# Patient Record
Sex: FEMALE | Race: WHITE | Employment: FULL TIME | URBAN - METROPOLITAN AREA
[De-identification: names, ages, dates, MRNs, and addresses within clinical notes are randomized per-mention and may not be internally consistent; named-entity substitution may affect disease eponyms.]

---

## 2017-05-25 ENCOUNTER — OFFICE VISIT (OUTPATIENT)
Dept: ORTHOPEDIC SURGERY | Age: 57
End: 2017-05-25

## 2017-05-25 VITALS
HEIGHT: 61 IN | WEIGHT: 168.2 LBS | HEART RATE: 61 BPM | SYSTOLIC BLOOD PRESSURE: 162 MMHG | DIASTOLIC BLOOD PRESSURE: 73 MMHG | TEMPERATURE: 97.8 F | BODY MASS INDEX: 31.75 KG/M2

## 2017-05-25 DIAGNOSIS — M70.61 TROCHANTERIC BURSITIS OF RIGHT HIP: ICD-10-CM

## 2017-05-25 DIAGNOSIS — M25.551 ACUTE RIGHT HIP PAIN: ICD-10-CM

## 2017-05-25 DIAGNOSIS — Z96.651 HISTORY OF TOTAL RIGHT KNEE REPLACEMENT: Primary | ICD-10-CM

## 2017-05-25 RX ORDER — BETAMETHASONE SODIUM PHOSPHATE AND BETAMETHASONE ACETATE 3; 3 MG/ML; MG/ML
6 INJECTION, SUSPENSION INTRA-ARTICULAR; INTRALESIONAL; INTRAMUSCULAR; SOFT TISSUE ONCE
Qty: 1 ML | Refills: 0
Start: 2017-05-25 | End: 2017-05-25

## 2017-05-25 RX ORDER — TRAMADOL HYDROCHLORIDE 50 MG/1
TABLET ORAL
Refills: 0 | COMMUNITY
Start: 2017-04-18 | End: 2017-12-23

## 2017-05-25 NOTE — MR AVS SNAPSHOT
Visit Information Date & Time Provider Department Dept. Phone Encounter #  
 5/25/2017  2:00 PM Ayala Monzon MD [facility] [phone] [id number] Upcoming Health Maintenance Date Due Hepatitis C Screening 1960 DTaP/Tdap/Td series (1 - Tdap) 2/23/1981 PAP AKA CERVICAL CYTOLOGY 2/23/1981 BREAST CANCER SCRN MAMMOGRAM 2/23/2010 FOBT Q 1 YEAR AGE 50-75 2/23/2010 INFLUENZA AGE 9 TO ADULT 8/1/2017 Allergies as of 5/25/2017  Review Complete On: 5/25/2017 By: Ayala Monzon MD  
 No Known Allergies Current Immunizations  Never Reviewed Name Date Pneumococcal Polysaccharide (PPSV-23) 11/12/2015 11:18 AM  
  
 Not reviewed this visit You Were Diagnosed With   
  
 Codes Comments History of total right knee replacement    -  Primary ICD-10-CM: U83.333 ICD-9-CM: V43.65 Acute right hip pain     ICD-10-CM: M25.551 ICD-9-CM: 719.45 Trochanteric bursitis of right hip     ICD-10-CM: M70.61 ICD-9-CM: 726.5 Vitals BP Pulse Temp Height(growth percentile) Weight(growth percentile) BMI  
 162/73 61 97.8 °F (36.6 °C) (Oral) 5' 1\" (1.549 m) 168 lb 3.2 oz (76.3 kg) 31.78 kg/m2 OB Status Smoking Status Hysterectomy Former Smoker BMI and BSA Data Body Mass Index Body Surface Area 31.78 kg/m 2 1.81 m 2 Preferred Pharmacy Pharmacy Name Phone [address], [address] [place] [address] [phone] Your Updated Medication List  
  
   
This list is accurate as of: 5/25/17  2:38 PM.  Always use your most recent med list.  
  
  
  
  
 acetaminophen 325 mg tablet Commonly known as:  TYLENOL Take 2 Tabs by mouth every four (4) hours as needed. ADVIL 100 mg tablet Generic drug:  ibuprofen Take 100 mg by mouth every six (6) hours as needed for Pain. ALBUTEROL IN Take  by inhalation. Emergency only multivitamin, tx-iron-ca-min 9 mg iron-400 mcg Tab tablet Commonly known as:  THERA-M w/ IRON Take 1 Tab by mouth daily. ondansetron 4 mg disintegrating tablet Commonly known as:  ZOFRAN ODT Take 1-2 Tabs by mouth every eight (8) hours as needed. oxyCODONE IR 5 mg immediate release tablet Commonly known as:  Ivan Longs Take 1-3 Tabs by mouth every four (4) hours as needed. Max Daily Amount: 90 mg.  
  
 traMADol 50 mg tablet Commonly known as:  ULTRAM  
TAKE 1 TABLET BY MOUTH TWO TIMES A DAY AS NEEDED  
  
 VITAMIN D3 1,000 unit Cap Generic drug:  cholecalciferol Take  by mouth daily. warfarin 2.5 mg tablet Commonly known as:  COUMADIN Take 1 Tab by mouth daily. GOAL INR IS 2.0-2.5. THE DOSE WILL BE ADJUSTED BASE ON THE LEVEL OF BLOOD THINNING. DURATION OF THERAPY IS 28 DAYS. We Performed the Following AMB POC X-RAY KNEE 3 VIEW [53360 CPT(R)] AMB POC X-RAY RADEX HIP UNI WITH PELVIS 2-3 VIEWS [43022 CPT(R)] Patient Instructions Patient's blood pressure was elevated at today's office visit. Patient instructed to contact  primary care physician for treatment. The doctor has ordered some laboratory studies for you. Please go to [facility] or [facility] to have your lab tests conducted. If you wish to go to another facility that is okay. Please have the lab forward a copy of the results to our office. Total Knee Replacement Rehabilitation What is knee replacement rehabilitation? Knee replacement rehabilitation (rehab) is a series of exercises you do after your surgery. This helps you get back your knee's range of motion and strength. You will work with your doctor and physical therapist to plan this exercise program. To get the best results, you need to do the exercises correctly and as often and as long as your doctor or physical therapist tells you. Before you start any exercises, talk with your doctor or physical therapist. It is important that you know exactly how to do the exercises. Stop and call your doctor if you are not sure you are doing the exercises correctly or if you have any pain. Ice your knee after exercising if it is sore. Exercises after surgery These exercises can be done right after your surgery. You can do them in your hospital bed. Talk to your doctor before you start any of these exercises. He or she may tell you to do them in a certain way. You may need to do them several times each day. When you first do them, they may hurt. Your knee may click or pop. But these exercises will help you recover and may help your pain go away faster. [employer] · Lie or sit on your bed. · Tighten the thigh muscle of your affected leg. · Hold for about 6 seconds, then rest up to 10 seconds. · Relax your thigh and knee. · Do 8 to 10 repetitions several times during the day. Straight leg raises · Lie or sit on your bed. · Tighten the thigh muscle of your affected leg, and keep the knee straight. · Lift your leg several inches, and hold it for 5 to 10 seconds. · Slowly lower your leg. · Rest a minute. Repeat 8 to 12 times with each knee. Do this exercise every day, up to 3 times a day. Ankle pumps · Lie or sit on your bed. · Tighten the thigh and calf muscles of your affected leg. This will make your foot move up and down. · Do this for 2 to 3 minutes, 2 to 3 times an hour. Quadriceps (thigh) strengthening exercise · While sitting in a chair, straighten your leg and hold for 6 seconds. Then lower your leg and rest for up to 10 seconds. · Repeat 8 to 12 times with each leg. Do every day, up to 3 times a day. · When this thigh-strengthening exercise becomes easy, you can add a light weight to your ankle. Bed knee bends · Lie or sit on your bed. · Bend your affected knee by sliding your foot toward you.  Stop when your knee no longer bends. · Hold this position for 15 to 30 seconds, and then slide your leg back down the bed. · Do this several times. Later exercises When you are able to walk on your own for a few steps or a short distance, you can try these exercises. Talk to your doctor before starting any of these exercises. He or she may tell you to do them in a certain way. Standing knee bends · Stand up straight, using a walker or crutches or hold onto something stable, such as a counter. · Lift the thigh of your affected leg so that your knee bends. · Lift as far as you can, and hold it for 5 to 10 seconds. · Lower your leg, and touch the floor with your heel first. 
· Do this 8 to 12 times or until your leg feels tired. Assisted knee bends · Lie on your back with your affected knee slightly bent. · Loop a towel over your knee, and slide it down so that is against your ankle. · Continue to bend your knee, pulling your ankle gently toward you with the towel. · Bend your knee as far as you can. · Hold for 15 to 30 seconds, and then relax your leg. · Do this 10 to 12 times or until your leg feels tired. Adding resistance About 4 to 6 weeks after surgery, you may be able to do the standing or assisted knee bends with light weights around your ankles. Begin with 1- to 2-pound weights, and slowly increase the weight as your knee and leg get stronger. Check with your doctor before you make any changes to your exercises. Follow-up care is a key part of your treatment and safety. Be sure to make and go to all appointments, and call your doctor if you are having problems. It's also a good idea to know your test results and keep a list of the medicines you take. Where can you learn more? Go to http://messi-charmaine.info/. Enter X232 in the search box to learn more about \"Total Knee Replacement Rehabilitation. \" Current as of: May 23, 2016 Content Version: 11.2 © 1070-1596 [employer]wise, Incorporated. Care instructions adapted under license by [employer] (which disclaims liability or warranty for this information). If you have questions about a medical condition or this instruction, always ask your healthcare professional. [address] any warranty or liability for your use of this information. Introducing \[facility]\"" & HEALTH SERVICES! Antonieta Rivasvermaulik introduces [employer] patient portal. Now you can access parts of your medical record, email your doctor's office, and request medication refills online. 1. In your internet browser, go to [email or web address] 2. Click on the First Time User? Click Here link in the Sign In box. You will see the New Member Sign Up page. 3. Enter your [employer] Access Code exactly as it appears below. You will not need to use this code after youve completed the sign-up process. If you do not sign up before the expiration date, you must request a new code. · [employer] Access Code: [email or web address] Expires: 8/23/2017  2:38 PM 
 
4. Enter the last four digits of your Social Security Number (xxxx) and Date of Birth (mm/dd/yyyy) as indicated and click Submit. You will be taken to the next sign-up page. 5. Create a [employer] ID. This will be your [employer] login ID and cannot be changed, so think of one that is secure and easy to remember. 6. Create a [employer] password. You can change your password at any time. 7. Enter your Password Reset Question and Answer. This can be used at a later time if you forget your password. 8. Enter your e-mail address. You will receive e-mail notification when new information is available in [address] 9. Click Sign Up. You can now view and download portions of your medical record. 10. Click the Download Summary menu link to download a portable copy of your medical information.  
 
If you have questions, please visit the Frequently Asked Questions section of the [employer]. Remember, [employer] is NOT to be used for urgent needs. For medical emergencies, dial 911. Now available from your iPhone and Android! Please provide this summary of care documentation to your next provider. Your primary care clinician is listed as Rashid Montiel. If you have any questions after today's visit, please call [phone].

## 2017-05-25 NOTE — PROGRESS NOTES
Patient: Roger Regalado                MRN: [id number]       SSN: [id number]  YOB: 1960        AGE: 62 y.o. SEX: female  Body mass index is 31.78 kg/(m^2). PCP: Amy Jacques MD  05/25/17    HISTORY: Ms. Lupe Singh is here today with complaints of right hip pain and right knee pain. Dr. Zoe Begum replaced both of her knees. She has a Sigma on the left side and a DePuy Attune on the right side. She is complaining of right knee and right hip pain. She has a little bit of start-up pain when she first ambulates for the right knee. No complications after surgery. She has otherwise been feeling well. She denies fevers, chills, night sweats or weight loss. The hip hurts to roll over on at it at night. She occasionally has some low back pain but not too much today. She does not have much groin pain either. All systems are reviewed and are updated on the EMR. PHYSICAL EXAMINATION:  She is a pleasant lady with a BMI of 32. She moves the head and neck adequately. There is no respiratory compromise or indrawing. The hips rotate well. The low back is only minimally tender. She is tender over the greater trochanter. With regards to the knees, both knees have excellent range of motion. There is perhaps a touch of mid flexion instability involving both knees but not severely so. She has some tenderness at the medial joint line and also some mild tenderness over the pes anserinus. The patella seems to be tracking quite nicely. Both feet are warm and well perfused. She does have some evidence of neuropathy distally. No strabismus, lymphadenopathy, scleral icterus or JVD. No cyanosis, peripheral edema, or clubbing. She remains a very pleasant lady. RADIOGRAPHS:  Review of her x-rays shows that her implants are very nicely aligned. I am questioning just a very faint lucent line involving the right tibia medial cement mantle along the stem. This may just be artifact.  Otherwise, the implants appear to be very well aligned. IMPRESSION:  My overall impression is fairly severe trochanteric bursitis. She has excellent range of motion involving both knees but with some right knee pain, mild to moderate. PLAN:  I explained that her knees appear to be very nicely placed. We will obtain a technetium bone scan looking specifically at the tibia. We will get some blood work as well for her. She will return to see us after these investigations. REVIEW OF SYSTEMS:      CON: negative for weight loss, fever  EYE: negative for double vision  ENT: negative for hoarseness  RS:   negative for Tb  GI:    negative for blood in stool  :  negative for blood in urine  Other systems reviewed and noted below. Past Medical History:   Diagnosis Date    Arthritis     COPD     Hepatitis C     treated     Hypercholesterolemia     Hypertension     no meds     Sleep apnea     no CPAP        Family History   Problem Relation Age of Onset    High Cholesterol Mother     Heart Disease Father     High Cholesterol Father     Diabetes Sister     High Cholesterol Sister        Current Outpatient Prescriptions   Medication Sig Dispense Refill    ibuprofen (ADVIL) 100 mg tablet Take 100 mg by mouth every six (6) hours as needed for Pain.  traMADol (ULTRAM) 50 mg tablet TAKE 1 TABLET BY MOUTH TWO TIMES A DAY AS NEEDED  0    multivitamin, tx-iron-ca-min (THERA-M W/ IRON) 9 mg iron-400 mcg tab tablet Take 1 Tab by mouth daily.  ALBUTEROL IN Take  by inhalation. Emergency only      Cholecalciferol, Vitamin D3, (VITAMIN D3) 1,000 unit cap Take  by mouth daily.  ondansetron (ZOFRAN ODT) 4 mg disintegrating tablet Take 1-2 Tabs by mouth every eight (8) hours as needed. 15 Tab 0    oxyCODONE IR (ROXICODONE) 5 mg immediate release tablet Take 1-3 Tabs by mouth every four (4) hours as needed. Max Daily Amount: 90 mg. 60 Tab 0    warfarin (COUMADIN) 2.5 mg tablet Take 1 Tab by mouth daily.  GOAL INR IS 2.0-2.5. THE DOSE WILL BE ADJUSTED BASE ON THE LEVEL OF BLOOD THINNING. DURATION OF THERAPY IS 28 DAYS. 30 Tab 1    acetaminophen (TYLENOL) 325 mg tablet Take 2 Tabs by mouth every four (4) hours as needed. 40 Tab 0       No Known Allergies    Past Surgical History:   Procedure Laterality Date    HC STPLER HEMMORROID PPH01      HX  SECTION  1984    HX CHOLECYSTECTOMY  2005    HX HYSTERECTOMY  2008    HX KNEE ARTHROSCOPY  2009    Bilateral    HX TONSILLECTOMY  1968    OSSEOUS SURGERY PER QUADRANT         Social History     Social History    Marital status: SINGLE     Spouse name: N/A    Number of children: N/A    Years of education: N/A     Occupational History    Not on file. Social History Main Topics    Smoking status: Former Smoker     Packs/day: 1.00     Years: 40.00     Types: Cigarettes     Quit date: 10/21/2015    Smokeless tobacco: Never Used    Alcohol use No    Drug use: No    Sexual activity: Not on file     Other Topics Concern    Not on file     Social History Narrative       Visit Vitals    /73    Pulse 61    Temp 97.8 °F (36.6 °C) (Oral)    Ht 5' 1\" (1.549 m)    Wt 168 lb 3.2 oz (76.3 kg)    BMI 31.78 kg/m2         PHYSICAL EXAMINATION:  GENERAL: Alert and oriented x3, in no acute distress, well-developed, well-nourished, afebrile. HEART: No JVD. EYES: No scleral icterus   NECK: No significant lymphadenopathy   LUNGS: No respiratory compromise or indrawing  ABDOMEN: Soft, non-tender, non-distended. Electronically signed by:  Carlos Martínez MD

## 2017-05-25 NOTE — PATIENT INSTRUCTIONS
Patient's blood pressure was elevated at today's office visit. Patient instructed to contact  primary care physician for treatment. The doctor has ordered some laboratory studies for you. Please go to [facility] or [facility] to have your lab tests conducted. If you wish to go to another facility that is okay. Please have the lab forward a copy of the results to our office. Total Knee Replacement Rehabilitation  What is knee replacement rehabilitation? Knee replacement rehabilitation (rehab) is a series of exercises you do after your surgery. This helps you get back your knee's range of motion and strength. You will work with your doctor and physical therapist to plan this exercise program. To get the best results, you need to do the exercises correctly and as often and as long as your doctor or physical therapist tells you. Before you start any exercises, talk with your doctor or physical therapist. It is important that you know exactly how to do the exercises. Stop and call your doctor if you are not sure you are doing the exercises correctly or if you have any pain. Ice your knee after exercising if it is sore. Exercises after surgery  These exercises can be done right after your surgery. You can do them in your hospital bed. Talk to your doctor before you start any of these exercises. He or she may tell you to do them in a certain way. You may need to do them several times each day. When you first do them, they may hurt. Your knee may click or pop. But these exercises will help you recover and may help your pain go away faster. Quad sets  · Lie or sit on your bed. · Tighten the thigh muscle of your affected leg. · Hold for about 6 seconds, then rest up to 10 seconds. · Relax your thigh and knee. · Do 8 to 10 repetitions several times during the day. Straight leg raises  · Lie or sit on your bed.   · Tighten the thigh muscle of your affected leg, and keep the knee straight. · Lift your leg several inches, and hold it for 5 to 10 seconds. · Slowly lower your leg. · Rest a minute. Repeat 8 to 12 times with each knee. Do this exercise every day, up to 3 times a day. Ankle pumps  · Lie or sit on your bed. · Tighten the thigh and calf muscles of your affected leg. This will make your foot move up and down. · Do this for 2 to 3 minutes, 2 to 3 times an hour. Quadriceps (thigh) strengthening exercise   · While sitting in a chair, straighten your leg and hold for 6 seconds. Then lower your leg and rest for up to 10 seconds. · Repeat 8 to 12 times with each leg. Do every day, up to 3 times a day. · When this thigh-strengthening exercise becomes easy, you can add a light weight to your ankle. Bed knee bends  · Lie or sit on your bed. · Bend your affected knee by sliding your foot toward you. Stop when your knee no longer bends. · Hold this position for 15 to 30 seconds, and then slide your leg back down the bed. · Do this several times. Later exercises  When you are able to walk on your own for a few steps or a short distance, you can try these exercises. Talk to your doctor before starting any of these exercises. He or she may tell you to do them in a certain way. Standing knee bends  · Stand up straight, using a walker or crutches or hold onto something stable, such as a counter. · Lift the thigh of your affected leg so that your knee bends. · Lift as far as you can, and hold it for 5 to 10 seconds. · Lower your leg, and touch the floor with your heel first.  · Do this 8 to 12 times or until your leg feels tired. Assisted knee bends  · Lie on your back with your affected knee slightly bent. · Loop a towel over your knee, and slide it down so that is against your ankle. · Continue to bend your knee, pulling your ankle gently toward you with the towel. · Bend your knee as far as you can. · Hold for 15 to 30 seconds, and then relax your leg.   · Do this 10 to 12 times or until your leg feels tired. Adding resistance  About 4 to 6 weeks after surgery, you may be able to do the standing or assisted knee bends with light weights around your ankles. Begin with 1- to 2-pound weights, and slowly increase the weight as your knee and leg get stronger. Check with your doctor before you make any changes to your exercises. Follow-up care is a key part of your treatment and safety. Be sure to make and go to all appointments, and call your doctor if you are having problems. It's also a good idea to know your test results and keep a list of the medicines you take. Where can you learn more? Go to http://messi-charmaine.info/. Enter P486 in the search box to learn more about \"Total Knee Replacement Rehabilitation. \"  Current as of: May 23, 2016  Content Version: 11.2  © 4452-8613 [employer]. Care instructions adapted under license by [employer] (which disclaims liability or warranty for this information). If you have questions about a medical condition or this instruction, always ask your healthcare professional. [address] any warranty or liability for your use of this information.

## 2017-07-24 ENCOUNTER — HOSPITAL ENCOUNTER (OUTPATIENT)
Dept: NUCLEAR MEDICINE | Age: 57
Discharge: HOME | End: 2017-07-24
Attending: ORTHOPAEDIC SURGERY
Payer: COMMERCIAL

## 2017-07-24 ENCOUNTER — TELEPHONE (OUTPATIENT)
Dept: ORTHOPEDIC SURGERY | Facility: CLINIC | Age: 57
End: 2017-07-24

## 2017-07-24 ENCOUNTER — HOSPITAL ENCOUNTER (OUTPATIENT)
Dept: GENERAL RADIOLOGY | Age: 57
Discharge: HOME | End: 2017-07-24
Attending: ORTHOPAEDIC SURGERY
Payer: COMMERCIAL

## 2017-07-24 DIAGNOSIS — Z96.651 HISTORY OF TOTAL RIGHT KNEE REPLACEMENT: ICD-10-CM

## 2017-07-24 DIAGNOSIS — Z96.651 HISTORY OF TOTAL RIGHT KNEE REPLACEMENT: Primary | ICD-10-CM

## 2017-07-24 PROCEDURE — 78315 BONE IMAGING 3 PHASE: CPT

## 2017-07-24 PROCEDURE — 73562 X-RAY EXAM OF KNEE 3: CPT

## 2017-07-24 NOTE — TELEPHONE ENCOUNTER
LIBERTY FROM [facility] CALLED FOR DR. Aldair Avila. LIBERTY SAID SHE NEEDS A NEW ORDER FOR THE PATIENT TO GET AN XRAY ON HER RIGHT KNEE. Megha Griffin. [phone]. FAX# [phone].

## 2017-07-25 ENCOUNTER — HOSPITAL ENCOUNTER (OUTPATIENT)
Dept: NUCLEAR MEDICINE | Age: 57
Discharge: HOME | End: 2017-07-25
Attending: ORTHOPAEDIC SURGERY
Payer: COMMERCIAL

## 2017-07-25 PROCEDURE — 78805 NM INFLAM PROC LTD: CPT

## 2017-07-26 ENCOUNTER — HOSPITAL ENCOUNTER (OUTPATIENT)
Dept: NUCLEAR MEDICINE | Age: 57
Discharge: HOME | End: 2017-07-26
Attending: ORTHOPAEDIC SURGERY
Payer: COMMERCIAL

## 2017-07-26 DIAGNOSIS — Z96.651 HISTORY OF TOTAL RIGHT KNEE REPLACEMENT: ICD-10-CM

## 2017-07-26 PROCEDURE — 78102 BONE MARROW IMAGING LTD: CPT

## 2017-08-08 ENCOUNTER — HOSPITAL ENCOUNTER (OUTPATIENT)
Dept: LAB | Age: 57
Discharge: HOME | End: 2017-08-08
Payer: COMMERCIAL

## 2017-08-08 DIAGNOSIS — Z96.651 HISTORY OF TOTAL RIGHT KNEE REPLACEMENT: ICD-10-CM

## 2017-08-08 LAB
BASOPHILS # BLD AUTO: 0 K/UL (ref 0–0.1)
BASOPHILS # BLD: 0 % (ref 0–2)
DIFFERENTIAL METHOD BLD: ABNORMAL
EOSINOPHIL # BLD: 0 K/UL (ref 0–0.4)
EOSINOPHIL NFR BLD: 0 % (ref 0–5)
ERYTHROCYTE [DISTWIDTH] IN BLOOD BY AUTOMATED COUNT: 13.2 % (ref 11.6–14.5)
ERYTHROCYTE [SEDIMENTATION RATE] IN BLOOD: 4 MM/HR (ref 0–30)
HCT VFR BLD AUTO: 45.5 % (ref 35–45)
HGB BLD-MCNC: 15.5 G/DL (ref 12–16)
LYMPHOCYTES # BLD AUTO: 15 % (ref 21–52)
LYMPHOCYTES # BLD: 1.1 K/UL (ref 0.9–3.6)
MCH RBC QN AUTO: 29.7 PG (ref 24–34)
MCHC RBC AUTO-ENTMCNC: 34.1 G/DL (ref 31–37)
MCV RBC AUTO: 87.2 FL (ref 74–97)
MONOCYTES # BLD: 0.2 K/UL (ref 0.05–1.2)
MONOCYTES NFR BLD AUTO: 2 % (ref 3–10)
NEUTS SEG # BLD: 6 K/UL (ref 1.8–8)
NEUTS SEG NFR BLD AUTO: 83 % (ref 40–73)
PLATELET # BLD AUTO: 229 K/UL (ref 135–420)
PMV BLD AUTO: 10.2 FL (ref 9.2–11.8)
RBC # BLD AUTO: 5.22 M/UL (ref 4.2–5.3)
URATE SERPL-MCNC: 4.4 MG/DL (ref 2.6–7.2)
WBC # BLD AUTO: 7.3 K/UL (ref 4.6–13.2)

## 2017-08-08 PROCEDURE — 85652 RBC SED RATE AUTOMATED: CPT | Performed by: ORTHOPAEDIC SURGERY

## 2017-08-08 PROCEDURE — 84550 ASSAY OF BLOOD/URIC ACID: CPT | Performed by: ORTHOPAEDIC SURGERY

## 2017-08-08 PROCEDURE — 36415 COLL VENOUS BLD VENIPUNCTURE: CPT | Performed by: ORTHOPAEDIC SURGERY

## 2017-08-08 PROCEDURE — 85025 COMPLETE CBC W/AUTO DIFF WBC: CPT | Performed by: ORTHOPAEDIC SURGERY

## 2017-08-08 PROCEDURE — 83520 IMMUNOASSAY QUANT NOS NONAB: CPT | Performed by: ORTHOPAEDIC SURGERY

## 2017-08-08 PROCEDURE — 86140 C-REACTIVE PROTEIN: CPT | Performed by: ORTHOPAEDIC SURGERY

## 2017-08-09 LAB — CRP SERPL-MCNC: 0.6 MG/DL (ref 0–0.3)

## 2017-08-10 ENCOUNTER — OFFICE VISIT (OUTPATIENT)
Dept: ORTHOPEDIC SURGERY | Age: 57
End: 2017-08-10

## 2017-08-10 VITALS
WEIGHT: 167.8 LBS | HEART RATE: 81 BPM | BODY MASS INDEX: 31.68 KG/M2 | SYSTOLIC BLOOD PRESSURE: 181 MMHG | TEMPERATURE: 96.1 F | RESPIRATION RATE: 15 BRPM | DIASTOLIC BLOOD PRESSURE: 97 MMHG | OXYGEN SATURATION: 93 % | HEIGHT: 61 IN

## 2017-08-10 DIAGNOSIS — M70.51 INFRAPATELLAR BURSITIS OF RIGHT KNEE: ICD-10-CM

## 2017-08-10 DIAGNOSIS — M70.51 PES ANSERINUS BURSITIS OF RIGHT KNEE: Primary | ICD-10-CM

## 2017-08-10 LAB — IL6 SERPL-MCNC: <0.7 PG/ML (ref 0–15.5)

## 2017-08-10 RX ORDER — DICLOFENAC SODIUM 10 MG/G
4 GEL TOPICAL 4 TIMES DAILY
Qty: 5 EACH | Refills: 0 | Status: SHIPPED | OUTPATIENT
Start: 2017-08-10 | End: 2021-11-26 | Stop reason: SDUPTHER

## 2017-08-10 RX ORDER — BETAMETHASONE SODIUM PHOSPHATE AND BETAMETHASONE ACETATE 3; 3 MG/ML; MG/ML
6 INJECTION, SUSPENSION INTRA-ARTICULAR; INTRALESIONAL; INTRAMUSCULAR; SOFT TISSUE ONCE
Qty: 1 ML | Refills: 0
Start: 2017-08-10 | End: 2017-08-18

## 2017-08-10 RX ORDER — MELOXICAM 15 MG/1
TABLET ORAL
Qty: 30 TAB | Refills: 2 | Status: SHIPPED | OUTPATIENT
Start: 2017-08-10 | End: 2020-09-15 | Stop reason: SDUPTHER

## 2017-08-10 RX ORDER — BETAMETHASONE SODIUM PHOSPHATE AND BETAMETHASONE ACETATE 3; 3 MG/ML; MG/ML
6 INJECTION, SUSPENSION INTRA-ARTICULAR; INTRALESIONAL; INTRAMUSCULAR; SOFT TISSUE ONCE
Qty: 1 VIAL | Refills: 0
Start: 2017-08-10 | End: 2017-08-18

## 2017-08-10 RX ORDER — HYDROCODONE BITARTRATE AND ACETAMINOPHEN 5; 325 MG/1; MG/1
1 TABLET ORAL
Qty: 21 TAB | Refills: 0 | Status: SHIPPED | OUTPATIENT
Start: 2017-08-10 | End: 2017-12-23

## 2017-08-10 RX ORDER — BETAMETHASONE SODIUM PHOSPHATE AND BETAMETHASONE ACETATE 3; 3 MG/ML; MG/ML
6 INJECTION, SUSPENSION INTRA-ARTICULAR; INTRALESIONAL; INTRAMUSCULAR; SOFT TISSUE ONCE
Qty: 1 VIAL | Refills: 0 | Status: CANCELLED
Start: 2017-08-10 | End: 2017-08-10

## 2017-08-10 RX ORDER — BETAMETHASONE SODIUM PHOSPHATE AND BETAMETHASONE ACETATE 3; 3 MG/ML; MG/ML
6 INJECTION, SUSPENSION INTRA-ARTICULAR; INTRALESIONAL; INTRAMUSCULAR; SOFT TISSUE ONCE
Qty: 1 ML | Refills: 0 | Status: CANCELLED
Start: 2017-08-10 | End: 2017-08-10

## 2017-08-10 NOTE — PROGRESS NOTES
[address] [facility], [address] [facility]  [phone]           Patient: Wander Schneider                MRN: [id number]       SSN: [id number]  YOB: 1960        AGE: 62 y.o. SEX: female  Body mass index is 31.71 kg/(m^2). PCP: None  08/10/17      This office note has been dictated. REVIEW OF SYSTEMS:  Constitutional: Negative for fever, chills, weight loss and malaise/fatigue. HENT: Negative. Eyes: Negative. Respiratory: Negative. Cardiovascular: Negative. Gastrointestinal: No bowel incontinence or constipation. Genitourinary: No bladder incontinence or saddle anesthesia. Skin: Negative. Neurological: Negative. Endo/Heme/Allergies: Negative. Psychiatric/Behavioral: Negative. Musculoskeletal: As per HPI above. Past Medical History:   Diagnosis Date    Arthritis     COPD     Hepatitis C     treated     Hypercholesterolemia     Hypertension     no meds     Sleep apnea     no CPAP          Current Outpatient Prescriptions:     ibuprofen (ADVIL) 100 mg tablet, Take 100 mg by mouth every six (6) hours as needed for Pain., Disp: , Rfl:     multivitamin, tx-iron-ca-min (THERA-M W/ IRON) 9 mg iron-400 mcg tab tablet, Take 1 Tab by mouth daily. , Disp: , Rfl:     acetaminophen (TYLENOL) 325 mg tablet, Take 2 Tabs by mouth every four (4) hours as needed. , Disp: 40 Tab, Rfl: 0    ALBUTEROL IN, Take  by inhalation. Emergency only, Disp: , Rfl:     Cholecalciferol, Vitamin D3, (VITAMIN D3) 1,000 unit cap, Take  by mouth daily. , Disp: , Rfl:     traMADol (ULTRAM) 50 mg tablet, TAKE 1 TABLET BY MOUTH TWO TIMES A DAY AS NEEDED, Disp: , Rfl: 0    ondansetron (ZOFRAN ODT) 4 mg disintegrating tablet, Take 1-2 Tabs by mouth every eight (8) hours as needed. , Disp: 15 Tab, Rfl: 0    oxyCODONE IR (ROXICODONE) 5 mg immediate release tablet, Take 1-3 Tabs by mouth every four (4) hours as needed.  Max Daily Amount: 90 mg., Disp: 60 Tab, Rfl: 0    warfarin (COUMADIN) 2.5 mg tablet, Take 1 Tab by mouth daily. GOAL INR IS 2.0-2.5. THE DOSE WILL BE ADJUSTED BASE ON THE LEVEL OF BLOOD THINNING. DURATION OF THERAPY IS 28 DAYS., Disp: 30 Tab, Rfl: 1    No Known Allergies    Social History     Social History    Marital status: SINGLE     Spouse name: N/A    Number of children: N/A    Years of education: N/A     Occupational History    Not on file. Social History Main Topics    Smoking status: Former Smoker     Packs/day: 1.00     Years: 40.00     Types: Cigarettes     Quit date: 10/21/2015    Smokeless tobacco: Never Used    Alcohol use No    Drug use: No    Sexual activity: Not on file     Other Topics Concern    Not on file     Social History Narrative       Past Surgical History:   Procedure Laterality Date    [facility] HEMMORROID PPH01      HX  SECTION      HX CHOLECYSTECTOMY      HX HYSTERECTOMY      HX KNEE ARTHROSCOPY  2009    Bilateral    HX TONSILLECTOMY  1968    OSSEOUS SURGERY PER QUADRANT             * Patient was identified by name and date of birth   * Agreement on procedure being performed was verified  * Risks and Benefits explained to the patient  * Procedure site verified and marked as necessary  * Patient was positioned for comfort  * Consent was signed and verified  4:23 PM    The patient was instructed on post injection care. The patient is seen today for reevaluation of mainly her right knee. She has had bilateral knee replacements done by Dr. Darryl Reynolds. Dr. Khurram Salmeron saw her and did an injection for her trochanteric bursitis, which helped her moderately. she does continue to have knee pain, however, when she is up and ambulating. She has soreness after being seated. She does have a little bit of feelings of instability of the knee at times. There is no locking or giving way. There are no injuries and no falls.   She is now about 18 months out of her knee surgery. She states that it did feel good early on. PHYSICAL EXAMINATION: In general, the patient is alert and oriented x 3 and is in no acute distress. The patient is well-developed and well-nourished with a normal affect. The patient is afebrile. HEENT:  Head is normocephalic and atraumatic. Pupils are equally round and reactive to light and accommodation. Extraocular eye movements are intact. Neck is supple. Trachea is midline. No JVD is present. Breathing is nonlabored. Examination of the lower extremities reveals pain-free range of motion of the hips. She dies gave discomfort with palpation of the trochanteric bursa on the right side. There is a negative straight leg raise, negative calf tenderness, and negative Zurdos sign. There are no signs of DVT present. Examination of the right knee reveals the skin is intact. The surgical wounds are healed nicely. There is no ecchymosis, no warmth, and no signs of infection or cellulitis present. She has good range of motion. The patella tracks nicely. There are no rubs or crepitus noted. There is a little bit of laxity to the knee ___mid-flexion. There is tenderness to palpation globally to the knee to the medial and lateral joint lines at the pes bursa, and she also has some discomfort to the tibialis anterior with resisted dorsiflexion. LABORATORY STUDIES:   Review of blood work shows CBC white count is 7.3. CRP is 0.6. IL-6 is less than 0.7. Sed rate is 4. BONE SCAN REVIEW:   Three-phase bone scan, as well as indium sulfur colloid shows no evidence of loosening and no evidence for active infection or inflammation. ASSESSMENT:      1. Status post right knee replacement done elsewhere. 2. Synovitis of the right knee. 3. Pes bursitis of the right knee. 4. Tibialis anterior tendinitis. PLAN:  At this point, we are going to move forward with cortisone injection for the knee.   Today, after informed and written consent, under aseptic conditions, with ultrasound-guided assistance, the right knee was prepped with Betadine and 6 mg of Celestone was injected to the knee proper and 3 mg to the pes bursa without complications. The patient tolerated the injection well. She was instructed on post injection care. We are going to start her on Mobic 15 mg once a day with foot, as well as Voltaren Gel to apply to the area qid. We will give her a prescription for Norco, one every eight hours #21. She is going to followup with us in one months time for evaluation.                    JR Parrish LOMBARDI, PA-C, ATC

## 2017-08-11 NOTE — TELEPHONE ENCOUNTER
[employer] needs PA submitted for Diclofenac Sodium. Use Key [id number]. Complete form on enter Key.  Please notify [facility] when determination from plan recd at [phone]

## 2017-08-15 NOTE — TELEPHONE ENCOUNTER
Received a fax from [employer] [facility] stating a prior auth request for Voltaren Gel has been approved effective 07/15/2017 through 08/14/2018.

## 2017-08-18 RX ORDER — BETAMETHASONE SODIUM PHOSPHATE AND BETAMETHASONE ACETATE 3; 3 MG/ML; MG/ML
6 INJECTION, SUSPENSION INTRA-ARTICULAR; INTRALESIONAL; INTRAMUSCULAR; SOFT TISSUE ONCE
Qty: 1 ML | Refills: 0
Start: 2017-08-18 | End: 2017-08-18

## 2017-10-04 ENCOUNTER — OFFICE VISIT (OUTPATIENT)
Dept: ORTHOPEDIC SURGERY | Facility: CLINIC | Age: 57
End: 2017-10-04

## 2017-10-04 VITALS
OXYGEN SATURATION: 96 % | DIASTOLIC BLOOD PRESSURE: 79 MMHG | BODY MASS INDEX: 32.25 KG/M2 | HEIGHT: 61 IN | HEART RATE: 76 BPM | TEMPERATURE: 97.5 F | WEIGHT: 170.8 LBS | SYSTOLIC BLOOD PRESSURE: 152 MMHG

## 2017-10-04 DIAGNOSIS — Z96.651 HISTORY OF TOTAL RIGHT KNEE REPLACEMENT: ICD-10-CM

## 2017-10-04 DIAGNOSIS — M12.269: ICD-10-CM

## 2017-10-04 DIAGNOSIS — M70.62 TROCHANTERIC BURSITIS OF LEFT HIP: Primary | ICD-10-CM

## 2017-10-04 DIAGNOSIS — M70.51 PES ANSERINUS BURSITIS OF RIGHT KNEE: ICD-10-CM

## 2017-10-04 RX ORDER — BETAMETHASONE SODIUM PHOSPHATE AND BETAMETHASONE ACETATE 3; 3 MG/ML; MG/ML
6 INJECTION, SUSPENSION INTRA-ARTICULAR; INTRALESIONAL; INTRAMUSCULAR; SOFT TISSUE ONCE
Qty: 1 ML | Refills: 0
Start: 2017-10-04 | End: 2017-10-04

## 2017-10-04 NOTE — PROGRESS NOTES
[address] [facility], [address] [facility]  [phone]           Patient: Alexandra Baer                MRN: [id number]       SSN: [id number]  YOB: 1960        AGE: 62 y.o. SEX: female  Body mass index is 32.27 kg/(m^2). PCP: None  10/04/17      This office note has been dictated. REVIEW OF SYSTEMS:  Constitutional: Negative for fever, chills, weight loss and malaise/fatigue. HENT: Negative. Eyes: Negative. Respiratory: Negative. Cardiovascular: Negative. Gastrointestinal: No bowel incontinence or constipation. Genitourinary: No bladder incontinence or saddle anesthesia. Skin: Negative. Neurological: Negative. Endo/Heme/Allergies: Negative. Psychiatric/Behavioral: Negative. Musculoskeletal: As per HPI above. Past Medical History:   Diagnosis Date    Arthritis     COPD     Hepatitis C     treated     Hypercholesterolemia     Hypertension     no meds     Sleep apnea     no CPAP          Current Outpatient Prescriptions:     meloxicam (MOBIC) 15 mg tablet, 1 tab by mouth daily with food, Disp: 30 Tab, Rfl: 2    HYDROcodone-acetaminophen (NORCO) 5-325 mg per tablet, Take 1 Tab by mouth every eight (8) hours as needed for Pain. Max Daily Amount: 3 Tabs., Disp: 21 Tab, Rfl: 0    diclofenac (VOLTAREN) 1 % gel, Apply 4 g to affected area four (4) times daily. Maximum 16 grams per joint per day. Dispense 5 100 gram tubes, Disp: 5 Each, Rfl: 0    ibuprofen (ADVIL) 100 mg tablet, Take 100 mg by mouth every six (6) hours as needed for Pain., Disp: , Rfl:     traMADol (ULTRAM) 50 mg tablet, TAKE 1 TABLET BY MOUTH TWO TIMES A DAY AS NEEDED, Disp: , Rfl: 0    ondansetron (ZOFRAN ODT) 4 mg disintegrating tablet, Take 1-2 Tabs by mouth every eight (8) hours as needed. , Disp: 15 Tab, Rfl: 0    oxyCODONE IR (ROXICODONE) 5 mg immediate release tablet, Take 1-3 Tabs by mouth every four (4) hours as needed.  Max Daily Amount: 90 mg., Disp: 60 Tab, Rfl: 0    warfarin (COUMADIN) 2.5 mg tablet, Take 1 Tab by mouth daily. GOAL INR IS 2.0-2.5. THE DOSE WILL BE ADJUSTED BASE ON THE LEVEL OF BLOOD THINNING. DURATION OF THERAPY IS 28 DAYS., Disp: 30 Tab, Rfl: 1    multivitamin, tx-iron-ca-min (THERA-M W/ IRON) 9 mg iron-400 mcg tab tablet, Take 1 Tab by mouth daily. , Disp: , Rfl:     acetaminophen (TYLENOL) 325 mg tablet, Take 2 Tabs by mouth every four (4) hours as needed. , Disp: 40 Tab, Rfl: 0    ALBUTEROL IN, Take  by inhalation. Emergency only, Disp: , Rfl:     Cholecalciferol, Vitamin D3, (VITAMIN D3) 1,000 unit cap, Take  by mouth daily. , Disp: , Rfl:     No Known Allergies    Social History     Social History    Marital status: SINGLE     Spouse name: N/A    Number of children: N/A    Years of education: N/A     Occupational History    Not on file. Social History Main Topics    Smoking status: Former Smoker     Packs/day: 1.00     Years: 40.00     Types: Cigarettes     Quit date: 10/21/2015    Smokeless tobacco: Never Used    Alcohol use No    Drug use: No    Sexual activity: Not on file     Other Topics Concern    Not on file     Social History Narrative       Past Surgical History:   Procedure Laterality Date    [facility] HEMMORROID PPH01      HX  SECTION  1984    HX CHOLECYSTECTOMY  2005    HX HYSTERECTOMY      HX KNEE ARTHROSCOPY  2009    Bilateral    HX TONSILLECTOMY  1968    OSSEOUS SURGERY PER QUADRANT             * Patient was identified by name and date of birth   * Agreement on procedure being performed was verified  * Risks and Benefits explained to the patient  * Procedure site verified and marked as necessary  * Patient was positioned for comfort  * Consent was signed and verified  9:39 AM    The patient was instructed on post injection care. We did see Ms. Armenta for reevaluation of her bilateral lower extremities.   The patient is complaining of a little laterally based left foot pain. She had an injection for trochanteric bursitis in the right hip in the past, which had worked well for her. She had discomfort, laterally based, to the left hip, which is worse when she is ambulating, afterwards lying on that side at night. The patient has had knee replacements done elsewhere approximately one year ago. We recently worked her up for infection and loosening. Fortunately, all have been negative. I did an injection for her synovitis and bursitis of the right knee at her last visit, which helped her considerably. She is currently using Voltaren Gel, which does help. She is on her feet quite a bit during the day and does have a little bit of achiness by the end of the day when she gets home at night. She has had no recent fevers, chills, systemic changes, and no injuries to report. PHYSICAL EXAMINATION:  In general, the patient is alert and oriented x 3 in no acute distress. The patient is well-developed, well-nourished, with a normal affect. The patient is afebrile. HEENT:  Head is normocephalic and atraumatic. Pupils are equally round and reactive to light and accommodation. Extraocular eye movements are intact. Neck is supple. Trachea is midline. No JVD is present. Breathing is nonlabored. Examination of the lower extremities reveals pain-free range of motion of the hips. She does have pain to palpation of the greater trochanteric bursa on the left side, none on the right. There is negative straight leg raise. There is negative calf tenderness. There is negative Zurdos. There is no evidence of DVT present. Examination of each of the knees reveals the skin is intact. There is no ecchymosis and no warmth. There are no signs for infection or cellulitis present. She has well-maintained range of motion, just some slight laxity in mid-flexion to the right knee, very acceptable, however. The patella tracks nicely.   There are no rubs or crepitus noted. There is no pain to palpation about the knees today. ASSESSMENT:      1. Left hip trochanteric bursitis. 2. Bilateral knee replacements. 3. Synovitis right knee, improved. 4. Pes bursitis right knee, improved. PLAN:  At this point, the patient will continue with Voltaren Gel to the knees four times a day as needed. We will move forward with a cortisone injection for the left hip today. Under aseptic conditions, and after informed and written consent, and a time out performed, left hip was prepped with Betadine and 6 mg of Celestone was injected without complications. The patient tolerated the injection well. The patient was instructed on post injection care. We will see her back in the office in about three months time for evaluation, at which point we can repeat the injection for the knees if necessary. She will call with any questions or concerns that shall arise.                     JR Parrish LOMBARDI, ABDI, ATC

## 2017-12-23 ENCOUNTER — APPOINTMENT (OUTPATIENT)
Dept: GENERAL RADIOLOGY | Age: 57
End: 2017-12-23
Attending: PHYSICIAN ASSISTANT
Payer: COMMERCIAL

## 2017-12-23 ENCOUNTER — HOSPITAL ENCOUNTER (EMERGENCY)
Age: 57
Discharge: HOME | End: 2017-12-23
Attending: EMERGENCY MEDICINE
Payer: COMMERCIAL

## 2017-12-23 VITALS
WEIGHT: 166 LBS | HEART RATE: 71 BPM | TEMPERATURE: 97.7 F | RESPIRATION RATE: 20 BRPM | BODY MASS INDEX: 32.59 KG/M2 | DIASTOLIC BLOOD PRESSURE: 95 MMHG | SYSTOLIC BLOOD PRESSURE: 186 MMHG | OXYGEN SATURATION: 97 % | HEIGHT: 60 IN

## 2017-12-23 DIAGNOSIS — I10 ESSENTIAL HYPERTENSION: ICD-10-CM

## 2017-12-23 DIAGNOSIS — S80.01XA CONTUSION OF RIGHT KNEE, INITIAL ENCOUNTER: ICD-10-CM

## 2017-12-23 DIAGNOSIS — S80.02XA CONTUSION OF LEFT KNEE, INITIAL ENCOUNTER: ICD-10-CM

## 2017-12-23 DIAGNOSIS — S90.31XA CONTUSION OF RIGHT FOOT, INITIAL ENCOUNTER: Primary | ICD-10-CM

## 2017-12-23 PROCEDURE — 73564 X-RAY EXAM KNEE 4 OR MORE: CPT

## 2017-12-23 PROCEDURE — 99283 EMERGENCY DEPT VISIT LOW MDM: CPT

## 2017-12-23 PROCEDURE — 73590 X-RAY EXAM OF LOWER LEG: CPT

## 2017-12-23 PROCEDURE — 74011250637 HC RX REV CODE- 250/637: Performed by: PHYSICIAN ASSISTANT

## 2017-12-23 PROCEDURE — 73630 X-RAY EXAM OF FOOT: CPT

## 2017-12-23 RX ORDER — HYDROCODONE BITARTRATE AND ACETAMINOPHEN 5; 325 MG/1; MG/1
1 TABLET ORAL
Status: COMPLETED | OUTPATIENT
Start: 2017-12-23 | End: 2017-12-23

## 2017-12-23 RX ORDER — HYDROCODONE BITARTRATE AND ACETAMINOPHEN 5; 325 MG/1; MG/1
1 TABLET ORAL
Qty: 6 TAB | Refills: 0 | OUTPATIENT
Start: 2017-12-23 | End: 2019-10-13

## 2017-12-23 RX ADMIN — HYDROCODONE BITARTRATE AND ACETAMINOPHEN 1 TABLET: 5; 325 TABLET ORAL at 14:53

## 2017-12-23 NOTE — ED NOTES
I have reviewed discharge instructions with the patient. The patient verbalized understanding. Current Discharge Medication List      START taking these medications    Details   HYDROcodone-acetaminophen (NORCO) 5-325 mg per tablet Take 1 Tab by mouth every six (6) hours as needed for Pain. Max Daily Amount: 4 Tabs.   Qty: 6 Tab, Refills: 0    Associated Diagnoses: Contusion of right knee, initial encounter         Patient armband removed and shredded

## 2017-12-23 NOTE — ED PROVIDER NOTES
EMERGENCY DEPARTMENT HISTORY AND PHYSICAL EXAM    2:31 PM      Date: 12/23/2017  Patient Name: Brock Patterson    History of Presenting Illness     Chief Complaint   Patient presents with    Ankle Injury    Knee Injury    Fall         History Provided By: Patient    Chief Complaint: b/l knee pain, R ankle pain  Duration:  Hours  Timing:  Acute  Location: b/l knee  Quality: Stabbing  Severity: Moderate  Modifying Factors: Worse with movement  Associated Symptoms: denies any other associated signs or symptoms      Additional History (Context): Brock Patterson is a 62 y.o. female with COPD, arthritis, HTN and past surgical hx of TKR who presents with c/o R ankle, b/l knee pain after a trip and fall 30 minutes PTA. Pt notes she twisted her ankle and fell forward off one step and landed on both of her knees. Denies head injury, LOC. Denies dizziness, chest pain, dyspnea, weakness, numbness/tingling. Took Motrin PTA    PCP: None    Current Outpatient Prescriptions   Medication Sig Dispense Refill    HYDROcodone-acetaminophen (NORCO) 5-325 mg per tablet Take 1 Tab by mouth every six (6) hours as needed for Pain. Max Daily Amount: 4 Tabs. 6 Tab 0    meloxicam (MOBIC) 15 mg tablet 1 tab by mouth daily with food 30 Tab 2    diclofenac (VOLTAREN) 1 % gel Apply 4 g to affected area four (4) times daily. Maximum 16 grams per joint per day. Dispense 5 100 gram tubes 5 Each 0    ibuprofen (ADVIL) 100 mg tablet Take 100 mg by mouth every six (6) hours as needed for Pain.  multivitamin, tx-iron-ca-min (THERA-M W/ IRON) 9 mg iron-400 mcg tab tablet Take 1 Tab by mouth daily.  ALBUTEROL IN Take  by inhalation. Emergency only      Cholecalciferol, Vitamin D3, (VITAMIN D3) 1,000 unit cap Take  by mouth daily.  ondansetron (ZOFRAN ODT) 4 mg disintegrating tablet Take 1-2 Tabs by mouth every eight (8) hours as needed.  15 Tab 0       Past History     Past Medical History:  Past Medical History:   Diagnosis Date  Arthritis     COPD     Hepatitis C     treated     Hypercholesterolemia     Hypertension     no meds     Sleep apnea     no CPAP        Past Surgical History:  Past Surgical History:   Procedure Laterality Date    [facility] HEMMORROID PPH01      HX  SECTION  1984    HX CHOLECYSTECTOMY  2005    HX HYSTERECTOMY  2008    HX KNEE ARTHROSCOPY   2010    Bilateral    HX TONSILLECTOMY  1968    OSSEOUS SURGERY PER QUADRANT         Family History:  Family History   Problem Relation Age of Onset    High Cholesterol Mother     Heart Disease Father     High Cholesterol Father     Diabetes Sister     High Cholesterol Sister        Social History:  Social History   Substance Use Topics    Smoking status: Current Some Day Smoker     Packs/day: 1.00     Years: 40.00     Types: Cigarettes     Last attempt to quit: 10/21/2015    Smokeless tobacco: Never Used    Alcohol use No       Allergies:  No Known Allergies      Review of Systems       Review of Systems   Constitutional: Negative for chills and fever. Respiratory: Negative for shortness of breath. Cardiovascular: Negative for chest pain. Gastrointestinal: Negative for abdominal pain, nausea and vomiting. Musculoskeletal: Positive for gait problem and joint swelling. Negative for neck pain and neck stiffness. Skin: Negative for color change, pallor, rash and wound. Neurological: Negative for weakness. All other systems reviewed and are negative. Physical Exam     Visit Vitals    BP (!) 186/95 (BP 1 Location: Left arm)    Pulse 71    Temp 97.7 °F (36.5 °C)    Resp 20    Ht 5' (1.524 m)    Wt 75.3 kg (166 lb)    SpO2 97%    BMI 32.42 kg/m2         Physical Exam   Constitutional: She appears well-developed and well-nourished. No distress. HENT:   Head: Normocephalic and atraumatic. Neck: Normal range of motion. Neck supple. Cardiovascular: Normal rate, regular rhythm, normal heart sounds and intact distal pulses. Exam reveals no gallop and no friction rub. No murmur heard. Pulmonary/Chest: Effort normal and breath sounds normal. No respiratory distress. She has no wheezes. She has no rales. Musculoskeletal:        Right hip: Normal.        Left hip: Normal.        Right knee: She exhibits decreased range of motion (flexion) and bony tenderness (patella, moderate). She exhibits no swelling, no effusion, no ecchymosis, no deformity, no laceration, no erythema, normal alignment and normal patellar mobility. Tenderness found. Left knee: She exhibits bony tenderness (mild patella tenderness). She exhibits normal range of motion, no swelling, no effusion, no ecchymosis, no deformity, no erythema, normal alignment and normal patellar mobility. Right ankle: Normal.        Right foot: There is normal capillary refill, no crepitus and no laceration. Feet:    Neurological: She is alert. Skin: Skin is warm. No rash noted. She is not diaphoretic. Nursing note and vitals reviewed. Diagnostic Study Results     Labs -  No results found for this or any previous visit (from the past 12 hour(s)). Radiologic Studies -   XR KNEE LT MIN 4 V    (Results Pending)   XR KNEE RT MIN 4 V    (Results Pending)   XR TIB/FIB RT    (Results Pending)   XR FOOT RT MIN 3 V    (Results Pending)     RADIOLOGY FINDINGS  R, L knee, R tib/ fib X-ray shows no fracture or dislocation, hardware in place. R foot xray with possible avulsion fracture lateral aspect of R foot, no dislocation, joint spaces intact  Pending review by Radiologist  Recorded by Gagan Reid PA-C      Medical Decision Making   I am the first provider for this patient. I reviewed the vital signs, available nursing notes, past medical history, past surgical history, family history and social history. Vital Signs-Reviewed the patient's vital signs. Pulse Oximetry Analysis -  97 on room air     Records Reviewed:  Old Medical Records (Time of Review: 2:31 PM)    ED Course: Progress Notes, Reevaluation, and Consults:  4:30 PM: Reviewed preliminary x-ray read with patient. Called for radiologist read for R foot due to possible sesamoid bone vs fx lateral aspect of R foot. 4:50PM: Pt does not want to wait for official read. Will place posterior short leg splint on foot and place on crutches due to possible fracture of foot. Will follow-up with her orthopedic physician next week. Provider Notes (Medical Decision Making): Extremity contusion d/c: The patient has symptoms and findings c/w soft tissue strain w/o specific S/S of fracture, major ligament or tendon rupture, compartment syndrome, or neurovascular compromise. They are stable for d/c with outpatient follow-up. Diagnosis     Clinical Impression:   1. Contusion of right foot, initial encounter    2. Contusion of right knee, initial encounter    3. Contusion of left knee, initial encounter    4. Essential hypertension        Disposition: home    Follow-up Information     Follow up With Details Comments Contact Info    [facility] EMERGENCY DEPT  If symptoms worsen [address] [phone] [id number] Schedule an appointment as soon as possible for a visit  [address] [facility]  [phone]           Patient's Medications   Start Taking    HYDROCODONE-ACETAMINOPHEN (NORCO) 5-325 MG PER TABLET    Take 1 Tab by mouth every six (6) hours as needed for Pain. Max Daily Amount: 4 Tabs. Continue Taking    ALBUTEROL IN    Take  by inhalation. Emergency only    CHOLECALCIFEROL, VITAMIN D3, (VITAMIN D3) 1,000 UNIT CAP    Take  by mouth daily. DICLOFENAC (VOLTAREN) 1 % GEL    Apply 4 g to affected area four (4) times daily. Maximum 16 grams per joint per day. Dispense 5 100 gram tubes    IBUPROFEN (ADVIL) 100 MG TABLET    Take 100 mg by mouth every six (6) hours as needed for Pain. MELOXICAM (MOBIC) 15 MG TABLET    1 tab by mouth daily with food    MULTIVITAMIN, TX-IRON-CA-MIN (THERA-M W/ IRON) 9 MG IRON-400 MCG TAB TABLET    Take 1 Tab by mouth daily. ONDANSETRON (ZOFRAN ODT) 4 MG DISINTEGRATING TABLET    Take 1-2 Tabs by mouth every eight (8) hours as needed. These Medications have changed    No medications on file   Stop Taking    ACETAMINOPHEN (TYLENOL) 325 MG TABLET    Take 2 Tabs by mouth every four (4) hours as needed. HYDROCODONE-ACETAMINOPHEN (NORCO) 5-325 MG PER TABLET    Take 1 Tab by mouth every eight (8) hours as needed for Pain. Max Daily Amount: 3 Tabs. OXYCODONE IR (ROXICODONE) 5 MG IMMEDIATE RELEASE TABLET    Take 1-3 Tabs by mouth every four (4) hours as needed. Max Daily Amount: 90 mg. TRAMADOL (ULTRAM) 50 MG TABLET    TAKE 1 TABLET BY MOUTH TWO TIMES A DAY AS NEEDED    WARFARIN (COUMADIN) 2.5 MG TABLET    Take 1 Tab by mouth daily. GOAL INR IS 2.0-2.5. THE DOSE WILL BE ADJUSTED BASE ON THE LEVEL OF BLOOD THINNING. DURATION OF THERAPY IS 28 DAYS.

## 2017-12-23 NOTE — DISCHARGE INSTRUCTIONS

## 2017-12-23 NOTE — Clinical Note
Take medication as prescribed. Follow-up with the orthopedic physician in 2 days for reassessment. Bring the results from this visit with your for their review. Return to the ED for any new, worsening, or persistent symptoms.

## 2017-12-27 ENCOUNTER — OFFICE VISIT (OUTPATIENT)
Dept: ORTHOPEDIC SURGERY | Facility: CLINIC | Age: 57
End: 2017-12-27

## 2017-12-27 VITALS
SYSTOLIC BLOOD PRESSURE: 204 MMHG | RESPIRATION RATE: 20 BRPM | HEIGHT: 60 IN | TEMPERATURE: 96.3 F | DIASTOLIC BLOOD PRESSURE: 93 MMHG | OXYGEN SATURATION: 97 % | HEART RATE: 76 BPM

## 2017-12-27 DIAGNOSIS — M25.571 ACUTE RIGHT ANKLE PAIN: Primary | ICD-10-CM

## 2017-12-27 DIAGNOSIS — S93.401A SPRAIN OF RIGHT ANKLE, UNSPECIFIED LIGAMENT, INITIAL ENCOUNTER: ICD-10-CM

## 2017-12-27 RX ORDER — HYDROCODONE BITARTRATE AND ACETAMINOPHEN 7.5; 325 MG/1; MG/1
1 TABLET ORAL
Qty: 21 TAB | Refills: 0 | Status: SHIPPED | OUTPATIENT
Start: 2017-12-27 | End: 2018-01-10 | Stop reason: SDUPTHER

## 2017-12-27 NOTE — PROGRESS NOTES
Patient: Shukri Brewer                MRN: [id number]       SSN: [id number]  YOB: 1960        AGE: 62 y.o. SEX: female  There is no height or weight on file to calculate BMI. PCP: None  12/27/17    HISTORY: Antonio Rodríguez missed a step a few days ago and injured her right foot and ankle. She had x-rays of the tib/fib, x-rays of the foot, no x-ray of the ankle and knee, which were negative. She is complaining of right lateral ankle pain and right lateral foot pain. It is moderate, significant, and she denies calf pain or shortness of breath. She denies hip pain. She denies knee pain. She has some noted swelling in that area. PHYSICAL EXAMINATION:  On examination today, her blood pressure is 200, and I recommend that she does see her family doctor. She is in no acute distress. She is very pleasant. She is alert and oriented. Affect is normal.  She has no respiratory compromise or indrawing. The hips rotate nicely. Both feet are warm and well perfused. The calf is nontender. She has some swelling and some mild bruising over the lateral aspect of the fibula, as well as the foot as well. There is some tenderness over the base of the fifth and also over the lateral malleolus, distal fibula, and a little bit of the ATFL as well. The CFL is mildly tender as well. There is decent ankle motion, although a little stiff and tender. There is good pulse. Sensation is normal, and the calf is soft. RADIOGRAPHS:  X-rays reviewed, including three views of the ankle, I do not see any obvious fracture. IMPRESSION:  My overall impression is very severe sprain. I am also very concerned about her fifth metatarsal.  She is quite tender over the tip, although I do not see anything obvious. PLAN:  We are going to put her in a boot. We will see her back in about 7-10 days.   We are going to repeat the foot and ankle looking at the fifth metatarsal, and also repeat the ankle x-ray at that point as well. If she has persistent, severe pain involving the base of the fifth past the three-week noreen, we will consider an MRI evaluation as well at that point. REVIEW OF SYSTEMS:      CON: negative for weight loss, fever  EYE: negative for double vision  ENT: negative for hoarseness  RS:   negative for Tb  GI:    negative for blood in stool  :  negative for blood in urine  Other systems reviewed and noted below. Past Medical History:   Diagnosis Date    Arthritis     COPD     Hepatitis C     treated     Hypercholesterolemia     Hypertension     no meds     Sleep apnea     no CPAP        Family History   Problem Relation Age of Onset    High Cholesterol Mother     Heart Disease Father     High Cholesterol Father     Diabetes Sister     High Cholesterol Sister        Current Outpatient Prescriptions   Medication Sig Dispense Refill    HYDROcodone-acetaminophen (NORCO) 7.5-325 mg per tablet Take 1 Tab by mouth every eight (8) hours as needed for Pain. Max Daily Amount: 3 Tabs. 21 Tab 0    meloxicam (MOBIC) 15 mg tablet 1 tab by mouth daily with food 30 Tab 2    diclofenac (VOLTAREN) 1 % gel Apply 4 g to affected area four (4) times daily. Maximum 16 grams per joint per day. Dispense 5 100 gram tubes 5 Each 0    ibuprofen (ADVIL) 100 mg tablet Take 100 mg by mouth every six (6) hours as needed for Pain.  multivitamin, tx-iron-ca-min (THERA-M W/ IRON) 9 mg iron-400 mcg tab tablet Take 1 Tab by mouth daily.  ALBUTEROL IN Take  by inhalation. Emergency only      Cholecalciferol, Vitamin D3, (VITAMIN D3) 1,000 unit cap Take  by mouth daily.  HYDROcodone-acetaminophen (NORCO) 5-325 mg per tablet Take 1 Tab by mouth every six (6) hours as needed for Pain. Max Daily Amount: 4 Tabs. 6 Tab 0    ondansetron (ZOFRAN ODT) 4 mg disintegrating tablet Take 1-2 Tabs by mouth every eight (8) hours as needed.  15 Tab 0       No Known Allergies    Past Surgical History:   Procedure Laterality Date     STPLER HEMMORROID PPH01      HX  SECTION  1984    HX CHOLECYSTECTOMY  2005    HX HYSTERECTOMY  2008    HX KNEE ARTHROSCOPY  2009 2010    Bilateral    HX TONSILLECTOMY  1968    OSSEOUS SURGERY PER QUADRANT         Social History     Social History    Marital status: SINGLE     Spouse name: N/A    Number of children: N/A    Years of education: N/A     Occupational History    Not on file. Social History Main Topics    Smoking status: Current Some Day Smoker     Packs/day: 1.00     Years: 40.00     Types: Cigarettes     Last attempt to quit: 10/21/2015    Smokeless tobacco: Never Used    Alcohol use No    Drug use: No    Sexual activity: Not on file     Other Topics Concern    Not on file     Social History Narrative       Visit Vitals    BP (!) 204/93    Pulse 76    Temp 96.3 °F (35.7 °C) (Oral)    Resp 20    Ht 5' (1.524 m)    SpO2 97%         PHYSICAL EXAMINATION:  GENERAL: Alert and oriented x3, in no acute distress, well-developed, well-nourished, afebrile. HEART: No JVD. EYES: No scleral icterus   NECK: No significant lymphadenopathy   LUNGS: No respiratory compromise or indrawing  ABDOMEN: Soft, non-tender, non-distended. Electronically signed by:  Lesly Canchola MD

## 2018-01-10 ENCOUNTER — OFFICE VISIT (OUTPATIENT)
Dept: ORTHOPEDIC SURGERY | Facility: CLINIC | Age: 58
End: 2018-01-10

## 2018-01-10 VITALS
OXYGEN SATURATION: 95 % | HEART RATE: 74 BPM | TEMPERATURE: 97.2 F | HEIGHT: 60 IN | DIASTOLIC BLOOD PRESSURE: 82 MMHG | RESPIRATION RATE: 18 BRPM | SYSTOLIC BLOOD PRESSURE: 172 MMHG | BODY MASS INDEX: 34.08 KG/M2 | WEIGHT: 173.6 LBS

## 2018-01-10 DIAGNOSIS — S93.401A SPRAIN OF RIGHT ANKLE, UNSPECIFIED LIGAMENT, INITIAL ENCOUNTER: ICD-10-CM

## 2018-01-10 DIAGNOSIS — S92.351D FRACTURE OF BASE OF FIFTH METATARSAL BONE OF RIGHT FOOT WITH ROUTINE HEALING, SUBSEQUENT ENCOUNTER: ICD-10-CM

## 2018-01-10 DIAGNOSIS — M25.571 PAIN, JOINT, ANKLE AND FOOT, RIGHT: Primary | ICD-10-CM

## 2018-01-10 RX ORDER — HYDROCODONE BITARTRATE AND ACETAMINOPHEN 7.5; 325 MG/1; MG/1
1 TABLET ORAL
Qty: 21 TAB | Refills: 0 | OUTPATIENT
Start: 2018-01-10 | End: 2019-10-13

## 2018-01-10 NOTE — PROGRESS NOTES
Patient: Luna Cabrera                MRN: [id number]       SSN: [id number]  YOB: 1960        AGE: 62 y.o. SEX: female  Body mass index is 33.9 kg/(m^2). PCP: None  01/10/18    HISTORY: Ms. Maribel Olvera injured her foot a few weeks ago, and she remains tender at the base of the fifth metatarsal, a little less on the ankle. We put her in a low fracture walker. She has been very happy with it. It has been gradually improving. She is taking occasional [place] with this. She denies fever or chills. She is otherwise feeling well. She denies calf pain, shortness of breath, or chest pain. PHYSICAL EXAMINATION:  On examination today, she has just slight swelling about the ankle. The fibula is not particularly tender or the medial malleolus. The CFL and ATFL are just slightly tender for her. She is mostly tender at the base of the fifth metatarsal.  There is good pulse present. The calf is nontender. Zurdo's sign is negative. There is no evidence for infection or DVT. RADIOGRAPHS:  Review of her x-rays, we repeated all the views, I think she has a non-displaced base of the fifth metatarsal fracture. The fracture is quite proximal with a cortical irregularity, non-displaced. PLAN:  We will keep her in the boot, and I will see her back in 10 days time for re-x-ray of the foot and ankle. She will return if there are any problems or if she is not doing well. A prescription for Norco 7.5 mg was written as well. REVIEW OF SYSTEMS:      CON: negative for weight loss, fever  EYE: negative for double vision  ENT: negative for hoarseness  RS:   negative for Tb  GI:    negative for blood in stool  :  negative for blood in urine  Other systems reviewed and noted below.           Past Medical History:   Diagnosis Date    Arthritis     COPD     Hepatitis C     treated     Hypercholesterolemia     Hypertension     no meds     Sleep apnea     no CPAP        Family History   Problem Relation Age of Onset    High Cholesterol Mother     Heart Disease Father     High Cholesterol Father     Diabetes Sister     High Cholesterol Sister        Current Outpatient Prescriptions   Medication Sig Dispense Refill    HYDROcodone-acetaminophen (NORCO) 7.5-325 mg per tablet Take 1 Tab by mouth every eight (8) hours as needed for Pain. Max Daily Amount: 3 Tabs. 21 Tab 0    meloxicam (MOBIC) 15 mg tablet 1 tab by mouth daily with food 30 Tab 2    diclofenac (VOLTAREN) 1 % gel Apply 4 g to affected area four (4) times daily. Maximum 16 grams per joint per day. Dispense 5 100 gram tubes 5 Each 0    ibuprofen (ADVIL) 100 mg tablet Take 100 mg by mouth every six (6) hours as needed for Pain.  multivitamin, tx-iron-ca-min (THERA-M W/ IRON) 9 mg iron-400 mcg tab tablet Take 1 Tab by mouth daily.  ALBUTEROL IN Take  by inhalation. Emergency only      Cholecalciferol, Vitamin D3, (VITAMIN D3) 1,000 unit cap Take  by mouth daily.  HYDROcodone-acetaminophen (NORCO) 5-325 mg per tablet Take 1 Tab by mouth every six (6) hours as needed for Pain. Max Daily Amount: 4 Tabs. 6 Tab 0    ondansetron (ZOFRAN ODT) 4 mg disintegrating tablet Take 1-2 Tabs by mouth every eight (8) hours as needed. 15 Tab 0       No Known Allergies    Past Surgical History:   Procedure Laterality Date    [facility] HEMMORROID PPH01      HX  SECTION  1984    HX CHOLECYSTECTOMY  2005    HX HYSTERECTOMY  2008    HX KNEE ARTHROSCOPY  2009    Bilateral    HX TONSILLECTOMY  1968    OSSEOUS SURGERY PER QUADRANT         Social History     Social History    Marital status: SINGLE     Spouse name: N/A    Number of children: N/A    Years of education: N/A     Occupational History    Not on file.      Social History Main Topics    Smoking status: Current Some Day Smoker     Packs/day: 1.00     Years: 40.00     Types: Cigarettes     Last attempt to quit: 10/21/2015    Smokeless tobacco: Never Used    Alcohol use No    Drug use: No    Sexual activity: Not on file     Other Topics Concern    Not on file     Social History Narrative       Visit Vitals    /82    Pulse 74    Temp 97.2 °F (36.2 °C) (Oral)    Resp 18    Ht 5' (1.524 m)    Wt 173 lb 9.6 oz (78.7 kg)    SpO2 95%    BMI 33.9 kg/m2         PHYSICAL EXAMINATION:  GENERAL: Alert and oriented x3, in no acute distress, well-developed, well-nourished, afebrile. HEART: No JVD. EYES: No scleral icterus   NECK: No significant lymphadenopathy   LUNGS: No respiratory compromise or indrawing  ABDOMEN: Soft, non-tender, non-distended. Electronically signed by:  Bharat Loving MD

## 2018-01-17 ENCOUNTER — OFFICE VISIT (OUTPATIENT)
Dept: ORTHOPEDIC SURGERY | Facility: CLINIC | Age: 58
End: 2018-01-17

## 2018-01-17 VITALS
DIASTOLIC BLOOD PRESSURE: 99 MMHG | TEMPERATURE: 97.4 F | RESPIRATION RATE: 18 BRPM | SYSTOLIC BLOOD PRESSURE: 176 MMHG | BODY MASS INDEX: 33.38 KG/M2 | WEIGHT: 170 LBS | OXYGEN SATURATION: 94 % | HEART RATE: 75 BPM | HEIGHT: 60 IN

## 2018-01-17 DIAGNOSIS — M25.571 PAIN IN JOINT INVOLVING RIGHT ANKLE AND FOOT: Primary | ICD-10-CM

## 2018-01-17 NOTE — PROGRESS NOTES
Patient: Luna Cabrera                MRN: [id number]       SSN: [id number]  YOB: 1960        AGE: 62 y.o. SEX: female  Body mass index is 33.2 kg/(m^2). PCP: None  01/17/18  HISTORY: Ms. Maribel Olvera returns in followup with regards to her base of the fifth metatarsal fracture. She is feeling better. PHYSICAL EXAMINATION:  I did examine her today. There is still a little bit of tenderness, surprisingly, over the distal fibula, and the swelling is coming down. She has good motion in the foot. The calf is nontender. Zurdo's sign is negative. She is also tender at the base of the fifth metatarsal.  She is more tender at the base of the fifth metatarsal than the fibula. She is ambulating adequately with her fracture walker. RADIOGRAPHS:  X-rays today, three views of the ankle, I do not see a fracture in the ankle. The syndesmosis is intact. With regards to the base of the fifth metatarsal, this remains undisplaced. PROCEDURE:  Under aseptic conditions and after informed, written consent with a time out, the **was injected with 1 cc of the [place] preparation, i.e. 6 mg, which was well tolerated. PLAN:  She is a smoker, and she has been cautioned about slow healing with smokers. I am very pleased, actually, with her clinical course. We will see her back in two weeks time. I would like to repeat the ankle and foot x-ray at that time. REVIEW OF SYSTEMS:      CON: negative for weight loss, fever  EYE: negative for double vision  ENT: negative for hoarseness  RS:   negative for Tb  GI:    negative for blood in stool  :  negative for blood in urine  Other systems reviewed and noted below.           Past Medical History:   Diagnosis Date    Arthritis     COPD     Hepatitis C     treated     Hypercholesterolemia     Hypertension     no meds     Sleep apnea     no CPAP        Family History   Problem Relation Age of Onset    High Cholesterol Mother     Heart Disease Father     High Cholesterol Father     Diabetes Sister     High Cholesterol Sister        Current Outpatient Prescriptions   Medication Sig Dispense Refill    HYDROcodone-acetaminophen (NORCO) 5-325 mg per tablet Take 1 Tab by mouth every six (6) hours as needed for Pain. Max Daily Amount: 4 Tabs. 6 Tab 0    meloxicam (MOBIC) 15 mg tablet 1 tab by mouth daily with food 30 Tab 2    diclofenac (VOLTAREN) 1 % gel Apply 4 g to affected area four (4) times daily. Maximum 16 grams per joint per day. Dispense 5 100 gram tubes 5 Each 0    ibuprofen (ADVIL) 100 mg tablet Take 100 mg by mouth every six (6) hours as needed for Pain.  multivitamin, tx-iron-ca-min (THERA-M W/ IRON) 9 mg iron-400 mcg tab tablet Take 1 Tab by mouth daily.  ALBUTEROL IN Take  by inhalation. Emergency only      Cholecalciferol, Vitamin D3, (VITAMIN D3) 1,000 unit cap Take  by mouth daily.  HYDROcodone-acetaminophen (NORCO) 7.5-325 mg per tablet Take 1 Tab by mouth every eight (8) hours as needed for Pain. Max Daily Amount: 3 Tabs. 21 Tab 0    ondansetron (ZOFRAN ODT) 4 mg disintegrating tablet Take 1-2 Tabs by mouth every eight (8) hours as needed. 15 Tab 0       No Known Allergies    Past Surgical History:   Procedure Laterality Date    [facility] STPLER HEMMORROID PPH01      HX  SECTION  1984    HX CHOLECYSTECTOMY  2005    HX HYSTERECTOMY      HX KNEE ARTHROSCOPY  2009    Bilateral    HX TONSILLECTOMY  1968    OSSEOUS SURGERY PER QUADRANT         Social History     Social History    Marital status: SINGLE     Spouse name: N/A    Number of children: N/A    Years of education: N/A     Occupational History    Not on file.      Social History Main Topics    Smoking status: Current Some Day Smoker     Packs/day: 1.00     Years: 40.00     Types: Cigarettes     Last attempt to quit: 10/21/2015    Smokeless tobacco: Never Used    Alcohol use No    Drug use: No    Sexual activity: Not on file     Other Topics Concern    Not on file     Social History Narrative       Visit Vitals    BP (!) 176/99    Pulse 75    Temp 97.4 °F (36.3 °C) (Oral)    Resp 18    Ht 5' (1.524 m)    Wt 170 lb (77.1 kg)    SpO2 94%    BMI 33.2 kg/m2         PHYSICAL EXAMINATION:  GENERAL: Alert and oriented x3, in no acute distress, well-developed, well-nourished, afebrile. HEART: No JVD. EYES: No scleral icterus   NECK: No significant lymphadenopathy   LUNGS: No respiratory compromise or indrawing  ABDOMEN: Soft, non-tender, non-distended. Electronically signed by:  Jack Calderón MD

## 2018-02-01 ENCOUNTER — OFFICE VISIT (OUTPATIENT)
Dept: ORTHOPEDIC SURGERY | Facility: CLINIC | Age: 58
End: 2018-02-01

## 2018-02-01 VITALS
OXYGEN SATURATION: 96 % | RESPIRATION RATE: 18 BRPM | WEIGHT: 170.6 LBS | HEIGHT: 60 IN | TEMPERATURE: 97.5 F | BODY MASS INDEX: 33.49 KG/M2 | HEART RATE: 68 BPM | DIASTOLIC BLOOD PRESSURE: 78 MMHG | SYSTOLIC BLOOD PRESSURE: 181 MMHG

## 2018-02-01 DIAGNOSIS — M25.571 ACUTE RIGHT ANKLE PAIN: ICD-10-CM

## 2018-02-01 DIAGNOSIS — M79.671 RIGHT FOOT PAIN: Primary | ICD-10-CM

## 2018-02-01 NOTE — PROGRESS NOTES
Patient: Herminio Andino                MRN: [id number]       SSN: [id number]  YOB: 1960        AGE: 62 y.o. SEX: female  Body mass index is 33.32 kg/(m^2). PCP: None  02/01/18  HISTORY: Ms. Shaneka Anderson is here today in follow up with regards to right foot and ankle pain. She had a known base of the fifth metatarsal fracture, which has gone on to heal nicely, and she is still complaining of posterolateral ankle discomfort, and we have had her in a boot. She twisted her leg the other night while she was sleeping and made things worse. She denies fever or chills. She is otherwise feeling well. She denies calf pain, shortness of breath, or chest pain. She denies knee pain or proximal tibial pain. PHYSICAL EXAMINATION:  On examination today, the knee and proximal tibia are noncontributory. The malleoli are nontender. The fifth metatarsal is nontender, including the base, but she is tender in two main places today, the peroneal tendons are quite irritated, although there is no evidence for infection, and the sinus tarsi is also tender for her as well. She has about a 1+ drawer test as well. The ankle dorsiflexors and plantarflexors are intact. Zurdo's sign is negative. The calf is nontender. RADIOGRAPHS:  Review of her x-rays, three views of the ankle and three views of the foot, show the fracture is healed. She has had a previous injury, and she has known calcified areas proximal to this. Nothing acute there. IMPRESSION:  My overall impression is fifth metatarsal healed now with peroneal dysfunction, tendon dysfunction. PLAN:  We will obtain an MRI of the foot and ankle for her.                REVIEW OF SYSTEMS:      CON: negative for weight loss, fever  EYE: negative for double vision  ENT: negative for hoarseness  RS:   negative for Tb  GI:    negative for blood in stool  :  negative for blood in urine  Other systems reviewed and noted below. Past Medical History:   Diagnosis Date    Arthritis     COPD     Hepatitis C     treated     Hypercholesterolemia     Hypertension     no meds     Sleep apnea     no CPAP        Family History   Problem Relation Age of Onset    High Cholesterol Mother     Heart Disease Father     High Cholesterol Father     Diabetes Sister     High Cholesterol Sister        Current Outpatient Prescriptions   Medication Sig Dispense Refill    meloxicam (MOBIC) 15 mg tablet 1 tab by mouth daily with food 30 Tab 2    diclofenac (VOLTAREN) 1 % gel Apply 4 g to affected area four (4) times daily. Maximum 16 grams per joint per day. Dispense 5 100 gram tubes 5 Each 0    ibuprofen (ADVIL) 100 mg tablet Take 100 mg by mouth every six (6) hours as needed for Pain.  multivitamin, tx-iron-ca-min (THERA-M W/ IRON) 9 mg iron-400 mcg tab tablet Take 1 Tab by mouth daily.  ALBUTEROL IN Take  by inhalation. Emergency only      Cholecalciferol, Vitamin D3, (VITAMIN D3) 1,000 unit cap Take  by mouth daily.  HYDROcodone-acetaminophen (NORCO) 7.5-325 mg per tablet Take 1 Tab by mouth every eight (8) hours as needed for Pain. Max Daily Amount: 3 Tabs. 21 Tab 0    HYDROcodone-acetaminophen (NORCO) 5-325 mg per tablet Take 1 Tab by mouth every six (6) hours as needed for Pain. Max Daily Amount: 4 Tabs. 6 Tab 0    ondansetron (ZOFRAN ODT) 4 mg disintegrating tablet Take 1-2 Tabs by mouth every eight (8) hours as needed.  15 Tab 0       No Known Allergies    Past Surgical History:   Procedure Laterality Date    [facility] HEMMORROID PPH01      HX  SECTION  1984    HX CHOLECYSTECTOMY  2005    HX HYSTERECTOMY      HX KNEE ARTHROSCOPY   2010    Bilateral    HX TONSILLECTOMY  1968    OSSEOUS SURGERY PER QUADRANT         Social History     Social History    Marital status: SINGLE     Spouse name: N/A    Number of children: N/A    Years of education: N/A     Occupational History    Not on file.     Social History Main Topics    Smoking status: Current Some Day Smoker     Packs/day: 1.00     Years: 40.00     Types: Cigarettes     Last attempt to quit: 10/21/2015    Smokeless tobacco: Never Used    Alcohol use No    Drug use: No    Sexual activity: Not on file     Other Topics Concern    Not on file     Social History Narrative       Visit Vitals    /78    Pulse 68    Temp 97.5 °F (36.4 °C) (Oral)    Resp 18    Ht 5' (1.524 m)    Wt 170 lb 9.6 oz (77.4 kg)    SpO2 96%    BMI 33.32 kg/m2         PHYSICAL EXAMINATION:  GENERAL: Alert and oriented x3, in no acute distress, well-developed, well-nourished, afebrile. HEART: No JVD. EYES: No scleral icterus   NECK: No significant lymphadenopathy   LUNGS: No respiratory compromise or indrawing  ABDOMEN: Soft, non-tender, non-distended. Electronically signed by:  Guevara Dowell MD

## 2018-08-03 ENCOUNTER — OFFICE VISIT (OUTPATIENT)
Dept: ORTHOPEDIC SURGERY | Age: 58
End: 2018-08-03

## 2018-08-03 VITALS
TEMPERATURE: 96.6 F | WEIGHT: 176 LBS | OXYGEN SATURATION: 93 % | DIASTOLIC BLOOD PRESSURE: 71 MMHG | SYSTOLIC BLOOD PRESSURE: 164 MMHG | HEART RATE: 74 BPM | BODY MASS INDEX: 34.55 KG/M2 | HEIGHT: 60 IN

## 2018-08-03 DIAGNOSIS — M70.51 PES ANSERINUS BURSITIS OF RIGHT KNEE: Primary | ICD-10-CM

## 2018-08-03 DIAGNOSIS — Z96.653 STATUS POST TOTAL BILATERAL KNEE REPLACEMENT: ICD-10-CM

## 2018-08-03 RX ORDER — BETAMETHASONE SODIUM PHOSPHATE AND BETAMETHASONE ACETATE 3; 3 MG/ML; MG/ML
6 INJECTION, SUSPENSION INTRA-ARTICULAR; INTRALESIONAL; INTRAMUSCULAR; SOFT TISSUE ONCE
Qty: 1 VIAL | Refills: 0
Start: 2018-08-03 | End: 2018-08-03

## 2018-08-03 RX ORDER — HYDROCODONE BITARTRATE AND ACETAMINOPHEN 7.5; 325 MG/1; MG/1
1 TABLET ORAL
Qty: 21 TAB | Refills: 0 | OUTPATIENT
Start: 2018-08-03 | End: 2019-10-13

## 2018-08-03 NOTE — PROGRESS NOTES
[address] [facility], [address] [facility]  [phone]           Patient: Montez Pereira                MRN: [id number]       SSN: [id number]  YOB: 1960        AGE: 62 y.o. SEX: female  Body mass index is 34.37 kg/(m^2). PCP: None  08/03/18      This office note has been dictated. REVIEW OF SYSTEMS:  Constitutional: Negative for fever, chills, weight loss and malaise/fatigue. HENT: Negative. Eyes: Negative. Respiratory: Negative. Cardiovascular: Negative. Gastrointestinal: No bowel incontinence or constipation. Genitourinary: No bladder incontinence or saddle anesthesia. Skin: Negative. Neurological: Negative. Endo/Heme/Allergies: Negative. Psychiatric/Behavioral: Negative. Musculoskeletal: As per HPI above. Past Medical History:   Diagnosis Date    Arthritis     COPD     Hepatitis C     treated     Hypercholesterolemia     Hypertension     no meds     Sleep apnea     no CPAP          Current Outpatient Prescriptions:     betamethasone (CELESTONE SOLUSPAN) 6 mg/mL injection, 1 mL by IntraBURSal route once for 1 dose. Right pes ansuranus injection, Disp: 1 Vial, Rfl: 0    meloxicam (MOBIC) 15 mg tablet, 1 tab by mouth daily with food, Disp: 30 Tab, Rfl: 2    diclofenac (VOLTAREN) 1 % gel, Apply 4 g to affected area four (4) times daily. Maximum 16 grams per joint per day. Dispense 5 100 gram tubes, Disp: 5 Each, Rfl: 0    ibuprofen (ADVIL) 100 mg tablet, Take 100 mg by mouth every six (6) hours as needed for Pain., Disp: , Rfl:     multivitamin, tx-iron-ca-min (THERA-M W/ IRON) 9 mg iron-400 mcg tab tablet, Take 1 Tab by mouth daily. , Disp: , Rfl:     ALBUTEROL IN, Take  by inhalation. Emergency only, Disp: , Rfl:     Cholecalciferol, Vitamin D3, (VITAMIN D3) 1,000 unit cap, Take  by mouth daily. , Disp: , Rfl:     HYDROcodone-acetaminophen (NORCO) 7.5-325 mg per tablet, Take 1 Tab by mouth every eight (8) hours as needed for Pain. Max Daily Amount: 3 Tabs., Disp: 21 Tab, Rfl: 0    HYDROcodone-acetaminophen (NORCO) 5-325 mg per tablet, Take 1 Tab by mouth every six (6) hours as needed for Pain. Max Daily Amount: 4 Tabs., Disp: 6 Tab, Rfl: 0    ondansetron (ZOFRAN ODT) 4 mg disintegrating tablet, Take 1-2 Tabs by mouth every eight (8) hours as needed. , Disp: 15 Tab, Rfl: 0    No Known Allergies    Social History     Social History    Marital status: SINGLE     Spouse name: N/A    Number of children: N/A    Years of education: N/A     Occupational History    Not on file. Social History Main Topics    Smoking status: Current Some Day Smoker     Packs/day: 1.00     Years: 40.00     Types: Cigarettes     Last attempt to quit: 10/21/2015    Smokeless tobacco: Never Used    Alcohol use No    Drug use: No    Sexual activity: Not on file     Other Topics Concern    Not on file     Social History Narrative       Past Surgical History:   Procedure Laterality Date    [facility] HEMMORROID PPH01      HX  SECTION  1984    HX CHOLECYSTECTOMY  2005    HX HYSTERECTOMY      HX KNEE ARTHROSCOPY  2009    Bilateral    HX KNEE REPLACEMENT      HX TONSILLECTOMY  1968    MS OSSEOUS SURG 4/> CNTIG TEETH QUAD             * Patient was identified by name and date of birth   * Agreement on procedure being performed was verified  * Risks and Benefits explained to the patient  * Procedure site verified and marked as necessary  * Patient was positioned for comfort  * Consent was signed and verified  4:01 PM    The patient was instructed on post injection care. SUBJECTIVE: The patient is seen today for reevaluation of her hips and knees. The patient does have a little laterally-based discomfort of her hips. More so, she has troubles with her knees. She has done knee replacements done elsewhere. She has been worked up for infection, which fortunately was negative.   She has some pes bursitis, especially on the right side, which is bothering her knee yet again today. She has had no recent fevers, chills, systemic changes, or injuries to report, and no chest pain or shortness of breath. PHYSICAL EXAMINATION:  In general, the patient is alert and oriented x 3 in no acute distress. The patient is well-developed, well-nourished, with a normal affect. The patient is afebrile. HEENT:  Head is normocephalic and atraumatic. Pupils are equally round and reactive to light and accommodation. Extraocular eye movements are intact. Neck is supple. Trachea is midline. No JVD is present. Breathing is nonlabored. Examination of the lower extremities reveals pain-free range of motion of the hips. She does have a little discomfort to palpation of the greater trochanteric bursae on the right side and negative on the left. Each of the knees reveals the skin is intact. There is no ecchymosis, no warmth, and no signs for infection or cellulitis present. She has full range of motion, good stability, and the patella tracks nicely without rubs or crepitus noted. There is tenderness to palpation to the pes bursa on the right side and minimal on the left. ASSESSMENT:      1. Trochanteric bursitis right hip. 2. Bilateral knee replacements with mild synovitis. 3. Right knee pes bursitis. PLAN:  At this point, we are going to move forward with a repeat cortisone injection for the right knee today. Under aseptic conditions, after informed and written consent, and appropriate time out performed, with ultrasound-guided assistance, the right knee was prepped with Betadine and 3 mg of Celestone was injected to the pes bursa without complications. The patient tolerated the injection well. The patient was instructed on post injection care. We will see her back in the office in about three months time for evaluation. She will call with any questions or concerns that shall arise. JR Senior MPAS, PASACHI, ATC

## 2018-11-01 ENCOUNTER — OFFICE VISIT (OUTPATIENT)
Dept: ORTHOPEDIC SURGERY | Facility: CLINIC | Age: 58
End: 2018-11-01

## 2018-11-01 VITALS
OXYGEN SATURATION: 95 % | BODY MASS INDEX: 35.18 KG/M2 | WEIGHT: 179.2 LBS | RESPIRATION RATE: 18 BRPM | DIASTOLIC BLOOD PRESSURE: 70 MMHG | TEMPERATURE: 97 F | HEART RATE: 69 BPM | HEIGHT: 60 IN | SYSTOLIC BLOOD PRESSURE: 177 MMHG

## 2018-11-01 DIAGNOSIS — M70.51 PES ANSERINUS BURSITIS OF RIGHT KNEE: ICD-10-CM

## 2018-11-01 DIAGNOSIS — M25.512 LEFT SHOULDER PAIN, UNSPECIFIED CHRONICITY: Primary | ICD-10-CM

## 2018-11-01 PROBLEM — E66.01 SEVERE OBESITY (HCC): Status: ACTIVE | Noted: 2018-11-01

## 2018-11-01 RX ORDER — BETAMETHASONE SODIUM PHOSPHATE AND BETAMETHASONE ACETATE 3; 3 MG/ML; MG/ML
6 INJECTION, SUSPENSION INTRA-ARTICULAR; INTRALESIONAL; INTRAMUSCULAR; SOFT TISSUE ONCE
Qty: 1 VIAL | Refills: 0
Start: 2018-11-01 | End: 2018-11-01

## 2018-11-01 RX ORDER — HYDROCODONE BITARTRATE AND ACETAMINOPHEN 7.5; 325 MG/1; MG/1
1 TABLET ORAL
Qty: 28 TAB | Refills: 0 | OUTPATIENT
Start: 2018-11-01 | End: 2019-10-13

## 2018-11-01 RX ORDER — BETAMETHASONE SODIUM PHOSPHATE AND BETAMETHASONE ACETATE 3; 3 MG/ML; MG/ML
6 INJECTION, SUSPENSION INTRA-ARTICULAR; INTRALESIONAL; INTRAMUSCULAR; SOFT TISSUE ONCE
Qty: 1 ML | Refills: 0
Start: 2018-11-01 | End: 2018-11-01

## 2018-11-01 NOTE — PROGRESS NOTES
[address] [place] [address] Drive, [address] [facility] [address] 
[phone] Patient: Marilyn Og                MRN: [id number]       SSN: [id number] YOB: 1960        AGE: 62 y.o. SEX: female Body mass index is 35 kg/m². PCP: None 11/01/18 This office note has been dictated. REVIEW OF SYSTEMS: 
Constitutional: Negative for fever, chills, weight loss and malaise/fatigue. HENT: Negative. Eyes: Negative. Respiratory: Negative. Cardiovascular: Negative. Gastrointestinal: No bowel incontinence or constipation. Genitourinary: No bladder incontinence or saddle anesthesia. Skin: Negative. Neurological: Negative. Endo/Heme/Allergies: Negative. Psychiatric/Behavioral: Negative. Musculoskeletal: As per HPI above. Past Medical History:  
Diagnosis Date  Arthritis  COPD  Hepatitis C   
 treated  Hypercholesterolemia  Hypertension   
 no meds  Sleep apnea   
 no CPAP Current Outpatient Medications:  
  meloxicam (MOBIC) 15 mg tablet, 1 tab by mouth daily with food, Disp: 30 Tab, Rfl: 2 
  diclofenac (VOLTAREN) 1 % gel, Apply 4 g to affected area four (4) times daily. Maximum 16 grams per joint per day. Dispense 5 100 gram tubes, Disp: 5 Each, Rfl: 0 
  ibuprofen (ADVIL) 100 mg tablet, Take 100 mg by mouth every six (6) hours as needed for Pain., Disp: , Rfl:  
  multivitamin, tx-iron-ca-min (THERA-M W/ IRON) 9 mg iron-400 mcg tab tablet, Take 1 Tab by mouth daily. , Disp: , Rfl:  
  ALBUTEROL IN, Take  by inhalation. Emergency only, Disp: , Rfl:  
  Cholecalciferol, Vitamin D3, (VITAMIN D3) 1,000 unit cap, Take  by mouth daily. , Disp: , Rfl:  
  HYDROcodone-acetaminophen (NORCO) 7.5-325 mg per tablet, Take 1 Tab by mouth every eight (8) hours as needed for Pain.  Max Daily Amount: 3 Tabs., Disp: 21 Tab, Rfl: 0 
   HYDROcodone-acetaminophen (NORCO) 7.5-325 mg per tablet, Take 1 Tab by mouth every eight (8) hours as needed for Pain. Max Daily Amount: 3 Tabs., Disp: 21 Tab, Rfl: 0 
  HYDROcodone-acetaminophen (NORCO) 5-325 mg per tablet, Take 1 Tab by mouth every six (6) hours as needed for Pain. Max Daily Amount: 4 Tabs., Disp: 6 Tab, Rfl: 0 
  ondansetron (ZOFRAN ODT) 4 mg disintegrating tablet, Take 1-2 Tabs by mouth every eight (8) hours as needed. , Disp: 15 Tab, Rfl: 0 No Known Allergies Social History Socioeconomic History  Marital status: SINGLE Spouse name: Not on file  Number of children: Not on file  Years of education: Not on file  Highest education level: Not on file Social Needs  Financial resource strain: Not on file  Food insecurity - worry: Not on file  Food insecurity - inability: Not on file  Transportation needs - medical: Not on file  Transportation needs - non-medical: Not on file Occupational History  Not on file Tobacco Use  Smoking status: Current Some Day Smoker Packs/day: 1.00 Years: 40.00 Pack years: 40.00 Types: Cigarettes Last attempt to quit: 10/21/2015 Years since quitting: 3.0  Smokeless tobacco: Never Used Substance and Sexual Activity  Alcohol use: No  
 Drug use: No  
 Sexual activity: Not on file Other Topics Concern  Not on file Social History Narrative  Not on file Past Surgical History:  
Procedure Laterality Date [address]  HX CHOLECYSTECTOMY  2005  HX HYSTERECTOMY  2008  HX KNEE ARTHROSCOPY  2009 2010 Bilateral  
 HX KNEE REPLACEMENT    
 HX TONSILLECTOMY  1968  NM OSSEOUS SURG 4/> CNTIG TEETH QUAD * Patient was identified by name and date of birth * Agreement on procedure being performed was verified * Risks and Benefits explained to the patient * Procedure site verified and marked as necessary * Patient was positioned for comfort * Consent was signed and verified 9:15 AM 
 
The patient was instructed on post injection care. SUBJECTIVE:  The patient is seen today for reevaluation of her right knee. she is status post right knee replacement surgery. She did very well with it. She had a little bursitis upon her last visit and had a bursal injection about three months ago, which gave her some relief. It has acted up a little bit more recently. She has had no fevers or chills or injuries to report. She has also been having some left shoulder pain for the past 10 days or so without injuries. She denies any fevers or chills. She has no warmth to the shoulders. She has trouble with overhead activities and has some discomfort at night into her deltoid, and other movements, i.e. putting on her clothes and her bra, cause discomfort. She has had no radiating pain or numbness or tingling in the upper extremity to report. PHYSICAL EXAMINATION:  In general, the patient is alert and oriented x 3 in no acute distress. The patient is well-developed, well-nourished, with a normal affect. The patient is afebrile. HEENT:  Head is normocephalic and atraumatic. Pupils are equally round and reactive to light and accommodation. Extraocular eye movements are intact. Neck is supple. Trachea is midline. No JVD is present. Breathing is nonlabored. Examination of the neck reveals good range of motion of the cervical spine without reproduction of symptoms. The left shoulder reveals the skin is intact. There is no ecchymosis, no warmth, and no signs of infection or cellulitis present. Range of motion is about 100° of forward flexion, 90° abduction. There is decreased strength with external rotation. There is a positive Howard, positive Speeds, negative [place]s.   There is some discomfort to palpation to the subacromial space, as well as the bicipital tendon. Sensory and motor are intact to the upper extremities. Examination of the lower extremities reveals pain-free range of motion of the hips. There is no pain to palpation of the greater trochanteric bursae. There is negative straight leg raise. There is negative calf tenderness. There is negative Zur[place]s. There is no evidence of DVT present. The right knee reveals the skin is intact. The surgical wounds are healed nicely. There is no ecchymosis and no warmth. She has full range of motion and very good stability. The patella tracks nicely without rubs or crepitus noted. There is some tenderness to palpation to the pes bursa of the right knee. RADIOGRAPHS:  Radiographs in the office today, including three views of the left shoulder, show no acute bony abnormalities and a type 2 acromion. ASSESSMENT:   
 
1. Left shoulder subacromial bursitis. 2. Left shoulder bicipital tendinitis. 3. Status post right knee replacement. 4. Pes bursitis right knee. PLAN:  At this point, we discussed treatment options. We are going to move forward with a cortisone injection for both the left shoulder and right knee today. Under aseptic conditions, after informed and written consent, and appropriate time out performed, both the left shoulder and right knee was prepped with Betadine and 6 mg of Celestone was injected to the shoulder and 3 mg of Celestone was injected to the right knee pes bursa without complications. The patient tolerated the injection well. The patient was instructed on post injection care. We will see her back in the office in about a months time for evaluation and to  the efficacy of the injections. We may consider an MRI of the shoulder at that point depending on the results.     
 
 
 
 
 
 
 
 
JR Parrish LOMBARDI, ABDI, ATC

## 2018-11-29 ENCOUNTER — OFFICE VISIT (OUTPATIENT)
Dept: ORTHOPEDIC SURGERY | Facility: CLINIC | Age: 58
End: 2018-11-29

## 2018-11-29 VITALS
HEIGHT: 60 IN | RESPIRATION RATE: 18 BRPM | OXYGEN SATURATION: 96 % | WEIGHT: 176.4 LBS | TEMPERATURE: 96.1 F | DIASTOLIC BLOOD PRESSURE: 90 MMHG | SYSTOLIC BLOOD PRESSURE: 181 MMHG | BODY MASS INDEX: 34.63 KG/M2 | HEART RATE: 74 BPM

## 2018-11-29 DIAGNOSIS — M25.512 LEFT SHOULDER PAIN, UNSPECIFIED CHRONICITY: Primary | ICD-10-CM

## 2018-11-29 RX ORDER — HYDROCODONE BITARTRATE AND ACETAMINOPHEN 7.5; 325 MG/1; MG/1
1 TABLET ORAL
Qty: 28 TAB | Refills: 0 | OUTPATIENT
Start: 2018-11-29 | End: 2019-10-13

## 2018-11-29 NOTE — PROGRESS NOTES
[address] [place] [address], [address] [facility] [address] [phone] Patient: Kevin Lewis                MRN: [id number]       SSN: [id number] YOB: 1960        AGE: 62 y.o. SEX: female Body mass index is 34.45 kg/m². PCP: None 11/29/18 This office note has been dictated. REVIEW OF SYSTEMS: 
Constitutional: Negative for fever, chills, weight loss and malaise/fatigue. HENT: Negative. Eyes: Negative. Respiratory: Negative. Cardiovascular: Negative. Gastrointestinal: No bowel incontinence or constipation. Genitourinary: No bladder incontinence or saddle anesthesia. Skin: Negative. Neurological: Negative. Endo/Heme/Allergies: Negative. Psychiatric/Behavioral: Negative. Musculoskeletal: As per HPI above. Past Medical History:  
Diagnosis Date  Arthritis  COPD  Hepatitis C   
 treated  Hypercholesterolemia  Hypertension   
 no meds  Sleep apnea   
 no CPAP Current Outpatient Medications:  
  HYDROcodone-acetaminophen (NORCO) 7.5-325 mg per tablet, Take 1 Tab by mouth every six (6) hours as needed for Pain. Max Daily Amount: 4 Tabs., Disp: 28 Tab, Rfl: 0 
  HYDROcodone-acetaminophen (NORCO) 5-325 mg per tablet, Take 1 Tab by mouth every six (6) hours as needed for Pain. Max Daily Amount: 4 Tabs., Disp: 6 Tab, Rfl: 0 
  diclofenac (VOLTAREN) 1 % gel, Apply 4 g to affected area four (4) times daily. Maximum 16 grams per joint per day. Dispense 5 100 gram tubes, Disp: 5 Each, Rfl: 0 
  ibuprofen (ADVIL) 100 mg tablet, Take 100 mg by mouth every six (6) hours as needed for Pain., Disp: , Rfl:  
  multivitamin, tx-iron-ca-min (THERA-M W/ IRON) 9 mg iron-400 mcg tab tablet, Take 1 Tab by mouth daily. , Disp: , Rfl:  
  ALBUTEROL IN, Take  by inhalation.  Emergency only, Disp: , Rfl:  
  Cholecalciferol, Vitamin D3, (VITAMIN D3) 1,000 unit cap, Take  by mouth daily. , Disp: , Rfl:  
  HYDROcodone-acetaminophen (NORCO) 7.5-325 mg per tablet, Take 1 Tab by mouth every six (6) hours as needed for Pain. Max Daily Amount: 4 Tabs., Disp: 28 Tab, Rfl: 0 
  HYDROcodone-acetaminophen (NORCO) 7.5-325 mg per tablet, Take 1 Tab by mouth every eight (8) hours as needed for Pain. Max Daily Amount: 3 Tabs., Disp: 21 Tab, Rfl: 0 
  HYDROcodone-acetaminophen (NORCO) 7.5-325 mg per tablet, Take 1 Tab by mouth every eight (8) hours as needed for Pain. Max Daily Amount: 3 Tabs., Disp: 21 Tab, Rfl: 0 
  meloxicam (MOBIC) 15 mg tablet, 1 tab by mouth daily with food, Disp: 30 Tab, Rfl: 2 
  ondansetron (ZOFRAN ODT) 4 mg disintegrating tablet, Take 1-2 Tabs by mouth every eight (8) hours as needed. , Disp: 15 Tab, Rfl: 0 No Known Allergies Social History Socioeconomic History  Marital status: SINGLE Spouse name: Not on file  Number of children: Not on file  Years of education: Not on file  Highest education level: Not on file Social Needs  Financial resource strain: Not on file  Food insecurity - worry: Not on file  Food insecurity - inability: Not on file  Transportation needs - medical: Not on file  Transportation needs - non-medical: Not on file Occupational History  Not on file Tobacco Use  Smoking status: Current Some Day Smoker Packs/day: 1.00 Years: 40.00 Pack years: 40.00 Types: Cigarettes Last attempt to quit: 10/21/2015 Years since quitting: 3.1  Smokeless tobacco: Never Used Substance and Sexual Activity  Alcohol use: No  
 Drug use: No  
 Sexual activity: Not on file Other Topics Concern  Not on file Social History Narrative  Not on file Past Surgical History:  
Procedure Laterality Date [address]  HX CHOLECYSTECTOMY  2005  HX HYSTERECTOMY  2008  HX KNEE ARTHROSCOPY  2009 2010  Bilateral  
 [address] [facility] OSSEOUS SURG 4/> CNTIG TEETH QUAD SUBJECTIVE:   The patient is seen today with reference to her left shoulder. The patient had an injection back on November 5, 2018, which did not help her much. She does continue to have discomfort in the shoulder worse with overhead activities and discomfort at night. It radiates to her deltoid. She denies any numbness or tingling in the upper extremity. She denies any chest pain or shortness of breath. She has had no fevers or chills. PHYSICAL EXAMINATION:  In general, the patient is alert and oriented x 3 in no acute distress. The patient is well-developed, well-nourished, with a normal affect. The patient is afebrile. HEENT:  Head is normocephalic and atraumatic. Pupils are equally round and reactive to light and accommodation. Extraocular eye movements are intact. Neck is supple. Trachea is midline. No JVD is present. Breathing is nonlabored. Examination of the neck reveals good range of motion of the cervical spine without reproduction of symptoms. The left shoulder reveals the skin is intact. There is no ecchymosis, no warmth, and no signs of infection or cellulitis present. Range of motion is about 90° of forward flexion and 95° of abduction. There is a positive Howard, positive Neers, and negative drop-arm, Speed's, and [place]'s. There is decreased strength with external rotation. Radial pulse is 2+. Capillary refill is within normal limits. ASSESSMENT:  Left shoulder subacromial bursitis, impingement syndrome, and rotator cuff pathology. PLAN:  At this point, she failed conservative treatment. We are going to move forward with an MRI of the left shoulder for further evaluation to rule out rotator cuff pathology, which likely she does have. She was given a refill of her Norco, one-week supply.   We will see her back after the MRI for further evaluation. She will call with any questions or concerns that shall arise.   
 
 
 
 
 
 
 
 
JR Parrish LOMBARDI, ABDI, ATC 
 
 
 
 
 
 
 
 
 no

## 2018-12-11 ENCOUNTER — HOSPITAL ENCOUNTER (OUTPATIENT)
Dept: MRI IMAGING | Age: 58
Discharge: HOME | End: 2018-12-11
Attending: PHYSICIAN ASSISTANT
Payer: COMMERCIAL

## 2018-12-11 DIAGNOSIS — M25.512 LEFT SHOULDER PAIN, UNSPECIFIED CHRONICITY: ICD-10-CM

## 2018-12-11 PROCEDURE — 73221 MRI JOINT UPR EXTREM W/O DYE: CPT

## 2018-12-18 DIAGNOSIS — M25.512 LEFT SHOULDER PAIN, UNSPECIFIED CHRONICITY: ICD-10-CM

## 2018-12-18 RX ORDER — HYDROCODONE BITARTRATE AND ACETAMINOPHEN 7.5; 325 MG/1; MG/1
1 TABLET ORAL
Qty: 28 TAB | Refills: 0 | OUTPATIENT
Start: 2018-12-18

## 2018-12-18 NOTE — TELEPHONE ENCOUNTER
Last Visit: 11/29/2018 with BERTIN Britton  Next Appointment: 12/28/2018 with BERTIN Britton  Previous Refill Encounter(s): 11/29/2018 per BERTIN Britton #[id number]    Requested Prescriptions     Pending Prescriptions Disp Refills    HYDROcodone-acetaminophen (NORCO) 7.5-325 mg per tablet 28 Tab 0     Sig: Take 1 Tab by mouth every six (6) hours as needed for Pain. Max Daily Amount: 4 Tabs.

## 2018-12-28 ENCOUNTER — OFFICE VISIT (OUTPATIENT)
Dept: ORTHOPEDIC SURGERY | Age: 58
End: 2018-12-28

## 2018-12-28 VITALS
HEART RATE: 73 BPM | SYSTOLIC BLOOD PRESSURE: 185 MMHG | WEIGHT: 175.4 LBS | BODY MASS INDEX: 34.44 KG/M2 | OXYGEN SATURATION: 98 % | DIASTOLIC BLOOD PRESSURE: 100 MMHG | HEIGHT: 60 IN | RESPIRATION RATE: 18 BRPM | TEMPERATURE: 97.6 F

## 2018-12-28 DIAGNOSIS — M25.512 LEFT SHOULDER PAIN, UNSPECIFIED CHRONICITY: ICD-10-CM

## 2018-12-28 DIAGNOSIS — M70.61 TROCHANTERIC BURSITIS OF RIGHT HIP: ICD-10-CM

## 2018-12-28 DIAGNOSIS — M25.551 RIGHT HIP PAIN: Primary | ICD-10-CM

## 2018-12-28 RX ORDER — BETAMETHASONE SODIUM PHOSPHATE AND BETAMETHASONE ACETATE 3; 3 MG/ML; MG/ML
6 INJECTION, SUSPENSION INTRA-ARTICULAR; INTRALESIONAL; INTRAMUSCULAR; SOFT TISSUE ONCE
Qty: 1 ML | Refills: 0
Start: 2018-12-28 | End: 2018-12-28

## 2018-12-28 RX ORDER — MELOXICAM 15 MG/1
15 TABLET ORAL DAILY
Qty: 30 TAB | Refills: 1 | Status: ON HOLD | OUTPATIENT
Start: 2018-12-28 | End: 2021-12-19

## 2018-12-28 RX ORDER — HYDROCODONE BITARTRATE AND ACETAMINOPHEN 7.5; 325 MG/1; MG/1
1 TABLET ORAL
Qty: 28 TAB | Refills: 0 | Status: SHIPPED | OUTPATIENT
Start: 2018-12-28 | End: 2020-09-15

## 2018-12-28 NOTE — PROGRESS NOTES
[address] [facility], [address] [place] [id number] 
[phone] Patient: Anupam Bullard                MRN: [id number]       SSN: [id number] YOB: 1960        AGE: 62 y.o. SEX: female Body mass index is 34.26 kg/m². PCP: Refusal, Pcp Disclosure 12/28/18 This office note has been dictated. REVIEW OF SYSTEMS: 
Constitutional: Negative for fever, chills, weight loss and malaise/fatigue. HENT: Negative. Eyes: Negative. Respiratory: Negative. Cardiovascular: Negative. Gastrointestinal: No bowel incontinence or constipation. Genitourinary: No bladder incontinence or saddle anesthesia. Skin: Negative. Neurological: Negative. Endo/Heme/Allergies: Negative. Psychiatric/Behavioral: Negative. Musculoskeletal: As per HPI above. Past Medical History:  
Diagnosis Date  Arthritis  COPD  Hepatitis C   
 treated  Hypercholesterolemia  Hypertension   
 no meds  Sleep apnea   
 no CPAP Current Outpatient Medications:  
  HYDROcodone-acetaminophen (NORCO) 5-325 mg per tablet, Take 1 Tab by mouth every six (6) hours as needed for Pain. Max Daily Amount: 4 Tabs., Disp: 6 Tab, Rfl: 0 
  meloxicam (MOBIC) 15 mg tablet, 1 tab by mouth daily with food, Disp: 30 Tab, Rfl: 2 
  diclofenac (VOLTAREN) 1 % gel, Apply 4 g to affected area four (4) times daily. Maximum 16 grams per joint per day. Dispense 5 100 gram tubes, Disp: 5 Each, Rfl: 0 
  ibuprofen (ADVIL) 100 mg tablet, Take 100 mg by mouth every six (6) hours as needed for Pain., Disp: , Rfl:  
  multivitamin, tx-iron-ca-min (THERA-M W/ IRON) 9 mg iron-400 mcg tab tablet, Take 1 Tab by mouth daily. , Disp: , Rfl:  
  ALBUTEROL IN, Take  by inhalation. Emergency only, Disp: , Rfl:  
  Cholecalciferol, Vitamin D3, (VITAMIN D3) 1,000 unit cap, Take  by mouth daily. , Disp: , Rfl:  
   HYDROcodone-acetaminophen (NORCO) 7.5-325 mg per tablet, Take 1 Tab by mouth every six (6) hours as needed for Pain. Max Daily Amount: 4 Tabs., Disp: 28 Tab, Rfl: 0 
  HYDROcodone-acetaminophen (NORCO) 7.5-325 mg per tablet, Take 1 Tab by mouth every six (6) hours as needed for Pain. Max Daily Amount: 4 Tabs., Disp: 28 Tab, Rfl: 0 
  HYDROcodone-acetaminophen (NORCO) 7.5-325 mg per tablet, Take 1 Tab by mouth every eight (8) hours as needed for Pain. Max Daily Amount: 3 Tabs., Disp: 21 Tab, Rfl: 0 
  HYDROcodone-acetaminophen (NORCO) 7.5-325 mg per tablet, Take 1 Tab by mouth every eight (8) hours as needed for Pain. Max Daily Amount: 3 Tabs., Disp: 21 Tab, Rfl: 0 
  ondansetron (ZOFRAN ODT) 4 mg disintegrating tablet, Take 1-2 Tabs by mouth every eight (8) hours as needed. , Disp: 15 Tab, Rfl: 0 No Known Allergies Social History Socioeconomic History  Marital status: SINGLE Spouse name: Not on file  Number of children: Not on file  Years of education: Not on file  Highest education level: Not on file Social Needs  Financial resource strain: Not on file  Food insecurity - worry: Not on file  Food insecurity - inability: Not on file  Transportation needs - medical: Not on file  Transportation needs - non-medical: Not on file Occupational History  Not on file Tobacco Use  Smoking status: Current Some Day Smoker Packs/day: 1.00 Years: 40.00 Pack years: 40.00 Types: Cigarettes Last attempt to quit: 10/21/2015 Years since quitting: 3.1  Smokeless tobacco: Never Used Substance and Sexual Activity  Alcohol use: No  
 Drug use: No  
 Sexual activity: Not on file Other Topics Concern  Not on file Social History Narrative  Not on file Past Surgical History:  
Procedure Laterality Date [address]  HX CHOLECYSTECTOMY  2005  HX HYSTERECTOMY  2008  HX KNEE ARTHROSCOPY  2009 2010 Bilateral  
 HX KNEE REPLACEMENT    
 HX TONSILLECTOMY  1968  LA OSSEOUS SURG 4/> CNTIG TEETH QUAD * Patient was identified by name and date of birth * Agreement on procedure being performed was verified * Risks and Benefits explained to the patient * Procedure site verified and marked as necessary * Patient was positioned for comfort * Consent was signed and verified 9:30 AM 
 
The patient was instructed on post injection care. SUBJECTIVE: The patient is seen today for reevaluation of her left shoulder, as well as complaints of right hip pain. With regards to the left shoulder, she had a cortisone injection back in early November, which did not help her much. I sent her for an MRI for further evaluation. She presents today for review. She states that she still has shoulder discomfort, especially with overhead activities and at night. She denies any weakness. She has no numbness or tingling in the upper extremity. With regards to the hip, she is having laterally-based discomfort. She does have a history of trochanteric bursitis. She is requesting repeat injection of the hip today. He denies any radiating pain or numbness down the lower extremities. She has had no change in her bowel or bladder habits and no fevers or chills to report. PHYSICAL EXAMINATION:  In general, the patient is alert and oriented x 3 in no acute distress. The patient is well-developed, well-nourished, with a normal affect. The patient is afebrile. HEENT:  Head is normocephalic and atraumatic. Pupils are equally round and reactive to light and accommodation. Extraocular eye movements are intact. Neck is supple. Trachea is midline. No JVD is present. Breathing is nonlabored. Examination of the lower extremities reveals pain-free range of motion of the hips.   She does have discomfort to palpation of the greater trochanteric bursae on the right side, minimal on the left. There is no calf tenderness. There is a negative Zurdo's sign. There are no signs for DVT present. The cervical spine has full range of motion without reproduction of symptoms. There is a negative Spurlings. The left shoulder reveals the skin is intact. There is no ecchymosis, no warmth, and no signs of infection or cellulitis present. There is a positive Howard and a negative drop-arm, Speed's, and [place]'s. There is a positive Neers. range of motion is about 95° of forward flexion and 90° of abduction. There is 30° of external rotation. RADIOGRAPHS:  Review of the MRI of the left shoulder shows rotator cuff tendinosis with no full-thickness tears noted. There is subacromial bursitis. AC joint hypertrophy and inflammation is noted. ASSESSMENT:   
 
1. Left shoulder rotator cuff tendinitis, subacromial bursitis. 2. Right hip trochanteric bursitis. PLAN:  At this point, we discussed treatment options. With regards to the shoulder, she will continue on Mobic. She was given a refill of this today. We will get her to physical therapy for a rotator cuff program.  With regards to the right hip, we are going to move forward with a cortisone injection for the right hip today. After informed and written consent, and appropriate time out performed, under sterile conditions, with ultrasound-guided assistance, the right hip was prepped with Betadine and 6 mg of Celestone was injected without complications. The patient tolerated the injection well. The patient was instructed on post injection care. She was given a one-week supply refill of her Norco.  We will plan on seeing her back in the office in about six weeks time for evaluation after physical therapy.     
 
 
 
 
 
JR Parrish LOMBARDI, PASACHI, ATC

## 2019-01-11 ENCOUNTER — HOSPITAL ENCOUNTER (OUTPATIENT)
Dept: PHYSICAL THERAPY | Age: 59
Discharge: HOME | End: 2019-01-11
Payer: COMMERCIAL

## 2019-01-11 PROCEDURE — 97110 THERAPEUTIC EXERCISES: CPT

## 2019-01-11 PROCEDURE — 97162 PT EVAL MOD COMPLEX 30 MIN: CPT
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
## 2019-01-11 NOTE — PROGRESS NOTES
[facility] [address] [facility] 
[phone] fax Plan of Care/ Statement of Necessity for Physical Therapy Services Patient name: Elliott Delarosa Start of Care: 2019 Referral source: Ila Moya MD : 1960 Medical Diagnosis: Left shoulder pain [M25.512] Payor: [facility] / Plan: [facility] PPO / Product Type: PPO /  Onset Date:2018 Treatment Diagnosis: Left shoulder pain Prior Hospitalization: see medical history Provider#: [id number] Medications: Verified on Patient summary List  
 Comorbidities: High blood pressure Prior Level of Function: I & pain free with all activities The Plan of Care and following information is based on the information from the initial evaluation. Assessment/ key information: Pt is a 61 y/o female presenting to clinic today due to left shoulder pain & decreased ROM. Pain began 2018 with no identifiable mechanism of injury. Reported pain at eval was 6/10 & has lessened following initial onset. .18 MRI of left shoulder shows rotator cuff tendinosis with no full-thickness tear, mild subacromial bursitis, joint effusion & synovitis, small intra-articular loose bodies possible, and AC joint hypertrophy/inflammation with type II acromion. Special tests & pt reported s/s rule out radicular symptoms. Hypomobile left scapula preventing normal scapular humeral rhythm. Sensation in tact bilaterally. Pt presents with decreased A&PROM & decreased strength on left side. Skilled physical therapy is medically necessary for pt to return to PLOF pain free by facilitating proper biomechanics of glenohumeral & scapular thoracic joints and strengthening surrounding musculature as appropriate.   
 
Evaluation Complexity History MEDIUM  Complexity : 1-2 comorbidities / personal factors will impact the outcome/ POC ; Examination HIGH Complexity : 4+ Standardized tests and measures addressing body structure, function, activity limitation and / or participation in recreation  ;Presentation MEDIUM Complexity : Evolving with changing characteristics  ; Clinical Decision Making MEDIUM Complexity : FOTO score of 26-74 Overall Complexity Rating: MEDIUM Problem List: pain affecting function, decrease ROM, decrease strength, decrease ADL/ functional abilitiies, decrease activity tolerance and decrease flexibility/ joint mobility Treatment Plan may include any combination of the following: Therapeutic exercise, Therapeutic activities, Neuromuscular re-education, Physical agent/modality, Manual therapy, Patient education, Self Care training and Functional mobility training Patient / Family readiness to learn indicated by: asking questions and interest 
Persons(s) to be included in education: patient (P) and family support person (FSP);list Issac Castañeda (sister). Barriers to Learning/Limitations: yes; Physical (currently uncontrolled high blood pressure) Patient Goal (s): \"reduction in pain when using it Patient Self Reported Health Status: good Rehabilitation Potential: excellent Short Term Goals: To be accomplished in 2 weeks: 1. Pt will be independent with mild complexity HEP in order to progress toward long term goals. Long Term Goals: To be accomplished in 6 weeks: 1. Pt will be independent with moderate complexity HEP in order to maintain gains made in physical therapy. 2. Pt will increase active abduction to >/= 120' in order to progress toward PLOF. 3. Pt will report pain does not exceed 3/10 in order to improve quality of life. 4. Pt will increase active ER to >/=70' to increase ability to complete ADLs without need for modification. 5. Pt will increase FOTO score by >/= 22 points in order to show functional improvement & return to prior level of function. 6. Pt will be able to carry a medium weight bag (5-10lb) with minimal difficulty in order to increase independence with carrying groceries. Frequency / Duration: Patient to be seen 2 times per week for 6 weeks. Patient/ Caregiver education and instruction: Diagnosis, prognosis, exercises and other proper biomechanic of glenohumeral & scapular thoracic joint; sources of pain. [x]  Plan of care has been reviewed with PTA Certification Period: 01.11.19 - 03.11.19 Alvino Turk, [facility] [place] 1/11/2019 10:33 AM 
 
________________________________________________________________________ I certify that the above Therapy Services are being furnished while the patient is under my care. I agree with the treatment plan and certify that this therapy is necessary. [de-identified] Signature:____________Date:_________TIME:________ 
 
[employer], Date and Time must be completed for valid certification ** Please sign and return to [place]  [address] [facility] 
[phone] fax

## 2019-01-15 ENCOUNTER — HOSPITAL ENCOUNTER (OUTPATIENT)
Dept: PHYSICAL THERAPY | Age: 59
Discharge: HOME | End: 2019-01-15
Payer: COMMERCIAL

## 2019-01-15 PROCEDURE — 97110 THERAPEUTIC EXERCISES: CPT

## 2019-01-15 NOTE — PROGRESS NOTES
PT DAILY TREATMENT NOTE 10-18 Patient Name: Ruddy Martinez Date:1/15/2019 : 1960 [x]  Patient  Verified Payor: BLUE CROSS / Plan: [facility] PPO / Product Type: PPO / In time:800  Out time:844 Total Treatment Time (min): 44 Visit #: 2 of 12 Medicare/BCBS Only Total Timed Codes (min):  29 1:1 Treatment Time:  23 Treatment Area: Left shoulder pain [M25.512] SUBJECTIVE Pain Level (0-10 scale): 5/10 Any medication changes, allergies to medications, adverse drug reactions, diagnosis change, or new procedure performed?: [x] No    [] Yes (see summary sheet for update) Subjective functional status/changes:   [] No changes reported Pt stated that she is doing ok today, but has some sh pain OBJECTIVE Modality rationale: decrease pain and increase tissue extensibility to improve the patients ability to increase ease with ADLs Min Type Additional Details  
 [] Estim:  []Unatt       []IFC  []Premod []Other:  []w/ice   []w/heat Position: Location:  
 [] Estim: []Att    []TENS instruct  []NMES []Other:  []w/US   []w/ice   []w/heat Position: Location:  
 []  Traction: [] Cervical       []Lumbar 
                     [] Prone          []Supine []Intermittent   []Continuous Lbs: 
[] before manual 
[] after manual  
 []  Ultrasound: []Continuous   [] Pulsed []1MHz   []3MHz W/cm2: 
Location:  
 []  Iontophoresis with dexamethasone Location: [] Take home patch  
[] In clinic  
15 []  Ice     [x]  heat 
[]  Ice massage 
[]  Laser  
[]  Anodyne Position: seated Location:left sh  
 []  Laser with stim 
[]  Other:  Position: Location:  
 []  Vasopneumatic Device Pressure:       [] lo [] med [] hi  
Temperature: [] lo [] med [] hi  
[x] Skin assessment post-treatment:  [x]intact []redness- no adverse reaction 
  []redness  adverse reaction: 28 min Therapeutic Exercise:  [x] See flow sheet :  
Rationale: increase ROM and increase strength to improve the patients ability to increase ease with ADls With 
 [x] TE 
 [] TA 
 [] neuro 
 [] other: Patient Education: [x] Review HEP [] Progressed/Changed HEP based on:  
[] positioning   [] body mechanics   [] transfers   [] heat/ice application   
[] other:   
 
Other Objective/Functional Measures:  
Had no complaint of increased pain during session Used good form with exercises with minimal cuing Pain Level (0-10 scale) post treatment: 3/10 ASSESSMENT/Changes in Function:  
Initiated therex today per flow sheet. Pt put forth good effort with all exercises. Pt reported compliance with HEP Patient will continue to benefit from skilled PT services to address functional mobility deficits, address ROM deficits and address strength deficits to attain remaining goals. [x]  See Plan of Care 
[]  See progress note/recertification 
[]  See Discharge Summary Progress towards goals / Updated goals: 
Short Term Goals: To be accomplished in 2 weeks: 1. Pt will be independent with mild complexity HEP in order to progress toward long term goals. Goal met. 1/15/19 Long Term Goals: To be accomplished in 6 weeks: 1. Pt will be independent with moderate complexity HEP in order to maintain gains made in physical therapy. 2. Pt will increase active abduction to >/= 120' in order to progress toward PLOF. 3. Pt will report pain does not exceed 3/10 in order to improve quality of life. 4. Pt will increase active ER to >/=70' to increase ability to complete ADLs without need for modification. 5. Pt will increase FOTO score by >/= 22 points in order to show functional improvement & return to prior level of function. 6. Pt will be able to carry a medium weight bag (5-10lb) with minimal difficulty in order to increase independence with carrying groceries.   
 
PLAN 
 []  Upgrade activities as tolerated     [x]  Continue plan of care 
[]  Update interventions per flow sheet      
[]  Discharge due to:_ 
[]  Other:_ Osmel Grover, CELSO 1/15/2019  8:04 AM 
 
Future Appointments Date Time Provider Kary Velázquez 1/18/2019  8:00 AM Jess Jessica, PTA [facility]  
1/22/2019  8:30 AM Jess Jessica, PTA [facility]  
1/25/2019  8:00 AM Humphrey Llanos, PT [id number]  
1/30/2019  8:00 AM Jess Jessica, PTA [facility]  
2/1/2019  8:00 AM Humphrey Llanos, PT [id number]  
2/5/2019  8:00 AM Jess Jessica, PTA [facility]  
2/8/2019  8:30 AM Jess Jessica, PTA [facility]  
2/12/2019  8:00 AM Jess Jessica, PTA [facility]  
2/15/2019  8:00 AM Humphrey Llanos, PT [facility]  
2/19/2019  8:00 AM Jess Jessica, PTA [facility]  
2/22/2019  8:00 AM Jess Jessica, PTA [facility]

## 2019-01-18 ENCOUNTER — HOSPITAL ENCOUNTER (OUTPATIENT)
Dept: PHYSICAL THERAPY | Age: 59
Discharge: HOME | End: 2019-01-18
Payer: COMMERCIAL

## 2019-01-18 PROCEDURE — 97530 THERAPEUTIC ACTIVITIES: CPT

## 2019-01-18 NOTE — PROGRESS NOTES
PT DAILY TREATMENT NOTE 10-18 Patient Name: Jarred Reyes Date:2019 : 1960 [x]  Patient  Verified Payor: BLUE CROSS / Plan: [facility] PPO / Product Type: PPO / In time:757  Out time:810 Total Treatment Time (min): 13 Visit #: - of 12 Medicare/BCBS Only Total Timed Codes (min):  13 1:1 Treatment Time:  13 Treatment Area: Left shoulder pain [M25.512] SUBJECTIVE Pain Level (0-10 scale): 510 Any medication changes, allergies to medications, adverse drug reactions, diagnosis change, or new procedure performed?: [x] No    [] Yes (see summary sheet for update) Subjective functional status/changes:   [] No changes reported Pt was not seen today 2* increased BP. Pt was given her BP readings from eval and instructed to call MD and see if he wants her to go to the ER or wait until her appt next week. OBJECTIVE 13 min Therapeutic Activity:  [x]  See flow sheet :  
Rationale: increase ROM and BP check and education about risk of high BP With 
 [] TE 
 [] TA 
 [] neuro 
 [] other: Patient Education: [x] Review HEP [] Progressed/Changed HEP based on:  
[] positioning   [] body mechanics   [] transfers   [] heat/ice application   
[] other:   
 
Other Objective/Functional Measures: BP was 178/107 at 800 am 
Was 183/103 at 810 am  
 
Pain Level (0-10 scale) post treatment: 5/10 ASSESSMENT/Changes in Function: Pt was sent home due to high BP Patient will continue to benefit from skilled PT services to modify and progress therapeutic interventions, address functional mobility deficits, address ROM deficits and address strength deficits to attain remaining goals. [x]  See Plan of Care 
[]  See progress note/recertification 
[]  See Discharge Summary Progress towards goals / Updated goals: 
Short Term Goals: To be accomplished in 2 weeks: 1.  Pt will be independent with mild complexity HEP in order to progress toward long term goals.  
Goal met. 1/15/19 Long Term Goals: To be accomplished in 6 weeks: 1. Pt will be independent with moderate complexity HEP in order to maintain gains made in physical therapy. 2. Pt will increase active abduction to >/= 120' in order to progress toward PLOF. 3. Pt will report pain does not exceed 3/10 in order to improve quality of life. 4. Pt will increase active ER to >/=70' to increase ability to complete ADLs without need for modification.  
5. Pt will increase FOTO score by >/= 22 points in order to show functional improvement & return to prior level of function. 6. Pt will be able to carry a medium weight bag (5-10lb) with minimal difficulty in order to increase independence with carrying groceries.  PLAN 
[]  Upgrade activities as tolerated     [x]  Continue plan of care 
[]  Update interventions per flow sheet      
[]  Discharge due to:_ 
[]  Other:_ Jessee Mera PTA 1/18/2019  7:57 AM 
 
Future Appointments Date Time Provider Kary Velázquez 1/18/2019  8:00 AM Verner Distad, PTA [facility]  
1/22/2019  8:30 AM Verner Distad, PTA [facility]  
1/25/2019  8:00 AM Verner Distad, PTA [facility]  
1/30/2019  8:00 AM Verner Distad, PTA [facility]  
2/1/2019  8:00 AM Aydee Hinton, PT [facility]  
2/5/2019  8:00 AM Verner Distad, PTA [facility]  
2/8/2019  8:30 AM Verner Distad, PTA [facility]  
2/12/2019  8:00 AM Vernerjason Camilo, PTA [facility]  
2/15/2019  8:00 AM Aydee Hinton, PT [facility]  
2/19/2019  8:00 AM Verner Distad, PTA [facility]  
2/22/2019  8:00 AM Verner Distad, PTA [facility]

## 2019-01-19 ENCOUNTER — HOSPITAL ENCOUNTER (OUTPATIENT)
Dept: LAB | Age: 59
Discharge: HOME | End: 2019-01-19
Payer: COMMERCIAL

## 2019-01-19 LAB
ALBUMIN SERPL-MCNC: 3.8 G/DL (ref 3.4–5)
ALBUMIN/GLOB SERPL: 1.2 {RATIO} (ref 0.8–1.7)
ALP SERPL-CCNC: 59 U/L (ref 45–117)
ALT SERPL-CCNC: 16 U/L (ref 13–56)
ANION GAP SERPL CALC-SCNC: 4 MMOL/L (ref 3–18)
AST SERPL-CCNC: 13 U/L (ref 15–37)
BASOPHILS # BLD: 0 K/UL (ref 0–0.1)
BASOPHILS NFR BLD: 1 % (ref 0–2)
BILIRUB SERPL-MCNC: 0.6 MG/DL (ref 0.2–1)
BUN SERPL-MCNC: 8 MG/DL (ref 7–18)
BUN/CREAT SERPL: 12 (ref 12–20)
CALCIUM SERPL-MCNC: 8.7 MG/DL (ref 8.5–10.1)
CHLORIDE SERPL-SCNC: 107 MMOL/L (ref 100–108)
CHOLEST SERPL-MCNC: 264 MG/DL
CO2 SERPL-SCNC: 30 MMOL/L (ref 21–32)
CREAT SERPL-MCNC: 0.68 MG/DL (ref 0.6–1.3)
DIFFERENTIAL METHOD BLD: ABNORMAL
EOSINOPHIL # BLD: 0.1 K/UL (ref 0–0.4)
EOSINOPHIL NFR BLD: 2 % (ref 0–5)
ERYTHROCYTE [DISTWIDTH] IN BLOOD BY AUTOMATED COUNT: 13.1 % (ref 11.6–14.5)
GLOBULIN SER CALC-MCNC: 3.2 G/DL (ref 2–4)
GLUCOSE SERPL-MCNC: 78 MG/DL (ref 74–99)
HCT VFR BLD AUTO: 47.7 % (ref 35–45)
HDLC SERPL-MCNC: 69 MG/DL (ref 40–60)
HDLC SERPL: 3.8 {RATIO} (ref 0–5)
HGB BLD-MCNC: 16 G/DL (ref 12–16)
LDLC SERPL CALC-MCNC: 181.4 MG/DL (ref 0–100)
LIPID PROFILE,FLP: ABNORMAL
LYMPHOCYTES # BLD: 2.2 K/UL (ref 0.9–3.6)
LYMPHOCYTES NFR BLD: 34 % (ref 21–52)
MCH RBC QN AUTO: 29.4 PG (ref 24–34)
MCHC RBC AUTO-ENTMCNC: 33.5 G/DL (ref 31–37)
MCV RBC AUTO: 87.5 FL (ref 74–97)
MONOCYTES # BLD: 0.5 K/UL (ref 0.05–1.2)
MONOCYTES NFR BLD: 8 % (ref 3–10)
NEUTS SEG # BLD: 3.7 K/UL (ref 1.8–8)
NEUTS SEG NFR BLD: 55 % (ref 40–73)
PLATELET # BLD AUTO: 187 K/UL (ref 135–420)
PMV BLD AUTO: 10.3 FL (ref 9.2–11.8)
POTASSIUM SERPL-SCNC: 4.2 MMOL/L (ref 3.5–5.5)
PROT SERPL-MCNC: 7 G/DL (ref 6.4–8.2)
RBC # BLD AUTO: 5.45 M/UL (ref 4.2–5.3)
SODIUM SERPL-SCNC: 141 MMOL/L (ref 136–145)
TRIGL SERPL-MCNC: 68 MG/DL (ref ?–150)
VLDLC SERPL CALC-MCNC: 13.6 MG/DL
WBC # BLD AUTO: 6.7 K/UL (ref 4.6–13.2)

## 2019-01-19 PROCEDURE — 80053 COMPREHEN METABOLIC PANEL: CPT

## 2019-01-19 PROCEDURE — 36415 COLL VENOUS BLD VENIPUNCTURE: CPT

## 2019-01-19 PROCEDURE — 85025 COMPLETE CBC W/AUTO DIFF WBC: CPT

## 2019-01-19 PROCEDURE — 80061 LIPID PANEL: CPT

## 2019-01-22 ENCOUNTER — HOSPITAL ENCOUNTER (OUTPATIENT)
Dept: PHYSICAL THERAPY | Age: 59
Discharge: HOME | End: 2019-01-22
Payer: COMMERCIAL

## 2019-01-22 PROCEDURE — 97530 THERAPEUTIC ACTIVITIES: CPT

## 2019-01-22 NOTE — PROGRESS NOTES
PT DAILY TREATMENT NOTE 10-18 Patient Name: Oscar Dodd Date:2019 : 1960 [x]  Patient  Verified Payor: BLUE CROSS / Plan: [facility] PPO / Product Type: PPO / In time:800  Out time:813 Total Treatment Time (min): 13 Visit #: - of 12 Medicare/BCBS Only Total Timed Codes (min):  13 1:1 Treatment Time:  13 Treatment Area: Left shoulder pain [M25.512] SUBJECTIVE Pain Level (0-10 scale): 7/10 Any medication changes, allergies to medications, adverse drug reactions, diagnosis change, or new procedure performed?: [x] No    [] Yes (see summary sheet for update) Subjective functional status/changes:   [] No changes reported Pt stated that her MD put her on BP medication on friday OBJECTIVE 13 min Therapeutic Activity:  [x]  See flow sheet :  
Rationale: BP check and education on taking medication timely and getting a BP cuff for home use With 
 [] TE 
 [] TA 
 [] neuro 
 [] other: Patient Education: [x] Review HEP [] Progressed/Changed HEP based on:  
[] positioning   [] body mechanics   [] transfers   [] heat/ice application   
[] other:   
 
Other Objective/Functional Measures:  
Pt cont with increased BP 
185/96 and 181/99 Pt has begun taking BP medication, MD wants to check her kidneys before increasing dosage Pt was instructed to invest in a BP cuff for home use for self monitoring BP Pain Level (0-10 scale) post treatment: 7/10 ASSESSMENT/Changes in Function:  
 
 
Patient will continue to benefit from skilled PT services to modify and progress therapeutic interventions, address functional mobility deficits, address ROM deficits and address strength deficits to attain remaining goals. [x]  See Plan of Care 
[]  See progress note/recertification 
[]  See Discharge Summary Progress towards goals / Updated goals: 
Short Term Goals: To be accomplished in 2 weeks: 1. Pt will be independent with mild complexity HEP in order to progress toward long term goals.  
Goal met. 1/15/19 Long Term Goals: To be accomplished in 6 weeks: 1. Pt will be independent with moderate complexity HEP in order to maintain gains made in physical therapy. 2. Pt will increase active abduction to >/= 120' in order to progress toward PLOF. 3. Pt will report pain does not exceed 3/10 in order to improve quality of life. 4. Pt will increase active ER to >/=70' to increase ability to complete ADLs without need for modification.  
5. Pt will increase FOTO score by >/= 22 points in order to show functional improvement & return to prior level of function. 6. Pt will be able to carry a medium weight bag (5-10lb) with minimal difficulty in order to increase independence with carrying groceries.  PLAN 
[]  Upgrade activities as tolerated     [x]  Continue plan of care 
[]  Update interventions per flow sheet      
[]  Discharge due to:_ 
[]  Other:_ Fabian España PTA 1/22/2019  8:13 AM 
 
Future Appointments Date Time Provider Kary Velázquez 1/25/2019  8:00 AM Angelina Standing, PTA [facility]  
1/30/2019  8:00 AM Angelina Standing, PTA [facility]  
2/1/2019  8:00 AM Sachin Sailors, PT [facility]  
2/5/2019  8:00 AM Angelina Standing, PTA [facility]  
2/8/2019  8:30 AM Angelina Standing, PTA [facility]  
2/12/2019  8:00 AM Angelina Standing, PTA [facility]  
2/15/2019  8:00 AM Sachin Sailors, PT [facility]  
2/19/2019  8:00 AM [facility] Standing[facility]  
2/22/2019  8:00 AM Angelina Standing, PTA [facility]

## 2019-01-30 ENCOUNTER — HOSPITAL ENCOUNTER (OUTPATIENT)
Dept: PHYSICAL THERAPY | Age: 59
Discharge: HOME | End: 2019-01-30
Payer: COMMERCIAL

## 2019-01-30 PROCEDURE — 97110 THERAPEUTIC EXERCISES: CPT

## 2019-01-30 NOTE — PROGRESS NOTES
PT DAILY TREATMENT NOTE 10-18 Patient Name: Elliott Delarosa Date:2019 : 1960 [x]  Patient  Verified Payor: BLUE CROSS / Plan: [facility] PPO / Product Type: PPO / In time:757  Out time:838 Total Treatment Time (min): 41 Visit #: 3 of 12 Medicare/BCBS Only Total Timed Codes (min):  26 1:1 Treatment Time:  26  
 
 
Treatment Area: Left shoulder pain [M25.512] SUBJECTIVE Pain Level (0-10 scale): 7/10 Any medication changes, allergies to medications, adverse drug reactions, diagnosis change, or new procedure performed?: [x] No    [] Yes (see summary sheet for update) Subjective functional status/changes:   [] No changes reported Pt stated that she cont to take her BP medication. Stated that she cont to have pain OBJECTIVE Modality rationale: decrease pain and increase tissue extensibility to improve the patients ability to increase ease with ADLs Min Type Additional Details  
 [] Estim:  []Unatt       []IFC  []Premod []Other:  []w/ice   []w/heat Position: Location:  
 [] Estim: []Att    []TENS instruct  []NMES []Other:  []w/US   []w/ice   []w/heat Position: Location:  
 []  Traction: [] Cervical       []Lumbar 
                     [] Prone          []Supine []Intermittent   []Continuous Lbs: 
[] before manual 
[] after manual  
 []  Ultrasound: []Continuous   [] Pulsed []1MHz   []3MHz W/cm2: 
Location:  
 []  Iontophoresis with dexamethasone Location: [] Take home patch  
[] In clinic  
10 []  Ice     [x]  heat 
[]  Ice massage 
[]  Laser  
[]  Anodyne Position: seated Location:left sh  
 []  Laser with stim 
[]  Other:  Position: Location:  
 []  Vasopneumatic Device Pressure:       [] lo [] med [] hi  
Temperature: [] lo [] med [] hi  
[x] Skin assessment post-treatment:  [x]intact []redness- no adverse reaction 
  []redness  adverse reaction: 26 min Therapeutic Exercise:  [x] See flow sheet :  
Rationale: increase ROM and increase strength to improve the patients ability to increase ease with ADLs With 
 [x] TE 
 [] TA 
 [] neuro 
 [] other: Patient Education: [x] Review HEP [] Progressed/Changed HEP based on:  
[] positioning   [] body mechanics   [] transfers   [] heat/ice application   
[] other:   
 
Other Objective/Functional Measures:  
Had difficulty with wall ladder today Cont to need cueing with isometrics Pain Level (0-10 scale) post treatment: 4/10 ASSESSMENT/Changes in Function:  
Pt is making slow progress toward goals as she has missed the 2 previous appts due to high BP. Pt cont with significant pain in the left sh. Had decreased active range of motion for all motions. Cont to have difficulty with ADls Patient will continue to benefit from skilled PT services to modify and progress therapeutic interventions, address functional mobility deficits, address ROM deficits and address strength deficits to attain remaining goals. [x]  See Plan of Care 
[]  See progress note/recertification 
[]  See Discharge Summary Progress towards goals / Updated goals: 
Short Term Goals: To be accomplished in 2 weeks: 1. Pt will be independent with mild complexity HEP in order to progress toward long term goals.  
Goal met. 1/15/19 Long Term Goals: To be accomplished in 6 weeks: 1. Pt will be independent with moderate complexity HEP in order to maintain gains made in physical therapy. 2. Pt will increase active abduction to >/= 120' in order to progress toward PLOF. 3. Pt will report pain does not exceed 3/10 in order to improve quality of life. 4. Pt will increase active ER to >/=70' to increase ability to complete ADLs without need for modification.  
5. Pt will increase FOTO score by >/= 22 points in order to show functional improvement & return to prior level of function. 6. Pt will be able to carry a medium weight bag (5-10lb) with minimal difficulty in order to increase independence with carrying groceries.  PLAN 
[]  Upgrade activities as tolerated     [x]  Continue plan of care 
[]  Update interventions per flow sheet      
[]  Discharge due to:_ 
[]  Other:_ Alirio Maya PTA 1/30/2019  8:02 AM 
 
Future Appointments Date Time Provider Kary Velázquez 2/1/2019  8:00 AM Kvng Figueroa, PT [facility]  
2/5/2019  8:00 AM Marilyn Diaz, PTA [facility]  
2/8/2019  8:30 AM Marilyn Diaz, PTA [facility]  
2/12/2019  8:00 AM Marilyn Diaz, PTA [facility]  
2/15/2019  8:00 AM Kvng Figueroa, PT [facility]  
2/19/2019  8:00 AM Marilyn Diaz, PTA [facility]  
2/22/2019  8:00 AM Marilyn Diaz, PTA [facility]

## 2019-02-01 ENCOUNTER — HOSPITAL ENCOUNTER (OUTPATIENT)
Dept: PHYSICAL THERAPY | Age: 59
Discharge: HOME | End: 2019-02-01
Payer: COMMERCIAL

## 2019-02-01 PROCEDURE — 97110 THERAPEUTIC EXERCISES: CPT

## 2019-02-01 NOTE — PROGRESS NOTES
PT DAILY TREATMENT NOTE 10-18 Patient Name: Joan Wilson Date:2019 : 1960 [x]  Patient  Verified Payor: BLUE CROSS / Plan: [facility] PPO / Product Type: PPO / In time: 8:00  Out time: 8:46 Total Treatment Time (min): 46 Visit #: 4 of 12 Medicare/BCBS Only Total Timed Codes (min):  31 1:1 Treatment Time:  31  
 
 
Treatment Area: Left shoulder pain [M25.512] SUBJECTIVE Pain Level (0-10 scale):  8/10 Any medication changes, allergies to medications, adverse drug reactions, diagnosis change, or new procedure performed?: [x] No    [] Yes (see summary sheet for update) Subjective functional status/changes:   [] No changes reported Pt. Reports she moved the wrong way at work and now has a lot of shoulder pain OBJECTIVE Modality rationale: decrease pain and increase tissue extensibility to improve the patients ability to increase ease of ADLs Type Additional Details  
[] Estim:  []Unatt       []IFC  []Premod []Other:  []w/ice   []w/heat Position: Location:  
[] Estim: []Att    []TENS instruct  []NMES []Other:  []w/US   []w/ice   []w/heat Position: Location:  
[]  Traction: [] Cervical       []Lumbar 
                     [] Prone          []Supine []Intermittent   []Continuous Lbs: 
[] before manual 
[] after manual  
[]  Ultrasound: []Continuous   [] Pulsed []1MHz   []3MHz W/cm2: 
Location:  
[]  Iontophoresis with dexamethasone Location: [] Take home patch  
[] In clinic  
[]  Ice     [x]  Heat 15 min 
[]  Ice massage 
[]  Laser  
[]  Anodyne Position: Location:  
[]  Laser with stim 
[]  Other:  Position: Location:  
[]  Vasopneumatic Device Pressure:       [] lo [] med [] hi  
Temperature: [] lo [] med [] hi  
[x] Skin assessment post-treatment:  [x]intact []redness- no adverse reaction 
  []redness  adverse reaction: 26 min Therapeutic Exercise:  [x] See flow sheet :  
Rationale: increase ROM and increase strength to improve the patients ability to increase ease of ADLs 5 min Manual Therapy:  Trigger point release to infraspinatus Rationale: decrease pain, increase ROM and increase tissue extensibility to increase ease of ADLs With 
 [x] TE 
 [] TA 
 [] neuro 
 [] other: Patient Education: [x] Review HEP [] Progressed/Changed HEP based on:  
[] positioning   [] body mechanics   [] transfers   [] heat/ice application   
[] other:   
 
Other Objective/Functional Measures:  
Left shoulder abduction AROM: 65 degrees BP: 177/94 Pt. Has improved mobility following manual  
Pt. Was educated on self massage at home Pain Level (0-10 scale) post treatment:  3/10 ASSESSMENT/Changes in Function:  Pt. Is making limited progress towards goals. She continues to have significant pain and decreased left shoulder mobility. She has significant tightness and trigger points in posterior shoulder that is also affecting her mobility Patient will continue to benefit from skilled PT services to modify and progress therapeutic interventions, address functional mobility deficits, address ROM deficits, address strength deficits, analyze and address soft tissue restrictions, analyze and cue movement patterns, analyze and modify body mechanics/ergonomics and assess and modify postural abnormalities to attain remaining goals. Progress towards goals / Updated goals: 
Short Term Goals: To be accomplished in 2 weeks: 1. Pt will be independent with mild complexity HEP in order to progress toward long term goals.  
Goal met. 1/15/19 Long Term Goals: To be accomplished in 6 weeks: 1. Pt will be independent with moderate complexity HEP in order to maintain gains made in physical therapy. 2. Pt will increase active abduction to >/= 120' in order to progress toward PLOF. Not met: 65 degrees (2/1/19) 3. Pt will report pain does not exceed 3/10 in order to improve quality of life. Not met: 8/10 (19) 4. Pt will increase active ER to >/=70' to increase ability to complete ADLs without need for modification.  
5. Pt will increase FOTO score by >/= 22 points in order to show functional improvement & return to prior level of function. 6. Pt will be able to carry a medium weight bag (5-10lb) with minimal difficulty in order to increase independence with carrying groceries.  PLAN 
[]  Upgrade activities as tolerated     [x]  Continue plan of care 
[]  Update interventions per flow sheet      
[]  Discharge due to:_ 
[]  Other:_ Sera Good, PT 2019  7:54 AM 
 
Future Appointments Date Time Provider Kary Velázquez 2019  8:00 AM Kimberlyn Do, PT [facility]  
2019  8:00 AM Halley Valenzuela, PTA [facility]  
2019  8:30 AM Halley Field, PTA [facility]  
2019  8:00 AM Halley Field, [facility]  
2/15/2019  8:00 AM Kimberlyn Do, PT [facility]  
2019  8:00 AM Halley Vlaenzuela, PTA [facility]  
2019  8:00 AM Halleycary Valenzuela, PTA [facility]

## 2019-02-08 ENCOUNTER — HOSPITAL ENCOUNTER (OUTPATIENT)
Dept: PHYSICAL THERAPY | Age: 59
Discharge: HOME | End: 2019-02-08
Payer: COMMERCIAL

## 2019-02-08 PROCEDURE — 97140 MANUAL THERAPY 1/> REGIONS: CPT

## 2019-02-08 PROCEDURE — 97110 THERAPEUTIC EXERCISES: CPT

## 2019-02-08 NOTE — PROGRESS NOTES
PT DAILY TREATMENT NOTE 10-18 Patient Name: Charline Coppola Date:2019 : 1960 [x]  Patient  Verified Payor: BLUE CROSS / Plan: [facility] PPO / Product Type: PPO / In time:830  Out time:910 Total Treatment Time (min): 40 Visit #: 5 of 12 Medicare/BCBS Only Total Timed Codes (min):  30 1:1 Treatment Time:  30 Treatment Area: Left shoulder pain [M25.512] SUBJECTIVE Pain Level (0-10 scale): 4/10 Any medication changes, allergies to medications, adverse drug reactions, diagnosis change, or new procedure performed?: [x] No    [] Yes (see summary sheet for update) Subjective functional status/changes:   [] No changes reported Pt stated that she is doing much better today OBJECTIVE Modality rationale: decrease pain and increase tissue extensibility to improve the patients ability to increase ease with ADLs Min Type Additional Details  
 [] Estim:  []Unatt       []IFC  []Premod []Other:  []w/ice   []w/heat Position: Location:  
 [] Estim: []Att    []TENS instruct  []NMES []Other:  []w/US   []w/ice   []w/heat Position: Location:  
 []  Traction: [] Cervical       []Lumbar 
                     [] Prone          []Supine []Intermittent   []Continuous Lbs: 
[] before manual 
[] after manual  
 []  Ultrasound: []Continuous   [] Pulsed []1MHz   []3MHz W/cm2: 
Location:  
 []  Iontophoresis with dexamethasone Location: [] Take home patch  
[] In clinic  
10 []  Ice     [x]  heat 
[]  Ice massage 
[]  Laser  
[]  Anodyne Position:seated Location:left sh  
 []  Laser with stim 
[]  Other:  Position: Location:  
 []  Vasopneumatic Device Pressure:       [] lo [] med [] hi  
Temperature: [] lo [] med [] hi  
[x] Skin assessment post-treatment:  [x]intact []redness- no adverse reaction 
  []redness  adverse reaction: 22 min Therapeutic Exercise:  [x] See flow sheet :  
Rationale: increase ROM and increase strength to improve the patients ability to increase ease with ADls 8 min Manual Therapy:  DTM to left infraspinatus Rationale: decrease pain, increase ROM and increase tissue extensibility to increase ease with ADLs With 
 [x] TE 
 [] TA 
 [] neuro 
 [] other: Patient Education: [x] Review HEP [] Progressed/Changed HEP based on:  
[] positioning   [] body mechanics   [] transfers   [] heat/ice application   
[] other:   
 
Other Objective/Functional Measures:  
Had no difficulty with exercises Isometrics cont to be challenging Had some tenderness in left infraspinatus, but no discernible trigger points Had increased pain with SA punches Pain Level (0-10 scale) post treatment: 2/10 ASSESSMENT/Changes in Function:  
Pt is slowly progressing toward goals. Pt had reduction in pain today. Cont with decreased strength and range of motion in the left sh. Cont to report difficulty with ADLs, but is improving Patient will continue to benefit from skilled PT services to modify and progress therapeutic interventions, address functional mobility deficits, address ROM deficits and address strength deficits to attain remaining goals. [x]  See Plan of Care 
[]  See progress note/recertification 
[]  See Discharge Summary Progress towards goals / Updated goals: 
Short Term Goals: To be accomplished in 2 weeks: 1. Pt will be independent with mild complexity HEP in order to progress toward long term goals.  
Goal met. 1/15/19 
  
Long Term Goals: To be accomplished in 6 weeks: 1. Pt will be independent with moderate complexity HEP in order to maintain gains made in physical therapy. 2. Pt will increase active abduction to >/= 120' in order to progress toward PLOF. Not met: 65 degrees (2/1/19) 3. Pt will report pain does not exceed 3/10 in order to improve quality of life. Progressing. Pt reported 4/10 pain today. 2/8/19 4. Pt will increase active ER to >/=70' to increase ability to complete ADLs without need for modification.  
5. Pt will increase FOTO score by >/= 22 points in order to show functional improvement & return to prior level of function. 6. Pt will be able to carry a medium weight bag (5-10lb) with minimal difficulty in order to increase independence with carrying groceries.  PLAN 
[]  Upgrade activities as tolerated     [x]  Continue plan of care 
[]  Update interventions per flow sheet      
[]  Discharge due to:_ 
[]  Other:_ Uma Serrano PTA 2/8/2019  8:29 AM 
 
Future Appointments Date Time Provider Kary Velázquez 2/8/2019  8:30 AM Ingrid Vogt PTA [facility]  
2/12/2019  8:00 AM Ingrid Vogt PTA [facility]  
2/15/2019  8:00 AM Helyn Bosworth, PTA [facility]  
2/19/2019  8:00 AM Ingrid Vogt PTA [facility]  
2/22/2019  8:00 AM Ingrid Vogt, PTA [facility]

## 2019-02-12 ENCOUNTER — HOSPITAL ENCOUNTER (OUTPATIENT)
Dept: PHYSICAL THERAPY | Age: 59
Discharge: HOME | End: 2019-02-12
Payer: COMMERCIAL

## 2019-02-12 PROCEDURE — 97110 THERAPEUTIC EXERCISES: CPT

## 2019-02-12 NOTE — PROGRESS NOTES
PT DAILY TREATMENT NOTE 10-18 Patient Name: Aubrey Gallegos Date:2019 : 1960 [x]  Patient  Verified Payor: BLUE CROSS / Plan: [facility] PPO / Product Type: PPO / In time:756  Out time:835 Total Treatment Time (min): 39 Visit #: 6 of 12 Medicare/BCBS Only Total Timed Codes (min):  29 1:1 Treatment Time:  34 Treatment Area: Left shoulder pain [M25.512] SUBJECTIVE Pain Level (0-10 scale): 5/10 Any medication changes, allergies to medications, adverse drug reactions, diagnosis change, or new procedure performed?: [x] No    [] Yes (see summary sheet for update) Subjective functional status/changes:   [] No changes reported Pt stated that she is doing pretty good today OBJECTIVE Modality rationale: decrease pain and increase tissue extensibility to improve the patients ability to increase ease with ADLs Min Type Additional Details  
 [] Estim:  []Unatt       []IFC  []Premod []Other:  []w/ice   []w/heat Position: Location:  
 [] Estim: []Att    []TENS instruct  []NMES []Other:  []w/US   []w/ice   []w/heat Position: Location:  
 []  Traction: [] Cervical       []Lumbar 
                     [] Prone          []Supine []Intermittent   []Continuous Lbs: 
[] before manual 
[] after manual  
 []  Ultrasound: []Continuous   [] Pulsed []1MHz   []3MHz W/cm2: 
Location:  
 []  Iontophoresis with dexamethasone Location: [] Take home patch  
[] In clinic  
10 []  Ice     [x]  heat 
[]  Ice massage 
[]  Laser  
[]  Anodyne Position:seated Location:left sh  
 []  Laser with stim 
[]  Other:  Position: Location:  
 []  Vasopneumatic Device Pressure:       [] lo [] med [] hi  
Temperature: [] lo [] med [] hi  
[x] Skin assessment post-treatment:  [x]intact []redness- no adverse reaction 
  []redness  adverse reaction: 29 min Therapeutic Exercise:  [x] See flow sheet :  
Rationale: increase ROM and increase strength to improve the patients ability to increase ease with ADls With 
 [x] TE 
 [] TA 
 [] neuro 
 [] other: Patient Education: [x] Review HEP [] Progressed/Changed HEP based on:  
[] positioning   [] body mechanics   [] transfers   [] heat/ice application   
[] other:   
 
Other Objective/Functional Measures:  
Pt declined to have manual therapy due to copay Pain Level (0-10 scale) post treatment: 0/10 ASSESSMENT/Changes in Function:  
See progress note Patient will continue to benefit from skilled PT services to modify and progress therapeutic interventions, address functional mobility deficits, address ROM deficits and address strength deficits to attain remaining goals. []  See Plan of Care [x]  See progress note/recertification 
[]  See Discharge Summary Progress towards goals / Updated goals: 
Short Term Goals: To be accomplished in 2 weeks: 1. Pt will be independent with mild complexity HEP in order to progress toward long term goals.  
Goal met. 1/15/19 
  
Long Term Goals: To be accomplished in 6 weeks: 1. Pt will be independent with moderate complexity HEP in order to maintain gains made in physical therapy. Goal met. 2/12/19 2. Pt will increase active abduction to >/= 120' in order to progress toward PLOF.  
Not met: 65 degrees (2/1/19) 3. Pt will report pain does not exceed 3/10 in order to improve quality of life.  
Progressing. Pt reported 4/10 pain today. 2/8/19 4. Pt will increase active ER to >/=70' to increase ability to complete ADLs without need for modification.  
Progressing. Left ER is 50*. 2/12/19 5. Pt will increase FOTO score by >/= 22 points in order to show functional improvement & return to prior level of function. Progressing. Increased from 44 to 55. 2/12/19 6.  Pt will be able to carry a medium weight bag (5-10lb) with minimal difficulty in order to increase independence with carrying groceries.  
Not met. Pt is unable to carry more than a loaf of bread with the left arm. 2/12/19 PLAN 
[]  Upgrade activities as tolerated     [x]  Continue plan of care 
[]  Update interventions per flow sheet      
[]  Discharge due to:_ 
[]  Other:_ Deana Ambrose, CELSO 2/12/2019  7:59 AM 
 
Future Appointments Date Time Provider Kary Velázquez 2/12/2019  8:00 AM Jose Carlos Louder, PTA [facility]  
2/15/2019  8:00 AM Irl Guera [facility]  
2/19/2019  8:00 AM Jose Carlos Louder, PTA [facility]  
2/22/2019  8:00 AM Jose Carlos Louder, PTA [facility]

## 2019-02-13 NOTE — PROGRESS NOTES
[facility] [address] [facility] 
[phone] fax Physical Therapy Progress Note Patient name: Claudia Alfaro Start of Care:  19 Referral source: Wallace Dupont MD : 1960 Medical/Treatment Diagnosis: Left shoulder pain [M25.512] Payor: [facility] / Plan: [facility] PPO / Product Type: PPO /  Onset Date: 2018 Prior Hospitalization: see medical history Provider#: [id number] Medications: Verified on Patient Summary List   
Comorbidities:  HTN Prior Level of Function: Ind and pain free with all activities Visits from Start of Care: 7    Missed Visits: 2 Established Goals:         Excellent           Good         Limited           None 
[x] Increased ROM   []  []  [x]  [] 
[x] Increased Strength  []  []  [x]  [] 
[x] Increased Mobility  []  []  [x]  []  
[x] Decreased Pain   []  []  [x]  [] 
[] Decreased Swelling  []  []  []  [] 
 
Key Functional Changes:  Pt. Is progressing slowly towards goals. She is having less pain overall but continues to demonstrate decreased shoulder mobility. Left shoulder abduction AROM was 65 degrees and ER was limited to 50 degrees. She also continue to have difficulty carrying anything heavier than a loaf of bread with left UE. FOTO score did improve from 44 to 55 points. Skilled PT is medically necessary in order to improve left shoulder mobility and strength for increased ease of ADLs and improved quality of life. Updated Goals: to be achieved in 6 weeks: 1. Patient will improve FOTO score by 22 points in order to demonstrate a significant improvement in function. 2. Patient will improve left shoulder abduction MMT to 120 degrees in order to increase ease of getting dressed. 3. Patient will improve left shoulder IR/ER strength to 4/5 in order to increase ease of reaching into cabinets.   
4. Patient will report pain at 2/10 or less during daily activities in order to improve quality of life. ASSESSMENT/RECOMMENDATIONS: 
[x]Continue therapy per initial plan/protocol at a frequency of  2 x per week for 6 weeks []Continue therapy with the following recommended changes:_____________________      _____________________________________________________________________ []Discontinue therapy progressing towards or have reached established goals []Discontinue therapy due to lack of appreciable progress towards goals []Discontinue therapy due to lack of attendance or compliance []Await Physician's recommendations/decisions regarding therapy []Other:________________________________________________________________ Thank you for this referral.  
Kenneth Aguilar, PT 2/13/2019 9:28 AM 
 
NOTE TO PHYSICIAN:  PLEASE COMPLETE THE ORDERS BELOW AND  
FAX TO [facility]: [phone] If you are unable to process this request in 24 hours please contact our office: [phone] ? I have read the above report and request that my patient continue as recommended. ? I have read the above report and request that my patient continue therapy with the following changes/special instructions:____________________________________ ? I have read the above report and request that my patient be discharged from therapy. Physicians signature: ______________________________Date: ______Time:______

## 2019-02-15 ENCOUNTER — HOSPITAL ENCOUNTER (OUTPATIENT)
Dept: PHYSICAL THERAPY | Age: 59
Discharge: HOME | End: 2019-02-15
Payer: COMMERCIAL

## 2019-02-22 ENCOUNTER — HOSPITAL ENCOUNTER (OUTPATIENT)
Dept: PHYSICAL THERAPY | Age: 59
Discharge: HOME | End: 2019-02-22
Payer: COMMERCIAL

## 2019-02-22 PROCEDURE — 97110 THERAPEUTIC EXERCISES: CPT

## 2019-02-22 NOTE — PROGRESS NOTES
PT DAILY TREATMENT NOTE 10-18 Patient Name: Alicia Mirza Date:2019 : 1960 [x]  Patient  Verified Payor: BLUE CROSS / Plan: [facility] PPO / Product Type: PPO / In time:8:00  Out time:8:50 Total Treatment Time (min): 50 Visit #: 8 of 12 Medicare timed visits: 50 Medicare 1:1 time= 40 Treatment Area: Left shoulder pain [M25.512] SUBJECTIVE Pain Level (0-10 scale): 3/10 Any medication changes, allergies to medications, adverse drug reactions, diagnosis change, or new procedure performed?: [x] No    [] Yes (see summary sheet for update) Subjective functional status/changes:   [] No changes reported I am in a little pain, but not much. I  Do not take pain medication. OBJECTIVE Modality rationale: decrease edema, decrease inflammation, decrease pain, increase tissue extensibility and increase muscle contraction/control to improve the patients ability to pick items up over head and in her cabinets. Type Additional Details  
[] Estim:  []Unatt       []IFC  []Premod []Other:  []w/ice   []w/heat Position: Location:  
[] Estim: []Att    []TENS instruct  []NMES []Other:  []w/US   []w/ice   []w/heat Position: Location:  
[]  Traction: [] Cervical       []Lumbar 
                     [] Prone          []Supine []Intermittent   []Continuous Lbs: 
[] before manual 
[] after manual  
[]  Ultrasound: []Continuous   [] Pulsed []1MHz   []3MHz W/cm2: 
Location:  
[]  Iontophoresis with dexamethasone Location: [] Take home patch  
[] In clinic  
[]  Ice     [x]  heat 
[]  Ice massage 
[]  Laser  
[]  Anodyne Position:seated Location:  
[]  Laser with stim 
[]  Other:  Position: Location:  
[]  Vasopneumatic Device Pressure:       [] lo [] med [] hi  
Temperature: [] lo [] med [] hi  
[x] Skin assessment post-treatment:  [x]intact [x]redness- no adverse reaction []redness  adverse reaction:  
 
 
 
40 min Therapeutic Exercise:  [x] See flow sheet :  
Rationale: increase ROM and increase strength to improve the patients ability to return to normal activities. With 
 [] TE 
 [] TA 
 [] neuro 
 [] other: Patient Education: [x] Review HEP [] Progressed/Changed HEP based on:  
[] positioning   [] body mechanics   [] transfers   [] heat/ice application   
[x] other: HEP and compliance Other Objective/Functional Measures:  Pt with decreased AROM of left shoulder. She reports increased stress at home, work and with her mentally ill daughter. Her BP has been monitored by her MD, and it continues to be high at times. Pt was educated regarding the need to monitor BP to avoid complications from high BP. Pain Level (0-10 scale) post treatment: 5/10 ASSESSMENT/Changes in Function: Pt with limited progress toward goals, she reports not being able to lift overhead to reach into her closets, cabinets and has slight difficulty washing her hair. Recommend f/u with MD regarding limitations. Patient will continue to benefit from skilled PT services to address functional mobility deficits, address ROM deficits, address strength deficits, analyze and address soft tissue restrictions and analyze and cue movement patterns to attain remaining goals. [x]  See Plan of Care 
[]  See progress note/recertification 
[]  See Discharge Summary Updated Goals: to be achieved in 6 weeks: 1. Patient will improve FOTO score by 22 points in order to demonstrate a significant improvement in function. 2. Patient will improve left shoulder abduction MMT to 120 degrees in order to increase ease of getting dressed. 3. Patient will improve left shoulder IR/ER strength to 4/5 in order to increase ease of reaching into cabinets.   
4. Patient will report pain at 2/10 or less during daily activities in order to improve quality of life.  
  
 
 ogress towards goals / Updated goals: PLAN [x]  Upgrade activities as tolerated     [x]  Continue plan of care 
[]  Update interventions per flow sheet      
[]  Discharge due to:_ 
[]  Other:_ Ailin Gold 2/22/2019  7:58 AM 
 
Future Appointments Date Time Provider Kary Velázquez 2/22/2019  8:00 AM Oscar Sellers [facility]

## 2019-03-27 NOTE — PROGRESS NOTES
[facility] [address] [facility] 
[phone] fax Physical Therapy Discharge Summary Patient name: Nain Painting Start of Care:  19 Referral source: Mariluz Sanchez MD : 1960 Medical/Treatment Diagnosis: Left shoulder pain [M25.512] Payor: [facility] / Plan: [facility] PPO / Product Type: PPO /  Onset Date: 2018 Prior Hospitalization: see medical history Provider#: [id number] Medications: Verified on Patient Summary List   
Comorbidities:  HTN Prior Level of Function: Ind and pain free with all activities Visits from Start of Care: 10    Missed Visits: 2 Reporting Period : 19 to 19 Summary of Care: 
Goal: Patient will improve FOTO score by 22 points in order to demonstrate a significant improvement in function. Status at last note/certification: 55 Status at discharge: not met Goal: Patient will improve left shoulder abduction AROM to 120 degrees in order to increase ease of getting dressed. Status at last note/certification: 65 degrees Status at discharge: not met Goal: Patient will improve left shoulder IR/ER strength to 4/5 in order to increase ease of reaching into cabinets. Status at last note/certification: 4-/5 Status at discharge: not met Goal: Patient will report pain at 2/10 or less during daily activities in order to improve quality of life. Status at last note/certification:  Status at discharge: not met Pt. Was seen for 1 visit after last progress note and then did not return to PT. Per last progress note: \"Pt. Is progressing slowly towards goals. She is having less pain overall but continues to demonstrate decreased shoulder mobility. Left shoulder abduction AROM was 65 degrees and ER was limited to 50 degrees. She also continue to have difficulty carrying anything heavier than a loaf of bread with left UE.  FOTO score did improve from 44 to 55 points. \" she was educated on a HEP at evaluation. ASSESSMENT/RECOMMENDATIONS: 
[x]Discontinue therapy: []Patient has reached or is progressing toward set goals [x]Patient is non-compliant or has abdicated 
    []Due to lack of appreciable progress towards set goals Kenneth Aguilar, PT 3/27/2019 3:27 PM

## 2019-04-24 ENCOUNTER — OFFICE VISIT (OUTPATIENT)
Dept: ORTHOPEDIC SURGERY | Facility: CLINIC | Age: 59
End: 2019-04-24

## 2019-04-24 VITALS
BODY MASS INDEX: 33.92 KG/M2 | DIASTOLIC BLOOD PRESSURE: 84 MMHG | HEIGHT: 60 IN | HEART RATE: 64 BPM | RESPIRATION RATE: 18 BRPM | OXYGEN SATURATION: 94 % | TEMPERATURE: 98.3 F | SYSTOLIC BLOOD PRESSURE: 185 MMHG | WEIGHT: 172.8 LBS

## 2019-04-24 DIAGNOSIS — M70.61 TROCHANTERIC BURSITIS OF RIGHT HIP: ICD-10-CM

## 2019-04-24 DIAGNOSIS — M25.512 LEFT SHOULDER PAIN, UNSPECIFIED CHRONICITY: Primary | ICD-10-CM

## 2019-04-24 DIAGNOSIS — M79.671 RIGHT FOOT PAIN: ICD-10-CM

## 2019-04-24 RX ORDER — HYDROCODONE BITARTRATE AND ACETAMINOPHEN 7.5; 325 MG/1; MG/1
1 TABLET ORAL
Qty: 28 TAB | Refills: 0 | Status: SHIPPED | OUTPATIENT
Start: 2019-04-24 | End: 2019-05-08

## 2019-04-24 RX ORDER — BETAMETHASONE SODIUM PHOSPHATE AND BETAMETHASONE ACETATE 3; 3 MG/ML; MG/ML
6 INJECTION, SUSPENSION INTRA-ARTICULAR; INTRALESIONAL; INTRAMUSCULAR; SOFT TISSUE ONCE
Qty: 1 ML | Refills: 0
Start: 2019-04-24 | End: 2019-04-24

## 2019-04-24 RX ORDER — DICLOFENAC SODIUM 10 MG/G
4 GEL TOPICAL 4 TIMES DAILY
Qty: 100 G | Refills: 0 | Status: SHIPPED | OUTPATIENT
Start: 2019-04-24 | End: 2020-09-15 | Stop reason: SDUPTHER

## 2019-04-24 RX ORDER — ATORVASTATIN CALCIUM 10 MG/1
TABLET, FILM COATED ORAL
Refills: 0 | COMMUNITY
Start: 2019-02-01 | End: 2021-12-29 | Stop reason: SDUPTHER

## 2019-04-24 RX ORDER — LISINOPRIL 10 MG/1
TABLET ORAL
Refills: 0 | COMMUNITY
Start: 2019-01-18 | End: 2021-12-29 | Stop reason: SDUPTHER

## 2019-04-24 NOTE — PROGRESS NOTES
[address] [place] [address], [address] Aga Bateman 
[phone] Patient: Stan Houston                MRN: [id number]       SSN: [id number] YOB: 1960        AGE: 61 y.o. SEX: female Body mass index is 33.75 kg/m². PCP: Refusal, Pcp Disclosure 04/24/19 This office note has been dictated. REVIEW OF SYSTEMS: 
Constitutional: Negative for fever, chills, weight loss and malaise/fatigue. HENT: Negative. Eyes: Negative. Respiratory: Negative. Cardiovascular: Negative. Gastrointestinal: No bowel incontinence or constipation. Genitourinary: No bladder incontinence or saddle anesthesia. Skin: Negative. Neurological: Negative. Endo/Heme/Allergies: Negative. Psychiatric/Behavioral: Negative. Musculoskeletal: As per HPI above. Past Medical History:  
Diagnosis Date  Arthritis  COPD  Hepatitis C   
 treated  Hypercholesterolemia  Hypertension   
 no meds  Sleep apnea   
 no CPAP Current Outpatient Medications:  
  atorvastatin (LIPITOR) 10 mg tablet, take 1 tablet by mouth at bedtime, Disp: , Rfl: 0 
  lisinopril (PRINIVIL, ZESTRIL) 10 mg tablet, take 1 tablet by mouth once daily, Disp: , Rfl: 0 
  betamethasone (CELESTONE SOLUSPAN) 6 mg/mL injection, 1 mL by Intra artICUlar route once for 1 dose., Disp: 1 mL, Rfl: 0 
  meloxicam (MOBIC) 15 mg tablet, 1 tab by mouth daily with food, Disp: 30 Tab, Rfl: 2 
  diclofenac (VOLTAREN) 1 % gel, Apply 4 g to affected area four (4) times daily. Maximum 16 grams per joint per day. Dispense 5 100 gram tubes, Disp: 5 Each, Rfl: 0 
  ibuprofen (ADVIL) 100 mg tablet, Take 100 mg by mouth every six (6) hours as needed for Pain., Disp: , Rfl:  
  multivitamin, tx-iron-ca-min (THERA-M W/ IRON) 9 mg iron-400 mcg tab tablet, Take 1 Tab by mouth daily. , Disp: , Rfl:  
  ALBUTEROL IN, Take  by inhalation.  Emergency only, Disp: , Rfl:  
   Cholecalciferol, Vitamin D3, (VITAMIN D3) 1,000 unit cap, Take  by mouth daily. , Disp: , Rfl:  
  meloxicam (MOBIC) 15 mg tablet, Take 1 Tab by mouth daily. , Disp: 30 Tab, Rfl: 1 
  HYDROcodone-acetaminophen (NORCO) 7.5-325 mg per tablet, Take 1 Tab by mouth every six (6) hours as needed for Pain. Max Daily Amount: 4 Tabs., Disp: 28 Tab, Rfl: 0 
  HYDROcodone-acetaminophen (NORCO) 7.5-325 mg per tablet, Take 1 Tab by mouth every eight (8) hours as needed for Pain. Max Daily Amount: 3 Tabs., Disp: 21 Tab, Rfl: 0 
  HYDROcodone-acetaminophen (NORCO) 7.5-325 mg per tablet, Take 1 Tab by mouth every eight (8) hours as needed for Pain. Max Daily Amount: 3 Tabs., Disp: 21 Tab, Rfl: 0 
  HYDROcodone-acetaminophen (NORCO) 5-325 mg per tablet, Take 1 Tab by mouth every six (6) hours as needed for Pain. Max Daily Amount: 4 Tabs., Disp: 6 Tab, Rfl: 0 
  ondansetron (ZOFRAN ODT) 4 mg disintegrating tablet, Take 1-2 Tabs by mouth every eight (8) hours as needed. , Disp: 15 Tab, Rfl: 0 No Known Allergies Social History Socioeconomic History  Marital status: SINGLE Spouse name: Not on file  Number of children: Not on file  Years of education: Not on file  Highest education level: Not on file Occupational History  Not on file Social Needs  Financial resource strain: Not on file  Food insecurity:  
  Worry: Not on file Inability: Not on file  Transportation needs:  
  Medical: Not on file Non-medical: Not on file Tobacco Use  Smoking status: Current Some Day Smoker Packs/day: 1.00 Years: 40.00 Pack years: 40.00 Types: Cigarettes Last attempt to quit: 10/21/2015 Years since quitting: 3.5  Smokeless tobacco: Never Used Substance and Sexual Activity  Alcohol use: No  
 Drug use: No  
 Sexual activity: Not on file Lifestyle  Physical activity:  
  Days per week: Not on file Minutes per session: Not on file  Stress: Not on file Relationships  Social connections:  
  Talks on phone: Not on file Gets together: Not on file Attends Rastafari service: Not on file Active member of club or organization: Not on file Attends meetings of clubs or organizations: Not on file Relationship status: Not on file  Intimate partner violence:  
  Fear of current or ex partner: Not on file Emotionally abused: Not on file Physically abused: Not on file Forced sexual activity: Not on file Other Topics Concern  Not on file Social History Narrative  Not on file Past Surgical History:  
Procedure Laterality Date [address]  HX CHOLECYSTECTOMY  2005  HX HYSTERECTOMY  2008  HX KNEE ARTHROSCOPY  2009 2010 Bilateral  
 HX KNEE REPLACEMENT    
 HX TONSILLECTOMY  1968  VT OSSEOUS SURG 4/> CNTIG TEETH QUAD * Patient was identified by name and date of birth * Agreement on procedure being performed was verified * Risks and Benefits explained to the patient * Procedure site verified and marked as necessary * Patient was positioned for comfort * Consent was signed and verified 11:42 AM 
 
The patient was instructed on post injection care. SUBJECTIVE:  The patient is seen today for reevaluation of her right hip, as well as her right foot. She does have a history of trochanteric bursitis of the right hip. She is requesting repeat injection of the hip today.   She is having laterally-based discomfort when she rolls over on the right side at night. She denies any radiating pain or numbness down the lower extremities. She has had no fevers, chills, systemic changes, or injuries to report. She does report having some right foot pain over the past couple of weeks without injury. She reports pain on the outside part of her foot. She has no numbness or tingling. PHYSICAL EXAMINATION:  In general, the patient is alert and oriented x 3 in no acute distress. The patient is well-developed, well-nourished, with a normal affect. The patient is afebrile. HEENT:  Head is normocephalic and atraumatic. Pupils are equally round and reactive to light and accommodation. Extraocular eye movements are intact. Neck is supple. Trachea is midline. No JVD is present. Breathing is nonlabored. Examination of the lower extremities reveals pain-free range of motion of the hips. She does have discomfort to palpation of the greater trochanteric bursae on the right side, none on the left. There is negative straight leg raise. There is negative calf tenderness. There is negative Zurdo's. There is no evidence of DVT present. The right foot reveals the skin is intact. There is no ecchymosis, no warmth, and no signs of infection or cellulitis present. She does have discomfort to palpation to the peroneal tendon at its insertion to the base of the fifth, as well as to the plantar fascia. She is ligamentously stable. There is a negative anterior drawer and negative talar tilt. There is negative eversion stress. Distal pulse is 2+ to dorsalis pedis and posterior tibial.   
  
RADIOGRAPHS:  Radiographs in the office today, including three views of the right foot, show no acute bony abnormalities. ASSESSMENT:   
 
1. Right hip trochanteric bursitis. 2. Right lower extremity peroneal tendinitis. PLAN:  At this point, we are going to move forward with a cortisone injection for the right hip today.  After informed and written consent, and appropriate time out performed, under sterile conditions, with ultrasound-guided assistance, the right hip was prepped with Betadine and 40 mg of Kenalog was injected to the trochanteric bursa without complications. The patient tolerated the injection well. The patient was instructed on post injection care. We are going to move forward with getting her some Voltaren Gel for the right foot. She was instructed on use, as well as precautions. We will plan on seeing her back in the office in about three months' time for evaluation. If the foot is not improved, we will get her to see Dr. Cas Garcia. She may need immobilization in the boot.    
 
 
 
 
 
 
 
 
JR Parrish LOMBARDI, PAAyeshaC, ATC

## 2019-06-05 ENCOUNTER — OFFICE VISIT (OUTPATIENT)
Dept: ORTHOPEDIC SURGERY | Facility: CLINIC | Age: 59
End: 2019-06-05

## 2019-06-05 VITALS
BODY MASS INDEX: 33.22 KG/M2 | TEMPERATURE: 97.5 F | SYSTOLIC BLOOD PRESSURE: 182 MMHG | RESPIRATION RATE: 16 BRPM | OXYGEN SATURATION: 91 % | DIASTOLIC BLOOD PRESSURE: 100 MMHG | HEIGHT: 60 IN | WEIGHT: 169.2 LBS | HEART RATE: 70 BPM

## 2019-06-05 DIAGNOSIS — M70.52 PES ANSERINUS BURSITIS OF LEFT KNEE: ICD-10-CM

## 2019-06-05 DIAGNOSIS — M25.512 LEFT SHOULDER PAIN, UNSPECIFIED CHRONICITY: ICD-10-CM

## 2019-06-05 DIAGNOSIS — Z96.652 STATUS POST TOTAL LEFT KNEE REPLACEMENT: ICD-10-CM

## 2019-06-05 DIAGNOSIS — M79.671 RIGHT FOOT PAIN: Primary | ICD-10-CM

## 2019-06-05 RX ORDER — VARENICLINE TARTRATE 1 MG/1
1 TABLET, FILM COATED ORAL
COMMUNITY
End: 2020-09-15

## 2019-06-05 RX ORDER — BETAMETHASONE SODIUM PHOSPHATE AND BETAMETHASONE ACETATE 3; 3 MG/ML; MG/ML
6 INJECTION, SUSPENSION INTRA-ARTICULAR; INTRALESIONAL; INTRAMUSCULAR; SOFT TISSUE ONCE
Qty: 1 ML | Refills: 0
Start: 2019-06-05 | End: 2019-06-05

## 2019-06-05 RX ORDER — HYDROCODONE BITARTRATE AND ACETAMINOPHEN 7.5; 325 MG/1; MG/1
1 TABLET ORAL
Qty: 21 TAB | Refills: 0 | Status: SHIPPED | OUTPATIENT
Start: 2019-06-05 | End: 2019-06-19

## 2019-06-05 NOTE — PROGRESS NOTES
[address] [facility], [address] [facility]  [phone]           Patient: Lyudmila Davis                MRN: [id number]       SSN: [id number]  YOB: 1960        AGE: 61 y.o. SEX: female  Body mass index is 33.04 kg/m². PCP: Refusal, Pcp Disclosure  06/05/19      This office note has been dictated. REVIEW OF SYSTEMS:  Constitutional: Negative for fever, chills, weight loss and malaise/fatigue. HENT: Negative. Eyes: Negative. Respiratory: Negative. Cardiovascular: Negative. Gastrointestinal: No bowel incontinence or constipation. Genitourinary: No bladder incontinence or saddle anesthesia. Skin: Negative. Neurological: Negative. Endo/Heme/Allergies: Negative. Psychiatric/Behavioral: Negative. Musculoskeletal: As per HPI above. Past Medical History:   Diagnosis Date    Arthritis     COPD     Hepatitis C     treated     Hypercholesterolemia     Hypertension     no meds     Sleep apnea     no CPAP          Current Outpatient Medications:     varenicline (CHANTIX) 1 mg tablet, Take 1 mg by mouth two (2) times daily (after meals). , Disp: , Rfl:     atorvastatin (LIPITOR) 10 mg tablet, take 1 tablet by mouth at bedtime, Disp: , Rfl: 0    lisinopril (PRINIVIL, ZESTRIL) 10 mg tablet, take 1 tablet by mouth once daily, Disp: , Rfl: 0    diclofenac (VOLTAREN) 1 % gel, Apply 4 g to affected area four (4) times daily. , Disp: 100 g, Rfl: 0    meloxicam (MOBIC) 15 mg tablet, Take 1 Tab by mouth daily. , Disp: 30 Tab, Rfl: 1    HYDROcodone-acetaminophen (NORCO) 7.5-325 mg per tablet, Take 1 Tab by mouth every six (6) hours as needed for Pain. Max Daily Amount: 4 Tabs., Disp: 28 Tab, Rfl: 0    HYDROcodone-acetaminophen (NORCO) 7.5-325 mg per tablet, Take 1 Tab by mouth every six (6) hours as needed for Pain.  Max Daily Amount: 4 Tabs., Disp: 28 Tab, Rfl: 0    HYDROcodone-acetaminophen (NORCO) 7.5-325 mg per tablet, Take 1 Tab by mouth every six (6) hours as needed for Pain. Max Daily Amount: 4 Tabs., Disp: 28 Tab, Rfl: 0    HYDROcodone-acetaminophen (NORCO) 7.5-325 mg per tablet, Take 1 Tab by mouth every eight (8) hours as needed for Pain. Max Daily Amount: 3 Tabs., Disp: 21 Tab, Rfl: 0    HYDROcodone-acetaminophen (NORCO) 7.5-325 mg per tablet, Take 1 Tab by mouth every eight (8) hours as needed for Pain. Max Daily Amount: 3 Tabs., Disp: 21 Tab, Rfl: 0    HYDROcodone-acetaminophen (NORCO) 5-325 mg per tablet, Take 1 Tab by mouth every six (6) hours as needed for Pain. Max Daily Amount: 4 Tabs., Disp: 6 Tab, Rfl: 0    meloxicam (MOBIC) 15 mg tablet, 1 tab by mouth daily with food, Disp: 30 Tab, Rfl: 2    diclofenac (VOLTAREN) 1 % gel, Apply 4 g to affected area four (4) times daily. Maximum 16 grams per joint per day. Dispense 5 100 gram tubes, Disp: 5 Each, Rfl: 0    ibuprofen (ADVIL) 100 mg tablet, Take 100 mg by mouth every six (6) hours as needed for Pain., Disp: , Rfl:     ondansetron (ZOFRAN ODT) 4 mg disintegrating tablet, Take 1-2 Tabs by mouth every eight (8) hours as needed. , Disp: 15 Tab, Rfl: 0    multivitamin, tx-iron-ca-min (THERA-M W/ IRON) 9 mg iron-400 mcg tab tablet, Take 1 Tab by mouth daily. , Disp: , Rfl:     ALBUTEROL IN, Take  by inhalation. Emergency only, Disp: , Rfl:     Cholecalciferol, Vitamin D3, (VITAMIN D3) 1,000 unit cap, Take  by mouth daily. , Disp: , Rfl:     No Known Allergies    Social History     Socioeconomic History    Marital status: SINGLE     Spouse name: Not on file    Number of children: Not on file    Years of education: Not on file    Highest education level: Not on file   Occupational History    Not on file   Social Needs    Financial resource strain: Not on file    Food insecurity:     Worry: Not on file     Inability: Not on file    Transportation needs:     Medical: Not on file     Non-medical: Not on file   Tobacco Use    Smoking status: Current Some Day Smoker     Packs/day: 1.00     Years: 40.00     Pack years: 40.00     Types: Cigarettes     Last attempt to quit: 10/21/2015     Years since quitting: 3.6    Smokeless tobacco: Never Used   Substance and Sexual Activity    Alcohol use: No    Drug use: No    Sexual activity: Not on file   Lifestyle    Physical activity:     Days per week: Not on file     Minutes per session: Not on file    Stress: Not on file   Relationships    Social connections:     Talks on phone: Not on file     Gets together: Not on file     Attends [employer] service: Not on file     Active member of club or organization: Not on file     Attends meetings of clubs or organizations: Not on file     Relationship status: Not on file    Intimate partner violence:     Fear of current or ex partner: Not on file     Emotionally abused: Not on file     Physically abused: Not on file     Forced sexual activity: Not on file   Other Topics Concern    Not on file   Social History Narrative    Not on file       Past Surgical History:   Procedure Laterality Date    [facility] [facility]      HX  SECTION      HX CHOLECYSTECTOMY      HX HYSTERECTOMY      HX KNEE ARTHROSCOPY  2009    Bilateral    HX KNEE REPLACEMENT      HX TONSILLECTOMY  1968    [place] OSSEOUS SURG 4/> CNTIG TEETH QUAD             * Patient was identified by name and date of birth   * Agreement on procedure being performed was verified  * Risks and Benefits explained to the patient  * Procedure site verified and marked as necessary  * Patient was positioned for comfort  * Consent was signed and verified  8:56 AM    The patient was instructed on post injection care. SUBJECTIVE:  The patient is seen today for reevaluation of multiple complaints. The patient complains of her left shoulder, which does have known bursitis. She has had an injection in the past, which gave her moderate relief. She is still doing her exercises.   She has had twinges of discomfort, discomfort at night radiating to her deltoid. No numbness or tingling in the upper extremity. She also has had a left knee replacement. She did well with it. She has been worked up for infection in the past.  Fortunately it has been negative. Does have some pes bursitis. Requesting an injection for the bursitis today. No feelings of instability. No fevers or chills. No knee swelling. She is also complaining of some right foot pain, which has been ongoing. She has had peroneal tendinitis in the past. Has been using some anti-inflammatories, which have not helped. She has discomfort with ambulation. No numbness and tingling to the foot. PHYSICAL EXAMINATION:  In general, the patient is alert and oriented x3 and is in no acute distress. The patient is well-developed and well-nourished. The patient is afebrile. HEENT:  Head is normocephalic and atraumatic. Extraocular eye movements are intact. No JVD is present. Breathing is nonlabored. Examination of the lower extremities reveals pain-free range of motion of the neck. No reproduction of symptoms. Sensory and motor intact in the lower extremities, symmetric bilaterally. Left shoulder reveals skin intact. No redness. No warmth. There are no signs for infection or cellulitis present. Range of motion about 40 degrees external rotation. Decreased strength on external rotation. Positive Howard. Negative  drop arm, and Clayton's'. About 90 degrees of forward flexion, 90 degrees of abduction. Examination of the lower extremities reveals pain-free motion of the hips. No pain on palpation of the trochanteric bursa. Straight leg raise negative. Negative calf tenderness. No signs of DVT present. The left knee reveals skin is intact. No redness. No warmth. There are no signs for infection or cellulitis present. She has full range of motion with very good stability.   Patella tracks nicely without rubs or crepitance. She does have  tenderness to palpation to the pes bursa of the left knee. The right foot reveals skin intact. No redness. No warmth. There are no signs for infection or cellulitis present. She does have discomfort to palpation to the peroneal tendon, laterally as well as the base of the 5th. There is no erythema, no ecchymosis noted. Range of motion of the foot and ankle within normal limits. ASSESSMENT:  1. Left shoulder  subacromial bursitis, impingement syndrome, rotator cuff pathology. 2.  Left knee replacement. 3.  Left knee pes bursitis. 4.  Right foot peroneal tendonitis. PROCEDURE:  At this point, we are going to move forward with a cortisone injection to the left knee today. Today, after informed consent, under aseptic conditions, as well as ultrasound guidance,  the left knee was prepped with Betadine and 6 mg of Celestone was injected to the pes without complications. The patient tolerated the injection well. She was instructed in post-injection care. PLAN:  We are going to perform an MRI of the left shoulder to rule out rotator cuff pathology, as well as the right foot to rule out any stress fractures. She was placed in a short boot to the right lower extremity. She will call if any questions or concern arises.                     JR Prarish LOMBARDI, ABDI, ATC

## 2019-06-28 ENCOUNTER — TELEPHONE (OUTPATIENT)
Dept: ORTHOPEDIC SURGERY | Facility: CLINIC | Age: 59
End: 2019-06-28

## 2019-06-28 NOTE — TELEPHONE ENCOUNTER
I spoke with the patient and explained the situation and apologized profusely for the confusion. I told her I was going to have someone from the scheduling at [facility] contact her and try to get her MRI done before she goes out of town July 5. She seemed to feel ome better about the situation. I also e-mailed Arnie Free with scheduling and asked her to contact the patient regarding this situation. Patient is requesting pain medication until she can get the MRI done.

## 2019-06-28 NOTE — TELEPHONE ENCOUNTER
Message received from Ebony in central scheduling. She is asking if we would complete a peer to peer review for the MRI of the Right Shoulder today. Please call [phone], ID# [id number], If approved, please obtain the authorization number and effective dates. Thank you.

## 2019-06-28 NOTE — TELEPHONE ENCOUNTER
Patient called back stating that L wanted her to call back after her MRI was scheduled but did not remember the reason why. She did state that her foot MRI is scheduled for 7/1 and they are still waiting on the shoulder MRI authorization.  Please advise patient at [phone]

## 2019-06-28 NOTE — TELEPHONE ENCOUNTER
Robina with [employer] of [facility] called asking what the status of the Peer to Peer is and if it is just for the shoulder MRI or for both the shoulder and foot MRI. She did state that the case closes today and that both MRI's are on the same case but not sure if the foot was approved or not. Please advise Haydee García at [phone].

## 2019-06-28 NOTE — TELEPHONE ENCOUNTER
Can you guys help me get this cleared up. Need to find out why she was denied and how all this miscommunication happened please.

## 2019-06-28 NOTE — TELEPHONE ENCOUNTER
Spoke with Sarika Isaac and she thinks because the shoulder foot are bound together it is not showing that either one is authorized. She is going to try to separate the orders so the foot can be done while the shoulder is pending authorization and peer to peer from [employer]. She is going to contact the patient to let her know what is going on.

## 2019-06-28 NOTE — TELEPHONE ENCOUNTER
Patient called stating that she was originally scheduled for today at [facility] for both MRI's but received a call yesterday saying to not show up because the MRI's were not approved. Then she received another call saying to come in they have an authorization for her foot only. When she arrived they told her they never had her on the schedule and neither MRI was approved. She was told that someone from [facility] spoke to us about the MRI denials but there is no documentation on it. She is very hurt by this and wants this fixed as soon as possible.  Please advise patient at [phone]

## 2019-06-28 NOTE — TELEPHONE ENCOUNTER
BERTIN Powers will need to do the peer to peer, I will not have time today. I can give her an rx for mobic that is the strongest medication I can do for this.

## 2019-07-01 ENCOUNTER — HOSPITAL ENCOUNTER (OUTPATIENT)
Age: 59
Discharge: HOME | End: 2019-07-01
Attending: PHYSICIAN ASSISTANT
Payer: COMMERCIAL

## 2019-07-01 ENCOUNTER — TELEPHONE (OUTPATIENT)
Dept: ORTHOPEDIC SURGERY | Age: 59
End: 2019-07-01

## 2019-07-01 DIAGNOSIS — M79.671 RIGHT FOOT PAIN: ICD-10-CM

## 2019-07-01 PROCEDURE — 73718 MRI LOWER EXTREMITY W/O DYE: CPT

## 2019-07-01 NOTE — TELEPHONE ENCOUNTER
Patient called advised Rt foot MRI completed 7/1. Pt is wanting update with Shoulder MRI. She is going out of town on 7/5 and will not return until 7/20. Patient is also requesting refill on [employer] 7.5-325 or alternative. Until she is able to be seen.

## 2019-07-02 NOTE — TELEPHONE ENCOUNTER
[address] notifying patient that she will need to be seen for her shoulder, and that we are unable to refill any pain medication at this time.

## 2019-07-02 NOTE — TELEPHONE ENCOUNTER
Pt will need to come in for f/u visit to obtain MRI of the shoulder. No refill of pain meds at this time.

## 2019-08-07 ENCOUNTER — OFFICE VISIT (OUTPATIENT)
Dept: ORTHOPEDIC SURGERY | Facility: CLINIC | Age: 59
End: 2019-08-07

## 2019-08-07 VITALS
WEIGHT: 170.6 LBS | OXYGEN SATURATION: 94 % | RESPIRATION RATE: 18 BRPM | BODY MASS INDEX: 33.49 KG/M2 | SYSTOLIC BLOOD PRESSURE: 170 MMHG | HEART RATE: 73 BPM | DIASTOLIC BLOOD PRESSURE: 79 MMHG | TEMPERATURE: 97.6 F | HEIGHT: 60 IN

## 2019-08-07 DIAGNOSIS — M70.52 PES ANSERINUS BURSITIS OF LEFT KNEE: ICD-10-CM

## 2019-08-07 DIAGNOSIS — M25.512 LEFT SHOULDER PAIN, UNSPECIFIED CHRONICITY: ICD-10-CM

## 2019-08-07 DIAGNOSIS — M79.671 RIGHT FOOT PAIN: Primary | ICD-10-CM

## 2019-08-07 DIAGNOSIS — Z96.652 STATUS POST TOTAL LEFT KNEE REPLACEMENT: ICD-10-CM

## 2019-08-07 RX ORDER — HYDROCODONE BITARTRATE AND ACETAMINOPHEN 7.5; 325 MG/1; MG/1
1 TABLET ORAL
Qty: 28 TAB | Refills: 0 | Status: SHIPPED | OUTPATIENT
Start: 2019-08-07 | End: 2019-08-14

## 2019-08-07 NOTE — PROGRESS NOTES
[address] [facility], [address] [facility]  [phone]           Patient: Nadiya Bob                MRN: [id number]       SSN: [id number]  YOB: 1960        AGE: 61 y.o. SEX: female  Body mass index is 33.32 kg/m². PCP: Refusal, Pcp Disclosure  08/07/19      This office note has been dictated. REVIEW OF SYSTEMS:  Constitutional: Negative for fever, chills, weight loss and malaise/fatigue. HENT: Negative. Eyes: Negative. Respiratory: Negative. Cardiovascular: Negative. Gastrointestinal: No bowel incontinence or constipation. Genitourinary: No bladder incontinence or saddle anesthesia. Skin: Negative. Neurological: Negative. Endo/Heme/Allergies: Negative. Psychiatric/Behavioral: Negative. Musculoskeletal: As per HPI above. Past Medical History:   Diagnosis Date    Arthritis     COPD     Hepatitis C     treated     Hypercholesterolemia     Hypertension     no meds     Sleep apnea     no CPAP          Current Outpatient Medications:     HYDROcodone-acetaminophen (NORCO) 7.5-325 mg per tablet, Take 1 Tab by mouth every six (6) hours as needed for Pain for up to 7 days. Max Daily Amount: 4 Tabs., Disp: 28 Tab, Rfl: 0    varenicline (CHANTIX) 1 mg tablet, Take 1 mg by mouth two (2) times daily (after meals). , Disp: , Rfl:     atorvastatin (LIPITOR) 10 mg tablet, take 1 tablet by mouth at bedtime, Disp: , Rfl: 0    lisinopril (PRINIVIL, ZESTRIL) 10 mg tablet, take 1 tablet by mouth once daily, Disp: , Rfl: 0    meloxicam (MOBIC) 15 mg tablet, 1 tab by mouth daily with food, Disp: 30 Tab, Rfl: 2    diclofenac (VOLTAREN) 1 % gel, Apply 4 g to affected area four (4) times daily. Maximum 16 grams per joint per day.  Dispense 5 100 gram tubes, Disp: 5 Each, Rfl: 0    ibuprofen (ADVIL) 100 mg tablet, Take 100 mg by mouth every six (6) hours as needed for Pain., Disp: , Rfl:     multivitamin, tx-iron-ca-min (THERA-M W/ IRON) 9 mg iron-400 mcg tab tablet, Take 1 Tab by mouth daily. , Disp: , Rfl:     ALBUTEROL IN, Take  by inhalation. Emergency only, Disp: , Rfl:     Cholecalciferol, Vitamin D3, (VITAMIN D3) 1,000 unit cap, Take  by mouth daily. , Disp: , Rfl:     diclofenac (VOLTAREN) 1 % gel, Apply 4 g to affected area four (4) times daily. , Disp: 100 g, Rfl: 0    meloxicam (MOBIC) 15 mg tablet, Take 1 Tab by mouth daily. , Disp: 30 Tab, Rfl: 1    HYDROcodone-acetaminophen (NORCO) 7.5-325 mg per tablet, Take 1 Tab by mouth every six (6) hours as needed for Pain. Max Daily Amount: 4 Tabs., Disp: 28 Tab, Rfl: 0    HYDROcodone-acetaminophen (NORCO) 7.5-325 mg per tablet, Take 1 Tab by mouth every six (6) hours as needed for Pain. Max Daily Amount: 4 Tabs., Disp: 28 Tab, Rfl: 0    HYDROcodone-acetaminophen (NORCO) 7.5-325 mg per tablet, Take 1 Tab by mouth every six (6) hours as needed for Pain. Max Daily Amount: 4 Tabs., Disp: 28 Tab, Rfl: 0    HYDROcodone-acetaminophen (NORCO) 7.5-325 mg per tablet, Take 1 Tab by mouth every eight (8) hours as needed for Pain. Max Daily Amount: 3 Tabs., Disp: 21 Tab, Rfl: 0    HYDROcodone-acetaminophen (NORCO) 7.5-325 mg per tablet, Take 1 Tab by mouth every eight (8) hours as needed for Pain. Max Daily Amount: 3 Tabs., Disp: 21 Tab, Rfl: 0    HYDROcodone-acetaminophen (NORCO) 5-325 mg per tablet, Take 1 Tab by mouth every six (6) hours as needed for Pain. Max Daily Amount: 4 Tabs., Disp: 6 Tab, Rfl: 0    ondansetron (ZOFRAN ODT) 4 mg disintegrating tablet, Take 1-2 Tabs by mouth every eight (8) hours as needed. , Disp: 15 Tab, Rfl: 0    No Known Allergies    Social History     Socioeconomic History    Marital status: SINGLE     Spouse name: Not on file    Number of children: Not on file    Years of education: Not on file    Highest education level: Not on file   Occupational History    Not on file   Social Needs    Financial resource strain: Not on file   [place] Food insecurity:     Worry: Not on file     Inability: Not on file    Transportation needs:     Medical: Not on file     Non-medical: Not on file   Tobacco Use    Smoking status: Current Some Day Smoker     Packs/day: 1.00     Years: 40.00     Pack years: 40.00     Types: Cigarettes     Last attempt to quit: 10/21/2015     Years since quitting: 3.7    Smokeless tobacco: Never Used   Substance and Sexual Activity    Alcohol use: No    Drug use: No    Sexual activity: Not on file   Lifestyle    Physical activity:     Days per week: Not on file     Minutes per session: Not on file    Stress: Not on file   Relationships    Social connections:     Talks on phone: Not on file     Gets together: Not on file     Attends Yazidi service: Not on file     Active member of club or organization: Not on file     Attends meetings of clubs or organizations: Not on file     Relationship status: Not on file    Intimate partner violence:     Fear of current or ex partner: Not on file     Emotionally abused: Not on file     Physically abused: Not on file     Forced sexual activity: Not on file   Other Topics Concern    Not on file   Social History Narrative    Not on file       Past Surgical History:   Procedure Laterality Date    [facility] [facility]      HX  SECTION  1984    HX CHOLECYSTECTOMY  2005    HX HYSTERECTOMY  2008    HX KNEE ARTHROSCOPY   2010    Bilateral    HX KNEE REPLACEMENT      HX TONSILLECTOMY  1968    [place] OSSEOUS SURG 4/> CNTIG TEETH QUAD             SUBJECTIVE:  The patient is seen today for reevaluation of her left shoulder. The patient has worsening discomfort of her left shoulder. I tried to get an MRI scheduled for her. Unfortunately, this was denied. The patient had a cortisone injection, which failed. She has had physical therapy, which failed. She has tried over-the-counter anti-inflammatories. She uses Voltaren gel.   She does take an occasional pain pill, which has had asked for a refill for. She has continued discomfort with overhead activities with some popping and snapping noted at times causing discomfort. There is no radiating pain or numbness down the upper extremity. With regards to the foot, she is improved. There is really just a little ache at times, but she is able to do her normal daily activities. She is status post bilateral knee replacements. She has done well with them. She does have a little pes bursitis. She had an injection about two months ago, which is starting to act up a little bit again. PHYSICAL EXAMINATION:  In general, the patient is alert and oriented x 3 in no acute distress. The patient is well-developed, well-nourished, with a normal affect. The patient is afebrile. HEENT:  Head is normocephalic and atraumatic. Pupils are equally round and reactive to light and accommodation. Extraocular eye movements are intact. Neck is supple. Trachea is midline. No JVD is present. Breathing is nonlabored. Examination of the left shoulder reveals the skin is intact. There is no ecchymosis, no warmth, and no signs of infection or cellulitis present. She has range of motion of about 100ø of forward flexion, 100ø abduction. There is decreased strength with external rotation and positive Howard', positive Neer's, and negative drop-arm, Speed's, and Golden's. Radial pulse is 2+, and capillary refill is within normal limits. Examination of the lower extremities reveals pain-free range of motion of the hips. There is no pain to palpation of the greater trochanteric bursae. There is negative straight leg raise. There is negative calf tenderness. There is negative Zurdo's. There is no evidence of DVT present. Each knee reveals the skin is intact. The surgical wounds are healed nicely. There is no ecchymosis, no warmth, and no signs of infection or cellulitis present.   She does have a little discomfort to palpation to the pes bursa of the right knee. Examination of the right foot reveals the skin is intact. There is no ecchymosis, no warmth, and no signs of infection or cellulitis present. There is no tenderness to palpation about the foot and ankle. She is ligamentously stable. Distal pulse is 2+ to dorsalis pedis and posterior tibial.       RADIOGRAPHS:  Review of the MRI of the right foot shows no evidence for stress fracture. There is nonspecific edema to the lateral and dorsal soft tissues noted. ASSESSMENT:      1. Worsening left shoulder pain. 2. Right foot peroneal tendinitis, improved. 3. Status post bilateral knee replacements. 4. Right knee pes bursitis. PLAN:  At this point, the patient has failed conservative treatment with regards to the left shoulder. I would like to get an MRI. I will put in for approval for this again. She will continue with Voltaren gel for the knee and activities as tolerated for the foot. She will see us back after the MRI for further evaluation.                   JR Parrish LOMBARDI, ABDI, ATC

## 2019-10-07 ENCOUNTER — HOSPITAL ENCOUNTER (OUTPATIENT)
Dept: MRI IMAGING | Age: 59
Discharge: HOME | End: 2019-10-07
Attending: PHYSICIAN ASSISTANT
Payer: COMMERCIAL

## 2019-10-07 DIAGNOSIS — M25.512 LEFT SHOULDER PAIN, UNSPECIFIED CHRONICITY: ICD-10-CM

## 2019-10-07 PROCEDURE — 73221 MRI JOINT UPR EXTREM W/O DYE: CPT

## 2019-10-13 ENCOUNTER — APPOINTMENT (OUTPATIENT)
Dept: GENERAL RADIOLOGY | Age: 59
End: 2019-10-13
Attending: PHYSICIAN ASSISTANT
Payer: COMMERCIAL

## 2019-10-13 ENCOUNTER — HOSPITAL ENCOUNTER (EMERGENCY)
Age: 59
Discharge: HOME | End: 2019-10-13
Attending: EMERGENCY MEDICINE
Payer: COMMERCIAL

## 2019-10-13 VITALS
DIASTOLIC BLOOD PRESSURE: 69 MMHG | RESPIRATION RATE: 23 BRPM | HEIGHT: 61 IN | SYSTOLIC BLOOD PRESSURE: 139 MMHG | TEMPERATURE: 98.6 F | WEIGHT: 162 LBS | HEART RATE: 101 BPM | OXYGEN SATURATION: 91 % | BODY MASS INDEX: 30.58 KG/M2

## 2019-10-13 DIAGNOSIS — J41.8 MIXED SIMPLE AND MUCOPURULENT CHRONIC BRONCHITIS (HCC): Primary | ICD-10-CM

## 2019-10-13 LAB
ALBUMIN SERPL-MCNC: 3.7 G/DL (ref 3.4–5)
ALBUMIN/GLOB SERPL: 0.9 {RATIO} (ref 0.8–1.7)
ALP SERPL-CCNC: 69 U/L (ref 45–117)
ALT SERPL-CCNC: 18 U/L (ref 13–56)
ANION GAP SERPL CALC-SCNC: 2 MMOL/L (ref 3–18)
ARTERIAL PATENCY WRIST A: YES
AST SERPL-CCNC: 15 U/L (ref 10–38)
ATRIAL RATE: 62 BPM
BASE EXCESS BLD CALC-SCNC: 0 MMOL/L
BASOPHILS # BLD: 0.1 K/UL (ref 0–0.1)
BASOPHILS NFR BLD: 1 % (ref 0–2)
BDY SITE: ABNORMAL
BILIRUB SERPL-MCNC: 0.4 MG/DL (ref 0.2–1)
BUN SERPL-MCNC: 7 MG/DL (ref 7–18)
BUN/CREAT SERPL: 9 (ref 12–20)
CALCIUM SERPL-MCNC: 8.3 MG/DL (ref 8.5–10.1)
CALCULATED P AXIS, ECG09: 17 DEGREES
CALCULATED R AXIS, ECG10: 57 DEGREES
CALCULATED T AXIS, ECG11: 20 DEGREES
CHLORIDE SERPL-SCNC: 107 MMOL/L (ref 100–111)
CK MB CFR SERPL CALC: NORMAL % (ref 0–4)
CK MB SERPL-MCNC: <1 NG/ML (ref 5–25)
CK SERPL-CCNC: 76 U/L (ref 26–192)
CO2 SERPL-SCNC: 31 MMOL/L (ref 21–32)
CREAT SERPL-MCNC: 0.75 MG/DL (ref 0.6–1.3)
DIAGNOSIS, 93000: NORMAL
DIFFERENTIAL METHOD BLD: ABNORMAL
EOSINOPHIL # BLD: 0.3 K/UL (ref 0–0.4)
EOSINOPHIL NFR BLD: 5 % (ref 0–5)
ERYTHROCYTE [DISTWIDTH] IN BLOOD BY AUTOMATED COUNT: 13.3 % (ref 11.6–14.5)
FLUAV AG NPH QL IA: NEGATIVE
FLUBV AG NOSE QL IA: NEGATIVE
GAS FLOW.O2 O2 DELIVERY SYS: ABNORMAL L/MIN
GAS FLOW.O2 SETTING OXYMISER: 2 L/M
GLOBULIN SER CALC-MCNC: 3.9 G/DL (ref 2–4)
GLUCOSE SERPL-MCNC: 92 MG/DL (ref 74–99)
HCO3 BLD-SCNC: 25.7 MMOL/L (ref 22–26)
HCT VFR BLD AUTO: 46.5 % (ref 35–45)
HGB BLD-MCNC: 15.6 G/DL (ref 12–16)
LYMPHOCYTES # BLD: 2 K/UL (ref 0.9–3.6)
LYMPHOCYTES NFR BLD: 38 % (ref 21–52)
MCH RBC QN AUTO: 29.6 PG (ref 24–34)
MCHC RBC AUTO-ENTMCNC: 33.5 G/DL (ref 31–37)
MCV RBC AUTO: 88.2 FL (ref 74–97)
MONOCYTES # BLD: 0.6 K/UL (ref 0.05–1.2)
MONOCYTES NFR BLD: 12 % (ref 3–10)
NEUTS SEG # BLD: 2.2 K/UL (ref 1.8–8)
NEUTS SEG NFR BLD: 44 % (ref 40–73)
O2/TOTAL GAS SETTING VFR VENT: 28 %
P-R INTERVAL, ECG05: 104 MS
PCO2 BLD: 46.3 MMHG (ref 35–45)
PH BLD: 7.35 [PH] (ref 7.35–7.45)
PLATELET # BLD AUTO: 192 K/UL (ref 135–420)
PMV BLD AUTO: 9.9 FL (ref 9.2–11.8)
PO2 BLD: 60 MMHG (ref 80–100)
POTASSIUM SERPL-SCNC: 4.1 MMOL/L (ref 3.5–5.5)
PROT SERPL-MCNC: 7.6 G/DL (ref 6.4–8.2)
Q-T INTERVAL, ECG07: 448 MS
QRS DURATION, ECG06: 80 MS
QTC CALCULATION (BEZET), ECG08: 454 MS
RBC # BLD AUTO: 5.27 M/UL (ref 4.2–5.3)
SAO2 % BLD: 89 % (ref 92–97)
SERVICE CMNT-IMP: ABNORMAL
SODIUM SERPL-SCNC: 140 MMOL/L (ref 136–145)
SPECIMEN TYPE: ABNORMAL
TOTAL RESP. RATE, ITRR: 20
TROPONIN I SERPL-MCNC: <0.02 NG/ML (ref 0–0.04)
VENTRICULAR RATE, ECG03: 62 BPM
WBC # BLD AUTO: 5.1 K/UL (ref 4.6–13.2)

## 2019-10-13 PROCEDURE — 94640 AIRWAY INHALATION TREATMENT: CPT

## 2019-10-13 PROCEDURE — 94760 N-INVAS EAR/PLS OXIMETRY 1: CPT

## 2019-10-13 PROCEDURE — 93005 ELECTROCARDIOGRAM TRACING: CPT

## 2019-10-13 PROCEDURE — 96365 THER/PROPH/DIAG IV INF INIT: CPT

## 2019-10-13 PROCEDURE — 82803 BLOOD GASES ANY COMBINATION: CPT

## 2019-10-13 PROCEDURE — 99285 EMERGENCY DEPT VISIT HI MDM: CPT

## 2019-10-13 PROCEDURE — 85025 COMPLETE CBC W/AUTO DIFF WBC: CPT

## 2019-10-13 PROCEDURE — 71046 X-RAY EXAM CHEST 2 VIEWS: CPT

## 2019-10-13 PROCEDURE — 87804 INFLUENZA ASSAY W/OPTIC: CPT

## 2019-10-13 PROCEDURE — 74011000250 HC RX REV CODE- 250: Performed by: PHYSICIAN ASSISTANT

## 2019-10-13 PROCEDURE — 74011000258 HC RX REV CODE- 258: Performed by: PHYSICIAN ASSISTANT

## 2019-10-13 PROCEDURE — 36600 WITHDRAWAL OF ARTERIAL BLOOD: CPT

## 2019-10-13 PROCEDURE — 96375 TX/PRO/DX INJ NEW DRUG ADDON: CPT

## 2019-10-13 PROCEDURE — 80053 COMPREHEN METABOLIC PANEL: CPT

## 2019-10-13 PROCEDURE — 74011250636 HC RX REV CODE- 250/636: Performed by: PHYSICIAN ASSISTANT

## 2019-10-13 PROCEDURE — 82550 ASSAY OF CK (CPK): CPT

## 2019-10-13 RX ORDER — ALBUTEROL SULFATE 90 UG/1
2 AEROSOL, METERED RESPIRATORY (INHALATION)
Qty: 1 INHALER | Refills: 0 | Status: SHIPPED | OUTPATIENT
Start: 2019-10-13

## 2019-10-13 RX ORDER — ALBUTEROL SULFATE 2.5 MG/.5ML
5 SOLUTION RESPIRATORY (INHALATION)
Status: DISCONTINUED | OUTPATIENT
Start: 2019-10-13 | End: 2019-10-13

## 2019-10-13 RX ORDER — DOXYCYCLINE 100 MG/1
100 CAPSULE ORAL 2 TIMES DAILY
Qty: 20 CAP | Refills: 0 | Status: SHIPPED | OUTPATIENT
Start: 2019-10-13 | End: 2019-10-23

## 2019-10-13 RX ORDER — DOXYCYCLINE 100 MG/1
100 CAPSULE ORAL
Status: DISCONTINUED | OUTPATIENT
Start: 2019-10-13 | End: 2019-10-13

## 2019-10-13 RX ORDER — ALBUTEROL SULFATE 0.83 MG/ML
10 SOLUTION RESPIRATORY (INHALATION)
Status: COMPLETED | OUTPATIENT
Start: 2019-10-13 | End: 2019-10-13

## 2019-10-13 RX ORDER — IPRATROPIUM BROMIDE AND ALBUTEROL SULFATE 2.5; .5 MG/3ML; MG/3ML
3 SOLUTION RESPIRATORY (INHALATION)
Status: COMPLETED | OUTPATIENT
Start: 2019-10-13 | End: 2019-10-13

## 2019-10-13 RX ORDER — PREDNISONE 20 MG/1
60 TABLET ORAL DAILY
Qty: 15 TAB | Refills: 0 | Status: SHIPPED | OUTPATIENT
Start: 2019-10-13 | End: 2019-10-18

## 2019-10-13 RX ORDER — ALBUTEROL SULFATE 2.5 MG/.5ML
5 SOLUTION RESPIRATORY (INHALATION)
Status: COMPLETED | OUTPATIENT
Start: 2019-10-13 | End: 2019-10-13

## 2019-10-13 RX ADMIN — IPRATROPIUM BROMIDE AND ALBUTEROL SULFATE 3 ML: .5; 3 SOLUTION RESPIRATORY (INHALATION) at 10:31

## 2019-10-13 RX ADMIN — METHYLPREDNISOLONE SODIUM SUCCINATE 125 MG: 125 INJECTION, POWDER, FOR SOLUTION INTRAMUSCULAR; INTRAVENOUS at 11:13

## 2019-10-13 RX ADMIN — DOXYCYCLINE 100 MG: 100 INJECTION, POWDER, LYOPHILIZED, FOR SOLUTION INTRAVENOUS at 13:01

## 2019-10-13 RX ADMIN — ALBUTEROL SULFATE 10 MG: 2.5 SOLUTION RESPIRATORY (INHALATION) at 13:01

## 2019-10-13 RX ADMIN — ALBUTEROL SULFATE 5 MG: 2.5 SOLUTION RESPIRATORY (INHALATION) at 11:22

## 2019-10-13 NOTE — DISCHARGE INSTRUCTIONS
Take medication as prescribed. Follow-up with your primary care physician within 2 days for reassessment. Bring the results from this visit with you for their review. Return to the ED immediately for any new, worsening, or persistent symptoms, including fever, chest pain, shortness of breath, or any other medical concerns.

## 2019-10-13 NOTE — ED NOTES
Pt ambulated to nurses station and back to Rm #15, 02 sats remain at 90% on RA. Provider made aware.

## 2019-10-13 NOTE — ED NOTES
1:11 PM :Pt care assumed from Brayan Aparicio, ED provider. Pt complaint(s), current treatment plan, progression and available diagnostic results have been discussed thoroughly. Rounding occurred: yes  Intended Disposition: TBD   Pending diagnostic reports and/or labs (please list): flu test, reassessment for disposition     1:57 PM: Pt reassessed. Feeling much better. Lungs CTAB. No respiratory distress. Pulse oxygenation 91-93%. Ambulated patient with nurse Tempestt. Pulse ox 91%. Pt notes her oxygenation is usually 90-93%. Notes \"I have to go home to take care of my grandson. \"  Reviewed results with patient and discussed need for close outpatient follow-up this week for reassessment. Discussed strict return precautions for any new, worsening, or persistent symptoms, including fever, chest pain, shortness of breath, or any other medical concerns. Pt in agreement with plan.

## 2019-10-13 NOTE — ED PROVIDER NOTES
EMERGENCY DEPARTMENT HISTORY AND PHYSICAL EXAM    Date: 10/13/2019  Patient Name: Juan Guo    History of Presenting Illness     Chief Complaint   Patient presents with    Cough             History Provided By: Patient    Chief Complaint:   Chief Complaint   Patient presents with    Cough     Duration: 4 Days  Timing:  Progressive  Location: lungs  Quality: Tightness  Severity: Mild  Modifying Factors: cold  Associated Symptoms: cough, congestion, wheezing      Additional History (Context): Juan Guo is a 61 y.o. female with COPD who presents with 4 days of cough and congestion. She originally started out with a cold with congestion and sore throat and some chills but no reported fever. Today she comes in with a progressive cough and wheezing and feeling shortness of breath when she is ambulating around. She does smoke and has a history of Spee COPD but she does not take a daily inhalers. Said the last time she inhaler was proximally a year ago. Sputum production is clear to light yellow. PCP: Refusal, Pcp Disclosure    Current Facility-Administered Medications   Medication Dose Route Frequency Provider Last Rate Last Dose    doxycycline (VIBRAMYCIN) 100 mg in 0.9% sodium chloride (MBP/ADV) 100 mL MBP  100 mg IntraVENous Q12H Ilia Hale PA-C 100 mL/hr at 10/13/19 1301 100 mg at 10/13/19 1301     Current Outpatient Medications   Medication Sig Dispense Refill    albuterol (PROVENTIL HFA, VENTOLIN HFA, PROAIR HFA) 90 mcg/actuation inhaler Take 2 Puffs by inhalation every four (4) hours as needed for Wheezing. 1 Inhaler 0    ipratropium-albuterol (COMBIVENT RESPIMAT)  mcg/actuation inhaler Take 1 Puff by inhalation every six (6) hours. 1 Inhaler 0    predniSONE (DELTASONE) 20 mg tablet Take 60 mg by mouth daily for 5 days. With Breakfast 15 Tab 0    doxycycline (MONODOX) 100 mg capsule Take 1 Cap by mouth two (2) times a day for 10 days.  20 Cap 0    dextromethorphan-guaiFENesin (ROBITUSSIN-DM)  mg/5 mL syrup Take 10 mL by mouth every six (6) hours as needed for Cough. SIG:  As prescribed during the daytime. 240 mL 0    varenicline (CHANTIX) 1 mg tablet Take 1 mg by mouth two (2) times daily (after meals).  atorvastatin (LIPITOR) 10 mg tablet take 1 tablet by mouth at bedtime  0    lisinopril (PRINIVIL, ZESTRIL) 10 mg tablet take 1 tablet by mouth once daily  0    diclofenac (VOLTAREN) 1 % gel Apply 4 g to affected area four (4) times daily. 100 g 0    meloxicam (MOBIC) 15 mg tablet Take 1 Tab by mouth daily. 30 Tab 1    HYDROcodone-acetaminophen (NORCO) 7.5-325 mg per tablet Take 1 Tab by mouth every six (6) hours as needed for Pain. Max Daily Amount: 4 Tabs. 28 Tab 0    meloxicam (MOBIC) 15 mg tablet 1 tab by mouth daily with food 30 Tab 2    diclofenac (VOLTAREN) 1 % gel Apply 4 g to affected area four (4) times daily. Maximum 16 grams per joint per day. Dispense 5 100 gram tubes 5 Each 0    ibuprofen (ADVIL) 100 mg tablet Take 100 mg by mouth every six (6) hours as needed for Pain.  ondansetron (ZOFRAN ODT) 4 mg disintegrating tablet Take 1-2 Tabs by mouth every eight (8) hours as needed. 15 Tab 0    multivitamin, tx-iron-ca-min (THERA-M W/ IRON) 9 mg iron-400 mcg tab tablet Take 1 Tab by mouth daily.  ALBUTEROL IN Take  by inhalation. Emergency only      Cholecalciferol, Vitamin D3, (VITAMIN D3) 1,000 unit cap Take  by mouth daily.          Past History     Past Medical History:  Past Medical History:   Diagnosis Date    Arthritis     COPD     Hepatitis C     treated     Hypercholesterolemia     Hypertension     no meds     Sleep apnea     no CPAP        Past Surgical History:  Past Surgical History:   Procedure Laterality Date    [facility] HEMMORROID [id number]      HX  SECTION  1984    HX CHOLECYSTECTOMY  2005    HX HYSTERECTOMY      HX KNEE ARTHROSCOPY  2009    Bilateral    HX KNEE REPLACEMENT      HX TONSILLECTOMY  1968    AR OSSEOUS SURG 4/> CNTIG TEETH QUAD         Family History:  Family History   Problem Relation Age of Onset    High Cholesterol Mother     Stroke Mother     Heart Disease Father     High Cholesterol Father     Diabetes Sister     High Cholesterol Sister        Social History:  Social History     Tobacco Use    Smoking status: Current Some Day Smoker     Packs/day: 1.00     Years: 40.00     Pack years: 40.00     Types: Cigarettes     Last attempt to quit: 10/21/2015     Years since quitting: 3.9    Smokeless tobacco: Never Used   Substance Use Topics    Alcohol use: No    Drug use: No       Allergies:  No Known Allergies      Review of Systems   Review of Systems   Constitutional: Negative. HENT: Positive for congestion. Negative for ear pain. Respiratory: Positive for cough, shortness of breath and wheezing. Cardiovascular: Negative. Gastrointestinal: Negative. Musculoskeletal: Negative. Skin: Negative. Neurological: Negative. All Other Systems Negative  Physical Exam     Vitals:    10/13/19 1145 10/13/19 1200 10/13/19 1215 10/13/19 1245   BP: 154/73 154/69 154/66 152/63   Pulse: 78 80 74 72   Resp: 21 22 18 21   Temp:       SpO2: 93% (!) 89% (!) 89% 94%   Weight:       Height:         Physical Exam   Constitutional: She appears well-developed and well-nourished. HENT:   Head: Normocephalic and atraumatic. Right Ear: Hearing, external ear and ear canal normal. No tenderness. Tympanic membrane is not erythematous and not bulging. Tympanic membrane mobility is normal. No hemotympanum. Left Ear: Hearing, tympanic membrane, external ear and ear canal normal.   Ears:    Nose: Nose normal.   Mouth/Throat: Oropharynx is clear and moist.   Eyes: Pupils are equal, round, and reactive to light. Conjunctivae and EOM are normal.   Neck: Normal range of motion. Neck supple.    Cardiovascular: Normal rate, regular rhythm, normal heart sounds and intact distal pulses. Pulmonary/Chest: She has wheezes. She has rales. Abdominal: Soft. Bowel sounds are normal.   Musculoskeletal: Normal range of motion. Neurological: She is alert. No cranial nerve deficit. Coordination normal.   Skin: Skin is warm and dry. Psychiatric: She has a normal mood and affect. Her behavior is normal.   Nursing note and vitals reviewed. Diagnostic Study Results     Labs -     Recent Results (from the past 12 hour(s))   CBC WITH AUTOMATED DIFF    Collection Time: 10/13/19 10:20 AM   Result Value Ref Range    WBC 5.1 4.6 - 13.2 K/uL    RBC 5.27 4.20 - 5.30 M/uL    HGB 15.6 12.0 - 16.0 g/dL    HCT 46.5 (H) 35.0 - 45.0 %    MCV 88.2 74.0 - 97.0 FL    MCH 29.6 24.0 - 34.0 PG    MCHC 33.5 31.0 - 37.0 g/dL    RDW 13.3 11.6 - 14.5 %    PLATELET 727 088 - 090 K/uL    MPV 9.9 9.2 - 11.8 FL    NEUTROPHILS 44 40 - 73 %    LYMPHOCYTES 38 21 - 52 %    MONOCYTES 12 (H) 3 - 10 %    EOSINOPHILS 5 0 - 5 %    BASOPHILS 1 0 - 2 %    ABS. NEUTROPHILS 2.2 1.8 - 8.0 K/UL    ABS. LYMPHOCYTES 2.0 0.9 - 3.6 K/UL    ABS. MONOCYTES 0.6 0.05 - 1.2 K/UL    ABS. EOSINOPHILS 0.3 0.0 - 0.4 K/UL    ABS.  BASOPHILS 0.1 0.0 - 0.1 K/UL    DF AUTOMATED     EKG, 12 LEAD, INITIAL    Collection Time: 10/13/19 10:24 AM   Result Value Ref Range    Ventricular Rate 62 BPM    Atrial Rate 62 BPM    P-R Interval 104 ms    QRS Duration 80 ms    Q-T Interval 448 ms    QTC Calculation (Bezet) 454 ms    Calculated P Axis 17 degrees    Calculated R Axis 57 degrees    Calculated T Axis 20 degrees    Diagnosis       Sinus rhythm with short SD  ST & T wave abnormality, consider anterior ischemia  Abnormal ECG  When compared with ECG of 30-OCT-2015 10:30,  ST now depressed in Inferior leads  Nonspecific T wave abnormality now evident in Inferior leads  Confirmed by Jamir Melo ([id number]) on 10/13/2019 11:11:22 AM     CARDIAC PANEL,(CK, CKMB & TROPONIN)    Collection Time: 10/13/19 10:38 AM   Result Value Ref Range    CK 76 26 - 192 U/L CK - MB <1.0 <3.6 ng/ml    CK-MB Index  0.0 - 4.0 %     CALCULATION NOT PERFORMED WHEN RESULT IS BELOW LINEAR LIMIT    Troponin-I, QT <0.02 0.0 - 1.125 NG/ML   METABOLIC PANEL, COMPREHENSIVE    Collection Time: 10/13/19 10:38 AM   Result Value Ref Range    Sodium 140 136 - 145 mmol/L    Potassium 4.1 3.5 - 5.5 mmol/L    Chloride 107 100 - 111 mmol/L    CO2 31 21 - 32 mmol/L    Anion gap 2 (L) 3.0 - 18 mmol/L    Glucose 92 74 - 99 mg/dL    BUN 7 7.0 - 18 MG/DL    Creatinine 0.75 0.6 - 1.3 MG/DL    BUN/Creatinine ratio 9 (L) 12 - 20      GFR est AA >60 >60 ml/min/1.73m2    GFR est non-AA >60 >60 ml/min/1.73m2    Calcium 8.3 (L) 8.5 - 10.1 MG/DL    Bilirubin, total 0.4 0.2 - 1.0 MG/DL    ALT (SGPT) 18 13 - 56 U/L    AST (SGOT) 15 10 - 38 U/L    Alk. phosphatase 69 45 - 117 U/L    Protein, total 7.6 6.4 - 8.2 g/dL    Albumin 3.7 3.4 - 5.0 g/dL    Globulin 3.9 2.0 - 4.0 g/dL    A-G Ratio 0.9 0.8 - 1.7     POC G3    Collection Time: 10/13/19 12:34 PM   Result Value Ref Range    Device: NASAL CANNULA      Flow rate (POC) 2 L/M    FIO2 (POC) 28 %    pH (POC) 7.353 7.35 - 7.45      pCO2 (POC) 46.3 (H) 35.0 - 45.0 MMHG    pO2 (POC) 60 (L) 80 - 100 MMHG    HCO3 (POC) 25.7 22 - 26 MMOL/L    sO2 (POC) 89 (L) 92 - 97 %    Base excess (POC) 0 mmol/L    Allens test (POC) YES      Total resp. rate 20      Site RIGHT RADIAL      Specimen type (POC) ARTERIAL      Performed by Berniece Schirmer        Radiologic Studies -   XR CHEST PA LAT   Final Result   IMPRESSION:      Findings compatible with history of chronic smoking and COPD with bronchial wall   thickening suspect for bronchitis. No evidence of focal pulmonary infiltrate. CT Results  (Last 48 hours)    None        CXR Results  (Last 48 hours)               10/13/19 1055  XR CHEST PA LAT Final result    Impression:  IMPRESSION:       Findings compatible with history of chronic smoking and COPD with bronchial wall   thickening suspect for bronchitis.  No evidence of focal pulmonary infiltrate. Narrative:  CHEST, PA AND LATERAL       CPT CODE: 93123       INDICATION: Shortness of breath. Productive cough, chest congestion, wheezing. COPD. Chronic smoker. COMPARISON: 11/11/2015 AP and lateral, 10/30/2015 PA and lateral.       TECHNIQUE: PA and lateral chest radiographs are reviewed. FINDINGS:       Bronchial wall thickening best seen in the lower lobes suspect for bronchitis. No evidence of  focal pulmonary infiltrate, pulmonary edema or pleural effusion. No evidence of pneumothorax. There is relative attenuation of the   bronchovascular markings in the upper lungs, which could reflect pulmonary   emphysematous changes. The cardiac silhouette is within normal limits in size. Hilar and mediastinal   contours are without significant interval change. Aortic atherosclerotic   calcification noted best seen at the level of the aortic arch. No acute osseous abnormalities identified. Surgical clips again project in the   right upper quadrant medially. EKG leads/wires overlie the patient. Medical Decision Making   I am the first provider for this patient. I reviewed the vital signs, available nursing notes, past medical history, past surgical history, family history and social history. Vital Signs-Reviewed the patient's vital signs. Pulse Oximetry Analysis - 88-89% on RA      EKG: Interpreted by the EP. Time Interpreted: 10:24   Rate: 62   Rhythm:NSR   Interpretation:ST and T wave abnormality   Comparison:    Records Reviewed: Nursing Notes and Old Medical Records    Provider Notes (Medical Decision Making):   7247 I reevaluated patient after her initial nebulizer. Her wheezing has improved but she is still having wheezing. I will order a second albuterol nebulizer. In addition she is just received the Solu-Medrol. 12:13 advised the patient after her second nebulizer.   Her wheezing had improved but she was still desaturating to 88% on room air. The nurse is going to add 2 L nasal cannula. We are going to try a second 5 mill albuterol nebulizer. And give her a dose of doxycycline. If she does not improve we did talk about the fact that she may need to be admitted. The patient at this time does not want to be admitted since she has children at home that need her.    1:03 PM.  The patient is getting her second nebulized treatment. Flu test is been ordered as well as IV doxycycline. I am leaving for the day and Demetrio Suero is taking over. We introduced her to the patient and she is aware that the patient would like to be discharged if possible. We told the patient if she was able to wean off her oxygen after the doxycycline and nebulizer treatment that she might be able to be discharged home. MED RECONCILIATION:  Current Facility-Administered Medications   Medication Dose Route Frequency    doxycycline (VIBRAMYCIN) 100 mg in 0.9% sodium chloride (MBP/ADV) 100 mL MBP  100 mg IntraVENous Q12H     Current Outpatient Medications   Medication Sig    albuterol (PROVENTIL HFA, VENTOLIN HFA, PROAIR HFA) 90 mcg/actuation inhaler Take 2 Puffs by inhalation every four (4) hours as needed for Wheezing.  ipratropium-albuterol (COMBIVENT RESPIMAT)  mcg/actuation inhaler Take 1 Puff by inhalation every six (6) hours.  predniSONE (DELTASONE) 20 mg tablet Take 60 mg by mouth daily for 5 days. With Breakfast    doxycycline (MONODOX) 100 mg capsule Take 1 Cap by mouth two (2) times a day for 10 days.  dextromethorphan-guaiFENesin (ROBITUSSIN-DM)  mg/5 mL syrup Take 10 mL by mouth every six (6) hours as needed for Cough. SIG:  As prescribed during the daytime.  varenicline (CHANTIX) 1 mg tablet Take 1 mg by mouth two (2) times daily (after meals).     atorvastatin (LIPITOR) 10 mg tablet take 1 tablet by mouth at bedtime    lisinopril (PRINIVIL, ZESTRIL) 10 mg tablet take 1 tablet by mouth once daily    diclofenac (VOLTAREN) 1 % gel Apply 4 g to affected area four (4) times daily.  meloxicam (MOBIC) 15 mg tablet Take 1 Tab by mouth daily.  HYDROcodone-acetaminophen (NORCO) 7.5-325 mg per tablet Take 1 Tab by mouth every six (6) hours as needed for Pain. Max Daily Amount: 4 Tabs.  meloxicam (MOBIC) 15 mg tablet 1 tab by mouth daily with food    diclofenac (VOLTAREN) 1 % gel Apply 4 g to affected area four (4) times daily. Maximum 16 grams per joint per day. Dispense 5 100 gram tubes    ibuprofen (ADVIL) 100 mg tablet Take 100 mg by mouth every six (6) hours as needed for Pain.  ondansetron (ZOFRAN ODT) 4 mg disintegrating tablet Take 1-2 Tabs by mouth every eight (8) hours as needed.  multivitamin, tx-iron-ca-min (THERA-M W/ IRON) 9 mg iron-400 mcg tab tablet Take 1 Tab by mouth daily.  ALBUTEROL IN Take  by inhalation. Emergency only    Cholecalciferol, Vitamin D3, (VITAMIN D3) 1,000 unit cap Take  by mouth daily. Disposition:  Home    DISCHARGE NOTE:   Pt has been reexamined. Patient has no new complaints, changes, or physical findings. Care plan outlined and precautions discussed. Results of labs and CXR were reviewed with the patient. All medications were reviewed with the patient; will d/c home with medications as listed above. All of pt's questions and concerns were addressed. Patient was instructed and agrees to follow up with PCM, as well as to return to the ED upon further deterioration. Patient is ready to go home.     Follow-up Information     Follow up With Specialties Details Why Contact Info    [facility] EMERGENCY DEPT Emergency Medicine  If symptoms worsen [address] [facility] [id number]  [facility] [address] [address] [facility] [address] [phone]    [facility] EMERGENCY DEPT Emergency Medicine  If symptoms worsen [address] [address]  [place] [address] [address] [facility] [address]          Current Discharge Medication List      START taking these medications    Details   albuterol (PROVENTIL HFA, VENTOLIN HFA, PROAIR HFA) 90 mcg/actuation inhaler Take 2 Puffs by inhalation every four (4) hours as needed for Wheezing. Qty: 1 Inhaler, Refills: 0      ipratropium-albuterol (COMBIVENT RESPIMAT)  mcg/actuation inhaler Take 1 Puff by inhalation every six (6) hours. Qty: 1 Inhaler, Refills: 0      predniSONE (DELTASONE) 20 mg tablet Take 60 mg by mouth daily for 5 days. With Breakfast  Qty: 15 Tab, Refills: 0      doxycycline (MONODOX) 100 mg capsule Take 1 Cap by mouth two (2) times a day for 10 days. Qty: 20 Cap, Refills: 0      dextromethorphan-guaiFENesin (ROBITUSSIN-DM)  mg/5 mL syrup Take 10 mL by mouth every six (6) hours as needed for Cough. SIG:  As prescribed during the daytime. Qty: 240 mL, Refills: 0         CONTINUE these medications which have NOT CHANGED    Details   varenicline (CHANTIX) 1 mg tablet Take 1 mg by mouth two (2) times daily (after meals). atorvastatin (LIPITOR) 10 mg tablet take 1 tablet by mouth at bedtime  Refills: 0      lisinopril (PRINIVIL, ZESTRIL) 10 mg tablet take 1 tablet by mouth once daily  Refills: 0      !! diclofenac (VOLTAREN) 1 % gel Apply 4 g to affected area four (4) times daily. Qty: 100 g, Refills: 0    Associated Diagnoses: Left shoulder pain, unspecified chronicity; Trochanteric bursitis of right hip; Right foot pain      !! meloxicam (MOBIC) 15 mg tablet Take 1 Tab by mouth daily. Qty: 30 Tab, Refills: 1    Associated Diagnoses: Right hip pain; Left shoulder pain, unspecified chronicity; Trochanteric bursitis of right hip      HYDROcodone-acetaminophen (NORCO) 7.5-325 mg per tablet Take 1 Tab by mouth every six (6) hours as needed for Pain. Max Daily Amount: 4 Tabs.   Qty: 28 Tab, Refills: 0    Associated Diagnoses: Right hip pain; Left shoulder pain, unspecified chronicity; Trochanteric bursitis of right hip      !! meloxicam (MOBIC) 15 mg tablet 1 tab by mouth daily with food  Qty: 30 Tab, Refills: 2    Associated Diagnoses: Pes anserinus bursitis of right knee; Infrapatellar bursitis of right knee      !! diclofenac (VOLTAREN) 1 % gel Apply 4 g to affected area four (4) times daily. Maximum 16 grams per joint per day. Dispense 5 100 gram tubes  Qty: 5 Each, Refills: 0    Associated Diagnoses: Pes anserinus bursitis of right knee; Infrapatellar bursitis of right knee      ibuprofen (ADVIL) 100 mg tablet Take 100 mg by mouth every six (6) hours as needed for Pain. ondansetron (ZOFRAN ODT) 4 mg disintegrating tablet Take 1-2 Tabs by mouth every eight (8) hours as needed. Qty: 15 Tab, Refills: 0      multivitamin, tx-iron-ca-min (THERA-M W/ IRON) 9 mg iron-400 mcg tab tablet Take 1 Tab by mouth daily. ALBUTEROL IN Take  by inhalation. Emergency only      Cholecalciferol, Vitamin D3, (VITAMIN D3) 1,000 unit cap Take  by mouth daily. !! - Potential duplicate medications found. Please discuss with provider. STOP taking these medications       HYDROcodone-acetaminophen (NORCO) 5-325 mg per tablet Comments:   Reason for Stopping:                   Diagnosis     Clinical Impression:   1.  Mixed simple and mucopurulent chronic bronchitis ([facility] [address]

## 2019-10-13 NOTE — ED TRIAGE NOTES
The patient has COPD. She presents for evaluation of a productive cough, chest congestion, and wheezing.

## 2020-09-15 ENCOUNTER — APPOINTMENT (OUTPATIENT)
Dept: GENERAL RADIOLOGY | Age: 60
End: 2020-09-15
Attending: EMERGENCY MEDICINE
Payer: COMMERCIAL

## 2020-09-15 ENCOUNTER — HOSPITAL ENCOUNTER (EMERGENCY)
Age: 60
Discharge: HOME | End: 2020-09-15
Attending: EMERGENCY MEDICINE
Payer: COMMERCIAL

## 2020-09-15 VITALS
DIASTOLIC BLOOD PRESSURE: 76 MMHG | HEIGHT: 60 IN | HEART RATE: 59 BPM | RESPIRATION RATE: 18 BRPM | OXYGEN SATURATION: 96 % | SYSTOLIC BLOOD PRESSURE: 171 MMHG | BODY MASS INDEX: 31.41 KG/M2 | WEIGHT: 160 LBS | TEMPERATURE: 97.9 F

## 2020-09-15 DIAGNOSIS — Z20.822 SUSPECTED COVID-19 VIRUS INFECTION: Primary | ICD-10-CM

## 2020-09-15 DIAGNOSIS — H72.91 PERFORATED EAR DRUM, RIGHT: ICD-10-CM

## 2020-09-15 PROCEDURE — 87635 SARS-COV-2 COVID-19 AMP PRB: CPT

## 2020-09-15 PROCEDURE — 75810000150 HC RMVL IMPACTED CERUMEN 1 / 2

## 2020-09-15 PROCEDURE — 99283 EMERGENCY DEPT VISIT LOW MDM: CPT

## 2020-09-15 PROCEDURE — 71045 X-RAY EXAM CHEST 1 VIEW: CPT

## 2020-09-15 RX ORDER — ALPRAZOLAM 0.5 MG/1
0.5 TABLET ORAL AS NEEDED
COMMUNITY

## 2020-09-15 RX ORDER — SERTRALINE HYDROCHLORIDE 50 MG/1
125 TABLET, FILM COATED ORAL DAILY
COMMUNITY

## 2020-09-15 RX ORDER — ACETIC ACID 20.65 MG/ML
5 SOLUTION AURICULAR (OTIC) 3 TIMES DAILY
Qty: 10 ML | Refills: 0 | Status: SHIPPED | OUTPATIENT
Start: 2020-09-15 | End: 2020-09-25

## 2020-09-15 RX ORDER — TRAZODONE HYDROCHLORIDE 100 MG/1
100 TABLET ORAL
COMMUNITY

## 2020-09-15 NOTE — ED NOTES
Current Discharge Medication List      START taking these medications    Details   acetic acid (VOSOL) 2 % otic solution 5 Drops by Otic route three (3) times daily for 10 days. Qty: 10 mL, Refills: 0           Patient armband removed and shredded. Prescription sent to patient pharmacy and reviewed.

## 2020-09-15 NOTE — ED PROVIDER NOTES
EMERGENCY DEPARTMENT HISTORY AND PHYSICAL EXAM    9:39 AM  Date: 9/15/2020  Patient Name: Selina Shahid    History of Presenting Illness     Chief Complaint   Patient presents with    Sinus Infection    Nasal Congestion    Sore Throat    Watery Eyes        History Provided By: Patient    HPI: Selina Shahid is a 61 y.o. female with history of COPD and hypertension. Patient is presenting with sinus congestion, sore throat and right ear pain since yesterday. She used over-the-counter Zyrtec and Flonase without much relief. Denies fever chills. No history of GI symptoms or urinary symptoms. 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 Reporting dry cough but denies chest pain or shortness of breath. Location:  Severity:  Timing/course:    Onset/Duration:     PCP: None    Past History     Past Medical History:  Past Medical History:   Diagnosis Date    Arthritis     COPD     Hepatitis C     treated     Hypercholesterolemia     Hypertension     no meds     Sleep apnea     no CPAP        Past Surgical History:  Past Surgical History:   Procedure Laterality Date    HC STPLER HEMMORROID PPH01      HX  SECTION  1984    HX CHOLECYSTECTOMY  2005    HX HYSTERECTOMY      HX KNEE ARTHROSCOPY  2009    Bilateral    HX KNEE REPLACEMENT      HX TONSILLECTOMY  1968    [place] OSSEOUS SURG 4/> CNTIG TEETH QUAD         Family History:  Family History   Problem Relation Age of Onset    High Cholesterol Mother     Stroke Mother     Heart Disease Father     High Cholesterol Father     Diabetes Sister     High Cholesterol Sister        Social History:  Social History     Tobacco Use    Smoking status: Current Some Day Smoker     Packs/day: 1.00     Years: 40.00     Pack years: 40.00     Types: Cigarettes     Last attempt to quit: 10/21/2015     Years since quittin.9    Smokeless tobacco: Never Used   Substance Use Topics    Alcohol use: No    Drug use: No       Allergies:  No Known Allergies    Review of Systems   Review of Systems   HENT: Positive for congestion, ear pain, postnasal drip, sinus pressure and sore throat. Respiratory: Positive for cough. All other systems reviewed and are negative. Physical Exam     Patient Vitals for the past 12 hrs:   Temp Pulse Resp BP SpO2   09/15/20 0837 97.9 °F (36.6 °C) (!) 59 18 (!) 171/76 96 %       Physical Exam  Vitals signs and nursing note reviewed. Constitutional:       General: She is not in acute distress. Appearance: She is well-developed. HENT:      Head: Normocephalic and atraumatic. Right Ear: There is impacted cerumen. Tympanic membrane is perforated. Left Ear: Tympanic membrane and ear canal normal. There is impacted cerumen. Neck:      Musculoskeletal: Normal range of motion and neck supple. Cardiovascular:      Rate and Rhythm: Normal rate. Pulmonary:      Effort: Pulmonary effort is normal. No respiratory distress. Skin:     General: Skin is warm and dry. Neurological:      General: No focal deficit present. Mental Status: She is alert and oriented to person, place, and time. Diagnostic Study Results     Labs -  No results found for this or any previous visit (from the past 12 hour(s)). Radiologic Studies -   No results found. Medical Decision Making     ED Course: Progress Notes, Reevaluation, and Consults:    9:39 AM Initial assessment performed. The patients presenting problems have been discussed, and they/their family are in agreement with the care plan formulated and outlined with them. I have encouraged them to ask questions as they arise throughout their visit. Provider Notes (Medical Decision Making): 63-year-old female history of hypertension and COPD presenting with ear pain and sinus pressure as well as dry cough.   Could possibly be related to her allergies versus an infection and mainly COVID-19 since she works at a place with somebody tested positive for COVID-19. Patient has a perforated right TM and both ear canals are full of wax. Will remove the wax with a curette, no irrigation since she has a perforated TM. She is well-appearing on exam otherwise and not in respiratory distress. Will get a screening chest x-ray given her COPD history to assess for pneumonia and discharge with symptomatic treatment. Also will need a COVID-19 test.    Procedures:     Critical Care Time:     Vital Signs-Reviewed the patient's vital signs. Reviewed pt's pulse ox reading. EKG: Interpreted by the EP. Time Interpreted:    Rate:    Rhythm:    Interpretation:   Comparison:     Records Reviewed: Nursing Notes (Time of Review: 9:39 AM)  -I am the first provider for this patient.  -I reviewed the vital signs, available nursing notes, past medical history, past surgical history, family history and social history. Current Outpatient Medications   Medication Sig Dispense Refill    ALPRAZolam (XANAX) 0.5 mg tablet Take 0.5 mg by mouth as needed for Anxiety.  traZODone (DESYREL) 100 mg tablet Take 100 mg by mouth nightly.  sertraline (Zoloft) 50 mg tablet Take 50 mg by mouth daily.  albuterol (PROVENTIL HFA, VENTOLIN HFA, PROAIR HFA) 90 mcg/actuation inhaler Take 2 Puffs by inhalation every four (4) hours as needed for Wheezing. 1 Inhaler 0    atorvastatin (LIPITOR) 10 mg tablet take 1 tablet by mouth at bedtime  0    lisinopril (PRINIVIL, ZESTRIL) 10 mg tablet take 1 tablet by mouth once daily  0    ibuprofen (ADVIL) 100 mg tablet Take 100 mg by mouth every six (6) hours as needed for Pain.  Cholecalciferol, Vitamin D3, (VITAMIN D3) 1,000 unit cap Take  by mouth daily.  meloxicam (MOBIC) 15 mg tablet Take 1 Tab by mouth daily. 30 Tab 1    diclofenac (VOLTAREN) 1 % gel Apply 4 g to affected area four (4) times daily.  Maximum 16 grams per joint per day. Dispense 5 100 gram tubes 5 Each 0        Clinical Impression     Clinical Impression: No diagnosis found. Disposition: DC      This note was dictated utilizing voice recognition software which may lead to typographical errors. I apologize in advance if the situation occurs. If questions arise please do not hesitate to contact me or call our department.     Carina Bean MD  9:39 AM

## 2020-09-15 NOTE — ED TRIAGE NOTES
Pt presents with headache, nasal pain/pressure, sore throat, nasal congestion and watery eyes since yesterday. Pt denies n/v/d or fever.

## 2020-09-15 NOTE — LETTER
NOTIFICATION RETURN TO WORK / SCHOOL 
 
9/15/2020 9:49 AM 
 
Ms. Sacha Guallpa 63 [facility] [id number] To Whom It May Concern: 
 
Sacha Guallpa is currently under the care of [address] [facility] EMERGENCY DEPT. She will return to work/school on: 9/28 or if test is negative If there are questions or concerns please have the patient contact our office. Sincerely, Stacia Gómez MD

## 2020-09-15 NOTE — DISCHARGE INSTRUCTIONS
Patient Education        Keeping Ears Dry: Care Instructions  Your Care Instructions  Your doctor wants you to keep water from getting into your ears. You may need to do this because of a ruptured eardrum, an ear infection, or other ear problems. Follow-up care is a key part of your treatment and safety. Be sure to make and go to all appointments, and call your doctor if you are having problems. It's also a good idea to know your test results and keep a list of the medicines you take. How can you care for yourself at home? · Take baths until your doctor says you can take showers again. Avoid getting water in the ear until after the problem clears up. Ask your doctor if you should use earplugs to keep water out of your ears. · Do not swim until your doctor says you can. · If you get water in your ears, turn your head to each side and pull the earlobe in different directions. This will help the water run out. If your ears are still wet, use a hair dryer set on the lowest heat. Hold the dryer several inches from your ear. · Use your medicines exactly as prescribed. Call your doctor if you think you are having a problem with your medicine. Do not put drops in your ears unless your doctor prescribes them. When should you call for help? Watch closely for changes in your health, and be sure to contact your doctor if you have any problems. Where can you learn more? Go to http://messi-charmaine.info/  Enter Z972 in the search box to learn more about \"Keeping Ears Dry: Care Instructions. \"  Current as of: April 15, 2020               Content Version: 12.6  © 0291-9990 Healthwise, Incorporated. Care instructions adapted under license by [employer] (which disclaims liability or warranty for this information).  If you have questions about a medical condition or this instruction, always ask your healthcare professional. [address] any warranty or liability for your use of this information. Patient Education        10 Things to Do When You Have COVID-19    Stay home. Don't go to school, work, or public areas. And don't use public transportation, ride-shares, or taxis unless you have no choice. Leave your home only if you need to get medical care. But call the doctor's office first so they know you're coming. And wear a cloth face cover. Ask before leaving isolation. Talk with your doctor or other health professional about when it will be safe for you to leave isolation. Wear a cloth face cover when you are around other people. It can help stop the spread of the virus when you cough or sneeze. Limit contact with people in your home. If possible, stay in a separate bedroom and use a separate bathroom. Avoid contact with pets and other animals. If possible, have a friend or family member care for them while you're sick. Cover your mouth and nose with a tissue when you cough or sneeze. Then throw the tissue in the trash right away. Wash your hands often, especially after you cough or sneeze. Use soap and water, and scrub for at least 20 seconds. If soap and water aren't available, use an alcohol-based hand . Don't share personal household items. These include bedding, towels, cups and glasses, and eating utensils. Clean and disinfect your home every day. Use household  or disinfectant wipes or sprays. Take special care to clean things that you grab with your hands. These include doorknobs, remote controls, phones, and handles on your refrigerator and microwave. And don't forget countertops, tabletops, bathrooms, and computer keyboards. Take acetaminophen (Tylenol) to relieve fever and body aches. Read and follow all instructions on the label. Current as of: July 10, 2020               Content Version: 12.6  © 8860-0177 [employer].    Care instructions adapted under license by [employer] (which disclaims liability or warranty for this information). If you have questions about a medical condition or this instruction, always ask your healthcare professional. [address] any warranty or liability for your use of this information.

## 2020-09-16 ENCOUNTER — PATIENT OUTREACH (OUTPATIENT)
Dept: CASE MANAGEMENT | Age: 60
End: 2020-09-16

## 2020-09-16 LAB — SARS-COV-2, COV2NT: NOT DETECTED

## 2020-09-16 NOTE — ACP (ADVANCE CARE PLANNING)
Advance Care Planning:   Does patient have an Advance Directive: health care decision makeres listed

## 2020-09-16 NOTE — PROGRESS NOTES
Patient contacted regarding [id number] suspect. Discussed COVID-19 related testing which was pending at this time. Test results were pending. Patient informed of results, if available? pending     Care Transition Nurse/ Ambulatory Care Manager/ LPN Care Coordinator contacted the patient by telephone to perform post discharge assessment. Verified name and  with patient as identifiers. Provided introduction to self, and explanation of the CTN/ACM/LPN role, and reason for call due to risk factors for infection and/or exposure to COVID-19. Symptoms reviewed with patient who verbalized the following symptoms: no new symptoms and no worsening symptoms. Due to no new or worsening symptoms encounter was not routed to provider for escalation. Discussed follow-up appointments. If no appointment was previously scheduled, appointment scheduling offered: [facility] follow up appointment(s): No future appointments. Non-[facility] follow up appointment(s): pcp as needed      Advance Care Planning:   Does patient have an Advance Directive: health care decision makeres listed    Patient has following risk factors of: htn. CTN/ACM/LPN reviewed discharge instructions, medical action plan and red flags such as increased shortness of breath, increasing fever and signs of decompensation with patient who verbalized understanding. Discussed exposure protocols and quarantine with CDC Guidelines What to do if you are sick with coronavirus disease .  Patient was given an opportunity for questions and concerns. The patient agrees to contact the [facility] exposure line [phone], [facility] [address]  ([phone]) and PCP office for questions related to their healthcare. CTN/ACM/LPN provided contact information for future needs. Reviewed and educated patient on any new and changed medications related to discharge diagnosis.     Patient/family/caregiver given information for [employer] and agrees to enroll no  Patient's preferred e-mail:  n/a  Patient's preferred phone number: n/a  Based on Loop alert triggers, patient will be contacted by nurse care manager for worsening symptoms. Plan for follow-up call in 1-2 days based on severity of symptoms and risk factors.

## 2020-09-17 ENCOUNTER — PATIENT OUTREACH (OUTPATIENT)
Dept: CASE MANAGEMENT | Age: 60
End: 2020-09-17

## 2020-09-17 NOTE — PROGRESS NOTES
Patient contacted regarding COVID-19 risk and screening. Discussed COVID-19 related testing which was available at this time. Test results were negative. Patient informed of results, if available? yes     Care Transition Nurse/ Ambulatory Care Manager/ LPN Care Coordinator contacted the patient by telephone to perform follow-up assessment. Verified name and  with patient as identifiers. Patient has following risk factors of: htn. Symptoms reviewed with patient who verbalized the following symptoms: no new symptoms and no worsening symptoms. Due to no new or worsening symptoms encounter was not routed to provider for escalation. Education provided regarding infection prevention, and signs and symptoms of COVID-19 and when to seek medical attention with patient who verbalized understanding. Discussed exposure protocols and quarantine from [address] you at higher risk for severe illness  and given an opportunity for questions and concerns. The patient agrees to contact PCP office for questions related to their healthcare. CTN/ACM/LPN provided contact information for future reference. From CDC: Are you at higher risk for severe illness?  Wash your hands often.  Avoid close contact (6 feet, which is about two arm lengths) with people who are sick.  Put distance between yourself and other people if COVID-19 is spreading in your community.  Clean and disinfect frequently touched surfaces.  Avoid all cruise travel and non-essential air travel.  Call your healthcare professional if you have concerns about COVID-19 and your underlying condition or if you are sick. For more information on steps you can take to protect yourself, see CDC's How to [place] for follow-up call in 7-14 days based on severity of symptoms and risk factors.

## 2020-09-30 ENCOUNTER — PATIENT OUTREACH (OUTPATIENT)
Dept: CASE MANAGEMENT | Age: 60
End: 2020-09-30

## 2020-09-30 NOTE — PROGRESS NOTES
Date/Time:  9/30/2020 12:12 PM  Attempted to reach patient by telephone. Left HIPPA compliant message requesting a return call. This episode is resolved.

## 2021-06-09 ENCOUNTER — OFFICE VISIT (OUTPATIENT)
Dept: ORTHOPEDIC SURGERY | Age: 61
End: 2021-06-09
Payer: COMMERCIAL

## 2021-06-09 VITALS
WEIGHT: 170 LBS | OXYGEN SATURATION: 98 % | HEART RATE: 67 BPM | BODY MASS INDEX: 33.38 KG/M2 | TEMPERATURE: 97.8 F | HEIGHT: 60 IN

## 2021-06-09 DIAGNOSIS — M70.52 PES ANSERINUS BURSITIS OF LEFT KNEE: ICD-10-CM

## 2021-06-09 DIAGNOSIS — M75.51 BURSITIS OF RIGHT SHOULDER: ICD-10-CM

## 2021-06-09 DIAGNOSIS — M25.511 RIGHT SHOULDER PAIN, UNSPECIFIED CHRONICITY: ICD-10-CM

## 2021-06-09 DIAGNOSIS — M70.51 PES ANSERINUS BURSITIS OF RIGHT KNEE: ICD-10-CM

## 2021-06-09 DIAGNOSIS — M70.61 TROCHANTERIC BURSITIS OF RIGHT HIP: ICD-10-CM

## 2021-06-09 DIAGNOSIS — Z96.652 STATUS POST TOTAL LEFT KNEE REPLACEMENT: Primary | ICD-10-CM

## 2021-06-09 DIAGNOSIS — Z96.651 STATUS POST RIGHT KNEE REPLACEMENT: ICD-10-CM

## 2021-06-09 DIAGNOSIS — M25.551 RIGHT HIP PAIN: ICD-10-CM

## 2021-06-09 PROCEDURE — 99214 OFFICE O/P EST MOD 30 MIN: CPT | Performed by: PHYSICIAN ASSISTANT

## 2021-06-09 PROCEDURE — 72170 X-RAY EXAM OF PELVIS: CPT | Performed by: PHYSICIAN ASSISTANT

## 2021-06-09 PROCEDURE — 73562 X-RAY EXAM OF KNEE 3: CPT | Performed by: PHYSICIAN ASSISTANT

## 2021-06-09 PROCEDURE — 20611 DRAIN/INJ JOINT/BURSA W/US: CPT | Performed by: PHYSICIAN ASSISTANT

## 2021-06-09 PROCEDURE — 73030 X-RAY EXAM OF SHOULDER: CPT | Performed by: PHYSICIAN ASSISTANT

## 2021-06-09 RX ORDER — BETAMETHASONE SODIUM PHOSPHATE AND BETAMETHASONE ACETATE 3; 3 MG/ML; MG/ML
6 INJECTION, SUSPENSION INTRA-ARTICULAR; INTRALESIONAL; INTRAMUSCULAR; SOFT TISSUE ONCE
Status: COMPLETED | OUTPATIENT
Start: 2021-06-09 | End: 2021-06-09

## 2021-06-09 RX ADMIN — BETAMETHASONE SODIUM PHOSPHATE AND BETAMETHASONE ACETATE 6 MG: 3; 3 INJECTION, SUSPENSION INTRA-ARTICULAR; INTRALESIONAL; INTRAMUSCULAR; SOFT TISSUE at 09:27

## 2021-06-09 NOTE — PROGRESS NOTES
[address], [address] [place] [address] [address]           Patient: Alysa Cuellar                MRN: [id number]       SSN: [id number]  YOB: 1960        AGE: 64 y.o. SEX: female  Body mass index is 33.2 kg/m². PCP: None  06/09/21            REVIEW OF SYSTEMS:  Constitutional: Negative for fever, chills, weight loss and malaise/fatigue. HENT: Negative. Eyes: Negative. Respiratory: Negative. Cardiovascular: Negative. Gastrointestinal: No bowel incontinence or constipation. Genitourinary: No bladder incontinence or saddle anesthesia. Skin: Negative. Neurological: Negative. Endo/Heme/Allergies: Negative. Psychiatric/Behavioral: Negative. Musculoskeletal: As per HPI above. Past Medical History:   Diagnosis Date    Arthritis     COPD     Hepatitis C     treated     Hypercholesterolemia     Hypertension     no meds     Sleep apnea     no CPAP          Current Outpatient Medications:     traZODone (DESYREL) 100 mg tablet, Take 100 mg by mouth nightly., Disp: , Rfl:     sertraline (Zoloft) 50 mg tablet, Take 50 mg by mouth daily. , Disp: , Rfl:     albuterol (PROVENTIL HFA, VENTOLIN HFA, PROAIR HFA) 90 mcg/actuation inhaler, Take 2 Puffs by inhalation every four (4) hours as needed for Wheezing., Disp: 1 Inhaler, Rfl: 0    atorvastatin (LIPITOR) 10 mg tablet, take 1 tablet by mouth at bedtime, Disp: , Rfl: 0    lisinopril (PRINIVIL, ZESTRIL) 10 mg tablet, take 1 tablet by mouth once daily, Disp: , Rfl: 0    diclofenac (VOLTAREN) 1 % gel, Apply 4 g to affected area four (4) times daily. Maximum 16 grams per joint per day. Dispense 5 100 gram tubes, Disp: 5 Each, Rfl: 0    ibuprofen (ADVIL) 100 mg tablet, Take 100 mg by mouth every six (6) hours as needed for Pain., Disp: , Rfl:     Cholecalciferol, Vitamin D3, (VITAMIN D3) 1,000 unit cap, Take  by mouth daily. , Disp: , Rfl:     ALPRAZolam [place]) 0.5 mg tablet, Take 0.5 mg by mouth as needed for Anxiety. (Patient not taking: Reported on 2021), Disp: , Rfl:     meloxicam (MOBIC) 15 mg tablet, Take 1 Tab by mouth daily. (Patient not taking: Reported on 2021), Disp: 30 Tab, Rfl: 1    No Known Allergies    Social History     Socioeconomic History    Marital status: SINGLE     Spouse name: Not on file    Number of children: Not on file    Years of education: Not on file    Highest education level: Not on file   Occupational History    Not on file   Tobacco Use    Smoking status: Current Some Day Smoker     Packs/day: 1.00     Years: 40.00     Pack years: 40.00     Types: Cigarettes     Last attempt to quit: 10/21/2015     Years since quittin.6    Smokeless tobacco: Never Used   Substance and Sexual Activity    Alcohol use: No    Drug use: No    Sexual activity: Not on file   Other Topics Concern    Not on file   Social History Narrative    Not on file     Social Determinants of Health     Financial Resource Strain:     Difficulty of Paying Living Expenses:    Food Insecurity:     Worried About Running Out of Food in the Last Year:     Ran Out of Food in the Last Year:    Transportation Needs:     Lack of Transportation (Medical):      Lack of Transportation (Non-Medical):    Physical Activity:     Days of Exercise per Week:     Minutes of Exercise per Session:    Stress:     Feeling of Stress :    Social Connections:     Frequency of Communication with Friends and Family:     Frequency of Social Gatherings with Friends and Family:     Attends Gnosticist Services:     Active Member of Clubs or Organizations:     Attends Club or Organization Meetings:     Marital Status:    Intimate Partner Violence:     Fear of Current or Ex-Partner:     Emotionally Abused:     Physically Abused:     Sexually Abused:        Past Surgical History:   Procedure Laterality Date    HC STPLER HEMMORROID PPH01      HX  SECTION 1984    HX CHOLECYSTECTOMY  2005    HX HYSTERECTOMY  2008    HX KNEE ARTHROSCOPY  2009 2010    Bilateral    HX KNEE REPLACEMENT      HX TONSILLECTOMY  1968    [place] OSSEOUS SURG 4/> CNTIG TEETH QUAD       Patient seen and evaluated today for complaints of right shoulder pain as well as right hip pain. In regards to the right shoulder she has pain throughout the day which is worse at night. She has trouble with overhead activities. She denies numbness and tingling upper extremity. Her discomfort has been ongoing. In regards to her hip she is having lateral base discomfort of the right hip worse when she is ambulating or laying on the right side at night. She has no pain into the groin. No thigh pain. She has had knee replacements done previously, elsewhere, and overall doing well. Patient denies recent fevers, chills, chest pain, SOB, or injuries. No recent systemic changes noted. A 12-point review of systems is performed today. Pertinent positives are noted. All other systems reviewed and otherwise are negative. Physical exam: General: Alert and oriented x3, nad.  well-developed, well nourished. normal affect, AF. NC/AT, EOMI, neck supple, trachea midline, no JVD present. Breathing is non-labored. Examination of the right shoulder the skin intact. There is no erythema, ecchymosis, warmth. There are no signs of infection or cellulitis present. Range of motion is approximate 90 degrees of forward flexion, 90 degrees of abduction, 20 degrees external rotation. She has a positive Howard test.  Negative drop arm, speeds, Creek's. External rotator strength is symmetric bilaterally. Examination of the lower extremities reveals pain-free range of motion the hips. She does have discomfort to palpation the truck #the right side. Negative straight leg raise. Negative calf tenderness. Negative Homans. No signs of DVT present. Each of the knees reveal skin intact.   There is no erythema, ecchymosis, warmth. There are no signs of infection or cellulitis present. Range of motion is well-preserved. Good stability. Radiographs obtained the office today 6/9/2021 [address] [place] including AP pelvis show no acute bony abnormalities, 3 views of the right shoulder showing moderate GH arthritis without acute abnormalities noted. As well as an AP, lateral, skyline of the left knee show no acute abnormalities without evidence for loosening or fracture noted. She does have a reverse tilt of the patella noted on the skyline view. Assessment: #1 right shoulder subacromial bursitis, impingement syndrome, rotator cuff pathology #2 right hip trochanter bursitis, #3 status post bilateral knee replacements done elsewhere    Plan: At this point, we discussed treatment options. We will move forward with a cortisone injection for the right shoulder and right hip, trochanteric bursa. After informed consent, under aseptic conditions, with US guided assitance, the right shoulder and right hip was prepped with betadine and a mxiture of 3ml 1% lidocaine and 6mg of celestone was injected without complications. The patient tolerated the injection well. The patient is instructed on post-injection care. We will see her back in a month's time for reevaluation to  the efficacy of the cortisone injection for the shoulder. We may need to move forward the MRI of the shoulder at that point. Surgical intervention was discussed regarding rotator cuff pathology and elected against given the lack of conservative treatment. Chart reviewed for the following:  Bridget MCKEON PA-C, have reviewed the History, Physical and updated the Allergic reactions for [place]?     TIME OUT performed immediately prior to start of procedure:  Bridget MCKEON PA-C, have performed the following reviews on [place] prior to the start of the procedure:  ????????  * Patient was identified by name and date of birth   * Agreement on procedure being performed was verified  * Risks and Benefits explained to the patient  * Procedure site verified and marked as necessary  * Patient was positioned for comfort  * Consent was signed and verified    Time:9:15 AM    Body part: right shoulder, intra-bursal, right hip- intra-bursal    Medication & Dose: 3ml 1% lidocaine and 6mg celesone each    Date of procedure: 06/09/21    Procedure performed by: Shahbaz Echeverria PA-C    Provider assisted by: none    Patient assisted by: self    How tolerated by patient: tolerated the procedure well with no complications    Post Procedural Pain Scale: 9    Comments:   [address] using a frequency of 10MHz with a 12L-RS transducer head was used to confirm needle placement.   Ultrasound images captured using [address] Ultrasound machine and scanned into patient's chart       Damir Issa PA-C, ATC

## 2021-11-26 ENCOUNTER — HOSPITAL ENCOUNTER (EMERGENCY)
Age: 61
Discharge: HOME | End: 2021-11-26
Attending: STUDENT IN AN ORGANIZED HEALTH CARE EDUCATION/TRAINING PROGRAM
Payer: COMMERCIAL

## 2021-11-26 ENCOUNTER — APPOINTMENT (OUTPATIENT)
Dept: GENERAL RADIOLOGY | Age: 61
End: 2021-11-26
Attending: PHYSICIAN ASSISTANT
Payer: COMMERCIAL

## 2021-11-26 VITALS
OXYGEN SATURATION: 95 % | WEIGHT: 160 LBS | TEMPERATURE: 98.6 F | DIASTOLIC BLOOD PRESSURE: 81 MMHG | BODY MASS INDEX: 31.41 KG/M2 | HEART RATE: 65 BPM | SYSTOLIC BLOOD PRESSURE: 175 MMHG | HEIGHT: 60 IN | RESPIRATION RATE: 18 BRPM

## 2021-11-26 DIAGNOSIS — M75.21 BICEPS TENDINITIS OF RIGHT SHOULDER: Primary | ICD-10-CM

## 2021-11-26 DIAGNOSIS — M70.51 INFRAPATELLAR BURSITIS OF RIGHT KNEE: ICD-10-CM

## 2021-11-26 DIAGNOSIS — M70.51 PES ANSERINUS BURSITIS OF RIGHT KNEE: ICD-10-CM

## 2021-11-26 PROCEDURE — 99283 EMERGENCY DEPT VISIT LOW MDM: CPT

## 2021-11-26 PROCEDURE — 73030 X-RAY EXAM OF SHOULDER: CPT

## 2021-11-26 PROCEDURE — 74011250637 HC RX REV CODE- 250/637: Performed by: PHYSICIAN ASSISTANT

## 2021-11-26 RX ORDER — DICLOFENAC SODIUM 10 MG/G
4 GEL TOPICAL 4 TIMES DAILY
Qty: 5 EACH | Refills: 0 | Status: SHIPPED | OUTPATIENT
Start: 2021-11-26

## 2021-11-26 RX ORDER — HYDROCODONE BITARTRATE AND ACETAMINOPHEN 5; 325 MG/1; MG/1
1 TABLET ORAL
Status: COMPLETED | OUTPATIENT
Start: 2021-11-26 | End: 2021-11-26

## 2021-11-26 RX ORDER — NAPROXEN 500 MG/1
500 TABLET ORAL 2 TIMES DAILY WITH MEALS
Qty: 20 TABLET | Refills: 0 | Status: ON HOLD | OUTPATIENT
Start: 2021-11-26 | End: 2021-12-19

## 2021-11-26 RX ADMIN — HYDROCODONE BITARTRATE AND ACETAMINOPHEN 1 TABLET: 5; 325 TABLET ORAL at 14:18
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
## 2021-11-26 NOTE — DISCHARGE INSTRUCTIONS
Can use the sling as needed for comfort, but recommend light stretching and movement as tolerated  Use the topical anti-inflammatory, naproxen for inflammation
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
## 2021-12-19 ENCOUNTER — HOSPITAL ENCOUNTER (INPATIENT)
Age: 61
LOS: 1 days | Discharge: HOME | DRG: 177 | End: 2021-12-20
Attending: EMERGENCY MEDICINE | Admitting: STUDENT IN AN ORGANIZED HEALTH CARE EDUCATION/TRAINING PROGRAM
Payer: COMMERCIAL

## 2021-12-19 ENCOUNTER — APPOINTMENT (OUTPATIENT)
Dept: CT IMAGING | Age: 61
DRG: 177 | End: 2021-12-19
Attending: EMERGENCY MEDICINE
Payer: COMMERCIAL

## 2021-12-19 DIAGNOSIS — J96.01 ACUTE RESPIRATORY FAILURE WITH HYPOXIA (HCC): Primary | ICD-10-CM

## 2021-12-19 DIAGNOSIS — B34.2 CORONAVIRUS INFECTION: ICD-10-CM

## 2021-12-19 PROBLEM — J12.82 PNEUMONIA DUE TO COVID-19 VIRUS: Status: ACTIVE | Noted: 2021-12-19

## 2021-12-19 PROBLEM — U07.1 PNEUMONIA DUE TO COVID-19 VIRUS: Status: ACTIVE | Noted: 2021-12-19

## 2021-12-19 PROBLEM — J96.00 ACUTE RESPIRATORY FAILURE (HCC): Status: ACTIVE | Noted: 2021-12-19

## 2021-12-19 LAB
ANION GAP SERPL CALC-SCNC: 4 MMOL/L (ref 3–18)
ATRIAL RATE: 73 BPM
BASOPHILS # BLD: 0 K/UL (ref 0–0.1)
BASOPHILS NFR BLD: 1 % (ref 0–2)
BNP SERPL-MCNC: 2223 PG/ML (ref 0–900)
BUN SERPL-MCNC: 10 MG/DL (ref 7–18)
BUN/CREAT SERPL: 13 (ref 12–20)
CALCIUM SERPL-MCNC: 9.1 MG/DL (ref 8.5–10.1)
CALCULATED P AXIS, ECG09: 63 DEGREES
CALCULATED R AXIS, ECG10: 68 DEGREES
CALCULATED T AXIS, ECG11: 59 DEGREES
CHLORIDE SERPL-SCNC: 105 MMOL/L (ref 100–111)
CO2 SERPL-SCNC: 29 MMOL/L (ref 21–32)
COVID-19 RAPID TEST, COVR: DETECTED
CREAT SERPL-MCNC: 0.77 MG/DL (ref 0.6–1.3)
CRP SERPL-MCNC: 2.6 MG/DL (ref 0–0.3)
D DIMER PPP FEU-MCNC: 0.94 UG/ML(FEU)
DIAGNOSIS, 93000: NORMAL
DIFFERENTIAL METHOD BLD: ABNORMAL
EOSINOPHIL # BLD: 0 K/UL (ref 0–0.4)
EOSINOPHIL NFR BLD: 0 % (ref 0–5)
ERYTHROCYTE [DISTWIDTH] IN BLOOD BY AUTOMATED COUNT: 13.5 % (ref 11.6–14.5)
ERYTHROCYTE [SEDIMENTATION RATE] IN BLOOD: 9 MM/HR (ref 0–30)
GLUCOSE SERPL-MCNC: 99 MG/DL (ref 74–99)
HCT VFR BLD AUTO: 46.9 % (ref 35–45)
HGB BLD-MCNC: 15.4 G/DL (ref 12–16)
IMM GRANULOCYTES # BLD AUTO: 0 K/UL (ref 0–0.04)
IMM GRANULOCYTES NFR BLD AUTO: 0 % (ref 0–0.5)
LDH SERPL L TO P-CCNC: 171 U/L (ref 81–234)
LYMPHOCYTES # BLD: 1.4 K/UL (ref 0.9–3.6)
LYMPHOCYTES NFR BLD: 29 % (ref 21–52)
MCH RBC QN AUTO: 28.3 PG (ref 24–34)
MCHC RBC AUTO-ENTMCNC: 32.8 G/DL (ref 31–37)
MCV RBC AUTO: 86.1 FL (ref 78–100)
MONOCYTES # BLD: 0.7 K/UL (ref 0.05–1.2)
MONOCYTES NFR BLD: 15 % (ref 3–10)
NEUTS SEG # BLD: 2.6 K/UL (ref 1.8–8)
NEUTS SEG NFR BLD: 55 % (ref 40–73)
NRBC # BLD: 0 K/UL (ref 0–0.01)
NRBC BLD-RTO: 0 PER 100 WBC
P-R INTERVAL, ECG05: 130 MS
PLATELET # BLD AUTO: 177 K/UL (ref 135–420)
PMV BLD AUTO: 9.8 FL (ref 9.2–11.8)
POTASSIUM SERPL-SCNC: 3.9 MMOL/L (ref 3.5–5.5)
Q-T INTERVAL, ECG07: 424 MS
QRS DURATION, ECG06: 84 MS
QTC CALCULATION (BEZET), ECG08: 467 MS
RBC # BLD AUTO: 5.45 M/UL (ref 4.2–5.3)
SODIUM SERPL-SCNC: 138 MMOL/L (ref 136–145)
SOURCE, COVRS: ABNORMAL
TROPONIN-HIGH SENSITIVITY: 6 NG/L (ref 0–54)
VENTRICULAR RATE, ECG03: 73 BPM
WBC # BLD AUTO: 4.8 K/UL (ref 4.6–13.2)

## 2021-12-19 PROCEDURE — 85379 FIBRIN DEGRADATION QUANT: CPT

## 2021-12-19 PROCEDURE — 74011000250 HC RX REV CODE- 250: Performed by: STUDENT IN AN ORGANIZED HEALTH CARE EDUCATION/TRAINING PROGRAM

## 2021-12-19 PROCEDURE — 93005 ELECTROCARDIOGRAM TRACING: CPT

## 2021-12-19 PROCEDURE — 85025 COMPLETE CBC W/AUTO DIFF WBC: CPT

## 2021-12-19 PROCEDURE — 83880 ASSAY OF NATRIURETIC PEPTIDE: CPT

## 2021-12-19 PROCEDURE — 3E0333Z INTRODUCTION OF ANTI-INFLAMMATORY INTO PERIPHERAL VEIN, PERCUTANEOUS APPROACH: ICD-10-PCS | Performed by: STUDENT IN AN ORGANIZED HEALTH CARE EDUCATION/TRAINING PROGRAM

## 2021-12-19 PROCEDURE — 99283 EMERGENCY DEPT VISIT LOW MDM: CPT

## 2021-12-19 PROCEDURE — 74011250637 HC RX REV CODE- 250/637: Performed by: STUDENT IN AN ORGANIZED HEALTH CARE EDUCATION/TRAINING PROGRAM

## 2021-12-19 PROCEDURE — 74011250636 HC RX REV CODE- 250/636: Performed by: STUDENT IN AN ORGANIZED HEALTH CARE EDUCATION/TRAINING PROGRAM

## 2021-12-19 PROCEDURE — 84484 ASSAY OF TROPONIN QUANT: CPT

## 2021-12-19 PROCEDURE — 85652 RBC SED RATE AUTOMATED: CPT

## 2021-12-19 PROCEDURE — 74011000258 HC RX REV CODE- 258: Performed by: STUDENT IN AN ORGANIZED HEALTH CARE EDUCATION/TRAINING PROGRAM

## 2021-12-19 PROCEDURE — 80048 BASIC METABOLIC PNL TOTAL CA: CPT

## 2021-12-19 PROCEDURE — 83615 LACTATE (LD) (LDH) ENZYME: CPT

## 2021-12-19 PROCEDURE — XW033E5 INTRODUCTION OF REMDESIVIR ANTI-INFECTIVE INTO PERIPHERAL VEIN, PERCUTANEOUS APPROACH, NEW TECHNOLOGY GROUP 5: ICD-10-PCS | Performed by: STUDENT IN AN ORGANIZED HEALTH CARE EDUCATION/TRAINING PROGRAM

## 2021-12-19 PROCEDURE — 94640 AIRWAY INHALATION TREATMENT: CPT

## 2021-12-19 PROCEDURE — 71275 CT ANGIOGRAPHY CHEST: CPT

## 2021-12-19 PROCEDURE — 99223 1ST HOSP IP/OBS HIGH 75: CPT | Performed by: STUDENT IN AN ORGANIZED HEALTH CARE EDUCATION/TRAINING PROGRAM

## 2021-12-19 PROCEDURE — 87635 SARS-COV-2 COVID-19 AMP PRB: CPT

## 2021-12-19 PROCEDURE — 74011250636 HC RX REV CODE- 250/636: Performed by: EMERGENCY MEDICINE

## 2021-12-19 PROCEDURE — 74011000250 HC RX REV CODE- 250: Performed by: EMERGENCY MEDICINE

## 2021-12-19 PROCEDURE — 74011000636 HC RX REV CODE- 636: Performed by: EMERGENCY MEDICINE

## 2021-12-19 PROCEDURE — 65660000000 HC RM CCU STEPDOWN

## 2021-12-19 PROCEDURE — 86140 C-REACTIVE PROTEIN: CPT

## 2021-12-19 RX ORDER — DEXAMETHASONE SODIUM PHOSPHATE 4 MG/ML
6 INJECTION, SOLUTION INTRA-ARTICULAR; INTRALESIONAL; INTRAMUSCULAR; INTRAVENOUS; SOFT TISSUE EVERY 12 HOURS
Status: DISCONTINUED | OUTPATIENT
Start: 2021-12-19 | End: 2021-12-19 | Stop reason: DRUGHIGH

## 2021-12-19 RX ORDER — DEXAMETHASONE SODIUM PHOSPHATE 4 MG/ML
10 INJECTION, SOLUTION INTRA-ARTICULAR; INTRALESIONAL; INTRAMUSCULAR; INTRAVENOUS; SOFT TISSUE
Status: COMPLETED | OUTPATIENT
Start: 2021-12-19 | End: 2021-12-19

## 2021-12-19 RX ORDER — ONDANSETRON 2 MG/ML
4 INJECTION INTRAMUSCULAR; INTRAVENOUS
Status: DISCONTINUED | OUTPATIENT
Start: 2021-12-19 | End: 2021-12-20 | Stop reason: HOSPADM

## 2021-12-19 RX ORDER — ACETAMINOPHEN 650 MG/1
650 SUPPOSITORY RECTAL
Status: DISCONTINUED | OUTPATIENT
Start: 2021-12-19 | End: 2021-12-20 | Stop reason: HOSPADM

## 2021-12-19 RX ORDER — ALPRAZOLAM 0.25 MG/1
0.5 TABLET ORAL DAILY PRN
Status: DISCONTINUED | OUTPATIENT
Start: 2021-12-19 | End: 2021-12-20 | Stop reason: HOSPADM

## 2021-12-19 RX ORDER — ALBUTEROL SULFATE 0.83 MG/ML
2.5 SOLUTION RESPIRATORY (INHALATION)
Status: COMPLETED | OUTPATIENT
Start: 2021-12-19 | End: 2021-12-19

## 2021-12-19 RX ORDER — DEXAMETHASONE SODIUM PHOSPHATE 4 MG/ML
6 INJECTION, SOLUTION INTRA-ARTICULAR; INTRALESIONAL; INTRAMUSCULAR; INTRAVENOUS; SOFT TISSUE EVERY 12 HOURS
Status: DISCONTINUED | OUTPATIENT
Start: 2021-12-19 | End: 2021-12-20 | Stop reason: HOSPADM

## 2021-12-19 RX ORDER — SODIUM CHLORIDE 0.9 % (FLUSH) 0.9 %
5-40 SYRINGE (ML) INJECTION AS NEEDED
Status: DISCONTINUED | OUTPATIENT
Start: 2021-12-19 | End: 2021-12-20 | Stop reason: HOSPADM

## 2021-12-19 RX ORDER — DEXAMETHASONE SODIUM PHOSPHATE 4 MG/ML
10 INJECTION, SOLUTION INTRA-ARTICULAR; INTRALESIONAL; INTRAMUSCULAR; INTRAVENOUS; SOFT TISSUE
Status: DISCONTINUED | OUTPATIENT
Start: 2021-12-19 | End: 2021-12-19

## 2021-12-19 RX ORDER — SERTRALINE HYDROCHLORIDE 50 MG/1
125 TABLET, FILM COATED ORAL DAILY
Status: DISCONTINUED | OUTPATIENT
Start: 2021-12-20 | End: 2021-12-20 | Stop reason: HOSPADM

## 2021-12-19 RX ORDER — ONDANSETRON 4 MG/1
4 TABLET, ORALLY DISINTEGRATING ORAL
Status: DISCONTINUED | OUTPATIENT
Start: 2021-12-19 | End: 2021-12-20 | Stop reason: HOSPADM

## 2021-12-19 RX ORDER — ACETAMINOPHEN 325 MG/1
650 TABLET ORAL
Status: DISCONTINUED | OUTPATIENT
Start: 2021-12-19 | End: 2021-12-20 | Stop reason: HOSPADM

## 2021-12-19 RX ORDER — LISINOPRIL 10 MG/1
10 TABLET ORAL DAILY
Status: DISCONTINUED | OUTPATIENT
Start: 2021-12-20 | End: 2021-12-20 | Stop reason: HOSPADM

## 2021-12-19 RX ORDER — SODIUM CHLORIDE 0.9 % (FLUSH) 0.9 %
5-40 SYRINGE (ML) INJECTION EVERY 8 HOURS
Status: DISCONTINUED | OUTPATIENT
Start: 2021-12-19 | End: 2021-12-20 | Stop reason: HOSPADM

## 2021-12-19 RX ORDER — POLYETHYLENE GLYCOL 3350 17 G/17G
17 POWDER, FOR SOLUTION ORAL DAILY PRN
Status: DISCONTINUED | OUTPATIENT
Start: 2021-12-19 | End: 2021-12-20 | Stop reason: HOSPADM

## 2021-12-19 RX ORDER — ENOXAPARIN SODIUM 100 MG/ML
40 INJECTION SUBCUTANEOUS DAILY
Status: DISCONTINUED | OUTPATIENT
Start: 2021-12-20 | End: 2021-12-20 | Stop reason: HOSPADM

## 2021-12-19 RX ORDER — ATORVASTATIN CALCIUM 10 MG/1
10 TABLET, FILM COATED ORAL
Status: DISCONTINUED | OUTPATIENT
Start: 2021-12-19 | End: 2021-12-20 | Stop reason: HOSPADM

## 2021-12-19 RX ORDER — TRAZODONE HYDROCHLORIDE 50 MG/1
100 TABLET ORAL
Status: DISCONTINUED | OUTPATIENT
Start: 2021-12-19 | End: 2021-12-20 | Stop reason: HOSPADM

## 2021-12-19 RX ADMIN — REMDESIVIR 200 MG: 100 INJECTION, POWDER, LYOPHILIZED, FOR SOLUTION INTRAVENOUS at 14:42

## 2021-12-19 RX ADMIN — TRAZODONE HYDROCHLORIDE 100 MG: 50 TABLET ORAL at 22:24

## 2021-12-19 RX ADMIN — ALBUTEROL SULFATE 2.5 MG: 2.5 SOLUTION RESPIRATORY (INHALATION) at 09:58

## 2021-12-19 RX ADMIN — DEXAMETHASONE SODIUM PHOSPHATE 10 MG: 4 INJECTION, SOLUTION INTRAMUSCULAR; INTRAVENOUS at 09:58

## 2021-12-19 RX ADMIN — IOPAMIDOL 72 ML: 755 INJECTION, SOLUTION INTRAVENOUS at 08:30

## 2021-12-19 RX ADMIN — Medication 10 ML: at 22:25

## 2021-12-19 RX ADMIN — DEXAMETHASONE SODIUM PHOSPHATE 6 MG: 4 INJECTION, SOLUTION INTRAMUSCULAR; INTRAVENOUS at 22:24

## 2021-12-19 RX ADMIN — ATORVASTATIN CALCIUM 10 MG: 10 TABLET, FILM COATED ORAL at 22:24

## 2021-12-19 NOTE — ED TRIAGE NOTES
Pt states she may have covid    States it started yesterday    Symptoms:    Nasal congestion, fever, chills, body aches and SOB     Pt is fully vaccinated, no booster but scheduled for booster       95% on room air    84% walking o2

## 2021-12-19 NOTE — ED NOTES
Called report to [address] [place] [address] and have remained on hold for 15 minutes to give report to [employer].

## 2021-12-19 NOTE — ED PROVIDER NOTES
EMERGENCY DEPARTMENT HISTORY AND PHYSICAL EXAM    7:23 AM patient seen at this time in room 6    Date: 12/19/2021  Patient Name: Kendrick Li    History of Presenting Illness     Chief Complaint   Patient presents with    Concern For COVID-19 (Coronavirus)    Generalized Body Aches         History Provided By: patient    Additional History (Context): Kendrick Li is a 64 y.o. female presents with mild COPD,'s 24 hours of symptoms, diarrhea short of breath with exertion headache. Treating with over-the-counter medications. No history of CHF. She is fully vaccinated for coronavirus. .    PCP: None    Chief Complaint:   Duration:    Timing:    Location:   Quality:   Severity:   Modifying Factors:   Associated Symptoms:       Current Facility-Administered Medications   Medication Dose Route Frequency Provider Last Rate Last Admin    albuterol (PROVENTIL VENTOLIN) nebulizer solution 2.5 mg  2.5 mg Nebulization NOW Kimberly Nicholson MD        dexamethasone (DECADRON) 4 mg/mL injection 10 mg  10 mg IntraMUSCular NOW Kailash Allen MD         Current Outpatient Medications   Medication Sig Dispense Refill    diclofenac (Voltaren) 1 % gel Apply 4 g to affected area four (4) times daily. Maximum 16 grams per joint per day. Dispense 5 100 gram tubes 5 Each 0    naproxen (NAPROSYN) 500 mg tablet Take 1 Tablet by mouth two (2) times daily (with meals). 20 Tablet 0    ALPRAZolam (XANAX) 0.5 mg tablet Take 0.5 mg by mouth as needed for Anxiety. (Patient not taking: Reported on 6/9/2021)      traZODone (DESYREL) 100 mg tablet Take 100 mg by mouth nightly.  sertraline (Zoloft) 50 mg tablet Take 50 mg by mouth daily.  albuterol (PROVENTIL HFA, VENTOLIN HFA, PROAIR HFA) 90 mcg/actuation inhaler Take 2 Puffs by inhalation every four (4) hours as needed for Wheezing.  1 Inhaler 0    atorvastatin (LIPITOR) 10 mg tablet take 1 tablet by mouth at bedtime  0    lisinopril (PRINIVIL, ZESTRIL) 10 mg tablet take 1 tablet by mouth once daily  0    meloxicam (MOBIC) 15 mg tablet Take 1 Tab by mouth daily. (Patient not taking: Reported on 2021) 30 Tab 1    ibuprofen (ADVIL) 100 mg tablet Take 100 mg by mouth every six (6) hours as needed for Pain.  Cholecalciferol, Vitamin D3, (VITAMIN D3) 1,000 unit cap Take  by mouth daily. Past History     Past Medical History:  Past Medical History:   Diagnosis Date    Arthritis     COPD     Hepatitis C     treated     Hypercholesterolemia     Hypertension     no meds     Sleep apnea     no CPAP        Past Surgical History:  Past Surgical History:   Procedure Laterality Date    HC STPLER HEMMORROID PPH01      HX  SECTION  1984    HX CHOLECYSTECTOMY  2005    HX HYSTERECTOMY  2008    HX KNEE ARTHROSCOPY  2009    Bilateral    HX KNEE REPLACEMENT      HX TONSILLECTOMY  1968    UT OSSEOUS SURG 4/> CNTIG TEETH QUAD         Family History:  Family History   Problem Relation Age of Onset    High Cholesterol Mother     Stroke Mother     Heart Disease Father     High Cholesterol Father     Diabetes Sister     High Cholesterol Sister        Social History:  Social History     Tobacco Use    Smoking status: Current Some Day Smoker     Packs/day: 0.50     Years: 40.00     Pack years: 20.00     Types: Cigarettes     Last attempt to quit: 10/21/2015     Years since quittin.1    Smokeless tobacco: Never Used   Substance Use Topics    Alcohol use: No    Drug use: No       Allergies:  No Known Allergies      Review of Systems     Review of Systems   Constitutional: Negative for diaphoresis and fever. HENT: Negative for congestion and sore throat. Eyes: Negative for pain and itching. Respiratory: Positive for cough and shortness of breath. Cardiovascular: Negative for chest pain and palpitations. Gastrointestinal: Positive for diarrhea. Negative for abdominal pain. Endocrine: Negative for polydipsia and polyuria. Genitourinary: Negative for dysuria and hematuria. Musculoskeletal: Negative for arthralgias and myalgias. Skin: Negative for rash and wound. Neurological: Negative for seizures and syncope. Hematological: Does not bruise/bleed easily. Psychiatric/Behavioral: Negative for agitation and hallucinations. Physical Exam       Patient Vitals for the past 12 hrs:   Temp Pulse Resp BP SpO2   12/19/21 0715 99.2 °F (37.3 °C) 76 22 130/72 94 %       IPVITALS  Patient Vitals for the past 24 hrs:   BP Temp Pulse Resp SpO2 Height Weight   12/19/21 0715 130/72 99.2 °F (37.3 °C) 76 22 94 % 5' (1.524 m) 70.3 kg (155 lb)       Physical Exam  Vitals and nursing note reviewed. Constitutional:       Appearance: She is well-developed. HENT:      Head: Normocephalic and atraumatic. Eyes:      General: No scleral icterus. Conjunctiva/sclera: Conjunctivae normal.   Neck:      Vascular: No JVD. Cardiovascular:      Rate and Rhythm: Normal rate and regular rhythm. Heart sounds: Normal heart sounds. Comments: 4 intact extremity pulses  Pulmonary:      Effort: Pulmonary effort is normal.      Breath sounds: Normal breath sounds. Abdominal:      Palpations: Abdomen is soft. There is no mass. Tenderness: There is no abdominal tenderness. Musculoskeletal:         General: Normal range of motion. Cervical back: Normal range of motion and neck supple. Lymphadenopathy:      Cervical: No cervical adenopathy. Skin:     General: Skin is warm and dry. Neurological:      General: No focal deficit present. Mental Status: She is alert.            Diagnostic Study Results   Labs -  Recent Results (from the past 12 hour(s))   EKG, 12 LEAD, INITIAL    Collection Time: 12/19/21  7:25 AM   Result Value Ref Range    Ventricular Rate 73 BPM    Atrial Rate 73 BPM    P-R Interval 130 ms    QRS Duration 84 ms    Q-T Interval 424 ms    QTC Calculation (Bezet) 467 ms    Calculated P Axis 63 degrees Calculated R Axis 68 degrees    Calculated T Axis 59 degrees    Diagnosis       Normal sinus rhythm  ST abnormality, possible digitalis effect  Abnormal ECG  When compared with ECG of 13-OCT-2019 10:24,  Nonspecific T wave abnormality now evident in Lateral leads     CBC WITH AUTOMATED DIFF    Collection Time: 12/19/21  7:30 AM   Result Value Ref Range    WBC 4.8 4.6 - 13.2 K/uL    RBC 5.45 (H) 4.20 - 5.30 M/uL    HGB 15.4 12.0 - 16.0 g/dL    HCT 46.9 (H) 35.0 - 45.0 %    MCV 86.1 78.0 - 100.0 FL    MCH 28.3 24.0 - 34.0 PG    MCHC 32.8 31.0 - 37.0 g/dL    RDW 13.5 11.6 - 14.5 %    PLATELET 389 135 - 810 K/uL    MPV 9.8 9.2 - 11.8 FL    NRBC 0.0 0  WBC    ABSOLUTE NRBC 0.00 0.00 - 0.01 K/uL    NEUTROPHILS 55 40 - 73 %    LYMPHOCYTES 29 21 - 52 %    MONOCYTES 15 (H) 3 - 10 %    EOSINOPHILS 0 0 - 5 %    BASOPHILS 1 0 - 2 %    IMMATURE GRANULOCYTES 0 0.0 - 0.5 %    ABS. NEUTROPHILS 2.6 1.8 - 8.0 K/UL    ABS. LYMPHOCYTES 1.4 0.9 - 3.6 K/UL    ABS. MONOCYTES 0.7 0.05 - 1.2 K/UL    ABS. EOSINOPHILS 0.0 0.0 - 0.4 K/UL    ABS. BASOPHILS 0.0 0.0 - 0.1 K/UL    ABS. IMM.  GRANS. 0.0 0.00 - 0.04 K/UL    DF AUTOMATED     METABOLIC PANEL, BASIC    Collection Time: 12/19/21  7:30 AM   Result Value Ref Range    Sodium 138 136 - 145 mmol/L    Potassium 3.9 3.5 - 5.5 mmol/L    Chloride 105 100 - 111 mmol/L    CO2 29 21 - 32 mmol/L    Anion gap 4 3.0 - 18 mmol/L    Glucose 99 74 - 99 mg/dL    BUN 10 7.0 - 18 MG/DL    Creatinine 0.77 0.6 - 1.3 MG/DL    BUN/Creatinine ratio 13 12 - 20      GFR est AA >60 >60 ml/min/1.73m2    GFR est non-AA >60 >60 ml/min/1.73m2    Calcium 9.1 8.5 - 10.1 MG/DL   NT-PRO BNP    Collection Time: 12/19/21  7:30 AM   Result Value Ref Range    NT pro-BNP 2,223 (H) 0 - 900 PG/ML   TROPONIN-HIGH SENSITIVITY    Collection Time: 12/19/21  7:30 AM   Result Value Ref Range    Troponin-High Sensitivity 6 0 - 54 ng/L   COVID-19 RAPID TEST    Collection Time: 12/19/21  7:40 AM   Result Value Ref Range    Specimen source Nasopharyngeal      COVID-19 rapid test Detected (AA) NOTD         Radiologic Studies -   CTA CHEST W OR W WO CONT   Final Result      No pulmonary embolism. Emphysema.   -A few scattered punctate pulmonary nodules. Follow-up can be obtained. Atherosclerosis. Cardiomegaly. Enlarged pulmonary artery which can be seen with arterial hypertension. Please see report for additional findings and further details. Nonemergent CT   abdomen/pelvis with contrast can be obtained for incidental abdominal findings. .        CTA CHEST W OR W WO CONT    Result Date: 12/19/2021  EXAM: CTA CHEST W OR W WO CONT INDICATION: Short of breath COMPARISON: None. CONTRAST: 72 mL of Isovue-370. TECHNIQUE: Precontrast  images were obtained to localize the volume for acquisition. Multislice helical CT arteriography was performed from the diaphragm to the thoracic inlet during uneventful rapid bolus intravenous contrast administration. Lung and soft tissue windows were generated. Coronal and sagittal images were generated and 3D post processing consisting of coronal maximum intensity images was performed. CT dose reduction was achieved through use of a standardized protocol tailored for this examination and automatic exposure control for dose modulation. FINDINGS: Emphysema. A few scattered punctate solitary pulmonary nodules. . The pulmonary arteries are well enhanced and no pulmonary emboli are identified. Pulmonary artery is prominent which can be seen with pulmonary arterial hypertension. There is no mediastinal or hilar adenopathy or mass. The aorta enhances normally without evidence of aneurysm or dissection. Great vessel atherosclerosis and coronary artery atherosclerosis noted. Heart is mild to prominent. Potential enhancing nodule left kidney series 2 image 204 measuring 5 mm. Nodular thickening left adrenal gland. Nonspecific bonilla hepatis lymph nodes noted.  Liver partially imaged but appears prominent in size. Potential fatty infiltration. Cholecystectomy. Scattered colonic diverticulosis. Decreased bone mineral density. Trace rotoscoliosis and mild kyphosis. Osseous degenerative changes. Scattered bone islands. No pulmonary embolism. Emphysema. -A few scattered punctate pulmonary nodules. Follow-up can be obtained. Atherosclerosis. Cardiomegaly. Enlarged pulmonary artery which can be seen with arterial hypertension. Please see report for additional findings and further details. Nonemergent CT abdomen/pelvis with contrast can be obtained for incidental abdominal findings. .      Medications ordered:   Medications   albuterol (PROVENTIL VENTOLIN) nebulizer solution 2.5 mg (has no administration in time range)   dexamethasone (DECADRON) 4 mg/mL injection 10 mg (has no administration in time range)   iopamidoL (ISOVUE-370) 76 % injection 100 mL (72 mL IntraVENous Given 12/19/21 0830)         Medical Decision Making   Initial Medical Decision Making and DDx:  Strongly suspicious for viral pneumonia, coronavirus or other similar. Differential includes lobar bacterial pneumonia PE CHF COPD all less likely    ED Course: Progress Notes, Reevaluation, and Consults:  ED Course as of 12/19/21 0955   Sun Dec 19, 2021   0948 Positive for coronavirusDrops to 84% with minimal exertion we will start oxygen and paging hospitalist for admission. No signs of lobar pneumonia or PE. No signs of ACS. Decadron ordered [CB]      ED Course User Index  [CB] Fabby Fischer MD     9:55 AM discussed with Dr. Hallie Davila hospitalist accepts for admission wants remdesivir and ID consulted. I am the first provider for this patient. I reviewed the vital signs, available nursing notes, past medical history, past surgical history, family history and social history.     Patient Vitals for the past 12 hrs:   Temp Pulse Resp BP SpO2   12/19/21 0715 99.2 °F (37.3 °C) 76 22 130/72 94 %       Vital Signs-Reviewed the patient's vital signs.    Pulse Oximetry Analysis, Cardiac Monitor, 12 lead ekg:      Interpreted by the EP. Records Reviewed: Nursing notes reviewed (Time of Review: 7:23 AM)    Procedures:   Critical Care Time:   Aspirin: (was aspirin given for stroke?)    Diagnosis     Clinical Impression:   1. Acute respiratory failure with hypoxia ([address]    2. Coronavirus infection        Disposition: Admitted      Follow-up Information    None          Patient's Medications   Start Taking    No medications on file   Continue Taking    ALBUTEROL (PROVENTIL HFA, VENTOLIN HFA, PROAIR HFA) 90 MCG/ACTUATION INHALER    Take 2 Puffs by inhalation every four (4) hours as needed for Wheezing. ALPRAZOLAM (XANAX) 0.5 MG TABLET    Take 0.5 mg by mouth as needed for Anxiety. ATORVASTATIN (LIPITOR) 10 MG TABLET    take 1 tablet by mouth at bedtime    CHOLECALCIFEROL, VITAMIN D3, (VITAMIN D3) 1,000 UNIT CAP    Take  by mouth daily. DICLOFENAC (VOLTAREN) 1 % GEL    Apply 4 g to affected area four (4) times daily. Maximum 16 grams per joint per day. Dispense 5 100 gram tubes    IBUPROFEN (ADVIL) 100 MG TABLET    Take 100 mg by mouth every six (6) hours as needed for Pain. LISINOPRIL (PRINIVIL, ZESTRIL) 10 MG TABLET    take 1 tablet by mouth once daily    MELOXICAM (MOBIC) 15 MG TABLET    Take 1 Tab by mouth daily. NAPROXEN (NAPROSYN) 500 MG TABLET    Take 1 Tablet by mouth two (2) times daily (with meals). SERTRALINE (ZOLOFT) 50 MG TABLET    Take 50 mg by mouth daily. TRAZODONE (DESYREL) 100 MG TABLET    Take 100 mg by mouth nightly.    These Medications have changed    No medications on file   Stop Taking    No medications on file     _______________________________    Notes:    Lyn Castano MD using Dragon dictation      _______________________________

## 2021-12-20 VITALS
BODY MASS INDEX: 30.43 KG/M2 | DIASTOLIC BLOOD PRESSURE: 72 MMHG | HEIGHT: 60 IN | WEIGHT: 155 LBS | HEART RATE: 63 BPM | OXYGEN SATURATION: 95 % | RESPIRATION RATE: 20 BRPM | SYSTOLIC BLOOD PRESSURE: 131 MMHG | TEMPERATURE: 97.6 F

## 2021-12-20 LAB
ALBUMIN SERPL-MCNC: 3.2 G/DL (ref 3.4–5)
ALBUMIN/GLOB SERPL: 0.9 {RATIO} (ref 0.8–1.7)
ALP SERPL-CCNC: 57 U/L (ref 45–117)
ALT SERPL-CCNC: 12 U/L (ref 13–56)
ANION GAP SERPL CALC-SCNC: 7 MMOL/L (ref 3–18)
AST SERPL-CCNC: 10 U/L (ref 10–38)
BASOPHILS # BLD: 0 K/UL (ref 0–0.1)
BASOPHILS NFR BLD: 0 % (ref 0–2)
BILIRUB SERPL-MCNC: 0.2 MG/DL (ref 0.2–1)
BUN SERPL-MCNC: 14 MG/DL (ref 7–18)
BUN/CREAT SERPL: 21 (ref 12–20)
CALCIUM SERPL-MCNC: 8.8 MG/DL (ref 8.5–10.1)
CHLORIDE SERPL-SCNC: 105 MMOL/L (ref 100–111)
CO2 SERPL-SCNC: 27 MMOL/L (ref 21–32)
CREAT SERPL-MCNC: 0.68 MG/DL (ref 0.6–1.3)
DIFFERENTIAL METHOD BLD: ABNORMAL
EOSINOPHIL # BLD: 0 K/UL (ref 0–0.4)
EOSINOPHIL NFR BLD: 0 % (ref 0–5)
ERYTHROCYTE [DISTWIDTH] IN BLOOD BY AUTOMATED COUNT: 13.1 % (ref 11.6–14.5)
GLOBULIN SER CALC-MCNC: 3.7 G/DL (ref 2–4)
GLUCOSE SERPL-MCNC: 109 MG/DL (ref 74–99)
HCT VFR BLD AUTO: 44.4 % (ref 35–45)
HGB BLD-MCNC: 14.5 G/DL (ref 12–16)
IMM GRANULOCYTES # BLD AUTO: 0 K/UL (ref 0–0.04)
IMM GRANULOCYTES NFR BLD AUTO: 0 % (ref 0–0.5)
LYMPHOCYTES # BLD: 1 K/UL (ref 0.9–3.6)
LYMPHOCYTES NFR BLD: 19 % (ref 21–52)
MCH RBC QN AUTO: 28.3 PG (ref 24–34)
MCHC RBC AUTO-ENTMCNC: 32.7 G/DL (ref 31–37)
MCV RBC AUTO: 86.5 FL (ref 78–100)
MONOCYTES # BLD: 0.5 K/UL (ref 0.05–1.2)
MONOCYTES NFR BLD: 10 % (ref 3–10)
NEUTS SEG # BLD: 3.7 K/UL (ref 1.8–8)
NEUTS SEG NFR BLD: 71 % (ref 40–73)
NRBC # BLD: 0 K/UL (ref 0–0.01)
NRBC BLD-RTO: 0 PER 100 WBC
PLATELET # BLD AUTO: 179 K/UL (ref 135–420)
PMV BLD AUTO: 10.3 FL (ref 9.2–11.8)
POTASSIUM SERPL-SCNC: 3.5 MMOL/L (ref 3.5–5.5)
PROT SERPL-MCNC: 6.9 G/DL (ref 6.4–8.2)
RBC # BLD AUTO: 5.13 M/UL (ref 4.2–5.3)
SODIUM SERPL-SCNC: 139 MMOL/L (ref 136–145)
WBC # BLD AUTO: 5.2 K/UL (ref 4.6–13.2)

## 2021-12-20 PROCEDURE — 99239 HOSP IP/OBS DSCHRG MGMT >30: CPT | Performed by: FAMILY MEDICINE

## 2021-12-20 PROCEDURE — 2709999900 HC NON-CHARGEABLE SUPPLY

## 2021-12-20 PROCEDURE — 74011250637 HC RX REV CODE- 250/637: Performed by: STUDENT IN AN ORGANIZED HEALTH CARE EDUCATION/TRAINING PROGRAM

## 2021-12-20 PROCEDURE — 97166 OT EVAL MOD COMPLEX 45 MIN: CPT

## 2021-12-20 PROCEDURE — 36415 COLL VENOUS BLD VENIPUNCTURE: CPT

## 2021-12-20 PROCEDURE — 97162 PT EVAL MOD COMPLEX 30 MIN: CPT

## 2021-12-20 PROCEDURE — APPSS60 APP SPLIT SHARED TIME 46-60 MINUTES: Performed by: NURSE PRACTITIONER

## 2021-12-20 PROCEDURE — 85025 COMPLETE CBC W/AUTO DIFF WBC: CPT

## 2021-12-20 PROCEDURE — 97116 GAIT TRAINING THERAPY: CPT

## 2021-12-20 PROCEDURE — 97535 SELF CARE MNGMENT TRAINING: CPT

## 2021-12-20 PROCEDURE — 77030027138 HC INCENT SPIROMETER -A

## 2021-12-20 PROCEDURE — 74011250636 HC RX REV CODE- 250/636: Performed by: STUDENT IN AN ORGANIZED HEALTH CARE EDUCATION/TRAINING PROGRAM

## 2021-12-20 PROCEDURE — 80053 COMPREHEN METABOLIC PANEL: CPT

## 2021-12-20 RX ADMIN — LISINOPRIL 10 MG: 10 TABLET ORAL at 10:38

## 2021-12-20 RX ADMIN — ENOXAPARIN SODIUM 40 MG: 100 INJECTION SUBCUTANEOUS at 10:38

## 2021-12-20 RX ADMIN — Medication 10 ML: at 10:39

## 2021-12-20 RX ADMIN — DEXAMETHASONE SODIUM PHOSPHATE 6 MG: 4 INJECTION, SOLUTION INTRAMUSCULAR; INTRAVENOUS at 10:38

## 2021-12-20 RX ADMIN — SERTRALINE 125 MG: 50 TABLET, FILM COATED ORAL at 10:37

## 2021-12-20 NOTE — PROGRESS NOTES
conducted an initial consultation and Spiritual Assessment for Miguelito Gillespie, who is a 64 y.o.,female. Patients Primary Language is: [place]. According to the patients EMR Judaism Affiliation is: Congregation.     The reason the Patient came to the hospital is:   Patient Active Problem List    Diagnosis Date Noted    Acute respiratory failure ([facility] [address] 12/19/2021    Pneumonia due to COVID-19 virus 12/19/2021    Severe obesity ([facility] [address] 11/01/2018    Osteoarthritis, knee 11/09/2015    Hyperglycemia, drug-induced 01/11/2014    Hyperlipemia 01/11/2014    Tobacco abuse 01/11/2014    Essential hypertension, benign 01/11/2014        The  provided the following Interventions:  Initiated a relationship of care and support. Chart reviewed. Assessment:  Not able to assess the patient due to medical condition. No family at bedside. Plan:  Chaplains will continue to follow and will provide pastoral care on an as needed/requested basis.  recommends bedside caregivers page  on duty if patient shows signs of acute spiritual or emotional distress.     [address]  [phone])

## 2021-12-20 NOTE — ROUTINE PROCESS
{[facility] BEDSIDE_VERBAL_RECORDED_WRITTEN:[id number]::\"Bedside\"} shift change report given to *** (oncoming nurse) by *** (offgoing nurse). Report included the following information {SBAR REPORTS [id number]}.

## 2021-12-20 NOTE — DISCHARGE SUMMARY
[facility]  Discharge Summary       Patient: Erica Martinez Age: 64 y.o. : 1960 MR#: [id number] SSN: [id number]  PCP on record: None  Admit date: 2021  Discharge date: 2021    Disposition:    []Home   []Home with Home Health   []SNF/NH   []Rehab   [x]Home with family   []Alternate Facility:____________________    Admission Diagnoses:  Acute respiratory failure ([address] [J96.00]  Pneumonia due to COVID-19 virus [U07.1, J12.82]    Discharge Diagnoses:                             1. Acute hypoxic respiratory failure secondary to COVID pneumonia  2. Nodular left kidney measuring 5 mm, Nodular thickening left adrenal gland. Nonspecific bonilla hepatis lymph nodes noted. Enlarged liver   3. Few scattered pulmonary nodules   4. H/o COPD  5. H/o HTN    Discharge Medications:     Current Discharge Medication List      CONTINUE these medications which have NOT CHANGED    Details   diclofenac (Voltaren) 1 % gel Apply 4 g to affected area four (4) times daily. Maximum 16 grams per joint per day. Dispense 5 100 gram tubes  Qty: 5 Each, Refills: 0    Associated Diagnoses: Pes anserinus bursitis of right knee; Infrapatellar bursitis of right knee      ALPRAZolam (XANAX) 0.5 mg tablet Take 0.5 mg by mouth as needed for Anxiety. traZODone (DESYREL) 100 mg tablet Take 100 mg by mouth nightly. sertraline (Zoloft) 50 mg tablet Take 50 mg by mouth daily. albuterol (PROVENTIL HFA, VENTOLIN HFA, PROAIR HFA) 90 mcg/actuation inhaler Take 2 Puffs by inhalation every four (4) hours as needed for Wheezing. Qty: 1 Inhaler, Refills: 0      atorvastatin (LIPITOR) 10 mg tablet take 1 tablet by mouth at bedtime  Refills: 0      lisinopril (PRINIVIL, ZESTRIL) 10 mg tablet take 1 tablet by mouth once daily  Refills: 0      Cholecalciferol, Vitamin D3, (VITAMIN D3) 1,000 unit cap Take  by mouth daily.       ibuprofen (ADVIL) 100 mg tablet Take 100 mg by mouth every six (6) hours as needed for Pain. Consults:    -Infectious disease    Procedures:  -   None    Significant Diagnostic Studies:     - FINDINGS:  Emphysema. A few scattered punctate solitary pulmonary nodules. .     The pulmonary arteries are well enhanced and no pulmonary emboli are identified. Pulmonary artery is prominent which can be seen with pulmonary arterial  hypertension.     There is no mediastinal or hilar adenopathy or mass. The aorta enhances normally  without evidence of aneurysm or dissection. Great vessel atherosclerosis and  coronary artery atherosclerosis noted. Heart is mild to prominent.     Potential enhancing nodule left kidney series 2 image 204 measuring 5 mm. Nodular thickening left adrenal gland. Nonspecific bonilla hepatis lymph nodes  noted. Liver partially imaged but appears prominent in size. Potential fatty  infiltration. Cholecystectomy. Scattered colonic diverticulosis.     Decreased bone mineral density. Trace rotoscoliosis and mild kyphosis. Osseous  degenerative changes. Scattered bone islands. CTA CHEST W OR W WO CONT    Result Date: 12/19/2021  No pulmonary embolism. Emphysema. -A few scattered punctate pulmonary nodules. Follow-up can be obtained. Atherosclerosis. Cardiomegaly. Enlarged pulmonary artery which can be seen with arterial hypertension. Please see report for additional findings and further details. Nonemergent CT abdomen/pelvis with contrast can be obtained for incidental abdominal findings. [facility] Course by Problem   1. Acute hypoxic respiratory failure secondary to COVID pneumonia -in ED patient was found to be initially hypoxic to oxygen levels of 84% on room air with exertion. Upon arrival to [facility] view with exertion patient oxygen levels at 93% however she deferred use of oxygen during admission and saturations continued to improve. Prior to discharge with ambulation her saturation maintained at 96 to 97% on room air.   Patient was initially treated with remdesivir and Decadron IV, infectious disease was consulted and cleared for discharge without further steroids and supportive care only. Discussed with patient and encouraged continued mobility, adequate hydration and nutrition. 2.   Nodular left kidney measuring 5 mm, Nodular thickening left adrenal gland. Nonspecific bonilla hepatis lymph nodes noted. Enlarged liver per CTA on admission. Please follow-up as outpatient. 3.  Few scattered pulmonary nodules -please follow-up as outpatient  4. H/o COPD -no evidence of acute exacerbation. Continue as needed albuterol  5. H/o HTN -continue on lisinopril without incident    Today's examination of the patient revealed:     Subjective:     Patient feels well denies shortness of breath including with exertion, + productive cough and able to expectorate better.   No further loose bowel movements since yesterday, denies nausea or vomiting, no abdominal pain    Objective:   VS:   Visit Vitals  /72 (BP 1 Location: Right upper arm, BP Patient Position: At rest)   Pulse 63   Temp 97.6 °F (36.4 °C)   Resp 20   Ht 5' (1.524 m)   Wt 70.3 kg (155 lb)   SpO2 95%   BMI 30.27 kg/m²      Tmax/24hrs: Temp (24hrs), Av.7 °F (36.5 °C), Min:97.3 °F (36.3 °C), Max:98.1 °F (36.7 °C)     Input/Output: No intake or output data in the 24 hours ending 21 1417    General:  Alert, NAD  Cardiovascular:  RRR  Pulmonary:  LSC throughout; respiratory effort WNL; with ambulation oxygen saturations maintained at 96 to 97% on room air  GI:  +BS in all four quadrants, soft, non-tender  Extremities:  No edema; 2+ dorsalis pedis pulses bilaterally  Neurology: Alert and oriented x4    Labs:    Recent Results (from the past 24 hour(s))   METABOLIC PANEL, COMPREHENSIVE    Collection Time: 21 10:40 AM   Result Value Ref Range    Sodium 139 136 - 145 mmol/L    Potassium 3.5 3.5 - 5.5 mmol/L    Chloride 105 100 - 111 mmol/L    CO2 27 21 - 32 mmol/L    Anion gap 7 3.0 - 18 mmol/L Glucose 109 (H) 74 - 99 mg/dL    BUN 14 7.0 - 18 MG/DL    Creatinine 0.68 0.6 - 1.3 MG/DL    BUN/Creatinine ratio 21 (H) 12 - 20      GFR est AA >60 >60 ml/min/1.73m2    GFR est non-AA >60 >60 ml/min/1.73m2    Calcium 8.8 8.5 - 10.1 MG/DL    Bilirubin, total 0.2 0.2 - 1.0 MG/DL    ALT (SGPT) 12 (L) 13 - 56 U/L    AST (SGOT) 10 10 - 38 U/L    Alk. phosphatase 57 45 - 117 U/L    Protein, total 6.9 6.4 - 8.2 g/dL    Albumin 3.2 (L) 3.4 - 5.0 g/dL    Globulin 3.7 2.0 - 4.0 g/dL    A-G Ratio 0.9 0.8 - 1.7     CBC WITH AUTOMATED DIFF    Collection Time: 12/20/21 10:40 AM   Result Value Ref Range    WBC 5.2 4.6 - 13.2 K/uL    RBC 5.13 4.20 - 5.30 M/uL    HGB 14.5 12.0 - 16.0 g/dL    HCT 44.4 35.0 - 45.0 %    MCV 86.5 78.0 - 100.0 FL    MCH 28.3 24.0 - 34.0 PG    MCHC 32.7 31.0 - 37.0 g/dL    RDW 13.1 11.6 - 14.5 %    PLATELET 152 032 - 347 K/uL    MPV 10.3 9.2 - 11.8 FL    NRBC 0.0 0  WBC    ABSOLUTE NRBC 0.00 0.00 - 0.01 K/uL    NEUTROPHILS 71 40 - 73 %    LYMPHOCYTES 19 (L) 21 - 52 %    MONOCYTES 10 3 - 10 %    EOSINOPHILS 0 0 - 5 %    BASOPHILS 0 0 - 2 %    IMMATURE GRANULOCYTES 0 0.0 - 0.5 %    ABS. NEUTROPHILS 3.7 1.8 - 8.0 K/UL    ABS. LYMPHOCYTES 1.0 0.9 - 3.6 K/UL    ABS. MONOCYTES 0.5 0.05 - 1.2 K/UL    ABS. EOSINOPHILS 0.0 0.0 - 0.4 K/UL    ABS. BASOPHILS 0.0 0.0 - 0.1 K/UL    ABS. IMM. GRANS. 0.0 0.00 - 0.04 K/UL    DF AUTOMATED       Additional Data Reviewed:     Condition: Stable  Follow-up Appointments:   1.  Your PCP: None, within 7 days     >30 minutes spent coordinating this discharge (review instructions/follow-up, prescriptions, preparing report for sign off)    Signed:  Iesha Lawson NP  12/20/2021  2:17 PM

## 2021-12-20 NOTE — CONSULTS
[facility]  (A Division of [address] [place] [address])      Consultation Note      Date of Admission: 12/19/2021      Date of Note: 12/20/2021      Reason for Referral: COVID      Current Antimicrobials:    Prior Antimicrobials:  remdesivir 12/19 - 1        Assessment: Rec / Plan:   Covid 19  - mild to moderate  - no pneumonia. Had transient hypoxia which has resolved   - Remdesivir, dexamethasone started 12/19 -> OK to dc from ID Standpoint  -> dc remdesivir, dexamethasone  -> d/w patient that she had mild Covid but must remain alert to the small possibility of worsening after discharge and return if her symptoms recur or worsen   COPD    Sleep apnea    HTN    HLD    treated Hep C    DJD       Imaging Studies reviewed for today's evaluation: CTA Chest 12/19    Microbiology:      Lines / Catheters:      HPI:  64year-old  female with mild COPD, Sleep apnea, HTN, HLD, treated Hep C, DJD admitted to [facility] 12/19/2021 due to hypoxia and Covid +. She is fully vaccinated for COVID (March, May 2021) and developed fever, chills, rhinorrhea and some dyspnea which alarmed her and made her present to [facility] ER 12/19. CTA Chest showed no pulmonary embolism, Emphysema with a few scattered pulmonary nodules. She tested positive for COVID but was not hypoxic on arrival with RA SpO2 94-98%. She later had desaturation with SpO2 88-89% on RA but this appears to have resolved since transfer to [facility]. She was started on IM Dexamethasone 6 mg q 12 hours and IV remdesivir. She has maintained SpO2 of 95-97% on RA and feels much better today, without fever or chills.        Active Hospital Problems    Diagnosis Date Noted    Acute respiratory failure ([address] 12/19/2021    Pneumonia due to COVID-19 virus 12/19/2021     Past Medical History:   Diagnosis Date    Arthritis     COPD     Hepatitis C     treated     Hypercholesterolemia     Hypertension     no meds     Sleep apnea     no CPAP      Past Surgical History:   Procedure Laterality Date    [facility] STPLER HEMMORROID PPH01      HX  SECTION  1984    HX CHOLECYSTECTOMY  2005    HX HYSTERECTOMY  2008    HX KNEE ARTHROSCOPY  2009 2010    Bilateral    HX KNEE REPLACEMENT      HX TONSILLECTOMY  1968    DE OSSEOUS SURG 4/> CNTIG TEETH QUAD       Family History   Problem Relation Age of Onset    High Cholesterol Mother     Stroke Mother     Heart Disease Father     High Cholesterol Father     Diabetes Sister     High Cholesterol Sister      Social History     Socioeconomic History    Marital status: SINGLE     Spouse name: Not on file    Number of children: Not on file    Years of education: Not on file    Highest education level: Not on file   Occupational History    Not on file   Tobacco Use    Smoking status: Current Some Day Smoker     Packs/day: 0.50     Years: 40.00     Pack years: 20.00     Types: Cigarettes     Last attempt to quit: 10/21/2015     Years since quittin.1    Smokeless tobacco: Never Used   Substance and Sexual Activity    Alcohol use: No    Drug use: No    Sexual activity: Not on file   Other Topics Concern    Not on file   Social History Narrative    Not on file     Social Determinants of Health     Financial Resource Strain:     Difficulty of Paying Living Expenses: Not on file   Food Insecurity:     Worried About Running Out of Food in the Last Year: Not on file    Staci of Food in the Last Year: Not on file   Transportation Needs:     Lack of Transportation (Medical): Not on file    Lack of Transportation (Non-Medical):  Not on file   Physical Activity:     Days of Exercise per Week: Not on file    Minutes of Exercise per Session: Not on file   Stress:     Feeling of Stress : Not on file   Social Connections:     Frequency of Communication with Friends and Family: Not on file    Frequency of Social Gatherings with Friends and Family: Not on file    Attends [employer] Services: Not on file    Active Member of Clubs or Organizations: Not on file    Attends Club or Organization Meetings: Not on file    Marital Status: Not on file   Intimate Partner Violence:     Fear of Current or Ex-Partner: Not on file    Emotionally Abused: Not on file    Physically Abused: Not on file    Sexually Abused: Not on file   Housing Stability:     Unable to Pay for Housing in the Last Year: Not on file    Number of [place] in the Last Year: Not on file    Unstable Housing in the Last Year: Not on file       Allergies:  Patient has no known allergies. Medications:  Current Facility-Administered Medications   Medication Dose Route Frequency    dexamethasone (DECADRON) 4 mg/mL injection 6 mg  6 mg IntraMUSCular Q12H    remdesivir 100 mg in 0.9% sodium chloride 250 mL IVPB  100 mg IntraVENous Q24H    ALPRAZolam (XANAX) tablet 0.5 mg  0.5 mg Oral DAILY PRN    atorvastatin (LIPITOR) tablet 10 mg  10 mg Oral QHS    lisinopriL (PRINIVIL, ZESTRIL) tablet 10 mg  10 mg Oral DAILY    sertraline (ZOLOFT) tablet 125 mg  125 mg Oral DAILY    traZODone (DESYREL) tablet 100 mg  100 mg Oral QHS    sodium chloride (NS) flush 5-40 mL  5-40 mL IntraVENous Q8H    sodium chloride (NS) flush 5-40 mL  5-40 mL IntraVENous PRN    acetaminophen (TYLENOL) tablet 650 mg  650 mg Oral Q6H PRN    Or    acetaminophen (TYLENOL) suppository 650 mg  650 mg Rectal Q6H PRN    polyethylene glycol (MIRALAX) packet 17 g  17 g Oral DAILY PRN    ondansetron (ZOFRAN ODT) tablet 4 mg  4 mg Oral Q8H PRN    Or    ondansetron (ZOFRAN) injection 4 mg  4 mg IntraVENous Q6H PRN    enoxaparin (LOVENOX) injection 40 mg  40 mg SubCUTAneous DAILY        ROS:  A comprehensive review of systems was negative except for that written in the History of Present Illness.      Physical Exam:    Temp (24hrs), Av.7 °F (36.5 °C), Min:97.3 °F (36.3 °C), Max:98.1 °F (36.7 °C)    Visit Vitals  /72 (BP 1 Location: Right upper arm, BP Patient Position: At rest)   Pulse 63   Temp 97.6 °F (36.4 °C)   Resp 20   Ht 5' (1.524 m)   Wt 70.3 kg (155 lb)   SpO2 95%   BMI 30.27 kg/m²       General: Well developed, well nourished 64 y.o. WHITE/NON- female in no acute distress. ENT: ENT exam normal, no neck nodes or sinus tenderness  Head: normocephalic, without obvious abnormality  Mouth:  mucous membranes moist, pharynx normal without lesions  Neck: supple, symmetrical, trachea midline   Cardio:  regular rate and rhythm, S1, S2 normal, no murmur, click, rub or gallop  Chest: inspection normal - no chest wall deformities or tenderness, respiratory effort normal  Lungs: clear to auscultation, no wheezes or rales and unlabored breathing  Abdomen: soft, non-tender. Bowel sounds normal. No masses, no organomegaly. Extremities:  no redness or tenderness in the calves or thighs, no edema  Neuro: Grossly normal       Lab results:    Chemistry  Recent Labs     12/20/21  1040 12/19/21  0730   * 99    138   K 3.5 3.9    105   CO2 27 29   BUN 14 10   CREA 0.68 0.77   CA 8.8 9.1   AGAP 7 4   BUCR 21* 13   AP 57  --    TP 6.9  --    ALB 3.2*  --    GLOB 3.7  --    AGRAT 0.9  --        CBC w/ Diff  Recent Labs     12/20/21  1040 12/19/21  0730   WBC 5.2 4.8   RBC 5.13 5.45*   HGB 14.5 15.4   HCT 44.4 46.9*    177   GRANS 71 55   LYMPH 19* 29   EOS 0 0       Microbiology  All Micro Results     Procedure Component Value Units Date/Time    COVID-19 RAPID TEST [[id number]]  (Abnormal) Collected: 12/19/21 0740    Order Status: Completed Specimen: Nasopharyngeal Updated: 12/19/21 0933     Specimen source Nasopharyngeal        COVID-19 rapid test Detected        Comment: NOTIFIED MICKIE [facility] ED 12/19/21 BY ALIZE       The specimen is POSITIVE for SARS-CoV-2, the novel coronavirus associated with COVID-19. This test has been authorized by the FDA under an Emergency Use Authorization (EUA) for use by authorized laboratories.         Fact sheet for Healthcare Providers: ConventionUpdate.co.nz  Fact sheet for Patients: ConventionUpdate.co.nz       Methodology: Isothermal Nucleic Acid Amplification                  Grant Hamm MD, [facility]  12/20/2021   1:25 PM

## 2021-12-20 NOTE — PROGRESS NOTES
Problem: Self Care Deficits Care Plan (Adult)  Goal: *Acute Goals and Plan of Care (Insert Text)  Outcome: Resolved/Met   OCCUPATIONAL THERAPY EVALUATION/DISCHARGE    Patient: Pillo Dickey (11 y.o. female)  Date: 2021  Primary Diagnosis: Acute respiratory failure ([facility] [address] [J96.00]  Pneumonia due to COVID-19 virus [U07.1, J12.82]        Precautions:   Fall  PLOF: independent with ADLs and functional mobility    ASSESSMENT AND RECOMMENDATIONS:  Nursing/RN cleared for pt to participate in OT evaluation and tx session. Patient presents lying supine in bed, A & O x 4, reports chronic pain in RUE/shoulder with movement d/t dx of tendinitis-nursing notified. Bed mobility: Mod I supine -> sit edge of bed. LB dress: Mod I doff and don slipper sock seated edge of bed w/ Good sitting balance using crossing leg method. Toilet transfer: Mod I w/o AD, good balance, Mod I toilet tasks and ADL routine seated on toilet and in stance. Patient seated in recliner chair at end of tx session, Patient verbalized understanding to utilize call bell to request assist as needed. Nursing notified of pt's level of assist with toilet transfer. Patient presents at baseline as PLOF with ADLs and functional mobility. Patient verbalized understanding of skilled OT services not indicated at this time while in acute hospital.   Skilled occupational therapy is not indicated at this time. Discharge Recommendations: None  Further Equipment Recommendations for Discharge: patient has all recommended DME     SUBJECTIVE:   Patient stated I have a shower chair.     OBJECTIVE DATA SUMMARY:     Past Medical History:   Diagnosis Date    Arthritis     COPD     Hepatitis C     treated     Hypercholesterolemia     Hypertension     no meds     Sleep apnea     no CPAP      Past Surgical History:   Procedure Laterality Date    [facility] STPLER HEMMORROID PPH01      HX  SECTION  1984    HX CHOLECYSTECTOMY  2005    HX HYSTERECTOMY  2008    HX KNEE ARTHROSCOPY  2009 2010    Bilateral    HX KNEE REPLACEMENT      HX TONSILLECTOMY  1968    [place] OSSEOUS SURG 4/> CNTIG TEETH QUAD       Barriers to Learning/Limitations: None  Compensate with: visual, verbal, tactile, kinesthetic cues/model    Home Situation:   Home Situation  Home Environment: Private residence  # Steps to Enter: 3  One/Two Story Residence: One story  Living Alone: No  Support Systems: Child(johnson),Other Family Member(s)  Patient Expects to be Discharged to[de-identified] House  Current DME Used/Available at Home: None,Grab bars,Shower chair  Tub or Shower Type: Tub/Shower combination   [x]    Right hand dominant   []     Left hand dominant    Cognitive/Behavioral Status:  Neurologic State: Alert  Orientation Level: Oriented X4  Cognition: Follows commands  Safety/Judgement: Fall prevention    Skin: appears intact  Edema: none noted    Vision/Perceptual:  appears intact, able to tell correct time on wall clock  Corrective Lenses: Glasses    Coordination: BUE  Coordination: Within functional limits  Fine Motor Skills-Upper: Left Intact; Right Intact    Gross Motor Skills-Upper: Left Intact; Right Intact    Balance:  Sitting: Intact  Standing: Intact    Strength: BUE  Strength:  (RUE shoulder grossly 3/5, LUE 5/5)     Tone & Sensation: BUE  Tone: Normal  Sensation: Intact     Range of Motion: BUE  AROM: Within functional limits  Functional Mobility and Transfers for ADLs:  Bed Mobility:     Supine to Sit: Modified independent  Transfers:  Sit to Stand: Modified independent  Stand to Sit: Modified independent     Toilet Transfer : Modified independent   Bathroom Mobility: Modified independent    ADL Assessment:  Feeding: Independent    Oral Facial Hygiene/Grooming: Supervision    Bathing: Supervision    Upper Body Dressing: Modified independent    Lower Body Dressing: Modified independent    Toileting: Modified independent     ADL Intervention:  Grooming  Position Performed:  Other (comment) (seated on toilet)  Washing Face: Modified independent    Upper Body Bathing  Bathing Assistance: Modified independent  Position Performed: Other (comment) (seated on toilet)    Lower Body Bathing  Perineal  : Modified independent  Position Performed: Standing  Lower Body : Modified independent  Position Performed: Other (comment) (seated on toilet)    Upper Body [address]: Modified independent  Shirt simulation with hospital gown: Modified independent    Lower Body Dressing Assistance  Underpants: Modified independent  Socks: Modified independent  Leg Crossed Method Used: Yes  Position Performed: Seated edge of bed    Toileting  Toileting Assistance: Modified independent  Bladder Hygiene: Modified independent  Clothing Management: Modified independent    Cognitive Retraining  Safety/Judgement: Fall prevention    Pain:  Pain level pre-treatment: 0/10   Pain level post-treatment: 0/10     Activity Tolerance:   fair  Please refer to the flowsheet for vital signs taken during this treatment. After treatment:   [x]  Patient left in no apparent distress sitting up in chair  []  Patient left in no apparent distress in bed  [x]  Call bell left within reach  [x]  Nursing notified  []  Caregiver present  []  Bed alarm activated    COMMUNICATION/EDUCATION:   [x]      Role of Occupational Therapy in the acute care setting  [x]      Home safety education was provided and the patient/caregiver indicated understanding. [x]      Patient/family have participated as able and agree with findings and recommendations. []      Patient is unable to participate in plan of care at this time. Thank you for this referral.  Edis Jean-Baptiste  Time Calculation: 25 mins      Eval Complexity: History: MEDIUM Complexity : Expanded review of history including physical, cognitive and psychosocial  history ;    Examination: MEDIUM Complexity : 3-5 performance deficits relating to physical, cognitive , or psychosocial skils that result in activity limitations and / or participation restrictions; Decision Making:MEDIUM Complexity : Patient may present with comorbidities that affect occupational performnce.  Miniml to moderate modification of tasks or assistance (eg, physical or verbal ) with assesment(s) is necessary to enable patient to complete evaluation

## 2021-12-20 NOTE — PROGRESS NOTES
Problem: Mobility Impaired (Adult and Pediatric)  Goal: *Acute Goals and Plan of Care (Insert Text)  Outcome: Resolved/Met  PHYSICAL THERAPY EVALUATION AND DISCHARGE    Patient: Pradeep Ceja (58 y.o. female)  Date: 2021  Primary Diagnosis: Acute respiratory failure ([facility] [address] [J96.00]  Pneumonia due to COVID-19 virus [U07.1, J12.82]  Precautions: Fall  PLOF: Independent  ASSESSMENT :  Droplet plus isolation. On room air. Educated on benefits of EOB/OOB for meals and toileting; verbalized understanding. Educated on activity pacing. Mod I for bed mobility. Mod I for sit to stand. Amb independently in room. Seated in recliner with breakfast tray. Educated on need for RN assistance with mobility; verbalized understanding. Call bell in reach. Skilled physical therapy is not indicated at this time. PLAN :  Discharge Recommendations: None (home)  Further Equipment Recommendations for Discharge: None     SUBJECTIVE:   Patient stated Yes. Great.     OBJECTIVE DATA SUMMARY:     Past Medical History:   Diagnosis Date    Arthritis     COPD     Hepatitis C     treated     Hypercholesterolemia     Hypertension     no meds     Sleep apnea     no CPAP      Past Surgical History:   Procedure Laterality Date     STPLER HEMMORROID PPH01      HX  SECTION  1984    HX CHOLECYSTECTOMY  2005    HX HYSTERECTOMY  2008    HX KNEE ARTHROSCOPY  2009    Bilateral    HX KNEE REPLACEMENT      HX TONSILLECTOMY  1968    [place] OSSEOUS SURG 4/> CNTIG TEETH QUAD       Barriers to Learning/Limitations: None  Compensate with: Visual Cues, Verbal Cues, Tactile Cues and Kinesthetic Cues  Home Situation:   Home Situation  Home Environment: Private residence  # Steps to Enter: 3  One/Two Story Residence: One story  Living Alone: No  Support Systems: Child(johnson),Other Family Member(s)  Patient Expects to be Discharged to[de-identified] House  Current DME Used/Available at Home: None  Critical Behavior:  Neurologic State: Alert  Orientation Level: Oriented X4  Cognition: Follows commands     Strength:    Manual Muscle Testing (LE)         R     L    Hip Flexion:   5/5 5/5  Knee EXT:   5/5 5/5  Knee FLEX:   5/5 5/5  Ankle DF:   5/5 5/5  _________________________________________________   Range Of Motion:  BLE AROM WFL   Functional Mobility:  Bed Mobility:  Supine to Sit: Modified independent  Transfers:  Sit to Stand: Modified independent  Stand to Sit: Modified independent  Balance:   Sitting: Intact  Standing: Intact  Ambulation/Gait Training:  Distance (ft): 25 Feet (ft)   Ambulation - Level of Assistance: Independent  Neuro Re-education:  Seated balance 10 minutes  Standing balance 5 minutes  Therapeutic Exercises:   Sit to stand x2  Pain:  Pain level pre-treatment: 0/10   Pain level post-treatment: 0/10    Activity Tolerance:   Good    After treatment:   [x]         Patient left in no apparent distress sitting up in chair  []         Patient left in no apparent distress in bed  [x]         Call bell left within reach  [x]         Nursing notified  []         Caregiver present  []         Bed alarm activated  []         SCDs applied    COMMUNICATION/EDUCATION:   [x]         Role of physical therapy in the acute care setting. [x]         Fall prevention education was provided and the patient/caregiver indicated understanding. [x]         Patient/family have participated as able in goal setting and plan of care. [x]         Patient/family agree to work toward stated goals and plan of care. []         Patient understands intent and goals of therapy, but is neutral about his/her participation. []         Patient is unable to participate in goal setting/plan of care: ongoing with therapy staff.     Thank you for this referral.  Ashkan Coats, GIANNA   Time Calculation: 24 mins    Eval Complexity: History: MEDIUM  Complexity : 1-2 comorbidities / personal factors will impact the outcome/ POC Exam:MEDIUM Complexity : 3 Standardized tests and measures addressing body structure, function, activity limitation and / or participation in recreation  Presentation: MEDIUM Complexity : Evolving with changing characteristics  Clinical Decision Making:Medium Complexity  Clinical judgement; ROM, MMT, functional mobility Overall Complexity:MEDIUM

## 2021-12-20 NOTE — H&P
History & Physical    Patient: Franci Zuleta MRN: [id number]  CSN: [id number]    YOB: 1960  Age: 64 y.o. Sex: female      DOA: 2021    Chief Complaint:   Chief Complaint   Patient presents with    Concern For COVID-19 (Coronavirus)    Generalized Body Aches          HPI:     Franci Zuleta is a 64 y.o.  female with hx of COPD, anxiety and depression. Patient presented to [facility] ED secondary to loose stools and shortness of breath for the past 24 hours. She is fully vaccinated against COVID. However, patient subsequently with positive COVID test. Patient initially hypoxic at 84% with minimal exertion. D dimer elevated but no signs of PE on CTA. Upon interview at [facility], patient hypoxic to 93% with minimal exertion. Desires not to wear oxygen, satting fine at rest. Patient is anxious to return home.      Past Medical History:   Diagnosis Date    Arthritis     COPD     Hepatitis C     treated     Hypercholesterolemia     Hypertension     no meds     Sleep apnea     no CPAP        Past Surgical History:   Procedure Laterality Date    HC STPLER HEMMORROID PPH01      HX  SECTION  1984    HX CHOLECYSTECTOMY  2005    HX HYSTERECTOMY  2008    HX KNEE ARTHROSCOPY  2009    Bilateral    HX KNEE REPLACEMENT      HX TONSILLECTOMY  1968    NM OSSEOUS SURG 4/> CNTIG TEETH QUAD         Family History   Problem Relation Age of Onset    High Cholesterol Mother     Stroke Mother     Heart Disease Father     High Cholesterol Father     Diabetes Sister     High Cholesterol Sister        Social History     Socioeconomic History    Marital status: SINGLE   Tobacco Use    Smoking status: Current Some Day Smoker     Packs/day: 0.50     Years: 40.00     Pack years: 20.00     Types: Cigarettes     Last attempt to quit: 10/21/2015     Years since quittin.1    Smokeless tobacco: Never Used   Substance and Sexual Activity    Alcohol use: No    Drug use: No       Prior to Admission medications    Medication Sig Start Date End Date Taking? Authorizing Provider   diclofenac (Voltaren) 1 % gel Apply 4 g to affected area four (4) times daily. Maximum 16 grams per joint per day. Dispense 5 100 gram tubes 11/26/21  Yes Rambo Leo PA-C   ALPRAZolam Cassi Heys) 0.5 mg tablet Take 0.5 mg by mouth as needed for Anxiety. Yes Other, MD Dalila   traZODone (DESYREL) 100 mg tablet Take 100 mg by mouth nightly. Yes Other, MD Dalila   sertraline (Zoloft) 50 mg tablet Take 50 mg by mouth daily. Yes Other, MD Dalila   albuterol (PROVENTIL HFA, VENTOLIN HFA, PROAIR HFA) 90 mcg/actuation inhaler Take 2 Puffs by inhalation every four (4) hours as needed for Wheezing. 10/13/19  Yes Diana Leiva PA-C   atorvastatin (LIPITOR) 10 mg tablet take 1 tablet by mouth at bedtime 2/1/19  Yes Provider, Historical   lisinopril (PRINIVIL, ZESTRIL) 10 mg tablet take 1 tablet by mouth once daily 1/18/19  Yes Provider, Historical   Cholecalciferol, Vitamin D3, (VITAMIN D3) 1,000 unit cap Take  by mouth daily. Yes Provider, Historical   ibuprofen (ADVIL) 100 mg tablet Take 100 mg by mouth every six (6) hours as needed for Pain. Provider, Historical       No Known Allergies      Review of Systems  GENERAL: No fever, chills, malaise  HEENT: No sore throat or sinus congestion. NECK: No pain or stiffness. PULMONARY: +shortness of breath, +cough, no wheeze. Cardiovascular: no CP  GASTROINTESTINAL: No abdominal pain, nausea, vomiting, +loose stools   GENITOURINARY: No dysuria. MUSCULOSKELETAL: No joint or muscle pain  DERMATOLOGIC: No rash  ENDOCRINE: no heat or cold intolerance. HEMATOLOGICAL: No anemia   NEUROLOGIC: No headache, tingling or weakness.        Physical Exam:     Physical Exam:  Visit Vitals  /75 (BP 1 Location: Left upper arm, BP Patient Position: At rest)   Pulse 61   Temp 98.1 °F (36.7 °C)   Resp 20   Ht 5' (1.524 m)   Wt 70.3 kg (155 lb)   SpO2 93%   BMI 30.27 kg/m²      O2 Device: None (Room air)    Temp (24hrs), Av.7 °F (37.1 °C), Min:98.1 °F (36.7 °C), Max:99.2 °F (37.3 °C)    No intake/output data recorded. No intake/output data recorded. General:  Alert, cooperative, no distress, appears stated age. Head: Normocephalic, without obvious abnormality, atraumatic. Eyes:  Conjunctivae/corneas clear. PERRL, EOMs intact. Nose: Nares normal. No drainage or sinus tenderness. Neck: Supple, symmetrical, trachea midline, no adenopathy, thyroid: no enlargement, no carotid bruit and no JVD. Lungs:   Clear to auscultation bilaterally. On room air. Heart:  Regular rate and rhythm, S1, S2 normal.     Abdomen: Soft, non-distended. Extremities: Extremities normal, atraumatic. Pulses: 2+ and symmetric all extremities. Skin:  No rashes or lesions   Neurologic: AAOx3, No focal motor or sensory deficit. Labs Reviewed: All lab results for the last 24 hours reviewed.   Recent Results (from the past 24 hour(s))   EKG, 12 LEAD, INITIAL    Collection Time: 21  7:25 AM   Result Value Ref Range    Ventricular Rate 73 BPM    Atrial Rate 73 BPM    P-R Interval 130 ms    QRS Duration 84 ms    Q-T Interval 424 ms    QTC Calculation (Bezet) 467 ms    Calculated P Axis 63 degrees    Calculated R Axis 68 degrees    Calculated T Axis 59 degrees    Diagnosis       Normal sinus rhythm  ST abnormality, possible digitalis effect  Abnormal ECG  When compared with ECG of 13-OCT-2019 10:24,  Nonspecific T wave abnormality now evident in Lateral leads  Confirmed by Joan Milan MD, ----- ([id number]) on 2021 10:55:21 AM     CBC WITH AUTOMATED DIFF    Collection Time: 21  7:30 AM   Result Value Ref Range    WBC 4.8 4.6 - 13.2 K/uL    RBC 5.45 (H) 4.20 - 5.30 M/uL    HGB 15.4 12.0 - 16.0 g/dL    HCT 46.9 (H) 35.0 - 45.0 %    MCV 86.1 78.0 - 100.0 FL    MCH 28.3 24.0 - 34.0 PG    MCHC 32.8 31.0 - 37.0 g/dL    RDW 13.5 11.6 - 14.5 %    PLATELET 807 465 - 808 K/uL MPV 9.8 9.2 - 11.8 FL    NRBC 0.0 0  WBC    ABSOLUTE NRBC 0.00 0.00 - 0.01 K/uL    NEUTROPHILS 55 40 - 73 %    LYMPHOCYTES 29 21 - 52 %    MONOCYTES 15 (H) 3 - 10 %    EOSINOPHILS 0 0 - 5 %    BASOPHILS 1 0 - 2 %    IMMATURE GRANULOCYTES 0 0.0 - 0.5 %    ABS. NEUTROPHILS 2.6 1.8 - 8.0 K/UL    ABS. LYMPHOCYTES 1.4 0.9 - 3.6 K/UL    ABS. MONOCYTES 0.7 0.05 - 1.2 K/UL    ABS. EOSINOPHILS 0.0 0.0 - 0.4 K/UL    ABS. BASOPHILS 0.0 0.0 - 0.1 K/UL    ABS. IMM.  GRANS. 0.0 0.00 - 0.04 K/UL    DF AUTOMATED     METABOLIC PANEL, BASIC    Collection Time: 12/19/21  7:30 AM   Result Value Ref Range    Sodium 138 136 - 145 mmol/L    Potassium 3.9 3.5 - 5.5 mmol/L    Chloride 105 100 - 111 mmol/L    CO2 29 21 - 32 mmol/L    Anion gap 4 3.0 - 18 mmol/L    Glucose 99 74 - 99 mg/dL    BUN 10 7.0 - 18 MG/DL    Creatinine 0.77 0.6 - 1.3 MG/DL    BUN/Creatinine ratio 13 12 - 20      GFR est AA >60 >60 ml/min/1.73m2    GFR est non-AA >60 >60 ml/min/1.73m2    Calcium 9.1 8.5 - 10.1 MG/DL   NT-PRO BNP    Collection Time: 12/19/21  7:30 AM   Result Value Ref Range    NT pro-BNP 2,223 (H) 0 - 900 PG/ML   TROPONIN-HIGH SENSITIVITY    Collection Time: 12/19/21  7:30 AM   Result Value Ref Range    Troponin-High Sensitivity 6 0 - 54 ng/L   COVID-19 RAPID TEST    Collection Time: 12/19/21  7:40 AM   Result Value Ref Range    Specimen source Nasopharyngeal      COVID-19 rapid test Detected (AA) NOTD     SED RATE (ESR)    Collection Time: 12/19/21 10:00 AM   Result Value Ref Range    Sed rate, automated 9 0 - 30 mm/hr   C REACTIVE PROTEIN, QT    Collection Time: 12/19/21 10:00 AM   Result Value Ref Range    C-Reactive protein 2.6 (H) 0 - 0.3 mg/dL   D DIMER    Collection Time: 12/19/21 10:00 AM   Result Value Ref Range    D DIMER 0.94 (H) <0.46 ug/ml(FEU)   LD    Collection Time: 12/19/21 10:00 AM   Result Value Ref Range     81 - 234 U/L        XR Results (most recent):  Results from Hospital Encounter encounter on 11/26/21    XR SHOULDER RT AP/LAT MIN 2 V    Narrative  XR SHOULDER RT AP/LAT MIN 2 V: 11/26/2021 1:42 PM    CLINICAL INFORMATION  right shoulder pain, worse at the biceps tendon insertion and over [facility] joint. COMPARISON  None. TECHNIQUE  3 views of the right shoulder    FINDINGS:  No fracture or destructive osseous lesion. Mild acromioclavicular joint  degenerative changes. Soft tissue structures are within normal limits. Impression  1. No acute osseous findings. CT Results  (Last 48 hours)               12/19/21 0830  CTA CHEST W OR W WO CONT Final result    Impression:      No pulmonary embolism. Emphysema.   -A few scattered punctate pulmonary nodules. Follow-up can be obtained. Atherosclerosis. Cardiomegaly. Enlarged pulmonary artery which can be seen with arterial hypertension. Please see report for additional findings and further details. Nonemergent CT   abdomen/pelvis with contrast can be obtained for incidental abdominal findings. .       Narrative:  EXAM: CTA CHEST W OR W WO CONT       INDICATION: Short of breath       COMPARISON: None. CONTRAST: 72 mL of Isovue-370. TECHNIQUE:    Precontrast  images were obtained to localize the volume for acquisition. Multislice helical CT arteriography was performed from the diaphragm to the   thoracic inlet during uneventful rapid bolus intravenous contrast   administration. Lung and soft tissue windows were generated. Coronal and   sagittal images were generated and 3D post processing consisting of coronal   maximum intensity images was performed. CT dose reduction was achieved through   use of a standardized protocol tailored for this examination and automatic   exposure control for dose modulation. FINDINGS:   Emphysema. A few scattered punctate solitary pulmonary nodules. .       The pulmonary arteries are well enhanced and no pulmonary emboli are identified.    Pulmonary artery is prominent which can be seen with pulmonary arterial   hypertension. There is no mediastinal or hilar adenopathy or mass. The aorta enhances normally   without evidence of aneurysm or dissection. Great vessel atherosclerosis and   coronary artery atherosclerosis noted. Heart is mild to prominent. Potential enhancing nodule left kidney series 2 image 204 measuring 5 mm. Nodular thickening left adrenal gland. Nonspecific bonilla hepatis lymph nodes   noted. Liver partially imaged but appears prominent in size. Potential fatty   infiltration. Cholecystectomy. Scattered colonic diverticulosis. Decreased bone mineral density. Trace rotoscoliosis and mild kyphosis. Osseous   degenerative changes. Scattered bone islands. Procedures/imaging: see electronic medical records for all procedures/Xrays and details which were not copied into this note but were reviewed prior to creation of Plan      Assessment/Plan        Assessment  1. Acute hypoxic respiratory failure secondary to COVID pneumonia  2. Elevated D dimer   #1-2. Admit. ID consult. Remdesivir, steroids ordered. CTA without evidence of PE.   3.   Incidental findings on CTA:  nodule left kidney measuring 5 mm. Nodular thickening left adrenal gland. Nonspecific bonilla hepatis lymph nodes noted. Enlarged liver. #3. For PCP to follow up.   4. H/o COPD  5. H/o HTN  6. H/o anxiety, depression    #4-6. Continue home meds as appropriate. Diet: regular   DVT/GI Prophylaxis: lovenox   Code Status: full     Contact:    Discussed with patient at bedside about hospital admission and plan care. He understands and agrees. All questions answered. Disclaimer: Sections of this note are dictated using utilizing voice recognition software. Minor typographical errors may be present. If questions arise, please do not hesitate to contact me or call our department.         Karina Diego, [facility] OF [facility]

## 2021-12-20 NOTE — DISCHARGE INSTRUCTIONS
DISCHARGE SUMMARY from Nurse    PATIENT INSTRUCTIONS:    After general anesthesia or intravenous sedation, for 24 hours or while taking prescription Narcotics:  · Limit your activities  · Do not drive and operate hazardous machinery  · Do not make important personal or business decisions  · Do  not drink alcoholic beverages  · If you have not urinated within 8 hours after discharge, please contact your surgeon on call. Report the following to your surgeon:  · Excessive pain, swelling, redness or odor of or around the surgical area  · Temperature over 100.5  · Nausea and vomiting lasting longer than 4 hours or if unable to take medications  · Any signs of decreased circulation or nerve impairment to extremity: change in color, persistent  numbness, tingling, coldness or increase pain  · Any questions    What to do at Home:  Recommended activity: Activity as tolerated,     If you experience any of the following symptoms chest pain, shortness of breath, nausea and vomiting, temps greater than 100.5, please follow up with urgent care/ER  or call. 911 for an emergency    *  Please give a list of your current medications to your Primary Care Provider. *  Please update this list whenever your medications are discontinued, doses are      changed, or new medications (including over-the-counter products) are added. *  Please carry medication information at all times in case of emergency situations. These are general instructions for a healthy lifestyle:    No smoking/ No tobacco products/ Avoid exposure to second hand smoke  Surgeon General's Warning:  Quitting smoking now greatly reduces serious risk to your health.     Obesity, smoking, and sedentary lifestyle greatly increases your risk for illness    A healthy diet, regular physical exercise & weight monitoring are important for maintaining a healthy lifestyle    You may be retaining fluid if you have a history of heart failure or if you experience any of the following symptoms:  Weight gain of 3 pounds or more overnight or 5 pounds in a week, increased swelling in our hands or feet or shortness of breath while lying flat in bed. Please call your doctor as soon as you notice any of these symptoms; do not wait until your next office visit. The discharge information has been reviewed with the patient. The patient verbalized understanding. Discharge medications reviewed with the patient and appropriate educational materials and side effects teaching were provided. ___________________________________________________________________________________________________________________________________Discharge Instructions    Patient: Sonny Ahuja MRN: [id number]  CSN: [id number]    YOB: 1960  Age: 64 y.o.   Sex: female    DOA: 12/19/2021 LOS:  LOS: 1 day   Discharge Date:      DIET:  Cardiac Diet    ACTIVITY: Activity as tolerated    ADDITIONAL INFORMATION: If you experience any of the following symptoms but not limited to Fever, chills, nausea, vomiting, diarrhea, change in mentation, falling, bleeding, shortness of breath, chest pain, please call your primary care physician or return to the emergency room if you cannot get hold of your doctor:     FOLLOW UP CARE:  PCP in 7 days     Ivan Scott NP  12/20/2021 2:16 PM

## 2021-12-21 ENCOUNTER — PATIENT OUTREACH (OUTPATIENT)
Dept: CASE MANAGEMENT | Age: 61
End: 2021-12-21

## 2021-12-21 NOTE — PROGRESS NOTES
Received report on pt.from off going RN. Resting quietly in bed on rounds. Denies c/o pain or SOB at this time. call bell at side. No acute distress noted. Will cont to monitor for any changes in status. Remains on RA.     1000 sitting up in chair. Call bell at side/.     1200 using IS well. Slight productive cough. Denies distress. 1400 watching tv    1700 sitting up eating dinner. 1740 discharge inst given to pt. Expressed understanding. [address] discharged to exit pw w/c. No distress noted at time of transport.

## 2021-12-21 NOTE — PROGRESS NOTES
Care Transitions Initial Call    Call within 2 business days of discharge: Yes     Patient: Mariella Barrera Patient : 1960 MRN: [id number]    Last Discharge [address]       Complaint Diagnosis Description Type Department Provider    21 Concern For COVID-19 (Coronavirus); Generalized Body Aches Acute respiratory failure with hypoxia (HCC) . .. ED to Hosp-Admission (Discharged) (ADMIT) [id number] Marlene Diaz, DO; Odilon Mcneil, ... Was this an external facility discharge? No Discharge Facility: N/A    Challenges to be reviewed by the provider   Additional needs identified to be addressed with provider: no  none         Method of communication with provider : none    Discussed COVID-19 related testing which was available at this time. Test results were positive. Patient informed of results, if available? yes     Advance Care Planning:   Does patient have an Advance Directive:  not on file    Inpatient Readmission Risk score: Unplanned Readmit Risk Score: 7.3 ( )    Was this a readmission? no   Patient stated reason for the admission: N/A    Patients top risk factors for readmission: medical condition-PNA/COVID   Interventions to address risk factors: Scheduled appointment with PCP-Provided patient with contact info for 2 PCP offices to schedule follow up appt and Obtained and reviewed discharge summary and/or continuity of care documents    Care Transition Nurse (CTN) contacted the patient by telephone to perform post hospital discharge assessment. Verified name and  with patient as identifiers. Provided introduction to self, and explanation of the CTN role. CTN reviewed discharge instructions, medical action plan and red flags with patient who verbalized understanding. Were discharge instructions available to patient? yes. Reviewed appropriate site of care based on symptoms and resources available to patient including: When to call 911 and CTN.  Patient given an opportunity to ask questions and does not have any further questions or concerns at this time. The patient agrees to contact the PCP office for questions related to their healthcare. Medication reconciliation was performed with patient, who verbalizes understanding of administration of home medications. Advised obtaining a 90-day supply of all daily and as-needed medications. Referral to Pharm D needed: no         Covid Risk Education  Educated patient about risk for severe COVID-19 due to risk factors according to CDC guidelines. CTN reviewed discharge instructions, medical action plan and red flag symptoms with the patient who verbalized understanding. Discussed COVID vaccination status: yes. Education provided on COVID-19 vaccination as appropriate. Discussed exposure protocols and quarantine with CDC Guidelines. Patient was given an opportunity to verbalize any questions and concerns and agrees to contact CTN or health care provider for questions related to their healthcare. Discussed follow-up appointments. If no appointment was previously scheduled, appointment scheduling offered: yes; provided patient contact info for "[employer] [facility] and Jae Castellano. Is follow up appointment scheduled within 7 days of discharge? D.   [facility] follow up appointment(s): No future appointments. Non-[facility] follow up appointment(s): None    Plan for follow-up call in 7-10 days based on severity of symptoms and risk factors. Plan for next call: symptom management-assess s/s of PNA and follow up appointment-verify scheduling of PCP/LIZBETH appt  CTN provided contact information for future needs. Goals Addressed                 This Visit's Progress     Attends follow-up appointments as directed. Goal: Patient will attend all appointments scheduled within the next 30 days. Ensure provider appt is scheduled within 7 business days post-discharge; 12/21: Patient does not have a PCP.  Provided contact info for [facility] and [address] to schedule appt. Confirm patient attended post-discharge provider appt   Complete post-visit call to confirm attendance and update care needs           Prevent complications post hospitalization. Goal: No admissions post 30 days from discharge of 12/20/21. Review/educate common or potential \"red flags\" of condition worsening; 12/21: Reviewed/educated on s/s of PNA to monitor and report:    Fever (100.5 or greater)  Tiredness (fatigue)   Trouble breathing: rapid breathing or shortness of breath   Sweating/Chills   Cough with mucus (might be greenish in color or contain a small amount of blood)   Chest pain and/or abdominal pain, especially with coughing or deep breathing   Loss of appetite   Confused mental state or changes in awareness (especially in older adults)   Observe for trends in symptoms and measures, provide direction to patient, and notify provider as needed       Discuss and provide resources for ACP; 12/21: Not on file. Will address at a later time.

## 2021-12-28 NOTE — PROGRESS NOTES
Thais Thomason is a 64 y.o. female presenting today for New Patient (establish care), Hospital Follow Up (Covid-19 positive admission), and Medication Refill (patient have not had medicatrion in 90 days)  . Chief Complaint   Patient presents with    New Patient     establish care   [facility] Follow Up     Covid-19 positive admission    Medication Refill     patient have not had medicatrion in 90 days       HPI:  Thais Thomason presents to the office today to [facility] care. She has a past medical history significant for COPD, MILY not on CPAP, hypertension, HLD, treated hepatitis C, OA s/p bilateral knee replacement. She was recently admitted on 12/19/2021 through 12/20/2021 due to acute hypoxic respiratory failure secondary to COVID-19 pneumonia. Was treated with IV steroid and remdesivir during the admission. Her hypoxia resolved and she she was discharged home without requiring oxygen. CT chest on 12/19/2021 showed a few scattered punctate pulmonary nodules, nodular thickening of the left adrenal gland, nonspecific bonilla hepatis lymph nodes, a potential enhancing nodule in left kidney. Labs were significant for an elevated BNP: 2223. Pt has orthopnea and PND. Uses 3-4 pillows to sleep. Denies chest pain. Today, patient states she still has some shortness of breath and has low stamina. Her O2 sat: 97%. MILY: Patient complaining of daytime drowsiness and fatigue. She is not using a CPAP. HTN: She was on lisinopril 10 mg. Has not been on it for months. Does not check BP at home. HLD: Ran out of lipitor 10 mg a few months ago. Has not had a lipid panel in many years. Anxiety and depression: Currently taking Zoloft - her dosage was increased recently. Also taking Trazodone and Xanax as needed. She has been recently having a lot more stress. Suman was very difficult for her with the family.  She has some days she feels hopeless but no suicidal ideations or thoughts of self harm. She is following with psychiatry: Dr. Brooks Lakhani. Smoking history: Pt still smokes every day. She has been smoking since she was 15 yrs old. Smoking 2 cigs a day. She knows she has to quit. She has tried nicotine patches, gums and Chantix - she has quit successfully, but as soon as she has stress she starts back up. Complaining of intermittent abd pain. Occurs in the lower abdomen with bloating and gas. No diarrhea or constipation. Due for Mammogram, Colonoscopy. Review of Systems   Constitutional: Positive for malaise/fatigue. Negative for chills, diaphoresis, fever and weight loss. HENT: Positive for congestion. Eyes: Negative for blurred vision, double vision and photophobia. Respiratory: Positive for cough and shortness of breath. Negative for sputum production. Cardiovascular: Positive for orthopnea and PND. Negative for chest pain, palpitations and claudication. Gastrointestinal: Positive for abdominal pain. Negative for blood in stool, constipation, diarrhea, heartburn, nausea and vomiting. Genitourinary: Negative for dysuria, flank pain, frequency, hematuria and urgency. Musculoskeletal: Positive for joint pain. Negative for back pain, myalgias and neck pain. Skin: Negative for rash. Neurological: Negative for tingling, tremors, sensory change, speech change, focal weakness, seizures, weakness and headaches. Psychiatric/Behavioral: Positive for depression. Negative for hallucinations, substance abuse and suicidal ideas. The patient is nervous/anxious and has insomnia. All other systems reviewed and are negative.       No Known Allergies    PHQ Screening   3 most recent PHQ Screens 12/29/2021   PHQ Not Done -   Little interest or pleasure in doing things Not at all   Feeling down, depressed, irritable, or hopeless Not at all   Total Score PHQ 2 0       History  Past Medical History:   Diagnosis Date    Arthritis     COPD     Hepatitis C     treated     Hypercholesterolemia     Hypertension     no meds     Sleep apnea     no CPAP        Past Surgical History:   Procedure Laterality Date    HC STPLER HEMMORROID PPH01      HX  SECTION  1984    HX CHOLECYSTECTOMY  2005    HX HYSTERECTOMY  2008    HX KNEE ARTHROSCOPY  2009 2010    Bilateral    HX KNEE REPLACEMENT      HX TONSILLECTOMY  1968    SD OSSEOUS SURG 4/> CNTIG TEETH QUAD         Social History     Socioeconomic History    Marital status: SINGLE     Spouse name: Not on file    Number of children: Not on file    Years of education: Not on file    Highest education level: Not on file   Occupational History    Not on file   Tobacco Use    Smoking status: Current Some Day Smoker     Packs/day: 0.50     Years: 40.00     Pack years: 20.00     Types: Cigarettes     Last attempt to quit: 10/21/2015     Years since quittin.1    Smokeless tobacco: Never Used   Substance and Sexual Activity    Alcohol use: No    Drug use: No    Sexual activity: Not on file   Other Topics Concern    Not on file   Social History Narrative    Not on file     Social Determinants of Health     Financial Resource Strain:     Difficulty of Paying Living Expenses: Not on file   Food Insecurity:     Worried About Running Out of Food in the Last Year: Not on file    Staci of Food in the Last Year: Not on file   Transportation Needs:     Lack of Transportation (Medical): Not on file    Lack of Transportation (Non-Medical):  Not on file   Physical Activity:     Days of Exercise per Week: Not on file    Minutes of Exercise per Session: Not on file   Stress:     Feeling of Stress : Not on file   Social Connections:     Frequency of Communication with Friends and Family: Not on file    Frequency of Social Gatherings with Friends and Family: Not on file    Attends Religion Services: Not on file    Active Member of Clubs or Organizations: Not on file    Attends Club or Organization Meetings: Not on file   [facility] Marital Status: Not on file   Intimate Partner Violence:     Fear of Current or Ex-Partner: Not on file    Emotionally Abused: Not on file    Physically Abused: Not on file    Sexually Abused: Not on file   Housing Stability:     Unable to Pay for Housing in the Last Year: Not on file    Number of [place] in the Last Year: Not on file    Unstable Housing in the Last Year: Not on file       Current Outpatient Medications   Medication Sig Dispense Refill    lisinopriL (PRINIVIL, ZESTRIL) 10 mg tablet Take 1 Tablet by mouth daily. 30 Tablet 2    atorvastatin (LIPITOR) 10 mg tablet Take 1 Tablet by mouth nightly. 30 Tablet 2    simethicone (MYLICON) 80 mg chewable tablet Take 1 Tablet by mouth every six (6) hours as needed for Flatulence or GI Pain. Indications: gas pain 30 Tablet 0    benzonatate (TESSALON) 100 mg capsule Take 1 Capsule by mouth three (3) times daily as needed for Cough for up to 10 days. 30 Capsule 0    diclofenac (Voltaren) 1 % gel Apply 4 g to affected area four (4) times daily. Maximum 16 grams per joint per day. Dispense 5 100 gram tubes 5 Each 0    ALPRAZolam (XANAX) 0.5 mg tablet Take 0.5 mg by mouth as needed for Anxiety.  traZODone (DESYREL) 100 mg tablet Take 100 mg by mouth nightly.  sertraline (Zoloft) 50 mg tablet Take 125 mg by mouth daily. Taking 2.5 tablets daily      albuterol (PROVENTIL HFA, VENTOLIN HFA, PROAIR HFA) 90 mcg/actuation inhaler Take 2 Puffs by inhalation every four (4) hours as needed for Wheezing. 1 Inhaler 0    ibuprofen (ADVIL) 100 mg tablet Take 100 mg by mouth every six (6) hours as needed for Pain.  Cholecalciferol, Vitamin D3, (VITAMIN D3) 1,000 unit cap Take  by mouth daily. Vitals:    12/29/21 1141   BP: 137/86   Pulse: 65   Resp: 16   Temp: 97 °F (36.1 °C)   TempSrc: Oral   SpO2: 97%   Weight: 157 lb (71.2 kg)   Height: 5' (1.524 m)   PainSc:   0 - No pain       Physical Exam  Vitals and nursing note reviewed. Constitutional:       General: She is not in acute distress. Appearance: Normal appearance. She is not ill-appearing, toxic-appearing or diaphoretic. HENT:      Head: Normocephalic and atraumatic. Nose: Congestion present. No rhinorrhea. Mouth/Throat:      Mouth: Mucous membranes are moist.      Pharynx: Oropharynx is clear. No oropharyngeal exudate or posterior oropharyngeal erythema. Eyes:      General: No scleral icterus. Extraocular Movements: Extraocular movements intact. Conjunctiva/sclera: Conjunctivae normal.      Pupils: Pupils are equal, round, and reactive to light. Cardiovascular:      Rate and Rhythm: Normal rate and regular rhythm. Pulses: Normal pulses. Heart sounds: No murmur heard. No gallop. Pulmonary:      Effort: Pulmonary effort is normal. No respiratory distress. Breath sounds: Normal breath sounds. No wheezing or rales. Abdominal:      General: Bowel sounds are normal. There is no distension. Palpations: Abdomen is soft. Tenderness: There is no abdominal tenderness. There is no guarding. Musculoskeletal:         General: Normal range of motion. Cervical back: Normal range of motion. Right lower leg: No edema. Left lower leg: No edema. Skin:     General: Skin is warm and dry. Coloration: Skin is not jaundiced or pale. Neurological:      General: No focal deficit present. Mental Status: She is alert and oriented to person, place, and time. Mental status is at baseline. Cranial Nerves: No cranial nerve deficit. Motor: No weakness. Gait: Gait normal.   Psychiatric:         Behavior: Behavior normal.         Thought Content:  Thought content normal.         Judgment: Judgment normal.      Comments: Anxious, Tearful         Admission on 12/19/2021, Discharged on 12/20/2021   Component Date Value Ref Range Status    Ventricular Rate 12/19/2021 73  BPM Final    Atrial Rate 12/19/2021 73  BPM Final    P-R Interval 12/19/2021 130  ms Final    QRS Duration 12/19/2021 84  ms Final    Q-T Interval 12/19/2021 424  ms Final    QTC Calculation (Bezet) 12/19/2021 467  ms Final    Calculated P Axis 12/19/2021 63  degrees Final    Calculated R Axis 12/19/2021 68  degrees Final    Calculated T Axis 12/19/2021 59  degrees Final    Diagnosis 12/19/2021    Final                    Value:Normal sinus rhythm  ST abnormality, possible digitalis effect  Abnormal ECG  When compared with ECG of 13-OCT-2019 10:24,  Nonspecific T wave abnormality now evident in Lateral leads  Confirmed by Jose Jason MD, ----- ([id number]) on 12/19/2021 10:55:21 AM      WBC 12/19/2021 4.8  4.6 - 13.2 K/uL Final    RBC 12/19/2021 5.45* 4.20 - 5.30 M/uL Final    HGB 12/19/2021 15.4  12.0 - 16.0 g/dL Final    HCT 12/19/2021 46.9* 35.0 - 45.0 % Final    MCV 12/19/2021 86.1  78.0 - 100.0 FL Final    MCH 12/19/2021 28.3  24.0 - 34.0 PG Final    MCHC 12/19/2021 32.8  31.0 - 37.0 g/dL Final    RDW 12/19/2021 13.5  11.6 - 14.5 % Final    PLATELET 73/46/5993 368  135 - 420 K/uL Final    MPV 12/19/2021 9.8  9.2 - 11.8 FL Final    NRBC 12/19/2021 0.0  0  WBC Final    ABSOLUTE NRBC 12/19/2021 0.00  0.00 - 0.01 K/uL Final    NEUTROPHILS 12/19/2021 55  40 - 73 % Final    LYMPHOCYTES 12/19/2021 29  21 - 52 % Final    MONOCYTES 12/19/2021 15* 3 - 10 % Final    EOSINOPHILS 12/19/2021 0  0 - 5 % Final    BASOPHILS 12/19/2021 1  0 - 2 % Final    IMMATURE GRANULOCYTES 12/19/2021 0  0.0 - 0.5 % Final    ABS. NEUTROPHILS 12/19/2021 2.6  1.8 - 8.0 K/UL Final    ABS. LYMPHOCYTES 12/19/2021 1.4  0.9 - 3.6 K/UL Final    ABS. MONOCYTES 12/19/2021 0.7  0.05 - 1.2 K/UL Final    ABS. EOSINOPHILS 12/19/2021 0.0  0.0 - 0.4 K/UL Final    ABS. BASOPHILS 12/19/2021 0.0  0.0 - 0.1 K/UL Final    ABS. IMM. GRANS.  12/19/2021 0.0  0.00 - 0.04 K/UL Final    DF 12/19/2021 AUTOMATED    Final    Sodium 12/19/2021 138  136 - 145 mmol/L Final    Potassium 12/19/2021 3.9  3.5 - 5.5 mmol/L Final    Chloride 12/19/2021 105  100 - 111 mmol/L Final    CO2 12/19/2021 29  21 - 32 mmol/L Final    Anion gap 12/19/2021 4  3.0 - 18 mmol/L Final    Glucose 12/19/2021 99  74 - 99 mg/dL Final    BUN 12/19/2021 10  7.0 - 18 MG/DL Final    Creatinine 12/19/2021 0.77  0.6 - 1.3 MG/DL Final    BUN/Creatinine ratio 12/19/2021 13  12 - 20   Final    GFR est AA 12/19/2021 >60  >60 ml/min/1.73m2 Final    GFR est non-AA 12/19/2021 >60  >60 ml/min/1.73m2 Final    Comment: (NOTE)  Estimated GFR is calculated using the Modification of Diet in Renal   Disease (MDRD) Study equation, reported for both  Americans   (GFRAA) and non- Americans (GFRNA), and normalized to 1.73m2   body surface area. The physician must decide which value applies to   the patient. The MDRD study equation should only be used in   individuals age 25 or older. It has not been validated for the   following: pregnant women, patients with serious comorbid conditions,   or on certain medications, or persons with extremes of body size,   muscle mass, or nutritional status.  Calcium 12/19/2021 9.1  8.5 - 10.1 MG/DL Final    NT pro-BNP 12/19/2021 2,223* 0 - 900 PG/ML Final    Comment:           For patients with dyspnea, NT proBNP is highly sensitive for detecting acute congestive heart failure. Also, an NT proBNP <300 pg/mL effectively rules out acute congestive heart failure, with 99% negative predictive value. Our reference ranges are for acute dyspnea. Age              Range (pg/ml)        0-49                0-450        50-75               0-900        >75                 0-1800       For ambulatory office patients, the ranges below apply: For patients with dyspnea, NT proBNP is highly sensitive for detecting acute congestive heart failure. Also, an NT proBNP <300 pg/mL effectively rules out acute congestive heart failure, with 99% negative predictive value.  Our reference ranges are for acute dyspnea.  Troponin-High Sensitivity 12/19/2021 6  0 - 54 ng/L Final    Up to 50% of normal patients may have low levels of detectable troponin (within reference range) circulating in the blood. Mild elevations in troponin that are above the reference range, may be present with other cardiac and non-cardiac disease states. Two or three serial tests at two hour intervals, are recommended to evaluate changes in circulating troponin over time.  Specimen source 12/19/2021 Nasopharyngeal    Final    COVID-19 rapid test 12/19/2021 Detected* NOTD   Final    Comment: NOTIFIED [facility] 12/19/21 BY MAXIMILIAN       The specimen is POSITIVE for SARS-CoV-2, the novel coronavirus associated with COVID-19. This test has been authorized by the FDA under an Emergency Use Authorization (EUA) for use by authorized laboratories. Fact sheet for Healthcare Providers: ConventionUpdate.co.nz  Fact sheet for Patients: ConventionUpdate.co.nz       Methodology: Isothermal Nucleic Acid Amplification      Sed rate, automated 12/19/2021 9  0 - 30 mm/hr Final    C-Reactive protein 12/19/2021 2.6* 0 - 0.3 mg/dL Final    D DIMER 12/19/2021 0.94* <0.46 ug/ml(FEU) Final    Comment: (NOTE)  A D-Dimer result less than 0.5 ug/mL FEU combined with a low clinical   pretest probability of DVT and/or PE has a negative predictive value   of %. The positive predictive value is 50% or less.       LD 12/19/2021 171  81 - 234 U/L Final    Sodium 12/20/2021 139  136 - 145 mmol/L Final    Potassium 12/20/2021 3.5  3.5 - 5.5 mmol/L Final    Chloride 12/20/2021 105  100 - 111 mmol/L Final    CO2 12/20/2021 27  21 - 32 mmol/L Final    Anion gap 12/20/2021 7  3.0 - 18 mmol/L Final    Glucose 12/20/2021 109* 74 - 99 mg/dL Final    BUN 12/20/2021 14  7.0 - 18 MG/DL Final    Creatinine 12/20/2021 0.68  0.6 - 1.3 MG/DL Final    BUN/Creatinine ratio 12/20/2021 21* 12 - 20 Final    GFR est AA 12/20/2021 >60  >60 ml/min/1.73m2 Final    GFR est non-AA 12/20/2021 >60  >60 ml/min/1.73m2 Final    Calcium 12/20/2021 8.8  8.5 - 10.1 MG/DL Final    Bilirubin, total 12/20/2021 0.2  0.2 - 1.0 MG/DL Final    ALT (SGPT) 12/20/2021 12* 13 - 56 U/L Final    AST (SGOT) 12/20/2021 10  10 - 38 U/L Final    Alk. phosphatase 12/20/2021 57  45 - 117 U/L Final    Protein, total 12/20/2021 6.9  6.4 - 8.2 g/dL Final    Albumin 12/20/2021 3.2* 3.4 - 5.0 g/dL Final    Globulin 12/20/2021 3.7  2.0 - 4.0 g/dL Final    A-G Ratio 12/20/2021 0.9  0.8 - 1.7   Final    WBC 12/20/2021 5.2  4.6 - 13.2 K/uL Final    RBC 12/20/2021 5.13  4.20 - 5.30 M/uL Final    HGB 12/20/2021 14.5  12.0 - 16.0 g/dL Final    HCT 12/20/2021 44.4  35.0 - 45.0 % Final    MCV 12/20/2021 86.5  78.0 - 100.0 FL Final    MCH 12/20/2021 28.3  24.0 - 34.0 PG Final    MCHC 12/20/2021 32.7  31.0 - 37.0 g/dL Final    RDW 12/20/2021 13.1  11.6 - 14.5 % Final    PLATELET 24/89/4685 138  135 - 420 K/uL Final    MPV 12/20/2021 10.3  9.2 - 11.8 FL Final    NRBC 12/20/2021 0.0  0  WBC Final    ABSOLUTE NRBC 12/20/2021 0.00  0.00 - 0.01 K/uL Final    NEUTROPHILS 12/20/2021 71  40 - 73 % Final    LYMPHOCYTES 12/20/2021 19* 21 - 52 % Final    MONOCYTES 12/20/2021 10  3 - 10 % Final    EOSINOPHILS 12/20/2021 0  0 - 5 % Final    BASOPHILS 12/20/2021 0  0 - 2 % Final    IMMATURE GRANULOCYTES 12/20/2021 0  0.0 - 0.5 % Final    ABS. NEUTROPHILS 12/20/2021 3.7  1.8 - 8.0 K/UL Final    ABS. LYMPHOCYTES 12/20/2021 1.0  0.9 - 3.6 K/UL Final    ABS. MONOCYTES 12/20/2021 0.5  0.05 - 1.2 K/UL Final    ABS. EOSINOPHILS 12/20/2021 0.0  0.0 - 0.4 K/UL Final    ABS. BASOPHILS 12/20/2021 0.0  0.0 - 0.1 K/UL Final    ABS. IMM. GRANS. 12/20/2021 0.0  0.00 - 0.04 K/UL Final    DF 12/20/2021 AUTOMATED    Final       No results found for any visits on 12/29/21.     Patient Care Team:  Patient Care Team:  None as PCP - General  Yulisa Miu, RN as Care Transitions Nurse      Assessment / Plan:      ICD-10-CM ICD-9-CM    1. Hospital discharge follow-up  Z09 V67.59 [place] DISCHARGE MEDS RECONCILED W/ CURRENT OUTPATIENT MED LIST   2. Essential hypertension, benign  I10 401.1 lisinopriL (PRINIVIL, ZESTRIL) 10 mg tablet      MICROALBUMIN, UR, RAND W/ MICROALB/CREAT RATIO      HEMOGLOBIN A1C WITH EAG   3. Hyperlipidemia, unspecified hyperlipidemia type  E78.5 272.4 atorvastatin (LIPITOR) 10 mg tablet      LIPID PANEL      TSH 3RD GENERATION      T4, FREE      HEMOGLOBIN A1C WITH EAG   4. Tobacco abuse  Z72.0 305.1    5. Pneumonia due to COVID-19 virus  U07.1 480.8     J12.82 079.89    6. Breast cancer screening by mammogram  Z12.31 V76.12 MEG MAMMO BI SCREENING INCL CAD   7. Encounter for medical examination to establish care  Z00.00 V70.9    8. Screening for colon cancer  Z12.11 V76.51    9. Anxiety and depression  F41.9 300.00     F32. A 311    10. Shortness of breath  R06.02 786.05 ECHO ADULT COMPLETE   11. Elevated brain natriuretic peptide (BNP) level  R79.89 790.99 ECHO ADULT COMPLETE   12. Gas pain  R14.1 819.7 simethicone (MYLICON) 80 mg chewable tablet   13. Cough  R05.9 786.2 benzonatate (TESSALON) 100 mg capsule     Hypertension: Blood pressure today was 137/86. Patient hasn't taken her lisinopril in many months. Will resume on lisinopril 10 mg. Hyperlipidemia: Check lipid panel and restart on Lipitor 10 mg. Check TSH and screen with hemoglobin A1c. Recent COVID-19 pneumonia: Patient states that she is doing better. She still has mild shortness of breath and decreased stamina. O2 saturation is normal. Has not had to use her albuterol inhaler. Tessalon prescribed for cough. Elevated BNP: Patient has elevated BNP, orthopnea and PND. Will check echo to evaluate for heart failure. Anxiety and depression: Patient is following with a psychiatrist. Louis Tian with Zoloft trazodone and Xanax as needed.  Patient states that her Zoloft dose was recently increased and that she has been feeling better. Smoking: Patient is an active smoker. She smokes 2 cigarettes/day. Counseled on smoking cessation. Patient states that she would like to quit. She will try nicotine patches. MILY: Not using a CPAP at this time. Will refer to sleep medicine. Pulmonary nodules: Can repeat CT scan in a few months. Healthcare maintenance:  Due for colonoscopy. Patient will schedule it with her gastroenterologist.  Mammogram ordered. Next visit: BP check, Pap smear, review labs and echo. I asked the patient if she  had any questions and answered her  questions. The patient stated that she understands the treatment plan and agrees with the treatment plan    This document was created with a voice activated dictation system and may contain transcription errors.

## 2021-12-29 ENCOUNTER — OFFICE VISIT (OUTPATIENT)
Dept: FAMILY MEDICINE CLINIC | Age: 61
End: 2021-12-29
Payer: COMMERCIAL

## 2021-12-29 VITALS
WEIGHT: 157 LBS | SYSTOLIC BLOOD PRESSURE: 137 MMHG | RESPIRATION RATE: 16 BRPM | OXYGEN SATURATION: 97 % | BODY MASS INDEX: 30.82 KG/M2 | HEART RATE: 65 BPM | HEIGHT: 60 IN | TEMPERATURE: 97 F | DIASTOLIC BLOOD PRESSURE: 86 MMHG

## 2021-12-29 DIAGNOSIS — G47.33 OSA (OBSTRUCTIVE SLEEP APNEA): ICD-10-CM

## 2021-12-29 DIAGNOSIS — Z72.0 TOBACCO ABUSE: ICD-10-CM

## 2021-12-29 DIAGNOSIS — I10 ESSENTIAL HYPERTENSION, BENIGN: ICD-10-CM

## 2021-12-29 DIAGNOSIS — U07.1 PNEUMONIA DUE TO COVID-19 VIRUS: ICD-10-CM

## 2021-12-29 DIAGNOSIS — R06.02 SHORTNESS OF BREATH: ICD-10-CM

## 2021-12-29 DIAGNOSIS — R14.1 GAS PAIN: ICD-10-CM

## 2021-12-29 DIAGNOSIS — R05.9 COUGH: ICD-10-CM

## 2021-12-29 DIAGNOSIS — Z00.00 ENCOUNTER FOR MEDICAL EXAMINATION TO ESTABLISH CARE: ICD-10-CM

## 2021-12-29 DIAGNOSIS — F41.9 ANXIETY AND DEPRESSION: ICD-10-CM

## 2021-12-29 DIAGNOSIS — E78.5 HYPERLIPIDEMIA, UNSPECIFIED HYPERLIPIDEMIA TYPE: ICD-10-CM

## 2021-12-29 DIAGNOSIS — Z12.11 SCREENING FOR COLON CANCER: ICD-10-CM

## 2021-12-29 DIAGNOSIS — R79.89 ELEVATED BRAIN NATRIURETIC PEPTIDE (BNP) LEVEL: ICD-10-CM

## 2021-12-29 DIAGNOSIS — Z12.31 BREAST CANCER SCREENING BY MAMMOGRAM: ICD-10-CM

## 2021-12-29 DIAGNOSIS — F32.A ANXIETY AND DEPRESSION: ICD-10-CM

## 2021-12-29 DIAGNOSIS — Z09 HOSPITAL DISCHARGE FOLLOW-UP: Primary | ICD-10-CM

## 2021-12-29 DIAGNOSIS — J12.82 PNEUMONIA DUE TO COVID-19 VIRUS: ICD-10-CM

## 2021-12-29 PROCEDURE — 99204 OFFICE O/P NEW MOD 45 MIN: CPT | Performed by: STUDENT IN AN ORGANIZED HEALTH CARE EDUCATION/TRAINING PROGRAM

## 2021-12-29 PROCEDURE — 1111F DSCHRG MED/CURRENT MED MERGE: CPT | Performed by: STUDENT IN AN ORGANIZED HEALTH CARE EDUCATION/TRAINING PROGRAM

## 2021-12-29 RX ORDER — ALBUTEROL SULFATE 90 UG/1
2 AEROSOL, METERED RESPIRATORY (INHALATION)
Status: CANCELLED | OUTPATIENT
Start: 2021-12-29

## 2021-12-29 RX ORDER — BENZONATATE 100 MG/1
100 CAPSULE ORAL
Qty: 30 CAPSULE | Refills: 0 | Status: SHIPPED | OUTPATIENT
Start: 2021-12-29 | End: 2022-01-08

## 2021-12-29 RX ORDER — LISINOPRIL 10 MG/1
10 TABLET ORAL DAILY
Qty: 30 TABLET | Refills: 2 | Status: SHIPPED | OUTPATIENT
Start: 2021-12-29

## 2021-12-29 RX ORDER — SIMETHICONE 80 MG
80 TABLET,CHEWABLE ORAL
Qty: 30 TABLET | Refills: 0 | Status: SHIPPED | OUTPATIENT
Start: 2021-12-29

## 2021-12-29 RX ORDER — ATORVASTATIN CALCIUM 10 MG/1
10 TABLET, FILM COATED ORAL
Qty: 30 TABLET | Refills: 2 | Status: SHIPPED | OUTPATIENT
Start: 2021-12-29

## 2022-01-04 ENCOUNTER — HOSPITAL ENCOUNTER (OUTPATIENT)
Dept: NON INVASIVE DIAGNOSTICS | Age: 62
Discharge: HOME | End: 2022-01-04
Attending: STUDENT IN AN ORGANIZED HEALTH CARE EDUCATION/TRAINING PROGRAM
Payer: COMMERCIAL

## 2022-01-04 ENCOUNTER — HOSPITAL ENCOUNTER (OUTPATIENT)
Dept: MAMMOGRAPHY | Age: 62
Discharge: HOME | End: 2022-01-04
Attending: STUDENT IN AN ORGANIZED HEALTH CARE EDUCATION/TRAINING PROGRAM
Payer: COMMERCIAL

## 2022-01-04 VITALS
DIASTOLIC BLOOD PRESSURE: 86 MMHG | HEIGHT: 60 IN | BODY MASS INDEX: 30.82 KG/M2 | WEIGHT: 157 LBS | SYSTOLIC BLOOD PRESSURE: 137 MMHG

## 2022-01-04 DIAGNOSIS — R79.89 ELEVATED BRAIN NATRIURETIC PEPTIDE (BNP) LEVEL: ICD-10-CM

## 2022-01-04 DIAGNOSIS — R06.02 SHORTNESS OF BREATH: ICD-10-CM

## 2022-01-04 DIAGNOSIS — Z12.31 BREAST CANCER SCREENING BY MAMMOGRAM: ICD-10-CM

## 2022-01-04 LAB
ECHO AO ROOT DIAM: 3 CM
ECHO AO ROOT INDEX: 1.79 CM/M2
ECHO LV E' LATERAL VELOCITY: 6 CM/S
ECHO LV FRACTIONAL SHORTENING: 23 % (ref 28–44)
ECHO LV INTERNAL DIMENSION DIASTOLE INDEX: 2.86 CM/M2
ECHO LV INTERNAL DIMENSION DIASTOLIC: 4.8 CM (ref 3.9–5.3)
ECHO LV INTERNAL DIMENSION SYSTOLIC INDEX: 2.2 CM/M2
ECHO LV INTERNAL DIMENSION SYSTOLIC: 3.7 CM
ECHO LV IVSD: 1.1 CM (ref 0.6–0.9)
ECHO LV MASS 2D: 194 G (ref 67–162)
ECHO LV MASS INDEX 2D: 115.5 G/M2 (ref 43–95)
ECHO LV POSTERIOR WALL DIASTOLIC: 1.1 CM (ref 0.6–0.9)
ECHO LV RELATIVE WALL THICKNESS RATIO: 0.46
ECHO LVOT AREA: 3.1 CM2
ECHO LVOT DIAM: 2 CM
ECHO LVOT MEAN GRADIENT: 2 MMHG
ECHO LVOT PEAK GRADIENT: 4 MMHG
ECHO LVOT PEAK VELOCITY: 1.1 M/S
ECHO LVOT STROKE VOLUME INDEX: 46.4 ML/M2
ECHO LVOT SV: 77.9 ML
ECHO LVOT VTI: 24.8 CM
ECHO MV A VELOCITY: 0.9 M/S
ECHO MV E DECELERATION TIME (DT): 190.8 MS
ECHO MV E VELOCITY: 0.88 M/S
ECHO MV E/A RATIO: 0.98
ECHO MV E/E' LATERAL: 14.67
ECHO RV FREE WALL PEAK S': 12 CM/S
ECHO RV TAPSE: 2.2 CM (ref 1.5–2)

## 2022-01-04 PROCEDURE — 77067 SCR MAMMO BI INCL CAD: CPT

## 2022-01-04 PROCEDURE — 93306 TTE W/DOPPLER COMPLETE: CPT

## 2022-01-04 PROCEDURE — 93306 TTE W/DOPPLER COMPLETE: CPT | Performed by: INTERNAL MEDICINE

## 2022-01-07 ENCOUNTER — PATIENT OUTREACH (OUTPATIENT)
Dept: CASE MANAGEMENT | Age: 62
End: 2022-01-07

## 2022-01-07 NOTE — PROGRESS NOTES
Care Transitions Follow Up Call    Challenges to be reviewed by the provider   Additional needs identified to be addressed with provider: no  none           Method of communication with provider : none    Care Transition Nurse (CTN) contacted the patient by telephone to follow up after admission on 21. Verified name and  with patient as identifiers. Patient reported she is awaiting results of COVID PCR test.    Symptoms reviewed with patient who verbalized the following symptoms: fatigue, cough and runny nose . Due to no new or worsening symptoms encounter was not routed to provider for escalation. Addressed changes since last contact: none  Follow up appointment completed? yes. Was follow up appointment scheduled within 7 days of discharge? no.     Advance Care Planning:   Does patient have an Advance Directive:  ACP referral placed    CTN reviewed discharge instructions, medical action plan and red flags with patient and discussed any barriers to care and/or understanding of plan of care after discharge. Discussed appropriate site of care based on symptoms and resources available to patient including: PCP, When to call 911 and CTN. The patient agrees to contact the PCP office for questions related to their healthcare. Patients top risk factors for readmission: medical condition-COVID   Interventions to address risk factors: Assessed for s/s    REHABILITATION [facility] follow up appointment(s):   Future Appointments   Date Time Provider Kary Velázquez   3/28/2022 11:30 AM MD IVETT MejiasMA-[facility]     Non[facility] follow up appointment(s): None    CTN provided contact information for future needs. Plan for follow-up call in 7-10 days based on severity of symptoms and risk factors. Plan for next call: symptom management-COVID s/s     Goals Addressed                 This Visit's Progress     Attends follow-up appointments as directed.         Goal: Patient will attend all appointments scheduled within the next 30 days. Ensure provider appt is scheduled within 7 business days post-discharge; 12/21: Patient does not have a PCP. Provided contact info for [facility] and [employer] to schedule appt. Confirm patient attended post-discharge provider appt; 1/7: Patient attended [facility] appt on 12/29. Complete post-visit call to confirm attendance and update care needs; 1/7: Attempted.

## 2022-01-07 NOTE — PROGRESS NOTES
Care Transitions Follow Up Call    Challenges to be reviewed by the provider   Additional needs identified to be addressed with provider: no  none           Method of communication with provider : none    Care Transition Nurse (CTN) contacted the patient by telephone to follow up after admission on 12/19/21. No answer. Left HIPPA compliant message. Name, role and contact information provided. Requested return call. Will attempt to contact at a later time.

## 2022-01-18 ENCOUNTER — PATIENT OUTREACH (OUTPATIENT)
Dept: CASE MANAGEMENT | Age: 62
End: 2022-01-18

## 2022-01-18 NOTE — PROGRESS NOTES
Follow Up Call    Challenges to be reviewed by the provider   Additional needs identified to be addressed with provider: no  none           No answer. Left HIPPA compliant message. Name, role and contact information provided. Requested return call.

## 2022-01-20 ENCOUNTER — PATIENT OUTREACH (OUTPATIENT)
Dept: CASE MANAGEMENT | Age: 62
End: 2022-01-20

## 2022-01-20 NOTE — PROGRESS NOTES
Patient has graduated from the Transitions of Care Coordination  program on 1/20/22. Patient/family has the ability to self-manage at this time Care management goals have been completed. Patient was not referred to the [facility] team for further management. Goals Addressed                 This Visit's Progress     COMPLETED: Attends follow-up appointments as directed. Goal: Patient will attend all appointments scheduled within the next 30 days. Ensure provider appt is scheduled within 7 business days post-discharge; 12/21: Patient does not have a PCP. Provided contact info for [facility] and [facility] to schedule appt. Confirm patient attended post-discharge provider appt; 1/7: Patient attended [facility] appt on 12/29. Complete post-visit call to confirm attendance and update care needs; 1/7: Done. 1/18: Done.  COMPLETED: Prevent complications post hospitalization. Goal: No admissions post 30 days from discharge of 12/20/21. Review/educate common or potential \"red flags\" of condition worsening; 12/21: Reviewed/educated on s/s of PNA to monitor and report:    Fever (100.5 or greater)  Tiredness (fatigue)   Trouble breathing: rapid breathing or shortness of breath   Sweating/Chills   Cough with mucus (might be greenish in color or contain a small amount of blood)   Chest pain and/or abdominal pain, especially with coughing or deep breathing   Loss of appetite   Confused mental state or changes in awareness (especially in older adults)   Observe for trends in symptoms and measures, provide direction to patient, and notify provider as needed       Discuss and provide resources for ACP; 12/21: Not on file. Will address at a later time. Patient has Care Transition Nurse's contact information for any further questions, concerns, or needs.   Patients upcoming visits:    Future Appointments   Date Time Provider Kary Velázquez   3/28/2022 11:30 AM Swapna adler MD [facility]

## 2022-03-19 PROBLEM — E66.01 SEVERE OBESITY (HCC): Status: ACTIVE | Noted: 2018-11-01

## 2022-03-19 PROBLEM — J12.82 PNEUMONIA DUE TO COVID-19 VIRUS: Status: ACTIVE | Noted: 2021-12-19

## 2022-03-19 PROBLEM — U07.1 PNEUMONIA DUE TO COVID-19 VIRUS: Status: ACTIVE | Noted: 2021-12-19

## 2022-03-20 PROBLEM — J96.00 ACUTE RESPIRATORY FAILURE (HCC): Status: ACTIVE | Noted: 2021-12-19

## 2022-08-04 ENCOUNTER — APPOINTMENT (OUTPATIENT)
Dept: GENERAL RADIOLOGY | Age: 62
End: 2022-08-04
Attending: EMERGENCY MEDICINE
Payer: COMMERCIAL

## 2022-08-04 ENCOUNTER — HOSPITAL ENCOUNTER (EMERGENCY)
Age: 62
Discharge: HOME | End: 2022-08-04
Attending: EMERGENCY MEDICINE
Payer: COMMERCIAL

## 2022-08-04 VITALS
HEART RATE: 77 BPM | DIASTOLIC BLOOD PRESSURE: 62 MMHG | OXYGEN SATURATION: 95 % | TEMPERATURE: 97.8 F | SYSTOLIC BLOOD PRESSURE: 128 MMHG | RESPIRATION RATE: 16 BRPM

## 2022-08-04 DIAGNOSIS — S90.454A: Primary | ICD-10-CM

## 2022-08-04 PROCEDURE — 99283 EMERGENCY DEPT VISIT LOW MDM: CPT

## 2022-08-04 PROCEDURE — 73660 X-RAY EXAM OF TOE(S): CPT

## 2022-08-04 RX ORDER — NEOMYCIN-BACITRACN ZN-POLYMYX 3.5 MG-400 UNIT-5,000 UNIT/GRAM TOP OINT
OINTMENT TOPICAL 3 TIMES DAILY
Qty: 9 G | Refills: 0 | Status: SHIPPED | OUTPATIENT
Start: 2022-08-04 | End: 2022-08-14

## 2022-08-04 NOTE — ED PROVIDER NOTES
EMERGENCY DEPARTMENT HISTORY AND PHYSICAL EXAM    Date: 8/4/2022  Patient Name: Adonay Ortiz    History of Presenting Illness     Chief Complaint   Patient presents with    Toe Pain         History Provided By: Patient      Additional History (Context): Adonay Ortiz is a 58 y.o. female with hypertension, hyperlipidemia, osteoarthritis, and hepatitis  who presents with right third toe pain after jumping off of the boat to be yesterday. She is unsure if there is a fracture or foreign body but it hurts to ambulate. PCP: Feliciano Capps MD    Current Outpatient Medications   Medication Sig Dispense Refill    neomycin-bacitracin-polymyxin (Neosporin, [id number],) 3.5mg-400 unit- 5,000 unit/gram ointment Apply  to affected area three (3) times daily for 10 days. Apply to affected area 9 g 0    lisinopriL (PRINIVIL, ZESTRIL) 10 mg tablet Take 1 Tablet by mouth daily. 30 Tablet 2    atorvastatin (LIPITOR) 10 mg tablet Take 1 Tablet by mouth nightly. 30 Tablet 2    simethicone (MYLICON) 80 mg chewable tablet Take 1 Tablet by mouth every six (6) hours as needed for Flatulence or GI Pain. Indications: gas pain 30 Tablet 0    diclofenac (Voltaren) 1 % gel Apply 4 g to affected area four (4) times daily. Maximum 16 grams per joint per day. Dispense 5 100 gram tubes 5 Each 0    ALPRAZolam (XANAX) 0.5 mg tablet Take 0.5 mg by mouth as needed for Anxiety. traZODone (DESYREL) 100 mg tablet Take 100 mg by mouth nightly. sertraline (Zoloft) 50 mg tablet Take 125 mg by mouth daily. Taking 2.5 tablets daily      albuterol (PROVENTIL HFA, VENTOLIN HFA, PROAIR HFA) 90 mcg/actuation inhaler Take 2 Puffs by inhalation every four (4) hours as needed for Wheezing. 1 Inhaler 0    ibuprofen (ADVIL) 100 mg tablet Take 100 mg by mouth every six (6) hours as needed for Pain. Cholecalciferol, Vitamin D3, (VITAMIN D3) 1,000 unit cap Take  by mouth daily.          Past History     Past Medical History:  Past Medical History:   Diagnosis Date    Arthritis     COPD     Hepatitis C     treated     Hypercholesterolemia     Hypertension     no meds     Sleep apnea     no CPAP        Past Surgical History:  Past Surgical History:   Procedure Laterality Date    HC STPLER HEMMORROID PPH01      HX  SECTION  1984    HX CHOLECYSTECTOMY  2005    HX HYSTERECTOMY  2008    HX KNEE ARTHROSCOPY  2009 2010    Bilateral    HX KNEE REPLACEMENT      HX TONSILLECTOMY  1968    MA OSSEOUS SURG 4/> CNTIG TEETH QUAD         Family History:  Family History   Problem Relation Age of Onset    High Cholesterol Mother     Stroke Mother     Heart Disease Father     High Cholesterol Father     Diabetes Sister     High Cholesterol Sister        Social History:  Social History     Tobacco Use    Smoking status: Some Days     Packs/day: 0.50     Years: 40.00     Pack years: 20.00     Types: Cigarettes     Last attempt to quit: 10/21/2015     Years since quittin.7    Smokeless tobacco: Never   Substance Use Topics    Alcohol use: No    Drug use: No       Allergies:  No Known Allergies      Review of Systems   Review of Systems   Musculoskeletal:  Positive for arthralgias. Skin:  Positive for wound. All Other Systems Negative  Physical Exam     Vitals:    22 1348   BP: 128/62   Pulse: 77   Resp: 16   Temp: 97.8 °F (36.6 °C)   SpO2: 95%     Physical Exam  Vitals and nursing note reviewed. Constitutional:       Appearance: She is well-developed. HENT:      Head: Normocephalic and atraumatic. Right Ear: External ear normal.      Left Ear: External ear normal.      Nose: Nose normal.   Eyes:      Conjunctiva/sclera: Conjunctivae normal.      Pupils: Pupils are equal, round, and reactive to light. Neck:      Vascular: No JVD. Trachea: No tracheal deviation. Cardiovascular:      Rate and Rhythm: Normal rate and regular rhythm. Heart sounds: Normal heart sounds. No murmur heard. No friction rub. No gallop.    Pulmonary: Effort: Pulmonary effort is normal. No respiratory distress. Breath sounds: Normal breath sounds. No wheezing or rales. Abdominal:      General: Bowel sounds are normal. There is no distension. Palpations: Abdomen is soft. There is no mass. Tenderness: There is no abdominal tenderness. There is no guarding or rebound. Musculoskeletal:         General: No tenderness. Normal range of motion. Cervical back: Normal range of motion and neck supple. Skin:     General: Skin is warm and dry. Findings: Lesion present. No rash. Comments: Right third toe PIP jt palmar aspect w/small protruding FB superficial abrasion    Neurological:      Mental Status: She is alert and oriented to person, place, and time. Cranial Nerves: No cranial nerve deficit. Deep Tendon Reflexes: Reflexes are normal and symmetric. Psychiatric:         Behavior: Behavior normal.              Diagnostic Study Results     Labs -   No results found for this or any previous visit (from the past 12 hour(s)). Radiologic Studies -   XR 3RD TOE RT MIN 2 V   Final Result   Possible retained 1-2 mm foreign body within the lateral aspect of the third toe   as described; although this could reflect projectional artifact. Note that   ultrasound typically the most sensitive examination for wooden foreign bodies. CT Results  (Last 48 hours)      None          CXR Results  (Last 48 hours)      None              Medical Decision Making   I am the first provider for this patient. I reviewed the vital signs, available nursing notes, past medical history, past surgical history, family history and social history. Vital Signs-Reviewed the patient's vital signs.       Records Reviewed: Nursing Notes    Procedures:  Foreign Body Removal    Date/Time: 8/4/2022 2:54 PM  Performed by: BERTIN Allen  Authorized by: BERTIN Allen     Consent:     Consent obtained:  Verbal    Consent given by:  Patient    Risks discussed:  Pain    Alternatives discussed:  Referral  Universal protocol:     Patient identity confirmed:  Verbally with patient  Location:     Location:  Toe    Toe location:  R third toe    Depth: Intradermal    Tendon involvement:  None  Anesthesia:     Anesthesia method:  None  Procedure type:     Procedure complexity:  Simple  Procedure details:     Localization method:  Probed and visualized    Removal mechanism: q-tip. Foreign bodies recovered:  1    Description:  Wooden splinter vs plant material    Intact foreign body removal: yes    Post-procedure details:     Neurovascular status: intact      Confirmation:  No additional foreign bodies on visualization    Skin closure:  None    Dressing:  Open (no dressing)    Procedure completion:  Tolerated well, no immediate complications    Provider Notes (Medical Decision Making): neosporin for home; wooden splinter shard vs plant FB removed. MED RECONCILIATION:  No current facility-administered medications for this encounter. Current Outpatient Medications   Medication Sig    neomycin-bacitracin-polymyxin (Neosporin, [id number],) 3.5mg-400 unit- 5,000 unit/gram ointment Apply  to affected area three (3) times daily for 10 days. Apply to affected area    lisinopriL (PRINIVIL, ZESTRIL) 10 mg tablet Take 1 Tablet by mouth daily. atorvastatin (LIPITOR) 10 mg tablet Take 1 Tablet by mouth nightly. simethicone (MYLICON) 80 mg chewable tablet Take 1 Tablet by mouth every six (6) hours as needed for Flatulence or GI Pain. Indications: gas pain    diclofenac (Voltaren) 1 % gel Apply 4 g to affected area four (4) times daily. Maximum 16 grams per joint per day. Dispense 5 100 gram tubes    ALPRAZolam (XANAX) 0.5 mg tablet Take 0.5 mg by mouth as needed for Anxiety. traZODone (DESYREL) 100 mg tablet Take 100 mg by mouth nightly. sertraline (Zoloft) 50 mg tablet Take 125 mg by mouth daily.  Taking 2.5 tablets daily    albuterol (PROVENTIL HFA, VENTOLIN HFA, PROAIR HFA) 90 mcg/actuation inhaler Take 2 Puffs by inhalation every four (4) hours as needed for Wheezing. ibuprofen (ADVIL) 100 mg tablet Take 100 mg by mouth every six (6) hours as needed for Pain. Cholecalciferol, Vitamin D3, (VITAMIN D3) 1,000 unit cap Take  by mouth daily. Disposition:  home    DISCHARGE NOTE:   2:55 PM    Pt has been reexamined. Patient has no new complaints, changes, or physical findings. Care plan outlined and precautions discussed. Results of exam, x-rays were reviewed with the patient. All medications were reviewed with the patient; will d/c home with neosporin. All of pt's questions and concerns were addressed. Patient was instructed and agrees to follow up with podiatry, as well as to return to the ED upon further deterioration. Patient is ready to go home. Follow-up Information       Follow up With Specialties Details Why Contact Info    Smita Parra DPM Podiatry Schedule an appointment as soon as possible for a visit in 2 days For wound re-check [address] [place] [address]  [phone]      [id number] [place] [address] EMERGENCY DEPT Emergency Medicine  If symptoms worsen return immediately [address] [place] [address]  [phone]            Current Discharge Medication List        START taking these medications    Details   neomycin-bacitracin-polymyxin (Neosporin, [id number],) 3.5mg-400 unit- 5,000 unit/gram ointment Apply  to affected area three (3) times daily for 10 days. Apply to affected area  Qty: 9 g, Refills: 0  Start date: 8/4/2022, End date: 8/14/2022             Diagnosis     Clinical Impression:   1.  Foreign body of skin of toe, right, initial encounter

## 2022-08-04 NOTE — ED TRIAGE NOTES
Pt c/o right toe pain after stepping on a tree branch. States she feels like there is a splinter in her foot. No deformity noted. Skin has small laceration.

## 2022-11-21 ENCOUNTER — APPOINTMENT (OUTPATIENT)
Dept: GENERAL RADIOLOGY | Age: 62
End: 2022-11-21
Attending: STUDENT IN AN ORGANIZED HEALTH CARE EDUCATION/TRAINING PROGRAM
Payer: COMMERCIAL

## 2022-11-21 ENCOUNTER — HOSPITAL ENCOUNTER (EMERGENCY)
Age: 62
Discharge: HOME | End: 2022-11-21
Attending: STUDENT IN AN ORGANIZED HEALTH CARE EDUCATION/TRAINING PROGRAM
Payer: COMMERCIAL

## 2022-11-21 VITALS
TEMPERATURE: 97.4 F | SYSTOLIC BLOOD PRESSURE: 137 MMHG | HEART RATE: 77 BPM | WEIGHT: 160 LBS | DIASTOLIC BLOOD PRESSURE: 93 MMHG | RESPIRATION RATE: 19 BRPM | HEIGHT: 64 IN | OXYGEN SATURATION: 94 % | BODY MASS INDEX: 27.31 KG/M2

## 2022-11-21 DIAGNOSIS — J44.1 COPD EXACERBATION (HCC): Primary | ICD-10-CM

## 2022-11-21 DIAGNOSIS — R06.02 SOB (SHORTNESS OF BREATH): ICD-10-CM

## 2022-11-21 LAB
ANION GAP SERPL CALC-SCNC: 6 MMOL/L (ref 3–18)
ATRIAL RATE: 70 BPM
BASOPHILS # BLD: 0.1 K/UL (ref 0–0.1)
BASOPHILS NFR BLD: 1 % (ref 0–2)
BNP SERPL-MCNC: 469 PG/ML (ref 0–900)
BUN SERPL-MCNC: 9 MG/DL (ref 7–18)
BUN/CREAT SERPL: 14 (ref 12–20)
CALCIUM SERPL-MCNC: 9 MG/DL (ref 8.5–10.1)
CALCULATED P AXIS, ECG09: 72 DEGREES
CALCULATED R AXIS, ECG10: 71 DEGREES
CALCULATED T AXIS, ECG11: 75 DEGREES
CHLORIDE SERPL-SCNC: 104 MMOL/L (ref 100–111)
CO2 SERPL-SCNC: 29 MMOL/L (ref 21–32)
COVID-19 RAPID TEST, COVR: NOT DETECTED
CREAT SERPL-MCNC: 0.64 MG/DL (ref 0.6–1.3)
DIAGNOSIS, 93000: NORMAL
DIFFERENTIAL METHOD BLD: ABNORMAL
EOSINOPHIL # BLD: 0.1 K/UL (ref 0–0.4)
EOSINOPHIL NFR BLD: 2 % (ref 0–5)
ERYTHROCYTE [DISTWIDTH] IN BLOOD BY AUTOMATED COUNT: 12.3 % (ref 11.6–14.5)
GLUCOSE SERPL-MCNC: 97 MG/DL (ref 74–99)
HCT VFR BLD AUTO: 47.4 % (ref 35–45)
HGB BLD-MCNC: 15.6 G/DL (ref 12–16)
IMM GRANULOCYTES # BLD AUTO: 0 K/UL (ref 0–0.04)
IMM GRANULOCYTES NFR BLD AUTO: 0 % (ref 0–0.5)
LYMPHOCYTES # BLD: 1.7 K/UL (ref 0.9–3.6)
LYMPHOCYTES NFR BLD: 25 % (ref 21–52)
MCH RBC QN AUTO: 28.9 PG (ref 24–34)
MCHC RBC AUTO-ENTMCNC: 32.9 G/DL (ref 31–37)
MCV RBC AUTO: 87.8 FL (ref 78–100)
MONOCYTES # BLD: 0.7 K/UL (ref 0.05–1.2)
MONOCYTES NFR BLD: 10 % (ref 3–10)
NEUTS SEG # BLD: 4.3 K/UL (ref 1.8–8)
NEUTS SEG NFR BLD: 62 % (ref 40–73)
NRBC # BLD: 0 K/UL (ref 0–0.01)
NRBC BLD-RTO: 0 PER 100 WBC
P-R INTERVAL, ECG05: 136 MS
PLATELET # BLD AUTO: 237 K/UL (ref 135–420)
PMV BLD AUTO: 10.4 FL (ref 9.2–11.8)
POTASSIUM SERPL-SCNC: 4 MMOL/L (ref 3.5–5.5)
Q-T INTERVAL, ECG07: 420 MS
QRS DURATION, ECG06: 82 MS
QTC CALCULATION (BEZET), ECG08: 453 MS
RBC # BLD AUTO: 5.4 M/UL (ref 4.2–5.3)
SODIUM SERPL-SCNC: 139 MMOL/L (ref 136–145)
SOURCE, COVRS: NORMAL
TROPONIN-HIGH SENSITIVITY: 4 NG/L (ref 0–54)
VENTRICULAR RATE, ECG03: 70 BPM
WBC # BLD AUTO: 6.9 K/UL (ref 4.6–13.2)

## 2022-11-21 PROCEDURE — 84484 ASSAY OF TROPONIN QUANT: CPT

## 2022-11-21 PROCEDURE — 71045 X-RAY EXAM CHEST 1 VIEW: CPT

## 2022-11-21 PROCEDURE — 85025 COMPLETE CBC W/AUTO DIFF WBC: CPT

## 2022-11-21 PROCEDURE — 93005 ELECTROCARDIOGRAM TRACING: CPT

## 2022-11-21 PROCEDURE — 96365 THER/PROPH/DIAG IV INF INIT: CPT

## 2022-11-21 PROCEDURE — 94640 AIRWAY INHALATION TREATMENT: CPT

## 2022-11-21 PROCEDURE — 99285 EMERGENCY DEPT VISIT HI MDM: CPT

## 2022-11-21 PROCEDURE — 74011250636 HC RX REV CODE- 250/636: Performed by: STUDENT IN AN ORGANIZED HEALTH CARE EDUCATION/TRAINING PROGRAM

## 2022-11-21 PROCEDURE — 74011000250 HC RX REV CODE- 250: Performed by: STUDENT IN AN ORGANIZED HEALTH CARE EDUCATION/TRAINING PROGRAM

## 2022-11-21 PROCEDURE — 74011000258 HC RX REV CODE- 258: Performed by: STUDENT IN AN ORGANIZED HEALTH CARE EDUCATION/TRAINING PROGRAM

## 2022-11-21 PROCEDURE — 96375 TX/PRO/DX INJ NEW DRUG ADDON: CPT

## 2022-11-21 PROCEDURE — 83880 ASSAY OF NATRIURETIC PEPTIDE: CPT

## 2022-11-21 PROCEDURE — 87635 SARS-COV-2 COVID-19 AMP PRB: CPT

## 2022-11-21 PROCEDURE — 80048 BASIC METABOLIC PNL TOTAL CA: CPT

## 2022-11-21 RX ORDER — ALBUTEROL SULFATE 90 UG/1
2 AEROSOL, METERED RESPIRATORY (INHALATION)
Qty: 1 EACH | Refills: 0 | Status: SHIPPED | OUTPATIENT
Start: 2022-11-21 | End: 2022-12-19

## 2022-11-21 RX ORDER — IPRATROPIUM BROMIDE AND ALBUTEROL SULFATE 2.5; .5 MG/3ML; MG/3ML
3 SOLUTION RESPIRATORY (INHALATION)
Status: DISPENSED | OUTPATIENT
Start: 2022-11-21 | End: 2022-11-21

## 2022-11-21 RX ORDER — INHALER, ASSIST DEVICES
1 SPACER (EA) MISCELLANEOUS AS NEEDED
Qty: 1 EACH | Refills: 0 | Status: SHIPPED | OUTPATIENT
Start: 2022-11-21

## 2022-11-21 RX ORDER — PREDNISONE 20 MG/1
40 TABLET ORAL DAILY
Qty: 10 TABLET | Refills: 0 | Status: SHIPPED | OUTPATIENT
Start: 2022-11-21 | End: 2022-11-26

## 2022-11-21 RX ORDER — DOXYCYCLINE HYCLATE 100 MG
100 TABLET ORAL 2 TIMES DAILY
Qty: 14 TABLET | Refills: 0 | Status: SHIPPED | OUTPATIENT
Start: 2022-11-21 | End: 2022-11-28

## 2022-11-21 RX ORDER — PREDNISONE 20 MG/1
60 TABLET ORAL
Status: DISCONTINUED | OUTPATIENT
Start: 2022-11-21 | End: 2022-11-21

## 2022-11-21 RX ADMIN — IPRATROPIUM BROMIDE AND ALBUTEROL SULFATE 3 ML: .5; 2.5 SOLUTION RESPIRATORY (INHALATION) at 09:52

## 2022-11-21 RX ADMIN — METHYLPREDNISOLONE SODIUM SUCCINATE 125 MG: 125 INJECTION, POWDER, FOR SOLUTION INTRAMUSCULAR; INTRAVENOUS at 09:53

## 2022-11-21 RX ADMIN — IPRATROPIUM BROMIDE AND ALBUTEROL SULFATE 3 ML: .5; 2.5 SOLUTION RESPIRATORY (INHALATION) at 10:42

## 2022-11-21 RX ADMIN — DOXYCYCLINE 100 MG: 100 INJECTION, POWDER, LYOPHILIZED, FOR SOLUTION INTRAVENOUS at 10:11

## 2022-11-21 NOTE — ED NOTES
Pt's o2 sats are now between 85-87 %room air. Pt placed on 2L oxygen via nasal cannula. sats are now 91% Dr. Malik Winter notified.

## 2022-11-21 NOTE — DISCHARGE INSTRUCTIONS
Please carefully read all discharge instructions  Please follow up with your primary care doctor in 1-2 days    Please follow-up with a primary care physician and if you do not have one currently use the contact information provided to obtain an appointment. If none was provided please call the number on the back of your insurance card to locate a Primary care doctor. Many offices have \"cancellation lists\" that you can ask to be placed on; should a patient with an earlier appointment cancel you will be notified to take their place. Please return to the Emergency Room immediately if your symptoms worsen. Please return to the Emergency Department if you develop a fever, chills, cannot eat or drink due to nausea or vomiting, or if any of your symptoms worsen. If you do not have insurance you can use the below for your medications. [email or web address]    What are GoodRx coupons? GoodRx coupons will help you pay less than the cash price for your prescription. Henrietta Collins free to use and are accepted at virtually every [place] pharmacy. Your pharmacist will know how to enter the codes on the coupon to pull up the lowest discount available. [employer] Activation    Thank you for requesting access to [employer]. Please follow the instructions below to securely access and download your online medical record. [employer] allows you to send messages to your doctor, view your test results, renew your prescriptions, schedule appointments, and more. How Do I Sign Up? In your internet browser, go to [email or web address]  Click on the First Time User? Click Here link in the Sign In box. You will be redirect to the New Member Sign Up page. Enter your [employer] Access Code exactly as it appears below. You will not need to use this code after youve completed the sign-up process. If you do not sign up before the expiration date, you must request a new code.     [employer] Access Code: [email or web address]  Expires: 1/5/2023 8:55 AM    Enter the last four digits of your Social Security Number (xxxx) and Date of Birth (mm/dd/yyyy) as indicated and click Submit. You will be taken to the next sign-up page. Create a [employer] ID. This will be your [employer] login ID and cannot be changed, so think of one that is secure and easy to remember. Create a [employer] password. You can change your password at any time. Enter your Password Reset Question and Answer. This can be used at a later time if you forget your password. Enter your e-mail address. You will receive e-mail notification when new information is available in [address] Click Sign Up. You can now view and download portions of your medical record. Click the [employer] link to download a portable copy of your medical information. Additional Information    If you have questions, please visit the Frequently Asked Questions section of the [employer] website at [email or web address] Remember, [employer] is NOT to be used for urgent needs. For medical emergencies, dial 911.

## 2022-11-21 NOTE — ED PROVIDER NOTES
EMERGENCY DEPARTMENT HISTORY AND PHYSICAL EXAM      Date: 11/21/2022  Patient Name: Gilbert Fisher    History of Presenting Illness     Chief Complaint   Patient presents with    Shortness of Breath    Ear Pain       HPI:  Gilbert Fisher is a 58 y.o. female with a history of 60 pack year of smoking who presents with complaints of cough, congestion, wheezing, sob. On review of systems, the patient denies chest pain, back pain, leg swelling, leg pain, recent travel, fever, chills, trauma, back pain, abdominal pain, nausea, vomiting, diaphoresis. The patient denies any aggravating or alleviating factors - and has not taken any medications in an attempt to alleviate her symptoms. States that she does not often go to the doctor, has never been diagnosed with COPD. PCP: Cole Olson MD    Current Facility-Administered Medications   Medication Dose Route Frequency Provider Last Rate Last Admin    doxycycline (VIBRAMYCIN) 100 mg in 0.9% sodium chloride (MBP/ADV) 100 mL MBP  100 mg IntraVENous Q12H Arlene Watson MD   IV Completed at 11/21/22 1115     Current Outpatient Medications   Medication Sig Dispense Refill    doxycycline (VIBRA-TABS) 100 mg tablet Take 1 Tablet by mouth two (2) times a day for 7 days. 14 Tablet 0    predniSONE (DELTASONE) 20 mg tablet Take 2 Tablets by mouth daily for 5 days. 10 Tablet 0    albuterol (PROVENTIL HFA, VENTOLIN HFA, PROAIR HFA) 90 mcg/actuation inhaler Take 2 Puffs by inhalation every four (4) hours as needed for Wheezing for up to 28 days. 1 Each 0    inhalational spacing device (BreatheRite MDI Spacer) 1 Each by Does Not Apply route as needed for Wheezing. 1 Each 0    lisinopriL (PRINIVIL, ZESTRIL) 10 mg tablet Take 1 Tablet by mouth daily. 30 Tablet 2    atorvastatin (LIPITOR) 10 mg tablet Take 1 Tablet by mouth nightly.  30 Tablet 2    simethicone (MYLICON) 80 mg chewable tablet Take 1 Tablet by mouth every six (6) hours as needed for Flatulence or GI Pain. Indications: gas pain 30 Tablet 0    diclofenac (Voltaren) 1 % gel Apply 4 g to affected area four (4) times daily. Maximum 16 grams per joint per day. Dispense 5 100 gram tubes 5 Each 0    ALPRAZolam (XANAX) 0.5 mg tablet Take 0.5 mg by mouth as needed for Anxiety. traZODone (DESYREL) 100 mg tablet Take 100 mg by mouth nightly. sertraline (ZOLOFT) 50 mg tablet Take 125 mg by mouth daily. Taking 2.5 tablets daily      ibuprofen 100 mg tablet Take 100 mg by mouth every six (6) hours as needed for Pain. cholecalciferol (VITAMIN D3) 25 mcg (1,000 unit) cap Take  by mouth daily. Past History     Past Medical History:  Past Medical History:   Diagnosis Date    Arthritis     COPD     Hepatitis C     treated     Hypercholesterolemia     Hypertension     no meds     Sleep apnea     no CPAP        Past Surgical History:  Past Surgical History:   Procedure Laterality Date    HC STPLER HEMMORROID PPH01      HX  SECTION      HX CHOLECYSTECTOMY      HX HYSTERECTOMY      HX KNEE ARTHROSCOPY  2009    Bilateral    HX KNEE REPLACEMENT      HX TONSILLECTOMY  1968    RI OSSEOUS SURG 4/> CNTIG TEETH QUAD         Family History:  Family History   Problem Relation Age of Onset    High Cholesterol Mother     Stroke Mother     Heart Disease Father     High Cholesterol Father     Diabetes Sister     High Cholesterol Sister        Social History:  Social History     Tobacco Use    Smoking status: Some Days     Packs/day: 0.50     Years: 40.00     Pack years: 20.00     Types: Cigarettes     Last attempt to quit: 10/21/2015     Years since quittin.0    Smokeless tobacco: Never   Substance Use Topics    Alcohol use: No    Drug use: No       Allergies:  No Known Allergies    PMH, PSH, family history, social history, allergies reviewed with the patient with significant items noted above. Review of Systems   As per HPI, otherwise reviewed and negative.    In addition to that documented in the HPI above  All other review of systems negative    Constitutional: Denies fevers or chills  Eyes: Denies vision changes  ENMT: Denies sore throat, denies neck pain, denies headache  CV: Denies chest pain, denies palpitations  GI: Denies vomiting or diarrhea  : Denies painful urination, denies blood in urine  MSK: Denies recent trauma, denies muscle aches, denies edema  Skin: Denies new rashes, denies new lesions, denies new sores  Neuro: Denies new numbness or tingling or weakness  Endocrine: Denies polyuria  Heme: Denies bleeding disorders    Physical Exam     Vitals:    11/21/22 0955 11/21/22 1055 11/21/22 1112 11/21/22 1114   BP:  (!) 137/93     Pulse:       Resp:       Temp:       SpO2:   92% 94%   Weight: 72.6 kg (160 lb)      Height: 5' 4\" (1.626 m)          Nursing notes and vital signs reviewed  General: Patient is awake and alert, resting comfortably in no acute distress  Head: Normocephalic and atraumatic  Eyes: Extraocular muscles intact, no conjunctival pallor  Ear, nose, throat: Normal external exam  Neck: Normal range of motion  Cardiovascular: RRR, warm, well-perfused extremities  Respiratory: Patient is in no respiratory distress, normal respiratory effort, wheezing present diffusely  GI: soft, non-tender, non-distended  MSK: No gross deformities appreciated  Extremities: pulses intact with good cap refills, no LE pitting edema or calf tenderness  Skin: Warm, dry, and intact  Neuro: The patient is alert and oriented, no gross motor or sensory defects noted. Psych: Appropriate mood and affect.           Diagnostic Study Results     Labs -     Recent Results (from the past 12 hour(s))   EKG, 12 LEAD, INITIAL    Collection Time: 11/21/22  9:41 AM   Result Value Ref Range    Ventricular Rate 70 BPM    Atrial Rate 70 BPM    P-R Interval 136 ms    QRS Duration 82 ms    Q-T Interval 420 ms    QTC Calculation (Bezet) 453 ms    Calculated P Axis 72 degrees    Calculated R Axis 71 degrees    Calculated T Axis 75 degrees    Diagnosis       Normal sinus rhythm  ST & T wave abnormality, consider anterior ischemia  Abnormal ECG  When compared with ECG of 19-DEC-2021 07:25,  No significant change was found     CBC WITH AUTOMATED DIFF    Collection Time: 11/21/22  9:48 AM   Result Value Ref Range    WBC 6.9 4.6 - 13.2 K/uL    RBC 5.40 (H) 4.20 - 5.30 M/uL    HGB 15.6 12.0 - 16.0 g/dL    HCT 47.4 (H) 35.0 - 45.0 %    MCV 87.8 78.0 - 100.0 FL    MCH 28.9 24.0 - 34.0 PG    MCHC 32.9 31.0 - 37.0 g/dL    RDW 12.3 11.6 - 14.5 %    PLATELET 375 774 - 834 K/uL    MPV 10.4 9.2 - 11.8 FL    NRBC 0.0 0  WBC    ABSOLUTE NRBC 0.00 0.00 - 0.01 K/uL    NEUTROPHILS 62 40 - 73 %    LYMPHOCYTES 25 21 - 52 %    MONOCYTES 10 3 - 10 %    EOSINOPHILS 2 0 - 5 %    BASOPHILS 1 0 - 2 %    IMMATURE GRANULOCYTES 0 0.0 - 0.5 %    ABS. NEUTROPHILS 4.3 1.8 - 8.0 K/UL    ABS. LYMPHOCYTES 1.7 0.9 - 3.6 K/UL    ABS. MONOCYTES 0.7 0.05 - 1.2 K/UL    ABS. EOSINOPHILS 0.1 0.0 - 0.4 K/UL    ABS. BASOPHILS 0.1 0.0 - 0.1 K/UL    ABS. IMM.  GRANS. 0.0 0.00 - 0.04 K/UL    DF AUTOMATED     METABOLIC PANEL, BASIC    Collection Time: 11/21/22  9:48 AM   Result Value Ref Range    Sodium 139 136 - 145 mmol/L    Potassium 4.0 3.5 - 5.5 mmol/L    Chloride 104 100 - 111 mmol/L    CO2 29 21 - 32 mmol/L    Anion gap 6 3.0 - 18 mmol/L    Glucose 97 74 - 99 mg/dL    BUN 9 7.0 - 18 MG/DL    Creatinine 0.64 0.6 - 1.3 MG/DL    BUN/Creatinine ratio 14 12 - 20      eGFR >60 >60 ml/min/1.73m2    Calcium 9.0 8.5 - 10.1 MG/DL   TROPONIN-HIGH SENSITIVITY    Collection Time: 11/21/22  9:48 AM   Result Value Ref Range    Troponin-High Sensitivity 4 0 - 54 ng/L   NT-PRO BNP    Collection Time: 11/21/22  9:48 AM   Result Value Ref Range    NT pro- 0 - 900 PG/ML   COVID-19 RAPID TEST    Collection Time: 11/21/22 10:17 AM   Result Value Ref Range    Specimen source Nasopharyngeal      COVID-19 rapid test Not detected NOTD         Radiologic Studies -   XR CHEST PORT   Final Result      1. Mild streaky/hazy opacity at the left lower lung, possibly atelectasis versus   early infiltrate. Recommend follow-up to document resolution. 2. Perhaps mild bronchial wall thickening at the lower lungs. CT Results  (Last 48 hours)      None          CXR Results  (Last 48 hours)                 11/21/22 0903  XR CHEST PORT Final result    Impression:      1. Mild streaky/hazy opacity at the left lower lung, possibly atelectasis versus   early infiltrate. Recommend follow-up to document resolution. 2. Perhaps mild bronchial wall thickening at the lower lungs. Narrative:  AP CHEST, PORTABLE       INDICATION: Dyspnea, cough       COMPARISON: Chest x-ray 9/15/2020       FINDINGS:       Cardiac silhouette is stable. Atherosclerosis noted. Perhaps mild bronchial   wall thickening at the lower lungs. Mild streaky/hazy opacity at the left lung   base. No pleural effusion or pneumothorax. No acute osseous abnormalities are   identified. Medical Decision Making   I am the first provider for this patient. I reviewed the vital signs, available nursing notes, past medical history, past surgical history, family history and social history. Vital Signs-Reviewed the patient's vital signs. EKG: Interpreted by myself. EKG non diagnostic, without acute ST elevation, arrhythmia, prolonged QT, or evidence of Brugada       Records Reviewed: Personally, on initial evaluation    MDM:   Patient presents with dyspnea, which is associated with wheezing, cough, productive sputum. Exam significant for wheezing.    DDX considered: Pneumothorax, pneumonia, COPD exacerbation, CHF, ACS, anxiety, PE  DDX thought to be less likely but also considered due to high risk condition: Anxiety, aortic dissection, PE, Boerhaave's, pericarditis, mediastinitis      Orders Placed This Encounter    COVID-19 RAPID TEST    XR CHEST PORT    CBC WITH AUTOMATED DIFF BASIC METABOLIC PANEL    TROPONIN-HIGH SENSITIVITY    PRO-BNP    EKG, 12 LEAD, INITIAL    albuterol-ipratropium (DUO-NEB) 2.5 MG-0.5 MG/3 ML    DISCONTD: predniSONE (DELTASONE) tablet 60 mg    doxycycline (VIBRAMYCIN) 100 mg in 0.9% sodium chloride (MBP/ADV) 100 mL MBP    methylPREDNISolone (PF) (Solu-MEDROL) injection 125 mg    doxycycline (VIBRA-TABS) 100 mg tablet    predniSONE (DELTASONE) 20 mg tablet    albuterol (PROVENTIL HFA, VENTOLIN HFA, PROAIR HFA) 90 mcg/actuation inhaler    inhalational spacing device (BreatheRite MDI Spacer)        Presentation consistent with COPD exacerbation, wheezing, productive cough, shortness of breath, will treat for 3 criteria for gold criteria. ED Course:   ED Course as of 11/21/22 1342   Mon Nov 21, 2022   0944 Flipped T waves in V2 V3.   [DM]   1057 CBC without leukocytosis or anemia. No electrolyte abnormality on chemistry panel.     [DM]   1128 Room air saturation 94% [DM]   1130 Feeling much better at this time. No acute pathology necessitating further emergent workup or hospital admission is suspected or found. Will discharge home with copd treatment. She is comfortable with the plan and discharge at this time. Expressed the importance of follow up for current symptoms and she agrees and was advised on what signs/symptoms to return immediately to the ER. [DM]      ED Course User Index  [DM] Nader Nowak MD          Vitals Review/addressed -     Diagnostic Study Results     Orders Placed This Encounter    COVID-19 RAPID TEST     Standing Status:   Standing     Number of Occurrences:   1     Order Specific Question:   Is this test for diagnosis or screening? Answer:   Diagnosis of ill patient     Order Specific Question:   Symptomatic for COVID-19 as defined by CDC? Answer:   Yes     Order Specific Question:   Date of Symptom Onset     Answer:   11/21/2022     Order Specific Question:   Hospitalized for COVID-19? Answer:    No Order Specific Question:   Admitted to ICU for COVID-19? Answer:   No     Order Specific Question:   Employed in healthcare setting? Answer:   Unknown     Order Specific Question:   Resident in a congregate (group) care setting? Answer:   Unknown     Order Specific Question:   Pregnant? Answer:   No     Order Specific Question:   Previously tested for COVID-19? Answer: Yes    XR CHEST PORT     Standing Status:   Standing     Number of Occurrences:   1     Order Specific Question:   Reason for Exam     Answer:   chest pain, sob, and/or arrhythmia    CBC WITH AUTOMATED DIFF     Standing Status:   Standing     Number of Occurrences:   1    BASIC METABOLIC PANEL     Standing Status:   Standing     Number of Occurrences:   1    TROPONIN-HIGH SENSITIVITY     Standing Status:   Standing     Number of Occurrences:   1    PRO-BNP     Standing Status:   Standing     Number of Occurrences:   1    EKG, 12 LEAD, INITIAL     Standing Status:   Standing     Number of Occurrences:   1     Order Specific Question:   Reason for Exam:     Answer:   sob    albuterol-ipratropium (DUO-NEB) 2.5 MG-0.5 MG/3 ML     Order Specific Question:   MODE OF DELIVERY     Answer:   Nebulizer     Order Specific Question:   Initiate RT Bronchodilator Protocol     Answer:   Yes    DISCONTD: predniSONE (DELTASONE) tablet 60 mg    doxycycline (VIBRAMYCIN) 100 mg in 0.9% sodium chloride (MBP/ADV) 100 mL MBP     Order Specific Question:   Antibiotic Indications     Answer:   COPD Exacerbation    methylPREDNISolone (PF) (Solu-MEDROL) injection 125 mg    doxycycline (VIBRA-TABS) 100 mg tablet     Sig: Take 1 Tablet by mouth two (2) times a day for 7 days. Dispense:  14 Tablet     Refill:  0    predniSONE (DELTASONE) 20 mg tablet     Sig: Take 2 Tablets by mouth daily for 5 days.      Dispense:  10 Tablet     Refill:  0    albuterol (PROVENTIL HFA, VENTOLIN HFA, PROAIR HFA) 90 mcg/actuation inhaler     Sig: Take 2 Puffs by inhalation every four (4) hours as needed for Wheezing for up to 28 days. Dispense:  1 Each     Refill:  0    inhalational spacing device (BreatheRite MDI Spacer)     Si Each by Does Not Apply route as needed for Wheezing. Dispense:  1 Each     Refill:  0       Labs -     Recent Results (from the past 12 hour(s))   EKG, 12 LEAD, INITIAL    Collection Time: 22  9:41 AM   Result Value Ref Range    Ventricular Rate 70 BPM    Atrial Rate 70 BPM    P-R Interval 136 ms    QRS Duration 82 ms    Q-T Interval 420 ms    QTC Calculation (Bezet) 453 ms    Calculated P Axis 72 degrees    Calculated R Axis 71 degrees    Calculated T Axis 75 degrees    Diagnosis       Normal sinus rhythm  ST & T wave abnormality, consider anterior ischemia  Abnormal ECG  When compared with ECG of 19-DEC-2021 07:25,  No significant change was found     CBC WITH AUTOMATED DIFF    Collection Time: 22  9:48 AM   Result Value Ref Range    WBC 6.9 4.6 - 13.2 K/uL    RBC 5.40 (H) 4.20 - 5.30 M/uL    HGB 15.6 12.0 - 16.0 g/dL    HCT 47.4 (H) 35.0 - 45.0 %    MCV 87.8 78.0 - 100.0 FL    MCH 28.9 24.0 - 34.0 PG    MCHC 32.9 31.0 - 37.0 g/dL    RDW 12.3 11.6 - 14.5 %    PLATELET 839 255 - 145 K/uL    MPV 10.4 9.2 - 11.8 FL    NRBC 0.0 0  WBC    ABSOLUTE NRBC 0.00 0.00 - 0.01 K/uL    NEUTROPHILS 62 40 - 73 %    LYMPHOCYTES 25 21 - 52 %    MONOCYTES 10 3 - 10 %    EOSINOPHILS 2 0 - 5 %    BASOPHILS 1 0 - 2 %    IMMATURE GRANULOCYTES 0 0.0 - 0.5 %    ABS. NEUTROPHILS 4.3 1.8 - 8.0 K/UL    ABS. LYMPHOCYTES 1.7 0.9 - 3.6 K/UL    ABS. MONOCYTES 0.7 0.05 - 1.2 K/UL    ABS. EOSINOPHILS 0.1 0.0 - 0.4 K/UL    ABS. BASOPHILS 0.1 0.0 - 0.1 K/UL    ABS. IMM.  GRANS. 0.0 0.00 - 0.04 K/UL    DF AUTOMATED     METABOLIC PANEL, BASIC    Collection Time: 22  9:48 AM   Result Value Ref Range    Sodium 139 136 - 145 mmol/L    Potassium 4.0 3.5 - 5.5 mmol/L    Chloride 104 100 - 111 mmol/L    CO2 29 21 - 32 mmol/L    Anion gap 6 3.0 - 18 mmol/L    Glucose 97 74 - 99 mg/dL    BUN 9 7.0 - 18 MG/DL    Creatinine 0.64 0.6 - 1.3 MG/DL    BUN/Creatinine ratio 14 12 - 20      eGFR >60 >60 ml/min/1.73m2    Calcium 9.0 8.5 - 10.1 MG/DL   TROPONIN-HIGH SENSITIVITY    Collection Time: 11/21/22  9:48 AM   Result Value Ref Range    Troponin-High Sensitivity 4 0 - 54 ng/L   NT-PRO BNP    Collection Time: 11/21/22  9:48 AM   Result Value Ref Range    NT pro- 0 - 900 PG/ML   COVID-19 RAPID TEST    Collection Time: 11/21/22 10:17 AM   Result Value Ref Range    Specimen source Nasopharyngeal      COVID-19 rapid test Not detected NOTD         Radiologic Studies -   XR CHEST PORT   Final Result      1. Mild streaky/hazy opacity at the left lower lung, possibly atelectasis versus   early infiltrate. Recommend follow-up to document resolution. 2. Perhaps mild bronchial wall thickening at the lower lungs. CT Results  (Last 48 hours)      None          CXR Results  (Last 48 hours)                 11/21/22 0903  XR CHEST PORT Final result    Impression:      1. Mild streaky/hazy opacity at the left lower lung, possibly atelectasis versus   early infiltrate. Recommend follow-up to document resolution. 2. Perhaps mild bronchial wall thickening at the lower lungs. Narrative:  AP CHEST, PORTABLE       INDICATION: Dyspnea, cough       COMPARISON: Chest x-ray 9/15/2020       FINDINGS:       Cardiac silhouette is stable. Atherosclerosis noted. Perhaps mild bronchial   wall thickening at the lower lungs. Mild streaky/hazy opacity at the left lung   base. No pleural effusion or pneumothorax. No acute osseous abnormalities are   identified. Disposition     Disposition:  Home    CLINICAL IMPRESSION:    1. COPD exacerbation ([address]    2.  SOB (shortness of breath)        It should be noted that I will be the provider of record for this patient  Devendra Sutton MD    Follow-up Information       Follow up With Specialties Details Why Contact Info    [facility] EMERGENCY DEPT Emergency Medicine Go to  If symptoms worsen [address] Miguelina Macedo MD Internal Medicine Physician   [address] [facility] [address] [facility] [address]      Angeles Castaneda MD Pulmonary Disease, Urgent Care, Internal Medicine Physician Call in 1 day  [address] [facility]  [phone]              Discharge Medication List as of 11/21/2022 11:30 AM        START taking these medications    Details   doxycycline (VIBRA-TABS) 100 mg tablet Take 1 Tablet by mouth two (2) times a day for 7 days. , Normal, Disp-14 Tablet, R-0      predniSONE (DELTASONE) 20 mg tablet Take 2 Tablets by mouth daily for 5 days. , Normal, Disp-10 Tablet, R-0      inhalational spacing device (BreatheRite MDI Spacer) 1 Each by Does Not Apply route as needed for Wheezing., Normal, Disp-1 Each, R-0           CONTINUE these medications which have CHANGED    Details   albuterol (PROVENTIL HFA, VENTOLIN HFA, PROAIR HFA) 90 mcg/actuation inhaler Take 2 Puffs by inhalation every four (4) hours as needed for Wheezing for up to 28 days. , Normal, Disp-1 Each, R-0           CONTINUE these medications which have NOT CHANGED    Details   lisinopriL (PRINIVIL, ZESTRIL) 10 mg tablet Take 1 Tablet by mouth daily. , Normal, Disp-30 Tablet, R-2      atorvastatin (LIPITOR) 10 mg tablet Take 1 Tablet by mouth nightly., Normal, Disp-30 Tablet, R-2      simethicone (MYLICON) 80 mg chewable tablet Take 1 Tablet by mouth every six (6) hours as needed for Flatulence or GI Pain. Indications: gas pain, Normal, Disp-30 Tablet, R-0      diclofenac (Voltaren) 1 % gel Apply 4 g to affected area four (4) times daily. Maximum 16 grams per joint per day. Dispense 5 100 gram tubes, Normal, Disp-5 Each, R-0      ALPRAZolam (XANAX) 0.5 mg tablet Take 0.5 mg by mouth as needed for Anxiety. , Historical Med      traZODone (DESYREL) 100 mg tablet Take 100 mg by mouth nightly., Historical Med      sertraline (ZOLOFT) 50 mg tablet Take 125 mg by mouth daily. Taking 2.5 tablets daily, Historical Med      ibuprofen 100 mg tablet Take 100 mg by mouth every six (6) hours as needed for Pain., Historical Med      cholecalciferol (VITAMIN D3) 25 mcg (1,000 unit) cap Take  by mouth daily. , Historical Med             Please note that this dictation was completed with "[employer]", the computer voice recognition software. Quite often unanticipated grammatical, syntax, homophones, and other interpretive errors are inadvertently transcribed by the computer software. Please disregard these errors. Please excuse any errors that have escaped final proofreading.

## 2023-08-03 ENCOUNTER — APPOINTMENT (OUTPATIENT)
Facility: HOSPITAL | Age: 63
End: 2023-08-03
Payer: COMMERCIAL

## 2023-08-03 ENCOUNTER — HOSPITAL ENCOUNTER (EMERGENCY)
Facility: HOSPITAL | Age: 63
Discharge: ANOTHER HOSPITAL | End: 2023-08-03
Attending: EMERGENCY MEDICINE
Payer: COMMERCIAL

## 2023-08-03 VITALS
HEIGHT: 64 IN | OXYGEN SATURATION: 98 % | WEIGHT: 170 LBS | RESPIRATION RATE: 23 BRPM | TEMPERATURE: 98 F | BODY MASS INDEX: 29.02 KG/M2 | HEART RATE: 71 BPM | SYSTOLIC BLOOD PRESSURE: 179 MMHG | DIASTOLIC BLOOD PRESSURE: 72 MMHG

## 2023-08-03 DIAGNOSIS — I63.9 ACUTE ISCHEMIC STROKE (HCC): Primary | ICD-10-CM

## 2023-08-03 LAB
ALBUMIN SERPL-MCNC: 3.5 G/DL (ref 3.4–5)
ALBUMIN/GLOB SERPL: 0.9 (ref 0.8–1.7)
ALP SERPL-CCNC: 60 U/L (ref 45–117)
ALT SERPL-CCNC: 14 U/L (ref 13–56)
ANION GAP SERPL CALC-SCNC: 5 MMOL/L (ref 3–18)
AST SERPL-CCNC: 18 U/L (ref 10–38)
BASOPHILS # BLD: 0.1 K/UL (ref 0–0.1)
BASOPHILS NFR BLD: 2 % (ref 0–2)
BILIRUB SERPL-MCNC: 0.8 MG/DL (ref 0.2–1)
BUN SERPL-MCNC: 9 MG/DL (ref 7–18)
BUN/CREAT SERPL: 12 (ref 12–20)
CALCIUM SERPL-MCNC: 8.7 MG/DL (ref 8.5–10.1)
CHLORIDE SERPL-SCNC: 104 MMOL/L (ref 100–111)
CO2 SERPL-SCNC: 24 MMOL/L (ref 21–32)
CREAT SERPL-MCNC: 0.73 MG/DL (ref 0.6–1.3)
DIFFERENTIAL METHOD BLD: ABNORMAL
EOSINOPHIL # BLD: 0.1 K/UL (ref 0–0.4)
EOSINOPHIL NFR BLD: 2 % (ref 0–5)
ERYTHROCYTE [DISTWIDTH] IN BLOOD BY AUTOMATED COUNT: 12.8 % (ref 11.6–14.5)
GLOBULIN SER CALC-MCNC: 3.8 G/DL (ref 2–4)
GLUCOSE BLD STRIP.AUTO-MCNC: 100 MG/DL (ref 70–110)
GLUCOSE SERPL-MCNC: 101 MG/DL (ref 74–99)
HCT VFR BLD AUTO: 47.3 % (ref 35–45)
HGB BLD-MCNC: 15.8 G/DL (ref 12–16)
IMM GRANULOCYTES # BLD AUTO: 0 K/UL (ref 0–0.04)
IMM GRANULOCYTES NFR BLD AUTO: 0 % (ref 0–0.5)
INR PPP: 1.1 (ref 0.9–1.1)
LYMPHOCYTES # BLD: 1.4 K/UL (ref 0.9–3.6)
LYMPHOCYTES NFR BLD: 27 % (ref 21–52)
MCH RBC QN AUTO: 29.4 PG (ref 24–34)
MCHC RBC AUTO-ENTMCNC: 33.4 G/DL (ref 31–37)
MCV RBC AUTO: 87.9 FL (ref 78–100)
MONOCYTES # BLD: 0.4 K/UL (ref 0.05–1.2)
MONOCYTES NFR BLD: 9 % (ref 3–10)
NEUTS SEG # BLD: 3 K/UL (ref 1.8–8)
NEUTS SEG NFR BLD: 59 % (ref 40–73)
NRBC # BLD: 0 K/UL (ref 0–0.01)
NRBC BLD-RTO: 0 PER 100 WBC
PLATELET # BLD AUTO: 173 K/UL (ref 135–420)
PMV BLD AUTO: 10.1 FL (ref 9.2–11.8)
POTASSIUM SERPL-SCNC: 3.9 MMOL/L (ref 3.5–5.5)
PROT SERPL-MCNC: 7.3 G/DL (ref 6.4–8.2)
PROTHROMBIN TIME: 14 SEC (ref 11.9–14.7)
RBC # BLD AUTO: 5.38 M/UL (ref 4.2–5.3)
SODIUM SERPL-SCNC: 133 MMOL/L (ref 136–145)
TROPONIN I SERPL HS-MCNC: 5 NG/L (ref 0–54)
WBC # BLD AUTO: 5 K/UL (ref 4.6–13.2)

## 2023-08-03 PROCEDURE — 85025 COMPLETE CBC W/AUTO DIFF WBC: CPT

## 2023-08-03 PROCEDURE — 70450 CT HEAD/BRAIN W/O DYE: CPT

## 2023-08-03 PROCEDURE — 70498 CT ANGIOGRAPHY NECK: CPT

## 2023-08-03 PROCEDURE — 93005 ELECTROCARDIOGRAM TRACING: CPT | Performed by: EMERGENCY MEDICINE

## 2023-08-03 PROCEDURE — 85610 PROTHROMBIN TIME: CPT

## 2023-08-03 PROCEDURE — 99285 EMERGENCY DEPT VISIT HI MDM: CPT

## 2023-08-03 PROCEDURE — 82962 GLUCOSE BLOOD TEST: CPT

## 2023-08-03 PROCEDURE — 80053 COMPREHEN METABOLIC PANEL: CPT

## 2023-08-03 PROCEDURE — 2580000003 HC RX 258: Performed by: EMERGENCY MEDICINE

## 2023-08-03 PROCEDURE — 84484 ASSAY OF TROPONIN QUANT: CPT

## 2023-08-03 PROCEDURE — 6360000004 HC RX CONTRAST MEDICATION: Performed by: EMERGENCY MEDICINE

## 2023-08-03 RX ORDER — SODIUM CHLORIDE 9 MG/ML
INJECTION, SOLUTION INTRAVENOUS CONTINUOUS
Status: DISCONTINUED | OUTPATIENT
Start: 2023-08-03 | End: 2023-08-03 | Stop reason: HOSPADM

## 2023-08-03 RX ADMIN — SODIUM CHLORIDE: 900 INJECTION, SOLUTION INTRAVENOUS at 08:44

## 2023-08-03 RX ADMIN — IOPAMIDOL 80 ML: 755 INJECTION, SOLUTION INTRAVENOUS at 07:44

## 2023-08-03 ASSESSMENT — PAIN - FUNCTIONAL ASSESSMENT: PAIN_FUNCTIONAL_ASSESSMENT: 0-10

## 2023-08-03 ASSESSMENT — PAIN SCALES - GENERAL: PAINLEVEL_OUTOF10: 7

## 2023-08-03 NOTE — PROGRESS NOTES
[facility]   Code Stroke 3 note    Start Time 9919  End Time 2570     responded to Code Stroke 3 for  [employer], who is a 61 y.o.,female,     Service Provided For[de-identified] Patient and family together  and the following Interventions      Type: Code Stroke                     The following outcomes were achieved: Outcome: Expressed Gratitude, Coping, Expressed feelings, needs, and concerns,Took family to IR waiting room for prayer and support. Assessment:  There are no spiritual or Rastafarian issues which require intervention at this time.      Plan:  Chaplains will Plan/Referrals: Continue Support (comment)    Time Spent: Total Time Calculated: 30 min      [address]  [phone]

## 2023-08-03 NOTE — ED NOTES
Report given to [facility]. All questions answrered. EMTALA filled out and given to transport. Unable to obtain CD at this time from CT scan, per charge nurse just go ahead and send pt to [facility].      Arpit Pearl RN  08/03/23 2502

## 2023-08-03 NOTE — ED PROVIDER NOTES
EMERGENCY DEPARTMENT HISTORY AND PHYSICAL EXAM    7:16 AM      Date: 8/3/2023  Patient Name: Tere Vick    History of Presenting Illness     Chief Complaint   Patient presents with    Cerebrovascular Accident       History From: {[id number]}  HPI       Nursing Notes were all reviewed and agreed with or any disagreements were addressed in the HPI. PCP: Nessa Mcallister MD    No current facility-administered medications for this encounter. Current Outpatient Medications   Medication Sig Dispense Refill    ALPRAZolam (XANAX) 0.5 MG tablet Take 0.5 mg by mouth as needed.       atorvastatin (LIPITOR) 10 MG tablet Take 10 mg by mouth      vitamin D 25 MCG (1000 UT) CAPS Take by mouth daily      diclofenac sodium (VOLTAREN) 1 % GEL Apply 4 g topically 4 times daily      ibuprofen (ADVIL;MOTRIN) 100 MG tablet Take 100 mg by mouth every 6 hours as needed      lisinopril (PRINIVIL;ZESTRIL) 10 MG tablet Take 10 mg by mouth daily      sertraline (ZOLOFT) 50 MG tablet Take 125 mg by mouth daily      simethicone (MYLICON) 80 MG chewable tablet Take 80 mg by mouth every 6 hours as needed      traZODone (DESYREL) 100 MG tablet Take 100 mg by mouth         Past History     Past Medical History:  Past Medical History:   Diagnosis Date    Arthritis     Hepatitis C     treated     Hypercholesterolemia     Hypertension     no meds     Sleep apnea     no CPAP        Past Surgical History:  Past Surgical History:   Procedure Laterality Date     SECTION  1984    CHOLECYSTECTOMY  2005    HYSTERECTOMY (CERVIX STATUS UNKNOWN)      KNEE ARTHROSCOPY  2009    Bilateral    STPLER HEMMORROID PPH01      TONSILLECTOMY  1968    TOTAL KNEE ARTHROPLASTY         Family History:  Family History   Problem Relation Age of Onset    High Cholesterol Father     Diabetes Sister     High Cholesterol Sister     High Cholesterol Mother     Stroke Mother     Heart Disease Father        Social History:  Social History     Tobacco Use

## 2023-08-04 LAB
EKG ATRIAL RATE: 66 BPM
EKG DIAGNOSIS: NORMAL
EKG P-R INTERVAL: 142 MS
EKG Q-T INTERVAL: 438 MS
EKG QRS DURATION: 84 MS
EKG QTC CALCULATION (BAZETT): 459 MS
EKG R AXIS: 99 DEGREES
EKG T AXIS: 103 DEGREES
EKG VENTRICULAR RATE: 66 BPM

## 2023-08-04 PROCEDURE — 93010 ELECTROCARDIOGRAM REPORT: CPT | Performed by: INTERNAL MEDICINE

## 2023-08-08 ASSESSMENT — ENCOUNTER SYMPTOMS
EYES NEGATIVE: 1
CHEST TIGHTNESS: 0
ABDOMINAL PAIN: 0

## 2023-08-09 ENCOUNTER — HOSPITAL ENCOUNTER (INPATIENT)
Facility: HOSPITAL | Age: 63
LOS: 16 days | Discharge: HOME | End: 2023-08-25
Attending: EMERGENCY MEDICINE | Admitting: EMERGENCY MEDICINE
Payer: COMMERCIAL

## 2023-08-09 DIAGNOSIS — I63.9 ACUTE CVA (CEREBROVASCULAR ACCIDENT) (HCC): Primary | ICD-10-CM

## 2023-08-09 PROCEDURE — 1180000000 HC REHAB R&B

## 2023-08-09 PROCEDURE — 94761 N-INVAS EAR/PLS OXIMETRY MLT: CPT

## 2023-08-09 PROCEDURE — 6370000000 HC RX 637 (ALT 250 FOR IP): Performed by: EMERGENCY MEDICINE

## 2023-08-09 RX ORDER — ATORVASTATIN CALCIUM 40 MG/1
40 TABLET, FILM COATED ORAL NIGHTLY
Status: DISCONTINUED | OUTPATIENT
Start: 2023-08-09 | End: 2023-08-25 | Stop reason: HOSPADM

## 2023-08-09 RX ORDER — LISINOPRIL 5 MG/1
10 TABLET ORAL DAILY
Status: DISCONTINUED | OUTPATIENT
Start: 2023-08-10 | End: 2023-08-25 | Stop reason: HOSPADM

## 2023-08-09 RX ORDER — ACETAMINOPHEN 325 MG/1
650 TABLET ORAL EVERY 4 HOURS PRN
Status: DISCONTINUED | OUTPATIENT
Start: 2023-08-09 | End: 2023-08-25 | Stop reason: HOSPADM

## 2023-08-09 RX ORDER — POLYETHYLENE GLYCOL 3350 17 G/17G
17 POWDER, FOR SOLUTION ORAL DAILY PRN
Status: DISCONTINUED | OUTPATIENT
Start: 2023-08-09 | End: 2023-08-25 | Stop reason: HOSPADM

## 2023-08-09 RX ORDER — VITAMIN B COMPLEX
1000 TABLET ORAL DAILY
Status: DISCONTINUED | OUTPATIENT
Start: 2023-08-10 | End: 2023-08-25 | Stop reason: HOSPADM

## 2023-08-09 RX ORDER — ASPIRIN 81 MG/1
81 TABLET, CHEWABLE ORAL DAILY
Status: DISCONTINUED | OUTPATIENT
Start: 2023-08-10 | End: 2023-08-25 | Stop reason: HOSPADM

## 2023-08-09 RX ORDER — TRAZODONE HYDROCHLORIDE 50 MG/1
50 TABLET ORAL NIGHTLY
Status: DISCONTINUED | OUTPATIENT
Start: 2023-08-09 | End: 2023-08-25 | Stop reason: HOSPADM

## 2023-08-09 RX ADMIN — TRAZODONE HYDROCHLORIDE 50 MG: 50 TABLET ORAL at 20:57

## 2023-08-09 RX ADMIN — ATORVASTATIN CALCIUM 40 MG: 40 TABLET, FILM COATED ORAL at 20:56

## 2023-08-09 ASSESSMENT — PAIN SCALES - GENERAL: PAINLEVEL_OUTOF10: 0

## 2023-08-10 LAB
ANION GAP SERPL CALC-SCNC: 4 MMOL/L (ref 3–18)
BASOPHILS # BLD: 0.1 K/UL (ref 0–0.1)
BASOPHILS NFR BLD: 1 % (ref 0–2)
BUN SERPL-MCNC: 14 MG/DL (ref 7–18)
BUN/CREAT SERPL: 23 (ref 12–20)
CALCIUM SERPL-MCNC: 8.5 MG/DL (ref 8.5–10.1)
CHLORIDE SERPL-SCNC: 105 MMOL/L (ref 100–111)
CO2 SERPL-SCNC: 29 MMOL/L (ref 21–32)
CREAT SERPL-MCNC: 0.6 MG/DL (ref 0.6–1.3)
DIFFERENTIAL METHOD BLD: NORMAL
EOSINOPHIL # BLD: 0.2 K/UL (ref 0–0.4)
EOSINOPHIL NFR BLD: 4 % (ref 0–5)
ERYTHROCYTE [DISTWIDTH] IN BLOOD BY AUTOMATED COUNT: 12.6 % (ref 11.6–14.5)
GLUCOSE SERPL-MCNC: 101 MG/DL (ref 74–99)
HCT VFR BLD AUTO: 39.7 % (ref 35–45)
HGB BLD-MCNC: 12.9 G/DL (ref 12–16)
IMM GRANULOCYTES # BLD AUTO: 0 K/UL (ref 0–0.04)
IMM GRANULOCYTES NFR BLD AUTO: 0 % (ref 0–0.5)
LYMPHOCYTES # BLD: 1.8 K/UL (ref 0.9–3.6)
LYMPHOCYTES NFR BLD: 34 % (ref 21–52)
MAGNESIUM SERPL-MCNC: 2.1 MG/DL (ref 1.6–2.6)
MCH RBC QN AUTO: 28.5 PG (ref 24–34)
MCHC RBC AUTO-ENTMCNC: 32.5 G/DL (ref 31–37)
MCV RBC AUTO: 87.6 FL (ref 78–100)
MONOCYTES # BLD: 0.5 K/UL (ref 0.05–1.2)
MONOCYTES NFR BLD: 10 % (ref 3–10)
NEUTS SEG # BLD: 2.8 K/UL (ref 1.8–8)
NEUTS SEG NFR BLD: 52 % (ref 40–73)
NRBC # BLD: 0 K/UL (ref 0–0.01)
NRBC BLD-RTO: 0 PER 100 WBC
PLATELET # BLD AUTO: 177 K/UL (ref 135–420)
PMV BLD AUTO: 10.7 FL (ref 9.2–11.8)
POTASSIUM SERPL-SCNC: 3.6 MMOL/L (ref 3.5–5.5)
RBC # BLD AUTO: 4.53 M/UL (ref 4.2–5.3)
SODIUM SERPL-SCNC: 138 MMOL/L (ref 136–145)
WBC # BLD AUTO: 5.4 K/UL (ref 4.6–13.2)

## 2023-08-10 PROCEDURE — 99223 1ST HOSP IP/OBS HIGH 75: CPT | Performed by: EMERGENCY MEDICINE

## 2023-08-10 PROCEDURE — 6370000000 HC RX 637 (ALT 250 FOR IP): Performed by: EMERGENCY MEDICINE

## 2023-08-10 PROCEDURE — 36415 COLL VENOUS BLD VENIPUNCTURE: CPT

## 2023-08-10 PROCEDURE — 92523 SPEECH SOUND LANG COMPREHEN: CPT

## 2023-08-10 PROCEDURE — 97162 PT EVAL MOD COMPLEX 30 MIN: CPT

## 2023-08-10 PROCEDURE — 97116 GAIT TRAINING THERAPY: CPT

## 2023-08-10 PROCEDURE — 80048 BASIC METABOLIC PNL TOTAL CA: CPT

## 2023-08-10 PROCEDURE — 92610 EVALUATE SWALLOWING FUNCTION: CPT

## 2023-08-10 PROCEDURE — 83735 ASSAY OF MAGNESIUM: CPT

## 2023-08-10 PROCEDURE — 97112 NEUROMUSCULAR REEDUCATION: CPT

## 2023-08-10 PROCEDURE — 97530 THERAPEUTIC ACTIVITIES: CPT

## 2023-08-10 PROCEDURE — 94761 N-INVAS EAR/PLS OXIMETRY MLT: CPT

## 2023-08-10 PROCEDURE — 97542 WHEELCHAIR MNGMENT TRAINING: CPT

## 2023-08-10 PROCEDURE — 85025 COMPLETE CBC W/AUTO DIFF WBC: CPT

## 2023-08-10 PROCEDURE — 97166 OT EVAL MOD COMPLEX 45 MIN: CPT

## 2023-08-10 PROCEDURE — 97535 SELF CARE MNGMENT TRAINING: CPT

## 2023-08-10 PROCEDURE — 92522 EVALUATE SPEECH PRODUCTION: CPT

## 2023-08-10 PROCEDURE — 1180000000 HC REHAB R&B

## 2023-08-10 RX ADMIN — ASPIRIN 81 MG CHEWABLE TABLET 81 MG: 81 TABLET CHEWABLE at 09:28

## 2023-08-10 RX ADMIN — CHOLECALCIFEROL TAB 25 MCG (1000 UNIT) 1000 UNITS: 25 TAB at 09:29

## 2023-08-10 RX ADMIN — TRAZODONE HYDROCHLORIDE 50 MG: 50 TABLET ORAL at 21:09

## 2023-08-10 RX ADMIN — SERTRALINE 125 MG: 50 TABLET, FILM COATED ORAL at 09:28

## 2023-08-10 RX ADMIN — ATORVASTATIN CALCIUM 40 MG: 40 TABLET, FILM COATED ORAL at 21:09

## 2023-08-10 RX ADMIN — LISINOPRIL 10 MG: 5 TABLET ORAL at 09:30

## 2023-08-10 ASSESSMENT — PAIN SCALES - GENERAL
PAINLEVEL_OUTOF10: 0

## 2023-08-10 NOTE — PROGRESS NOTES
Postural Assessment Scale for Stroke Patients (PASS)  Scoring Form    Patient: Jaden Johnson (39 y.o. female)  Date: 8/10/2023  Diagnosis: Acute CVA (cerebrovascular accident) [facility]) [I63.9] Acute CVA (cerebrovascular accident) [facility])  Precautions:    Evaluator: Sushant Montez PT      Maintaining a Posture    Give the subject instructions for each item as written below. When score the item, record the lowest response category that applies for each item. 1. Sitting without support    Examiner: Have the subject sit on a bench/mat without back support and with feet flat on the floor. [x] (3) Can sit for 5 minutes without support  [] (2) Can sit for more than 10 seconds without support  [] (1) can sit with slight support (for example, by 1 hand)  [] (0) cannot sit    2. Standing with support    Examiner: Have the subject stand, providing support as needed. Evaluate only the ability to stand with our without support. Do not consider the quality of the stance.    [] (3) Can stand with support of only 1 hand  [x] (2) Can stand with moderate support of 1 person  [] (1) Can stand with strong support of 2 people  [] (0) Cannot stand, even with support    3. Standing without support. Examiner: Have the subject stand without support. Evaluate only the ability to stand with or without support. Do not consider the quality of the stance.    [] (3) Can stand without support for more than 1 minutes and simultaneously perform arm movements at about shoulder level  [] (2) Can stand without support for 1 minute or stand slightly asymmetrically  [] (1) Can stand without support for 10 seconds or leans heavily on 1 leg  [x] (0) Cannot stand without support    4. Standing on nonparetic leg    Examiner: Have the subject stand on the nonparetic leg. Evaluate only the ability to bear weight entirely on the nonparetic leg.  Do not consider how the subject accomplishes the task.    [] (3) Can stand on nonparetic leg for more than

## 2023-08-10 NOTE — PLAN OF CARE
Problem: Chronic Conditions and Co-morbidities  Goal: Patient's chronic conditions and co-morbidity symptoms are monitored and maintained or improved  Outcome: Progressing  Flowsheets (Taken 8/9/2023 1623 by Gregorio Hughes LPN)  Care Plan - Patient's Chronic Conditions and Co-Morbidity Symptoms are Monitored and Maintained or Improved: Monitor and assess patient's chronic conditions and comorbid symptoms for stability, deterioration, or improvement     Problem: Skin/Tissue Integrity  Goal: Absence of new skin breakdown  Description: 1. Monitor for areas of redness and/or skin breakdown  2. Assess vascular access sites hourly  3. Every 4-6 hours minimum:  Change oxygen saturation probe site  4. Every 4-6 hours:  If on nasal continuous positive airway pressure, respiratory therapy assess nares and determine need for appliance change or resting period.   Outcome: Progressing     Problem: Safety - Adult  Goal: Free from fall injury  Outcome: Progressing     Problem: ABCDS Injury Assessment  Goal: Absence of physical injury  Outcome: Progressing

## 2023-08-10 NOTE — H&P
[id number] [facility]  [address] [facility], [address] [place] [address]     INPATIENT REHABILITATION  HISTORY AND PHYSICAL      Name: Wendy Lau CSN: [id number]   Age: 61 y.o. MRN: [id number]   Sex: female Admit Date: 8/9/2023     PCP: Janie Saavedra MD         Subjective:     Patient seen and examined. History of the Present Illness: This is a 64-year old female with a past history of hypertension and tobacco use who was recently admitted to [facility] with an acute stroke with right-sided weakness. Patient was noted to have an acute occlusion of the left M1 and underwent thrombolysis. Patient was noted to have a subarachnoid hemorrhage postprocedure. Subsequently patient has remained stable. Patient was evaluated by PT and OT and it was felt that patient may benefit from transitioning to the inpatient rehab. For full details please refer to chart    The patient  was noted to have impaired mobility and ADLs. Patient was felt to be a good candidate for acute inpatient rehabilitation. Upon evaluation by Physical Therapy and Occupational Therapy, the patient was recommended for acute inpatient rehabilitation. The patient was discharged and was subsequently admitted to the [facility] for intensive rehabilitation to help recover strength, function and mobility in the setting of acute cerebral infarction due to unspecified occlusion or stenosis of the left middle cerebral artery with right hemiparesis and dysarthria and dysphagia. I saw the patient in the inpatient rehab at [facility]. Patient is sitting in bed in no apparent distress. Patient is awake and alert and follows command and responds appropriately. No history of any chest pain or shortness of breath. No history of any fever chills or cough. No history of any dizziness. No history of any nausea vomiting or abdominal pain. No history of any urinary complaints.   No history of atorvastatin    -anxiety and depression  Continue Zoloft and trazodone. Patient is in good spirits. Patient denies any suicidal ideation    -History of tobacco use  Encouraged abstinence      The domains of functional impairment present in this patient which will require an intensive and interdisciplinary rehabilitation environment include self care, mobility, motor dysfunction, bowel/bladder management, safety, cognitive function, and communication. Estimated length of stay for this admission 14days    Anticipated disposition: Home. The potential to achieve that is good. PRECAUTIONS:   1. Safety/fall precautions. 2. Deep venous thrombosis precautions. 3. Aspiration precautions. POTENTIAL BARRIERS TO DISCHARGE:   1. Risk for falls. 2. Current home layout. 3. Family limited in ability to provide constant care. 4. Limited community resources. RELEVANT CHANGES SINCE PREADMISSION SCREENING: I have compared the patient's medical and functional status at the time of the pre-admission screening and on this post-admission evaluation. The preadmission screen and findings from therapy evaluations both support my post admission physician evaluation, deeming this patient to be an appropriate candidate for the IRF. The patient requires multidisciplinary treatment, physician oversight and intensive therapy not provided at a lower level of care. By signing this document, I acknowledge that I have personally performed a full physical examination on this patient within 24 hours of admission to this inpatient rehabilitation facility and have determined the patient to be able to tolerate the above course of treatment at an intensive level for a reasonable period of time. I will be completing a detailed individualized plan of care for this patient by day #4 of the patient's stay based upon the Pre-Admission Screen, the History and Physical Evaluation, and the therapy evaluations.         Total clinical

## 2023-08-10 NOTE — PLAN OF CARE
Problem: SLP Adult - Impaired Communication  Goal: By Discharge: Demonstrates communication skills at highest level of function for planned discharge setting. See evaluation for individualized goals. Description: Long term goals (initiated 8/10/23; to be completed by 8/31/23)  Patient will:  1. Perform oral/pharyngeal strengthening exercises with min cues. 2. Use safe swallowing techniques of slow rate of intake, small bites/sips, clear mouth between boluses, alternate liquids and solids, perform repeat swallow as needed, supervision. 3. Tolerate easy to chew diet and thin liquids without overt s/s of aspiration or other difficulty. 4. Follow 2 part instructions with 80% accuracy. 5. Name common objects with 90% accuracy; describe item function with 70-80% accuracy. 6. Respond to yes/no questions re: general information, 80% accuracy. 7. Respond to simply phrased requests for information, 70-80% accuracy. Short term goals ( by 8/17/23)  Patient will:  1. Perform oral/pharyngeal strengthening exercises with mod cues and models. 2. Use safe swallowing techniques of slow rate of intake, small bites/sips, clear mouth between boluses, alternate liquids and solids, perform repeat swallow as needed, mod cues. 3. Tolerate easy to chew diet and thin liquids without overt s/s of aspiration or other difficulty. 4. Follow 2 part instructions with 60% accuracy. 5. Name common objects with 80% accuracy; describe item function with 60% accuracy. 6. Respond to yes/no questions re: general information, 70% accuracy. 7. Respond to simply phrased requests for information, 60% accuracy.   Note:   SPEECH LANGUAGE PATHOLOGY BEDSIDE SWALLOW AND SPEECH/LANGUAGE EVALUATION    Patient: Dya Maldonado (38 y.o. female)  Date: 8/10/2023  Primary Diagnosis: Acute CVA (cerebrovascular accident) ([address]) [I63.9]       Precautions: Aspiration  PLOF: As per H&P    DYSPHAGIA ASSESSMENT:  Based on the objective data described below, arrived. OBJECTIVE:     Past Medical History:   Diagnosis Date    Arthritis     Hepatitis C     treated     Hypercholesterolemia     Hypertension     no meds     Sleep apnea     no CPAP      Past Surgical History:   Procedure Laterality Date     SECTION  1984    CHOLECYSTECTOMY  2005    HYSTERECTOMY (CERVIX STATUS UNKNOWN)  2008    KNEE ARTHROSCOPY  2009    Bilateral    NEUROVASCULAR PROCEDURE N/A 8/3/2023    Angiography cerebral intracrnial w anesthesia (02452) performed by Brenda Boston MD at [address] [place] [address]       Prior Level of Function/Home Situation: Social/Functional History  Lives With: Daughter (and grandchildren)  Type of Home: House  Home Layout: One level  Home Access: Stairs to enter with rails  Entrance Stairs - Number of Steps: 3  Entrance Stairs - Rails: Both (wide apart)  Bathroom Shower/Tub: Tub/Shower unit  Bathroom Toilet: Standard  Bathroom Equipment: None  Bathroom Accessibility: Not accessible  Home Equipment: None  Has the patient had two or more falls in the past year or any fall with injury in the past year?: No  Receives Help From: Family (2 daughters and grandchildren)  ADL Assistance: Independent  Homemaking Assistance: Independent  Ambulation Assistance: Independent  Transfer Assistance: Independent  Active : Yes  Mode of Transportation: [facility]  Occupation: Full time employment  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  Trials:  Ice chips: Managed WFL  Puree:  Managed WFL  Thin liquid: Managed WFL  Cracker: Protracted oral preparation    Significant residue in mouth

## 2023-08-10 NOTE — PLAN OF CARE
assistance  Level tile   Distance 8 ft   Comments  Gait deviations as above. PT provided manual support to R LE to assist with supporting R knee, advancing R LE, and preventing R LE scissoring       STEPS/STAIRS Initial Assessment 8/10/2023   Steps/Stairs ambulated 3   Step Height 4\"   Rail Use Right ascending (rail in R hand also descending)     Assistance Level Moderate assistance   Comments  Pt with difficulty lifting Right foot high enough to clear edge of steps both ascending and descending; needing min A to help manage R LE and min A for steadying assist   Curbs/Ramps  NT       Neuromuscular re-education:   PASS balance assessment completed with pt scoring a 16/36 indicating high fall risk  Maintaining a position: 5/15  Changing a position: 11/21    ASSESSMENT :  Based on the objective data described above, the patient presents with decreased strength and AROM R LE, impaired standing balance, decreased endurance, decreased coordination, and impaired functional mobility due to a CVA with R side hemiplegia. Pt is at increased risk of falls. Pt PLOF was independent without AD. Pt is cooperative and motivated. Pt will benefit from skilled PT intervention. Patient will benefit from skilled intervention to address the above impairments. Patient's rehabilitation potential is considered to be Therapy Prognosis: Good  Factors which may influence rehabilitation potential include:   []         None noted  []         Mental ability/status  [x]         Medical condition  []         Home/family situation and support systems  [x]         Safety awareness  []         Pain tolerance/management  [x]         Other: aphasia     PLAN :  Please see above, or care plan section of chart, for STG's and LTG's. Thank you    Order received from MD for physical therapy services and chart reviewed. Pt to be seen 5 times per week for 3 hours of total therapy per day for 3 weeks.   Thank you for the referral.    Pt would benefit therapy, but is neutral about his/her participation. []         Patient is unable to participate in goal setting and plan of care.       Thank you for this referral.  Ericka Brock, PT  8/10/2023

## 2023-08-10 NOTE — PLAN OF CARE
Problem: Occupational Therapy - Adult  Goal: By Discharge: Performs self-care activities at highest level of function for planned discharge setting. See evaluation for individualized goals. Description: Occupational Therapy Goals   Long Term Goals  Initiated 8/10/23 and to be accomplished within 2-3 week(s). 1. Pt will perform self-feeding with modified independence. 2. Pt will perform grooming with modified independence. 3. Pt will perform UB bathing with supervision. 4. Pt will perform LB bathing with supervision. 5. Pt will perform tub/shower transfer with supervision. 6. Pt will perform UB dressing with modified independence. 7. Pt will perform LB dressing with supervision. 8. Pt will perform toileting task with supervision. 9. Pt will perform toilet transfer with supervision. Short Term Goals   Initiated 8/10/23 and to be accomplished within 7 day(s). 1. Pt will perform self-feeding with modified independence. 2. Pt will perform grooming with modified independence. 3. Pt will perform UB bathing with minimal assistance. 4. Pt will perform LB bathing with minimal assistance. 5. Pt will perform tub/shower transfer with supervision. 6. Pt will perform UB dressing with minimal assistance. 7. Pt will perform LB dressing with minimal assistance. 8. Pt will perform toileting task with minimal assistance. 9. Pt will perform toilet transfer with supervision. Outcome: Progressing   OCCUPATIONAL THERAPY EVALUATION    Patient: Kilo Rendon [address] [facility] 61 y.o.   Date: 8/10/2023  Primary Diagnosis: Acute CVA (cerebrovascular accident) [facility]) [I63.9]    Patient identified with name and : Yes    Past Medical History:   Past Medical History:   Diagnosis Date    Arthritis     Hepatitis C     treated     Hypercholesterolemia     Hypertension     no meds     Sleep apnea     no CPAP      Precautions:  Restrictions/Precautions: Fall Risk (R hemiplegia; aphasia)                             Barriers to TRANSFER Initial Assessment   Transfer Technique Stand pivot   Functional Level Minimal assistance   Equipment Grab bars   Toilet Transfer Comments       WHEELCHAIR/BED TRANSFER INDEPENDENCE Initial Assessment   Transfer Technique   Stand pivot    Level of Assistance  Minimal assistance   Equipment     Comments  Hand held assist given for balance with sit to stand and while stepping to transfer to wheelchair     IADL Initial Assessment (PLOF)   Homemaking Responsibilities Independent      Activity Tolerance:   Patient Tolerated treatment well         EDUCATION:   Education Given To: Patient  Education Provided: Role of Therapy; Fall Prevention Strategies; Plan of Care;Transfer Training;Precautions; Safety;ADL Function;Equipment  Education Method: Demonstration;Verbal;Teach Back  Barriers to Learning: None  Education Outcome: Verbalized understanding;Demonstrated understanding;Continued education needed      ASSESSMENT :  Based on the objective data described above, the patient presents with muscle weakness (decreased functional use of her dominant RUE), decreased RUE coordination and decreased functional balance which negatively impact pt performance, independence and safety with ADL/IADL's and functional mobility. Pt would benefit from skilled occupational therapy in order to improve independent functional mobility/ADLs,/IADLs within the home. Interventions may include range of motion (AROM, PROM B UE), motor function (B UE/ strengthening/coordination), activity tolerance (vitals, oxygen saturation levels), balance training, ADL/IADL training and functional transfer training. Please see IRC; Interdisciplinary Eval, Care Plan, and Patient Education for further information regarding occupational therapy examination and plan of care.       PLAN :  Recommendations and Planned Interventions:  [x]               Self Care Training                   [x]        Therapeutic Activities  [x]               Functional

## 2023-08-11 PROCEDURE — 6370000000 HC RX 637 (ALT 250 FOR IP): Performed by: EMERGENCY MEDICINE

## 2023-08-11 PROCEDURE — 99232 SBSQ HOSP IP/OBS MODERATE 35: CPT | Performed by: EMERGENCY MEDICINE

## 2023-08-11 PROCEDURE — 97150 GROUP THERAPEUTIC PROCEDURES: CPT

## 2023-08-11 PROCEDURE — 97530 THERAPEUTIC ACTIVITIES: CPT

## 2023-08-11 PROCEDURE — 94760 N-INVAS EAR/PLS OXIMETRY 1: CPT

## 2023-08-11 PROCEDURE — 92507 TX SP LANG VOICE COMM INDIV: CPT

## 2023-08-11 PROCEDURE — 97112 NEUROMUSCULAR REEDUCATION: CPT

## 2023-08-11 PROCEDURE — 92508 TX SP LANG VOICE COMM GROUP: CPT

## 2023-08-11 PROCEDURE — 97110 THERAPEUTIC EXERCISES: CPT

## 2023-08-11 PROCEDURE — 1180000000 HC REHAB R&B

## 2023-08-11 PROCEDURE — 97116 GAIT TRAINING THERAPY: CPT

## 2023-08-11 RX ADMIN — ASPIRIN 81 MG CHEWABLE TABLET 81 MG: 81 TABLET CHEWABLE at 08:31

## 2023-08-11 RX ADMIN — TRAZODONE HYDROCHLORIDE 50 MG: 50 TABLET ORAL at 20:56

## 2023-08-11 RX ADMIN — CHOLECALCIFEROL TAB 25 MCG (1000 UNIT) 1000 UNITS: 25 TAB at 08:31

## 2023-08-11 RX ADMIN — ATORVASTATIN CALCIUM 40 MG: 40 TABLET, FILM COATED ORAL at 20:56

## 2023-08-11 RX ADMIN — SERTRALINE 125 MG: 50 TABLET, FILM COATED ORAL at 08:29

## 2023-08-11 RX ADMIN — LISINOPRIL 10 MG: 5 TABLET ORAL at 08:30

## 2023-08-11 ASSESSMENT — PAIN SCALES - GENERAL
PAINLEVEL_OUTOF10: 0

## 2023-08-11 NOTE — PLAN OF CARE
Problem: Occupational Therapy - Adult  Goal: By Discharge: Performs self-care activities at highest level of function for planned discharge setting. See evaluation for individualized goals. Description: Occupational Therapy Goals   Long Term Goals  Initiated 8/10/23 and to be accomplished within 2-3 week(s). 1. Pt will perform self-feeding with modified independence. 2. Pt will perform grooming with modified independence. 3. Pt will perform UB bathing with supervision. 4. Pt will perform LB bathing with supervision. 5. Pt will perform tub/shower transfer with supervision. 6. Pt will perform UB dressing with modified independence. 7. Pt will perform LB dressing with supervision. 8. Pt will perform toileting task with supervision. 9. Pt will perform toilet transfer with supervision. Short Term Goals   Initiated 8/10/23 and to be accomplished within 7 day(s). 1. Pt will perform self-feeding with modified independence. 2. Pt will perform grooming with modified independence. 3. Pt will perform UB bathing with minimal assistance. 4. Pt will perform LB bathing with minimal assistance. 5. Pt will perform tub/shower transfer with supervision. 6. Pt will perform UB dressing with minimal assistance. 7. Pt will perform LB dressing with minimal assistance. 8. Pt will perform toileting task with minimal assistance. 9. Pt will perform toilet transfer with supervision. Outcome: Progressing   Occupational Therapy TREATMENT    Patient: Jareth Brown [facility]   61 y.o. Patient identified with name and : yes    Date: 2023    First Tx Session  Time In: 1330  Time Out: 1435      Diagnosis: Acute CVA (cerebrovascular accident) [facility]) [I63.9]   Precautions:  Restrictions/Precautions: Fall Risk (R hemiplegia; aphasia)                             Chart, occupational therapy assessment, plan of care, and goals were reviewed. Pain:  Pt reports 0/10 pain or discomfort prior to treatment.     Pt reports

## 2023-08-11 NOTE — PLAN OF CARE
Problem: Physical Therapy - Adult  Goal: By Discharge: Performs mobility at highest level of function for planned discharge setting. See evaluation for individualized goals. Description: Physical Therapy Short Term Goals  Initiated 8/10/2023 and to be accomplished within 7 day(s) [8/17/23]  1. Patient will supine to sit and sit to supine  in bed with supervision. 2.  Patient will transfer from bed to chair and chair to bed with minimal assistance using the least restrictive device. 3.  Patient will perform sit to stand with minimal assistance. 4.  Patient will ambulate with minimal assistance for 50 feet with the least restrictive device. 5.  Patient will propel w/c 150 ft with L UE and B LE's on level surfaces with supervision for mobility on unit. Physical Therapy Long Term Goals  Initiated 8/10/2023 and to be accomplished within 21 day(s) [8/31/23]  1. Patient will supine to sit, sit to supine, roll right, and roll left in bed with modified independence without use of bed rails. 2.  Patient will transfer from bed to chair and chair to bed with SBA/CGA using the least restrictive device. 3.  Patient will perform sit to stand with SBA/CGA from various surface heights. 4.  Patient will ambulate with contact guard assist for 100 feet with the least restrictive device. 5.  Patient will ascend/descend 4 stairs with one handrail with contact guard assist to enter/exit home. (pt has B rails, but wide apart)  Outcome: Progressing      PHYSICAL THERAPY TREATMENT    Patient: Surinder Martínez (48 y.o. female)  Date: 8/11/2023  Diagnosis: Acute CVA (cerebrovascular accident) [facility]) [I63.9]   Precautions: Fall Risk Precautions  Chart, physical therapy assessment, plan of care and goals were reviewed. Time in: 0930  Time out : 1005    Patient seen for: Patient Education, NMRE, Gait Training, Transfer Training    Pain:  Pt pain was reported as no c/o pain pre-treatment.   Pt pain was reported as no c/o sit to stand and minimal manual cues for increased hip flexion and slow controlled descent with stand to sit with moderate verbal reminders and hand over hand assist for self manual cue for right LE weight bearing with stand to sit transfers. GAIT Daily Assessment    Gait Deviations Slow Brianna;Decreased step length;Decreased step height    Assistive device Small Aj Buschon    Ambulation assistance - surface  Minimal assistance; Moderate assistance  Level tile    Distance 15 Feet    Comments Pt ambulates with step to gait pattern with narrowed KAL, decreased left step length and decreased right stance time with one instance of right genu recurvatum with forward flexed posture in weight bearing. Pt requires minimal to moderate assistance for balance, weight shifting and moderate verbal cuing for sequencing. Pt requires moderate manual cues for abdominal engagement for upright posture throughout. BALANCE Daily Assessment    Posture Good    Sitting - Static Good    Sitting - Dynamic Fair;+    Standing - Static Fair    Standing - Dynamic Poor;+      WHEELCHAIR MOBILITY/MANAGEMENT Daily Assessment   Able to Propel  75 feet (distance not challenged)   Assist Level  Close supervision with moderate verbal cues for slower pace to attend to right LE management and foot placement. Curbs/ramps assistance required  NT   Wheelchair management  Pt manages left brake with left UE and requires hand over hand assist to manage right brake with right UE. Comments Pt propels w/c with B LE requiring moderate cues to slow pace to be able to safely advance and place right LE.       ASSESSMENT:  Patient is progressing well requiring decreased physical assistance with functional transfers and gait from evaluation. Pt moves quickly requiring moderate cues for safety and attention to quality of movement and will benefit from ongoing skilled intervention to address functional impairments.   Progression toward goals:  [x]

## 2023-08-11 NOTE — PLAN OF CARE
[id number] [facility] PHYSICAL REHABILITATION  [address] [place] [address], [id number] [place] [address]     INPATIENT REHABILITATION  OVERALL PLAN OF CARE    Name: Dedrick Bingham CSN: [id number]   Age: 61 y.o. MRN: [id number]   Sex: female Admit Date: 8/9/2023     Primary Rehabilitation Diagnosis impaired mobility and ADLs in the setting of acute cerebral infarction due to unspecified occlusion or stenosis of the left middle cerebral artery with right hemiparesis and dysarthria and dysphagia. 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 Continue physical therapy and Occupational Therapy and speech therapy  Continue aspirin and high intensity statin  Fall precautions and aspiration precautions     -Right hemiparesis  Physical therapy and Occupational Therapy     -Dysarthria and dysphagia  Speech and language pathology     -Hypertension  Continue lisinopril 10 mg p.o. daily, monitor blood pressure     -Subarachnoid hemorrhage  Monitor blood pressure     -Dyslipidemia  Continue atorvastatin     -anxiety and depression  Continue Zoloft and trazodone. Patient is in good spirits. Patient denies any suicidal ideation     -History of tobacco use  Encouraged abstinence     I discussed with patient. ANTICIPATED INTERVENTIONS THAT SUPPORT THE MEDICAL NECESSITY OF THIS ADMISSION:    I. Physical Therapy              A. Intensity: 1-2 hour per day              B. Frequency: 5 times per week              C. Duration: 2 weeks              D. Short Term Goals:           Initiated 8/10/2023 and to be accomplished within 7 day(s) [8/17/23]  1.   Patient will supine to sit and sit to supine

## 2023-08-12 PROCEDURE — 97112 NEUROMUSCULAR REEDUCATION: CPT

## 2023-08-12 PROCEDURE — 1180000000 HC REHAB R&B

## 2023-08-12 PROCEDURE — 6370000000 HC RX 637 (ALT 250 FOR IP): Performed by: EMERGENCY MEDICINE

## 2023-08-12 PROCEDURE — 97110 THERAPEUTIC EXERCISES: CPT

## 2023-08-12 PROCEDURE — 97116 GAIT TRAINING THERAPY: CPT

## 2023-08-12 PROCEDURE — 97530 THERAPEUTIC ACTIVITIES: CPT

## 2023-08-12 PROCEDURE — 97535 SELF CARE MNGMENT TRAINING: CPT

## 2023-08-12 RX ADMIN — TRAZODONE HYDROCHLORIDE 50 MG: 50 TABLET ORAL at 21:16

## 2023-08-12 RX ADMIN — ATORVASTATIN CALCIUM 40 MG: 40 TABLET, FILM COATED ORAL at 21:16

## 2023-08-12 RX ADMIN — ASPIRIN 81 MG CHEWABLE TABLET 81 MG: 81 TABLET CHEWABLE at 09:09

## 2023-08-12 RX ADMIN — CHOLECALCIFEROL TAB 25 MCG (1000 UNIT) 1000 UNITS: 25 TAB at 09:09

## 2023-08-12 RX ADMIN — SERTRALINE 125 MG: 50 TABLET, FILM COATED ORAL at 09:09

## 2023-08-12 RX ADMIN — LISINOPRIL 10 MG: 5 TABLET ORAL at 09:09

## 2023-08-12 ASSESSMENT — PAIN SCALES - GENERAL
PAINLEVEL_OUTOF10: 0

## 2023-08-12 NOTE — PLAN OF CARE
Problem: Physical Therapy - Adult  Goal: By Discharge: Performs mobility at highest level of function for planned discharge setting. See evaluation for individualized goals. Description: Physical Therapy Short Term Goals  Initiated 8/10/2023 and to be accomplished within 7 day(s) [8/17/23]  1. Patient will supine to sit and sit to supine  in bed with supervision. 2.  Patient will transfer from bed to chair and chair to bed with minimal assistance using the least restrictive device. 3.  Patient will perform sit to stand with minimal assistance. 4.  Patient will ambulate with minimal assistance for 50 feet with the least restrictive device. 5.  Patient will propel w/c 150 ft with L UE and B LE's on level surfaces with supervision for mobility on unit. Physical Therapy Long Term Goals  Initiated 8/10/2023 and to be accomplished within 21 day(s) [8/31/23]  1. Patient will supine to sit, sit to supine, roll right, and roll left in bed with modified independence without use of bed rails. 2.  Patient will transfer from bed to chair and chair to bed with SBA/CGA using the least restrictive device. 3.  Patient will perform sit to stand with SBA/CGA from various surface heights. 4.  Patient will ambulate with contact guard assist for 100 feet with the least restrictive device. 5.  Patient will ascend/descend 4 stairs with one handrail with contact guard assist to enter/exit home. (pt has B rails, but wide apart)  Outcome: Progressing       PHYSICAL THERAPY TREATMENT    Patient: Wendy Lau (84 y.o. female)  Date: 8/12/2023  Diagnosis: Acute CVA (cerebrovascular accident) [facility]) [I63.9]   Precautions: fall  Chart, physical therapy assessment, plan of care and goals were reviewed. Time in: 0953  Time out : 1053    Patient seen for: gait,bal, there ex, Pt ed, transfers    Pain:  Pt pain was reported as 0 pre-treatment. Pt pain was reported as 0 post-treatment.   Intervention: NA    Patient identified

## 2023-08-12 NOTE — PLAN OF CARE
Problem: Occupational Therapy - Adult  Goal: By Discharge: Performs self-care activities at highest level of function for planned discharge setting. See evaluation for individualized goals. Description: Occupational Therapy Goals   Long Term Goals  Initiated 8/10/23 and to be accomplished within 2-3 week(s). 1. Pt will perform self-feeding with modified independence. 2. Pt will perform grooming with modified independence. 3. Pt will perform UB bathing with supervision. 4. Pt will perform LB bathing with supervision. 5. Pt will perform tub/shower transfer with supervision. 6. Pt will perform UB dressing with modified independence. 7. Pt will perform LB dressing with supervision. 8. Pt will perform toileting task with supervision. 9. Pt will perform toilet transfer with supervision. Short Term Goals   Initiated 8/10/23 and to be accomplished within 7 day(s). 1. Pt will perform self-feeding with modified independence. 2. Pt will perform grooming with modified independence. 3. Pt will perform UB bathing with minimal assistance. 4. Pt will perform LB bathing with minimal assistance. 5. Pt will perform tub/shower transfer with supervision. 6. Pt will perform UB dressing with minimal assistance. 7. Pt will perform LB dressing with minimal assistance. 8. Pt will perform toileting task with minimal assistance. 9. Pt will perform toilet transfer with supervision. Outcome: Progressing   Occupational Therapy TREATMENT    Patient: Roosevelt Zhang [address] [facility]   61 y.o. Patient identified with name and : yes    Date: 2023    First Tx Session  Time In: 0900  Time Out: 1000      Diagnosis: Acute CVA (cerebrovascular accident) [facility]) [I63.9]   Precautions:  Restrictions/Precautions: Fall Risk                             Chart, occupational therapy assessment, plan of care, and goals were reviewed. Pain:  Pt reports 0/10 pain or discomfort prior to treatment.     Pt reports -/10 pain or discomfort stability and safety technique using leann techniques as well as orientation of pants. Pt thread BLE feet through underwear and pants with modA due to impaired core strength and safety. Pt required modA to don RLE sock from seated position. Pt demonstrated ability to pull up pants and underwear over BLE hips and buttocks with modA using LBQC for stability and pulling up over R hip due to R side weakness. TOILETING Daily Assessment   Functional Level     Clinical factors Pt declined this date. Activity Tolerance:    Pt tolerated treatment well. EDUCATION:   Education Given To: Patient  Education Provided: Safety;Transfer Training;Mobility Training; Fall Prevention Strategies  Education Method: Verbal;Demonstration  Barriers to Learning: Cognition  Education Outcome: Verbalized understanding;Demonstrated understanding    ASSESSMENT:  Pt demonstrates difficulty with LB self cares due to R side weakness however pt is making progress toward goals and improving safety with self cares. Pt would benefit from AE (long handled sponge, leann sock aid, long handled reacher) to facilitate increased independence with self cares. Progression toward goals:  [x]          Improving appropriately and progressing toward goals  []          Improving slowly and progressing toward goals  []          Not making progress toward goals and plan of care will be adjusted       PLAN:  Patient continues to benefit from skilled intervention to address the above impairments. Continue treatment per established plan of care. Discharge Recommendations:  Home with Home health OT and 24 hr assistance  Further Equipment Recommendations for Discharge: tub transfer , ALEKSANDR  Estimated LOS:TBD     COMMUNICATION/EDUCATION:   [] Home safety education was provided and the patient/caregiver indicated understanding. [x] Patient/family have participated as able in goal setting and plan of care.   [x] Patient/family agree to work toward stated

## 2023-08-12 NOTE — PLAN OF CARE
Problem: Chronic Conditions and Co-morbidities  Goal: Patient's chronic conditions and co-morbidity symptoms are monitored and maintained or improved  Outcome: Progressing  Flowsheets (Taken 8/11/2023 6640 by Tessie Alvarado RN)  Care Plan - Patient's Chronic Conditions and Co-Morbidity Symptoms are Monitored and Maintained or Improved: Monitor and assess patient's chronic conditions and comorbid symptoms for stability, deterioration, or improvement     Problem: Skin/Tissue Integrity  Goal: Absence of new skin breakdown  Description: 1. Monitor for areas of redness and/or skin breakdown  2. Assess vascular access sites hourly  3. Every 4-6 hours minimum:  Change oxygen saturation probe site  4. Every 4-6 hours:  If on nasal continuous positive airway pressure, respiratory therapy assess nares and determine need for appliance change or resting period.   Outcome: Progressing     Problem: Safety - Adult  Goal: Free from fall injury  Outcome: Progressing     Problem: ABCDS Injury Assessment  Goal: Absence of physical injury  Outcome: Progressing

## 2023-08-13 PROCEDURE — 1180000000 HC REHAB R&B

## 2023-08-13 PROCEDURE — 6370000000 HC RX 637 (ALT 250 FOR IP): Performed by: EMERGENCY MEDICINE

## 2023-08-13 RX ADMIN — SERTRALINE 125 MG: 50 TABLET, FILM COATED ORAL at 08:58

## 2023-08-13 RX ADMIN — ASPIRIN 81 MG CHEWABLE TABLET 81 MG: 81 TABLET CHEWABLE at 08:58

## 2023-08-13 RX ADMIN — ATORVASTATIN CALCIUM 40 MG: 40 TABLET, FILM COATED ORAL at 20:28

## 2023-08-13 RX ADMIN — CHOLECALCIFEROL TAB 25 MCG (1000 UNIT) 1000 UNITS: 25 TAB at 08:58

## 2023-08-13 RX ADMIN — LISINOPRIL 10 MG: 5 TABLET ORAL at 08:58

## 2023-08-13 RX ADMIN — TRAZODONE HYDROCHLORIDE 50 MG: 50 TABLET ORAL at 20:28

## 2023-08-13 ASSESSMENT — PAIN SCALES - GENERAL
PAINLEVEL_OUTOF10: 0

## 2023-08-13 NOTE — PLAN OF CARE
Problem: Chronic Conditions and Co-morbidities  Goal: Patient's chronic conditions and co-morbidity symptoms are monitored and maintained or improved  Outcome: Progressing  Flowsheets (Taken 8/13/2023 0800 by Darius Ro LPN)  Care Plan - Patient's Chronic Conditions and Co-Morbidity Symptoms are Monitored and Maintained or Improved: Monitor and assess patient's chronic conditions and comorbid symptoms for stability, deterioration, or improvement     Problem: Skin/Tissue Integrity  Goal: Absence of new skin breakdown  Description: 1. Monitor for areas of redness and/or skin breakdown  2. Assess vascular access sites hourly  3. Every 4-6 hours minimum:  Change oxygen saturation probe site  4. Every 4-6 hours:  If on nasal continuous positive airway pressure, respiratory therapy assess nares and determine need for appliance change or resting period.   Outcome: Progressing     Problem: Safety - Adult  Goal: Free from fall injury  Outcome: Progressing     Problem: ABCDS Injury Assessment  Goal: Absence of physical injury  Outcome: Progressing

## 2023-08-14 PROCEDURE — 97530 THERAPEUTIC ACTIVITIES: CPT

## 2023-08-14 PROCEDURE — 6370000000 HC RX 637 (ALT 250 FOR IP): Performed by: EMERGENCY MEDICINE

## 2023-08-14 PROCEDURE — 92507 TX SP LANG VOICE COMM INDIV: CPT

## 2023-08-14 PROCEDURE — 97116 GAIT TRAINING THERAPY: CPT

## 2023-08-14 PROCEDURE — 1180000000 HC REHAB R&B

## 2023-08-14 PROCEDURE — 99232 SBSQ HOSP IP/OBS MODERATE 35: CPT | Performed by: EMERGENCY MEDICINE

## 2023-08-14 PROCEDURE — 97112 NEUROMUSCULAR REEDUCATION: CPT

## 2023-08-14 PROCEDURE — 97110 THERAPEUTIC EXERCISES: CPT

## 2023-08-14 PROCEDURE — 97150 GROUP THERAPEUTIC PROCEDURES: CPT

## 2023-08-14 RX ADMIN — TRAZODONE HYDROCHLORIDE 50 MG: 50 TABLET ORAL at 20:33

## 2023-08-14 RX ADMIN — ASPIRIN 81 MG CHEWABLE TABLET 81 MG: 81 TABLET CHEWABLE at 09:30

## 2023-08-14 RX ADMIN — CHOLECALCIFEROL TAB 25 MCG (1000 UNIT) 1000 UNITS: 25 TAB at 09:31

## 2023-08-14 RX ADMIN — SERTRALINE 125 MG: 50 TABLET, FILM COATED ORAL at 09:31

## 2023-08-14 RX ADMIN — ATORVASTATIN CALCIUM 40 MG: 40 TABLET, FILM COATED ORAL at 20:33

## 2023-08-14 RX ADMIN — LISINOPRIL 10 MG: 5 TABLET ORAL at 09:31

## 2023-08-14 ASSESSMENT — PAIN SCALES - GENERAL
PAINLEVEL_OUTOF10: 0

## 2023-08-14 NOTE — PROGRESS NOTES
trunk and management of R LE, safety of R UE positioning)  Moderate assistance (stand step w/out AD with PT support, gait belt, and anterior approach)    Bed Mobility  Rolling to Right: Stand by assistance  Rolling to Left: Stand by assistance  Supine to Sit: Stand by assistance  Sit to Stand: Contact guard assistance  Sit to Supine: Stand by assistance  Bed to Chair: Minimal assistance      Wheelchair Mobility  Yes       Wheelchair Mobility  Propulsion: Yes         Ambulation  Large Base Quad Cane  Moderate assistance  Level tile  8 ft Ambulation  Device: Rolling Walker  Assistance: Minimal assistance  Surface: Level tile  Distance: 25   Stairs  Yes  Right ascending (rail in R hand also descending)  Moderate assistance Stairs  Stairs?: Yes  Rails: Right ascending (rail in R hand also descending)  Assistance: Moderate assistance     Functional Progress:    OCCUPATIONAL THERAPY    ON ADMISSION MOST RECENT   Eating  Setup, Supervision Eating  Feeding: Setup, Supervision   Grooming  Minimal assistance Grooming  Grooming: Minimal assistance   Bathing  UB Bathing Moderate assistance  LB Bathing Moderate assistance Bathing  UB Bathing   UE Bathing: Minimal assistance  LB Bathing   GMPO([id number]:IVBG)@   Upper Body Dressing  Moderate assistance   Upper Body Dressing  UE Dressing: Minimal assistance   Lower Body Dressing  Moderate assistance Lower Body Dressing  LE Dressing: Moderate assistance   Toileting  Moderate assistance Toileting  Toileting: Moderate assistance   Toilet Transfers  Minimal assistance Toilet Transfers  Toilet Transfer: Minimal assistance   Tub Transfers     SHOWER Transfers  Shower - Transfer From: Wheelchair  Shower - Transfer Type: To and From  Shower - Transfer To:  Transfer tub bench  Shower - Technique: Stand pivot  Shower Transfers: Minimal assistance Tub Transfers     SHOWER Transfers        Legend:   7 - Independent   6 - Modified Independent   5 - Standby Assistance / Supervision / Set-up   4 - Minimum Assistance / Contact Guard Assistance   3 - Moderate Assistance   2 - Maximum Assistance   1 - Total Assistance / Dependent             Plan:     1. Justification for continued stay: Fair progression towards established rehabilitation goals. 2. Medical Issues being followed closely:    [x]  Fall and safety precautions     []  Wound Care     [x]  Bowel and Bladder Function     [x]  Fluid Electrolyte and Nutrition Balance     []  Pain Control      3. Issues that 24 hour rehabilitation nursing is following:    [x]  Fall and safety precautions     []  Wound Care     [x]  Bowel and Bladder Function     [x]  Fluid Electrolyte and Nutrition Balance     []  Pain Control      [x]  Assistance with and education on in-room safety with transfers to and from the bed, wheelchair, toilet and shower. 4. Acute rehabilitation plan of care:    [x]  Continue current care and rehab. [x]  Physical Therapy           [x]  Occupational Therapy           [x]  Speech Therapy      []  Hold Rehab until further notice     5. Medications:    [x]  MAR Reviewed     [x]  Continue Present Medications       6. Chemical DVT Prophylaxis:      []  Enoxaparin     []  Unfractionated Heparin     []  Warfarin     []  NOAC     []  Aspirin     [x]  None     7. Mechanical DVT Prophylaxis:      []  CALDERON Stockings     [x]  Sequential Compression Device     []  None     8. GI Prophylaxis:      []  PPI     []  H2 Blocker     [x]  None / Not indicated     9. Code status:Full     Dragon medical dictation software was used for portions of this report. Unintended errors may occur.       Signed:    Dk Fernandes MD      August 13, 2023

## 2023-08-14 NOTE — PLAN OF CARE
Problem: Occupational Therapy - Adult  Goal: By Discharge: Performs self-care activities at highest level of function for planned discharge setting. See evaluation for individualized goals. Description: Occupational Therapy Goals   Long Term Goals  Initiated 8/10/23 and to be accomplished within 2-3 week(s). 1. Pt will perform self-feeding with modified independence. 2. Pt will perform grooming with modified independence. 3. Pt will perform UB bathing with supervision. 4. Pt will perform LB bathing with supervision. 5. Pt will perform tub/shower transfer with supervision. 6. Pt will perform UB dressing with modified independence. 7. Pt will perform LB dressing with supervision. 8. Pt will perform toileting task with supervision. 9. Pt will perform toilet transfer with supervision. Short Term Goals   Initiated 8/10/23 and to be accomplished within 7 day(s). 1. Pt will perform self-feeding with modified independence. 2. Pt will perform grooming with modified independence. 3. Pt will perform UB bathing with minimal assistance. 4. Pt will perform LB bathing with minimal assistance. 5. Pt will perform tub/shower transfer with supervision. 6. Pt will perform UB dressing with minimal assistance. 7. Pt will perform LB dressing with minimal assistance. 8. Pt will perform toileting task with minimal assistance. 9. Pt will perform toilet transfer with supervision. Outcome: Progressing   Occupational Therapy TREATMENT    Patient: Fredrik Osler [address] [address] y.o. Patient identified with name and : yes    Date: 2023    First Tx Session  Time In: 1031 (occupational therapy group tx session)   Time Out: 80    Second Tx Session  Time In: 5  Time Out: 18    Diagnosis: Acute CVA (cerebrovascular accident) [facility]) [I63.9]   Precautions:  Restrictions/Precautions: Fall Risk, Aspiration Risk (R hemiparesis; aphasia)   Chart, occupational therapy assessment, plan of care, and goals were reviewed. input. Therapist providing hands on facilitation throughout task performance due to R side weakness. Pt participated in RUE/ LUE red power web exercises using hand over hand positioning (left hand over right hand) for fist press and open palm hand presses; task performed (10 reps x2 sets). Therapist providing hands on facilitation and hands on assistance for holding power web hand exerciser out-front and for positioning pt's right hemiparetic hand. Performed for proprioceptive awareness and input of pt's affected right upper extremity. FUNCTIONAL MOBILITY Daily Assessment    Functional - Mobility Device: Wheelchair  Activity: To/From therapy gym  Assist Level: Contact guard assistance  Functional Mobility Comments: Pt propelled w/c with (CGA) using her BLE's to propel over level tile surface; pt requiring (CGA) for directional steering while manueuvering straightaways and turns due to R side weakness. Activity Tolerance:  Patient Tolerated treatment well   Rest breaks as needed due to fatigue     EDUCATION:   Education Given To: Patient  Education Provided: Energy Conservation;Home Exercise Program;Role of Therapy; Mobility Training; Safety  Education Method: Demonstration;Verbal  Barriers to Learning: Cognition  Education Outcome: Verbalized understanding;Demonstrated understanding;Continued education needed    ASSESSMENT:  Patient will benefit from continued skilled occupational therapy interventions to address decreased (I) with ADL's, decreased functional strength/ endurance, muscle weakness (decreased functional use of her dominant RUE), impaired RUE ROM/ [place], impaired balance/ coordination, word finding difficulty, impaired cognition, and impaired transfers/ mobility which negatively impact pt performance, independence, and safety with ADL/IADL's and functional mobility.   Progression toward goals:  []          Improving appropriately and progressing toward goals  [x]          Improving slowly and

## 2023-08-14 NOTE — PROGRESS NOTES
SPEECH LANGUAGE PATHOLOGY SPEECH-LANGUAGE   Problem: SLP Adult - Impaired Communication  Goal: By Discharge: Demonstrates communication skills at highest level of function for planned discharge setting. See evaluation for individualized goals. Description: Long term goals (initiated 8/10/23; to be completed by 8/31/23)  Patient will:  1. Perform oral/pharyngeal strengthening exercises with min cues. 2. Use safe swallowing techniques of slow rate of intake, small bites/sips, clear mouth between boluses, alternate liquids and solids, perform repeat swallow as needed, supervision. 3. Tolerate easy to chew diet and thin liquids without overt s/s of aspiration or other difficulty. 4. Follow 2 part instructions with 80% accuracy. 5. Name common objects with 90% accuracy; describe item function with 70-80% accuracy. 6. Respond to yes/no questions re: general information, 80% accuracy. 7. Respond to simply phrased requests for information, 70-80% accuracy. Short term goals ( by 8/17/23)  Patient will:  1. Perform oral/pharyngeal strengthening exercises with mod cues and models. 2. Use safe swallowing techniques of slow rate of intake, small bites/sips, clear mouth between boluses, alternate liquids and solids, perform repeat swallow as needed, mod cues. 3. Tolerate easy to chew diet and thin liquids without overt s/s of aspiration or other difficulty. 4. Follow 2 part instructions with 60% accuracy. 5. Name common objects with 80% accuracy; describe item function with 60% accuracy. 6. Respond to yes/no questions re: general information, 70% accuracy. 7. Respond to simply phrased requests for information, 60% accuracy. TREATMENT    Patient: Erasmo Ospina (83 y.o. female)  Date: 8/14/2023  Primary Diagnosis: Acute CVA (cerebrovascular accident) [facility]) [I63.9]       Precautions: Aspiration    Speech-Language Tx:  Patient seen today for follow up speech-language therapy.  Patient reports the she has been

## 2023-08-14 NOTE — PLAN OF CARE
Problem: Physical Therapy - Adult  Goal: By Discharge: Performs mobility at highest level of function for planned discharge setting. See evaluation for individualized goals. Description: Physical Therapy Short Term Goals  Initiated 8/10/2023 and to be accomplished within 7 day(s) [8/17/23]  1. Patient will supine to sit and sit to supine  in bed with supervision. 2.  Patient will transfer from bed to chair and chair to bed with minimal assistance using the least restrictive device. 3.  Patient will perform sit to stand with minimal assistance. 4.  Patient will ambulate with minimal assistance for 50 feet with the least restrictive device. 5.  Patient will propel w/c 150 ft with L UE and B LE's on level surfaces with supervision for mobility on unit. Physical Therapy Long Term Goals  Initiated 8/10/2023 and to be accomplished within 21 day(s) [8/31/23]  1. Patient will supine to sit, sit to supine, roll right, and roll left in bed with modified independence without use of bed rails. 2.  Patient will transfer from bed to chair and chair to bed with SBA/CGA using the least restrictive device. 3.  Patient will perform sit to stand with SBA/CGA from various surface heights. 4.  Patient will ambulate with contact guard assist for 100 feet with the least restrictive device. 5.  Patient will ascend/descend 4 stairs with one handrail with contact guard assist to enter/exit home. (pt has B rails, but wide apart)  Outcome: Progressing      PHYSICAL THERAPY TREATMENT    Patient: Ni Yo (60 y.o. female)  Date: 8/14/2023  Diagnosis: Acute CVA (cerebrovascular accident) [facility]) [I63.9]   Precautions: Fall Risk Precautions  Chart, physical therapy assessment, plan of care and goals were reviewed. Time in: 0759  Time out : 0904    Patient seen for: Patient Education, Transfer Training, Gait Training, NMRE    Pain:  Pt pain was reported as no c/o pain pre-treatment.   Pt pain was reported as no c/o

## 2023-08-14 NOTE — PROGRESS NOTES
Pt is a 61year old female admitted to ARU. Pt is alert and oriented, alone in the room. Pt states that she lives with her daughter and granddaughter in a 1 level home with 3 steps to enter and a walk in shower. Pt states that she was able to self care prior to admission and denies history with DME, home health, outpatient therapy and SNF. Pt states that she does not have a PCP and fills her medications at [facility]. Pt states that she has received the COVID vaccine/booster. Pt states that she does not have a POA and notes that her daughter, Osman Boyd ([phone]), is her POA and NOK. Pt states that her daughter ill be the primary caregiver at dc and consents to scheduling caregiver education. Pt affirms her insurance as [employer]. Sw reviewed insurance updates and dc planning. Pt states understanding and denies questions. Sw will follow.

## 2023-08-15 PROCEDURE — 97110 THERAPEUTIC EXERCISES: CPT

## 2023-08-15 PROCEDURE — 1180000000 HC REHAB R&B

## 2023-08-15 PROCEDURE — 97116 GAIT TRAINING THERAPY: CPT

## 2023-08-15 PROCEDURE — 97112 NEUROMUSCULAR REEDUCATION: CPT

## 2023-08-15 PROCEDURE — 99232 SBSQ HOSP IP/OBS MODERATE 35: CPT | Performed by: EMERGENCY MEDICINE

## 2023-08-15 PROCEDURE — 97530 THERAPEUTIC ACTIVITIES: CPT

## 2023-08-15 PROCEDURE — 97535 SELF CARE MNGMENT TRAINING: CPT

## 2023-08-15 PROCEDURE — 6370000000 HC RX 637 (ALT 250 FOR IP): Performed by: EMERGENCY MEDICINE

## 2023-08-15 PROCEDURE — 97150 GROUP THERAPEUTIC PROCEDURES: CPT

## 2023-08-15 RX ADMIN — TRAZODONE HYDROCHLORIDE 50 MG: 50 TABLET ORAL at 20:18

## 2023-08-15 RX ADMIN — ASPIRIN 81 MG CHEWABLE TABLET 81 MG: 81 TABLET CHEWABLE at 09:37

## 2023-08-15 RX ADMIN — CHOLECALCIFEROL TAB 25 MCG (1000 UNIT) 1000 UNITS: 25 TAB at 09:37

## 2023-08-15 RX ADMIN — SERTRALINE 125 MG: 50 TABLET, FILM COATED ORAL at 09:37

## 2023-08-15 RX ADMIN — ATORVASTATIN CALCIUM 40 MG: 40 TABLET, FILM COATED ORAL at 20:18

## 2023-08-15 ASSESSMENT — PAIN SCALES - GENERAL
PAINLEVEL_OUTOF10: 0

## 2023-08-15 NOTE — PLAN OF CARE
Problem: Occupational Therapy - Adult  Goal: By Discharge: Performs self-care activities at highest level of function for planned discharge setting. See evaluation for individualized goals. Description: Occupational Therapy Goals   Long Term Goals  Initiated 8/10/23 and to be accomplished within 2-3 week(s). 1. Pt will perform self-feeding with modified independence. 2. Pt will perform grooming with modified independence. 3. Pt will perform UB bathing with supervision. 4. Pt will perform LB bathing with supervision. 5. Pt will perform tub/shower transfer with supervision. 6. Pt will perform UB dressing with modified independence. 7. Pt will perform LB dressing with supervision. 8. Pt will perform toileting task with supervision. 9. Pt will perform toilet transfer with supervision. Short Term Goals   Initiated 8/10/23 and to be accomplished within 7 day(s). 1. Pt will perform self-feeding with modified independence. 2. Pt will perform grooming with modified independence. 3. Pt will perform UB bathing with minimal assistance. 4. Pt will perform LB bathing with minimal assistance. 5. Pt will perform tub/shower transfer with supervision. 6. Pt will perform UB dressing with minimal assistance. 7. Pt will perform LB dressing with minimal assistance. 8. Pt will perform toileting task with minimal assistance. 9. Pt will perform toilet transfer with supervision. Outcome: Progressing     Occupational Therapy TREATMENT    Patient: Jil Cook [address] [place] [address] y.o. Patient identified with name and : Yes    Date: 8/15/2023    First Tx Session  Time In: 56  Time Out: [employer]    Second Tx Session  Time In: 1200  Time Out: 1230    Diagnosis: Acute CVA (cerebrovascular accident) [facility]) [I63.9]   Precautions: Restrictions/Precautions: Fall Risk (R hemiplegia; aphasia)         Chart, occupational therapy assessment, plan of care, and goals were reviewed.      Pain:  Pt reports 0/10 pain or discomfort

## 2023-08-15 NOTE — PLAN OF CARE
Problem: Physical Therapy - Adult  Goal: By Discharge: Performs mobility at highest level of function for planned discharge setting. See evaluation for individualized goals. Description: Physical Therapy Short Term Goals  Initiated 8/10/2023 and to be accomplished within 7 day(s) [8/17/23]  1. Patient will supine to sit and sit to supine  in bed with supervision. 2.  Patient will transfer from bed to chair and chair to bed with minimal assistance using the least restrictive device. 3.  Patient will perform sit to stand with minimal assistance. 4.  Patient will ambulate with minimal assistance for 50 feet with the least restrictive device. 5.  Patient will propel w/c 150 ft with L UE and B LE's on level surfaces with supervision for mobility on unit. Physical Therapy Long Term Goals  Initiated 8/10/2023 and to be accomplished within 21 day(s) [8/31/23]  1. Patient will supine to sit, sit to supine, roll right, and roll left in bed with modified independence without use of bed rails. 2.  Patient will transfer from bed to chair and chair to bed with SBA/CGA using the least restrictive device. 3.  Patient will perform sit to stand with SBA/CGA from various surface heights. 4.  Patient will ambulate with contact guard assist for 100 feet with the least restrictive device. 5.  Patient will ascend/descend 4 stairs with one handrail with contact guard assist to enter/exit home. (pt has B rails, but wide apart)  Outcome: Progressing       PHYSICAL THERAPY TREATMENT    Patient: Sophia Garcia (33 y.o. female)  Date: 8/15/2023  Diagnosis: Acute CVA (cerebrovascular accident) [facility]) [I63.9]   Precautions: Fall Risk Precautions  Chart, physical therapy assessment, plan of care and goals were reviewed. Time in: 0800  Time out : 0905    Patient seen for: Patient Education, Transfer Training, Gait Training, NMRE    Pain:  Pt pain was reported as 0/10 pre-treatment.   Pt pain was reported as 0/10

## 2023-08-15 NOTE — PROGRESS NOTES
[facility]  Team Conference  Date: 8/15/2023  ACTIVE MONITORING OF CO-MORBID CONDITIONS/MANAGEMENT OF NEW MEDICAL ISSUES: Acute CVA (cerebrovascular accident) ([address]) [I63.9]    **Please refer to patient's electronic medical record to view the care plan and goals**  NURSING Making gains Yes   Skin Care: Skin Integumentary   Skin Color: Ecchymosis (comment)  Skin Condition/Temp: Dry, Warm  Skin Integrity: Ecchymosis  Location: R groin    Wound Care:           Pain: Pain Assessment  Pain Assessment: 0-10 (08/15/23 0930)  Pain Level: 0 (08/15/23 0930)  Patient's Stated Pain Goal: 0 - No pain (08/13/23 1600)    Bladder/Bowel Urine Assessment  Urinary Status: Voiding  Urinary Incontinence: Absent  Urine Color: Yellow/straw  Urine Appearance: Cloudy, Clear  Urine Odor: No odor Stool Assessment  Incontinence: Yes  Stool Appearance: Formed  Stool Color: Brown  Stool Amount: Medium  Stool Source: Rectum  Last BM (including prior to admit): 08/13/23   [x]  Continent bladder        [] Incontinent bladder           []Bladder training        []Kim     [x]  Continent bowel        [] Incontinent bowel              []Bowel training    Education:   [x] Stroke Prevention   [] Diabetic Teaching   [x] Fall Risk    []Blood Pressure Management   [] Wound Care/Pressure Injury    [x]Medication Management     []Pain Management           [] Other:    [] Caregiver Education completed:  No   [] Lab value concerns   No       Occupational Therapy  Making gains  Yes   Treatment Barriers:  [x] Fatigue                   [] Pain                  [] Participation      [] Cognition             [] Incontinence           [] Precautions      [] Communication/Language   [x] Safety awareness     [] Visual/Perceptual deficits     [] Other:    Treatment Areas of Focus/Interventions: [x]Coordination     [x]Strength   [x]ADL Training     [employer] Training        []Cognitive Training      []Perceptual Training        []Home

## 2023-08-15 NOTE — PLAN OF CARE
Problem: Occupational Therapy - Adult  Goal: By Discharge: Performs self-care activities at highest level of function for planned discharge setting. See evaluation for individualized goals. Description: Occupational Therapy Goals   Long Term Goals  Initiated 8/10/23 and to be accomplished within 2-3 week(s). 1. Pt will perform self-feeding with modified independence. 2. Pt will perform grooming with modified independence. 3. Pt will perform UB bathing with supervision. 4. Pt will perform LB bathing with supervision. 5. Pt will perform tub/shower transfer with supervision. 6. Pt will perform UB dressing with modified independence. 7. Pt will perform LB dressing with supervision. 8. Pt will perform toileting task with supervision. 9. Pt will perform toilet transfer with supervision. Short Term Goals   Initiated 8/10/23 and to be accomplished within 7 day(s). 1. Pt will perform self-feeding with modified independence. 2. Pt will perform grooming with modified independence. 3. Pt will perform UB bathing with minimal assistance. 4. Pt will perform LB bathing with minimal assistance. 5. Pt will perform tub/shower transfer with supervision. 6. Pt will perform UB dressing with minimal assistance. 7. Pt will perform LB dressing with minimal assistance. 8. Pt will perform toileting task with minimal assistance. 9. Pt will perform toilet transfer with supervision. 8/15/2023 1408 by Waldo Pham OT  Outcome: Progressing  Occupational Therapy GROUP TREATMENT    Patient: Wendy Lau   61 y.o. Patient identified with name and : yes    Date: 8/15/2023    Tx Session  Time In: [address]36 [place] (occupational therapy group tx session)   Time Out: 1040  Diagnosis: Acute CVA (cerebrovascular accident) [facility]) [I63.9]   Precautions: Fall Risk, Aspiration Risk (R hemiparesis; aphasia)   Chart, occupational therapy assessment, plan of care, and goals were reviewed.      Pain:  Pt reports 0/10 pain or

## 2023-08-15 NOTE — PLAN OF CARE
Problem: SLP Adult - Impaired Communication  Goal: By Discharge: Demonstrates communication skills at highest level of function for planned discharge setting. See evaluation for individualized goals. Description: Long term goals (initiated 8/10/23; to be completed by 8/31/23)  Patient will:  1. Perform oral/pharyngeal strengthening exercises with min cues. 2. Use safe swallowing techniques of slow rate of intake, small bites/sips, clear mouth between boluses, alternate liquids and solids, perform repeat swallow as needed, supervision. 3. Tolerate easy to chew diet and thin liquids without overt s/s of aspiration or other difficulty. 4. Follow 2 part instructions with 80% accuracy. 5. Name common objects with 90% accuracy; describe item function with 70-80% accuracy. 6. Respond to yes/no questions re: general information, 80% accuracy. 7. Respond to simply phrased requests for information, 70-80% accuracy. Short term goals ( by 8/17/23)  Patient will:  1. Perform oral/pharyngeal strengthening exercises with mod cues and models. 2. Use safe swallowing techniques of slow rate of intake, small bites/sips, clear mouth between boluses, alternate liquids and solids, perform repeat swallow as needed, mod cues. 3. Tolerate easy to chew diet and thin liquids without overt s/s of aspiration or other difficulty. 4. Follow 2 part instructions with 60% accuracy. 5. Name common objects with 80% accuracy; describe item function with 60% accuracy. 6. Respond to yes/no questions re: general information, 70% accuracy. 7. Respond to simply phrased requests for information, 60% accuracy. Note:   SPEECH-LANGUAGE TREATMENT    Patient: Day Maldonado (74 y.o. female)  Date: 8/15/2023  Diagnosis: Acute CVA (cerebrovascular accident) [facility]) [I63.9] Acute CVA (cerebrovascular accident) ([address])      Precautions: Standard  PLOF: As per H&P     ASSESSMENT:  Mrs. Curran as awake, alert and engaged in today's sessions.   She is

## 2023-08-16 PROCEDURE — 92508 TX SP LANG VOICE COMM GROUP: CPT

## 2023-08-16 PROCEDURE — 97530 THERAPEUTIC ACTIVITIES: CPT

## 2023-08-16 PROCEDURE — 99232 SBSQ HOSP IP/OBS MODERATE 35: CPT | Performed by: EMERGENCY MEDICINE

## 2023-08-16 PROCEDURE — 92507 TX SP LANG VOICE COMM INDIV: CPT

## 2023-08-16 PROCEDURE — 97150 GROUP THERAPEUTIC PROCEDURES: CPT

## 2023-08-16 PROCEDURE — 97535 SELF CARE MNGMENT TRAINING: CPT

## 2023-08-16 PROCEDURE — 1180000000 HC REHAB R&B

## 2023-08-16 PROCEDURE — 97116 GAIT TRAINING THERAPY: CPT

## 2023-08-16 PROCEDURE — 6370000000 HC RX 637 (ALT 250 FOR IP): Performed by: EMERGENCY MEDICINE

## 2023-08-16 RX ORDER — HYDRALAZINE HYDROCHLORIDE 10 MG/1
10 TABLET, FILM COATED ORAL EVERY 6 HOURS PRN
Status: DISCONTINUED | OUTPATIENT
Start: 2023-08-16 | End: 2023-08-25 | Stop reason: HOSPADM

## 2023-08-16 RX ADMIN — LISINOPRIL 10 MG: 5 TABLET ORAL at 08:21

## 2023-08-16 RX ADMIN — ATORVASTATIN CALCIUM 40 MG: 40 TABLET, FILM COATED ORAL at 20:56

## 2023-08-16 RX ADMIN — SERTRALINE 125 MG: 50 TABLET, FILM COATED ORAL at 08:21

## 2023-08-16 RX ADMIN — TRAZODONE HYDROCHLORIDE 50 MG: 50 TABLET ORAL at 20:56

## 2023-08-16 RX ADMIN — CHOLECALCIFEROL TAB 25 MCG (1000 UNIT) 1000 UNITS: 25 TAB at 08:21

## 2023-08-16 RX ADMIN — ASPIRIN 81 MG CHEWABLE TABLET 81 MG: 81 TABLET CHEWABLE at 08:21

## 2023-08-16 ASSESSMENT — PAIN SCALES - GENERAL
PAINLEVEL_OUTOF10: 0

## 2023-08-16 NOTE — PROGRESS NOTES
OT WEEKLY PROGRESS NOTE  Patient Name:Mena Durant    First Treatment Session  Time In: 0700  Time Out: 830    Medical Diagnosis:  Acute CVA (cerebrovascular accident) ([address]) [I63.9] Acute CVA (cerebrovascular accident) [facility])     Precautions: Fall Risk, Aspiration Risk (R hemiparesis; aphasia),  ,  ,  ,  ,  ,  ,      Pain at start of tx:0/10 pain or discomfort. Pain at stop of tx:0/10 pain or discomfort. Patient identified with name and : yes  Subjective:     Pt stated, \"I forgot it and then I remembered it. \" in reference to inattention to RUE during functional tasks in stance. Pt able to self correct improper placement of RUE with VC. Objective: This reporting period, pt has participated in various therex, theract, transfer training, self-care retraining, instr on AD/AE use, and ongoing pt ed in effort to improve RUE strength, ROM, and coordination as well as overall activity tolerance, standing balance/tolerance, and safety awareness/self-monitoring skills for carryover into functional transfers and ADL/light IADL performance skills. THERAPEUTIC ACTIVITY Daily Assessment     Pt completed light IADL mgmt task making bed in dynamic stance at EOB for improved standing balance, activity tolerance, and incorporation of RUE into functional tasks. Pt req CGA with SBQC in stance able to put x2 pillow cases on pillows primarily utilizing non-dominant LUE with RUE used to stabilize pillow joanne stance x5:56min overall. Pt req VC for safety while reaching out of KAL and crossing midline to manage task items.         COGNITION/PERCEPTION Initial Assessment Weekly Progress Assessment 2023   Overall orientation status Within Normal Limits  WNL   Orientation level Oriented X4 (able to indicate appropriate location and year when given yes/no questions due to aphasia)  A&O x4 (cont to indicate appropriate responses with yes/no questions d/t aphasia)   Overall cognitive status Exceptions  Exceptions

## 2023-08-16 NOTE — PLAN OF CARE
Problem: Physical Therapy - Adult  Goal: By Discharge: Performs mobility at highest level of function for planned discharge setting. See evaluation for individualized goals. Description: Physical Therapy Short Term Goals  Initiated 8/10/2023 and to be accomplished within 7 day(s) [8/17/23]  1. Patient will supine to sit and sit to supine  in bed with supervision. 2.  Patient will transfer from bed to chair and chair to bed with minimal assistance using the least restrictive device. 3.  Patient will perform sit to stand with minimal assistance. 4.  Patient will ambulate with minimal assistance for 50 feet with the least restrictive device. 5.  Patient will propel w/c 150 ft with L UE and B LE's on level surfaces with supervision for mobility on unit. Physical Therapy Long Term Goals  Initiated 8/10/2023 and to be accomplished within 21 day(s) [8/31/23]  1. Patient will supine to sit, sit to supine, roll right, and roll left in bed with modified independence without use of bed rails. 2.  Patient will transfer from bed to chair and chair to bed with SBA/CGA using the least restrictive device. 3.  Patient will perform sit to stand with SBA/CGA from various surface heights. 4.  Patient will ambulate with contact guard assist for 100 feet with the least restrictive device. 5.  Patient will ascend/descend 4 stairs with one handrail with contact guard assist to enter/exit home. (pt has B rails, but wide apart)  Outcome: Progressing       PHYSICAL THERAPY TREATMENT    Patient: Unknown Phy (36 y.o. female)  Date: 8/16/2023  Diagnosis: Acute CVA (cerebrovascular accident) [facility]) [I63.9]   Precautions: Fall Risk Precautions  Chart, physical therapy assessment, plan of care and goals were reviewed.     Time in: 0930  Time out : 1000    Patient seen for: Gait Training, Transfer Training, Balance Training, Patient Education    Time in: 1000  Time out : 1030    Patient seen for: 30 minute group activity to

## 2023-08-16 NOTE — PROGRESS NOTES
TEAM CONFERENCE FOLLOW-UP  Patient: Richard Whatley (4 y.o. female)  Date: 8/16/2023  Diagnosis: Acute CVA (cerebrovascular accident) [facility]) [I63.9] Acute CVA (cerebrovascular accident) [facility])      Precautions:      Met with patient to discuss the findings from 8/16/2023 Team Conference.     Patient requested further information and/or had questions:   Nadeen Anderson

## 2023-08-17 LAB
ANION GAP SERPL CALC-SCNC: 4 MMOL/L (ref 3–18)
BASOPHILS # BLD: 0 K/UL (ref 0–0.1)
BASOPHILS NFR BLD: 1 % (ref 0–2)
BUN SERPL-MCNC: 18 MG/DL (ref 7–18)
BUN/CREAT SERPL: 27 (ref 12–20)
CALCIUM SERPL-MCNC: 8.9 MG/DL (ref 8.5–10.1)
CHLORIDE SERPL-SCNC: 108 MMOL/L (ref 100–111)
CO2 SERPL-SCNC: 27 MMOL/L (ref 21–32)
CREAT SERPL-MCNC: 0.66 MG/DL (ref 0.6–1.3)
DIFFERENTIAL METHOD BLD: NORMAL
EOSINOPHIL # BLD: 0.1 K/UL (ref 0–0.4)
EOSINOPHIL NFR BLD: 2 % (ref 0–5)
ERYTHROCYTE [DISTWIDTH] IN BLOOD BY AUTOMATED COUNT: 12.8 % (ref 11.6–14.5)
GLUCOSE SERPL-MCNC: 99 MG/DL (ref 74–99)
HCT VFR BLD AUTO: 39 % (ref 35–45)
HGB BLD-MCNC: 12.6 G/DL (ref 12–16)
IMM GRANULOCYTES # BLD AUTO: 0 K/UL (ref 0–0.04)
IMM GRANULOCYTES NFR BLD AUTO: 0 % (ref 0–0.5)
LYMPHOCYTES # BLD: 1.8 K/UL (ref 0.9–3.6)
LYMPHOCYTES NFR BLD: 30 % (ref 21–52)
MCH RBC QN AUTO: 28.7 PG (ref 24–34)
MCHC RBC AUTO-ENTMCNC: 32.3 G/DL (ref 31–37)
MCV RBC AUTO: 88.8 FL (ref 78–100)
MONOCYTES # BLD: 0.6 K/UL (ref 0.05–1.2)
MONOCYTES NFR BLD: 9 % (ref 3–10)
NEUTS SEG # BLD: 3.4 K/UL (ref 1.8–8)
NEUTS SEG NFR BLD: 57 % (ref 40–73)
NRBC # BLD: 0 K/UL (ref 0–0.01)
NRBC BLD-RTO: 0 PER 100 WBC
PLATELET # BLD AUTO: 205 K/UL (ref 135–420)
PMV BLD AUTO: 10.9 FL (ref 9.2–11.8)
POTASSIUM SERPL-SCNC: 3.9 MMOL/L (ref 3.5–5.5)
RBC # BLD AUTO: 4.39 M/UL (ref 4.2–5.3)
SODIUM SERPL-SCNC: 139 MMOL/L (ref 136–145)
WBC # BLD AUTO: 6 K/UL (ref 4.6–13.2)

## 2023-08-17 PROCEDURE — 85025 COMPLETE CBC W/AUTO DIFF WBC: CPT

## 2023-08-17 PROCEDURE — 97112 NEUROMUSCULAR REEDUCATION: CPT

## 2023-08-17 PROCEDURE — 99232 SBSQ HOSP IP/OBS MODERATE 35: CPT | Performed by: FAMILY MEDICINE

## 2023-08-17 PROCEDURE — 1180000000 HC REHAB R&B

## 2023-08-17 PROCEDURE — 97535 SELF CARE MNGMENT TRAINING: CPT

## 2023-08-17 PROCEDURE — 97116 GAIT TRAINING THERAPY: CPT

## 2023-08-17 PROCEDURE — 92507 TX SP LANG VOICE COMM INDIV: CPT

## 2023-08-17 PROCEDURE — 97530 THERAPEUTIC ACTIVITIES: CPT

## 2023-08-17 PROCEDURE — 80048 BASIC METABOLIC PNL TOTAL CA: CPT

## 2023-08-17 PROCEDURE — 36415 COLL VENOUS BLD VENIPUNCTURE: CPT

## 2023-08-17 PROCEDURE — 92508 TX SP LANG VOICE COMM GROUP: CPT

## 2023-08-17 PROCEDURE — 6370000000 HC RX 637 (ALT 250 FOR IP): Performed by: EMERGENCY MEDICINE

## 2023-08-17 PROCEDURE — 97110 THERAPEUTIC EXERCISES: CPT

## 2023-08-17 PROCEDURE — 97150 GROUP THERAPEUTIC PROCEDURES: CPT

## 2023-08-17 RX ADMIN — SERTRALINE 125 MG: 50 TABLET, FILM COATED ORAL at 07:30

## 2023-08-17 RX ADMIN — LISINOPRIL 10 MG: 5 TABLET ORAL at 07:29

## 2023-08-17 RX ADMIN — CHOLECALCIFEROL TAB 25 MCG (1000 UNIT) 1000 UNITS: 25 TAB at 07:30

## 2023-08-17 RX ADMIN — ASPIRIN 81 MG CHEWABLE TABLET 81 MG: 81 TABLET CHEWABLE at 07:29

## 2023-08-17 RX ADMIN — ATORVASTATIN CALCIUM 40 MG: 40 TABLET, FILM COATED ORAL at 21:31

## 2023-08-17 RX ADMIN — TRAZODONE HYDROCHLORIDE 50 MG: 50 TABLET ORAL at 21:31

## 2023-08-17 ASSESSMENT — PAIN SCALES - GENERAL
PAINLEVEL_OUTOF10: 0

## 2023-08-17 NOTE — PLAN OF CARE
Problem: SLP Adult - Impaired Communication  Goal: By Discharge: Demonstrates communication skills at highest level of function for planned discharge setting. See evaluation for individualized goals. Description: Long term goals (initiated 8/10/23; to be completed by 8/31/23)  Patient will:  1. Perform oral/pharyngeal strengthening exercises with min cues. 2. Use safe swallowing techniques of slow rate of intake, small bites/sips, clear mouth between boluses, alternate liquids and solids, perform repeat swallow as needed, supervision. 3. Tolerate easy to chew diet and thin liquids without overt s/s of aspiration or other difficulty. 4. Follow 2 part instructions with 80% accuracy. 5. Name common objects with 90% accuracy; describe item function with 70-80% accuracy. 6. Respond to yes/no questions re: general information, 80% accuracy. 7. Respond to simply phrased requests for information, 70-80% accuracy. Short term goals ( by 8/24/23)  Patient will:  1. Perform oral/pharyngeal strengthening exercises with min cues and models. 2. Use safe swallowing techniques of slow rate of intake, small bites/sips, clear mouth between boluses, alternate liquids and solids, perform repeat swallow as needed, min cues. 3. Tolerate easy to chew diet and thin liquids without overt s/s of aspiration or other difficulty. 4. Follow 1 part instructions with 90% accuracy. 5. Name common objects with 80% accuracy; describe item function with 60% accuracy. 6. Respond to yes/no questions re: general information, 90% accuracy. 7. Respond to simply phrased requests for information, 70% accuracy.   8. Perform automatic speech tasks, mod-min assist.  9. Produce phrases with patterned rhythm and intonation, 80% accuracy  Note:   SPEECH-LANGUAGE TREATMENT    Patient: Geronimo Talavera (45 y.o. female)  Date: 8/17/2023  Diagnosis: Acute CVA (cerebrovascular accident) [facility]) [I63.9] Acute CVA (cerebrovascular accident) ([address] [facility]) discouraged and responds well to cues from the SLP. She is making excellent progress in speaking on the unit. PAIN:  Start of Tx: 1030 1230 1335   End of Tx:  1100  1300 1405    After treatment:   [x]       Patient left in no apparent distress sitting up in chair  []       Patient left in no apparent distress in bed  [x]       Call bell left within reach  []       Nursing notified  []       Caregiver present  []       Bed alarm activated      COMMUNICATION/EDUCATION:   [x] Patient educated regarding compensatory speech/language/comprehension techniques provided via demonstration, verbalization and teach back of comprehension  [x] Patient/family have participated as able in goal setting and plan of care. [x] Patient/family agree to work toward stated goals and plan of care. [] Patient understands intent and goals of therapy, neutral about participation.   [] Patient unable to participate in goal setting/plan of care secondary to cognition, hearing/vision deficits; education ongoing with interdisciplinary staff       Oziel Plasencia, SLP

## 2023-08-17 NOTE — PLAN OF CARE
Problem: Physical Therapy - Adult  Goal: By Discharge: Performs mobility at highest level of function for planned discharge setting. See evaluation for individualized goals. Description: Physical Therapy Short Term Goals  Initiated 8/10/2023 and to be accomplished within 7 day(s) [8/17/23]  1. Patient will supine to sit and sit to supine  in bed with supervision. MET and surpassed 8/17/2023  2. Patient will transfer from bed to chair and chair to bed with minimal assistance using the least restrictive device. MET and surpassed 8/17/2023  3. Patient will perform sit to stand with minimal assistance. MET and surpassed 8/17/2023  4. Patient will ambulate with minimal assistance for 50 feet with the least restrictive device. MET and surpassed 8/17/2023  5. Patient will propel w/c 150 ft with L UE and B LE's on level surfaces with supervision for mobility on unit. MET and surpassed 8/17/2023    Physical Therapy Long Term Goals  Initiated 8/10/2023 and to be accomplished within 21 day(s) [8/31/23]  1. Patient will supine to sit, sit to supine, roll right, and roll left in bed with modified independence without use of bed rails. 2.  Patient will transfer from bed to chair and chair to bed with SBA/CGA using the least restrictive device. 3.  Patient will perform sit to stand with SBA/CGA from various surface heights. 4.  Patient will ambulate with contact guard assist for 100 feet with the least restrictive device. 5.  Patient will ascend/descend 4 stairs with one handrail with contact guard assist to enter/exit home. (pt has B rails, but wide apart)  8/17/2023 0821 by Ernesto Holt PT  Outcome: Progressing      PHYSICAL THERAPY WEEKLY PROGRESS NOTE    Patient: Alejandra Florian (61 y.o. female)  Date: 8/17/2023  Diagnosis: Acute CVA (cerebrovascular accident) [facility]) [I63.9]   Precautions: Fall Risk Precautions  Chart, physical therapy assessment, plan of care and goals were reviewed.       Time

## 2023-08-18 PROCEDURE — 97535 SELF CARE MNGMENT TRAINING: CPT

## 2023-08-18 PROCEDURE — 92507 TX SP LANG VOICE COMM INDIV: CPT

## 2023-08-18 PROCEDURE — 92508 TX SP LANG VOICE COMM GROUP: CPT

## 2023-08-18 PROCEDURE — 97116 GAIT TRAINING THERAPY: CPT

## 2023-08-18 PROCEDURE — 97530 THERAPEUTIC ACTIVITIES: CPT

## 2023-08-18 PROCEDURE — 6370000000 HC RX 637 (ALT 250 FOR IP): Performed by: EMERGENCY MEDICINE

## 2023-08-18 PROCEDURE — 99232 SBSQ HOSP IP/OBS MODERATE 35: CPT | Performed by: FAMILY MEDICINE

## 2023-08-18 PROCEDURE — 97110 THERAPEUTIC EXERCISES: CPT

## 2023-08-18 PROCEDURE — 1180000000 HC REHAB R&B

## 2023-08-18 RX ORDER — SIMETHICONE 80 MG
80 TABLET,CHEWABLE ORAL EVERY 6 HOURS PRN
Status: DISCONTINUED | OUTPATIENT
Start: 2023-08-18 | End: 2023-08-25 | Stop reason: HOSPADM

## 2023-08-18 RX ORDER — LANOLIN ALCOHOL/MO/W.PET/CERES
3 CREAM (GRAM) TOPICAL NIGHTLY PRN
Status: DISCONTINUED | OUTPATIENT
Start: 2023-08-18 | End: 2023-08-25 | Stop reason: HOSPADM

## 2023-08-18 RX ORDER — CALCIUM CARBONATE 500 MG/1
500 TABLET, CHEWABLE ORAL 3 TIMES DAILY PRN
Status: DISCONTINUED | OUTPATIENT
Start: 2023-08-18 | End: 2023-08-25 | Stop reason: HOSPADM

## 2023-08-18 RX ORDER — ONDANSETRON 4 MG/1
4 TABLET, ORALLY DISINTEGRATING ORAL EVERY 8 HOURS PRN
Status: DISCONTINUED | OUTPATIENT
Start: 2023-08-18 | End: 2023-08-25 | Stop reason: HOSPADM

## 2023-08-18 RX ADMIN — SERTRALINE 125 MG: 50 TABLET, FILM COATED ORAL at 12:07

## 2023-08-18 RX ADMIN — CHOLECALCIFEROL TAB 25 MCG (1000 UNIT) 1000 UNITS: 25 TAB at 12:07

## 2023-08-18 RX ADMIN — ATORVASTATIN CALCIUM 40 MG: 40 TABLET, FILM COATED ORAL at 20:26

## 2023-08-18 RX ADMIN — TRAZODONE HYDROCHLORIDE 50 MG: 50 TABLET ORAL at 20:26

## 2023-08-18 RX ADMIN — ASPIRIN 81 MG CHEWABLE TABLET 81 MG: 81 TABLET CHEWABLE at 12:07

## 2023-08-18 ASSESSMENT — PAIN SCALES - GENERAL
PAINLEVEL_OUTOF10: 0

## 2023-08-18 NOTE — PROGRESS NOTES
[id number] [facility] PHYSICAL REHABILITATION  [address] [place] [address], [address] [facility]   INPATIENT REHABILITATION  DAILY PROGRESS NOTE     Date: 8/18/2023    Name: Nakia Rodríguez Age / Sex: 61 y.o. 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 Subjective:     Pt seen by me face to face today, 8.18. reports that she is doing well, and has no complaints. States that therapy \"is fun!\"    Pain - none    Review of Systems:  Y  N      Y  N  [] [x]   Fever/chills                                              [] [x] Chest Pain  [] [x]  Cough                                                    [] [x]  Diarrhea   [] [x]  Sputum                                                  [] [x]  Constipation  [] [x]  SOB/MILLIGAN                                                [] [x]  Nausea/Vomit  [] [x]  Abd Pain                                                  [x] []  Tolerating PT  [] [x]  Dysuria                                                    [x] []   Tolerating Diet    Labs reviewed. No new labs  MAR reviewed. 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      Other Co-Morbid Conditions managed in Rehab   RIGHT hemiparesis  Dysarthria and dysphagia  Hypertension  Subarachnoid hemorrhage  Dyslipidemia  Anxiety and depression  History of tobacco use     Plan      -impaired mobility and ADLs in the setting of acute cerebral infarction due to unspecified occlusion or stenosis of the left middle cerebral artery with right hemiparesis and dysarthria and Technique: Stand pivot  Shower Transfers: Minimal assistance  Shower Transfers Comments: Stand pivot transfer performed (CGA) w/c <-> tub transfer bench; use of grab bar for stability. Gait belt for safety. Tub Transfers  Tub Transfers  Tub - Transfer Type: To and From  Tub - Transfer To: Transfer tub bench  Tub - Technique: Stand pivot  Tub Transfers: Stand by assistance, Verbal cues  Tub Transfers Comments: VC for safe AD mgmt during stand-step transfer  SHOWER Transfers  Tub Transfers  Tub - Transfer Type: To and From  Tub - Transfer To: Transfer tub bench  Tub - Technique: Stand pivot  Tub Transfers: Stand by assistance, Verbal cues  Tub Transfers Comments: VC for safe AD mgmt during stand-step transfer     Legend:     7 - Independent     6 - Modified Independent     5 - Standby Assistance / Supervision / Set-up     4 - Minimum Assistance / Contact Guard Assistance     3 - Moderate Assistance     2 - Maximum Assistance     1 - Total Assistance / Dependent       1. Justification for continued stay: Fair progression towards established rehabilitation goals. 2. Medical Issues being followed closely:    [x]  Fall and safety precautions     []  Wound Care     [x]  Bowel and Bladder Function     [x]  Fluid Electrolyte and Nutrition Balance     []  Pain Control       3. Issues that 24 hour rehabilitation nursing is following:    [x]  Fall and safety precautions     []  Wound Care     [x]  Bowel and Bladder Function     [x]  Fluid Electrolyte and Nutrition Balance     []  Pain Control      [x]  Assistance with and education on in-room safety with transfers to and from the bed, wheelchair, toilet and shower. 4. Acute rehabilitation plan of care:    [x]  Continue current care and rehab. [x]  Physical Therapy           [x]  Occupational Therapy           [x]  Speech Therapy     []  Hold Rehab until further notice      5. Medications:    [x]  MAR Reviewed     [x]  Continue Present Medications         6.

## 2023-08-18 NOTE — PLAN OF CARE
Problem: Physical Therapy - Adult  Goal: By Discharge: Performs mobility at highest level of function for planned discharge setting. See evaluation for individualized goals. Description: Physical Therapy Short Term Goals  Initiated 8/10/2023, met 8/17/2023 and progressed to long term goals. 1.  Patient will supine to sit and sit to supine  in bed with supervision. 2.  Patient will transfer from bed to chair and chair to bed with minimal assistance using the least restrictive device. 3.  Patient will perform sit to stand with minimal assistance. 4.  Patient will ambulate with minimal assistance for 50 feet with the least restrictive device. 5.  Patient will propel w/c 150 ft with L UE and B LE's on level surfaces with supervision for mobility on unit. Physical Therapy Long Term Goals  Initiated 8/10/2023 and to be accomplished within 21 day(s) [8/31/23]  1. Patient will supine to sit, sit to supine, roll right, and roll left in bed with modified independence without use of bed rails. 2.  Patient will transfer from bed to chair and chair to bed with SBA/CGA using the least restrictive device. 3.  Patient will perform sit to stand with supervision from various surface heights. 4.  Patient will ambulate with contact guard assist for 150 feet with the least restrictive device. 5.  Patient will ascend/descend 4 stairs with one handrail with contact guard assist to enter/exit home. (pt has B rails, but wide apart)  Outcome: Progressing     PHYSICAL THERAPY TREATMENT    Patient: Tere Vick (32 y.o. female)  Date: 8/18/2023  Diagnosis: Acute CVA (cerebrovascular accident) [facility]) [I63.9]   Precautions: fall  Chart, physical therapy assessment, plan of care and goals were reviewed. Time in: 0945  Time out : 1045    Patient seen for: gait training, transfers,     Pain:  Pt pain was reported as 0 pre-treatment. Pt pain was reported as 0 post-treatment.   Intervention: NA    Patient identified with

## 2023-08-18 NOTE — PLAN OF CARE
Problem: SLP Adult - Impaired Communication  Goal: By Discharge: Demonstrates communication skills at highest level of function for planned discharge setting. See evaluation for individualized goals. Description: Long term goals (initiated 8/10/23; to be completed by 8/31/23)  Patient will:  1. Perform oral/pharyngeal strengthening exercises with min cues. 2. Use safe swallowing techniques of slow rate of intake, small bites/sips, clear mouth between boluses, alternate liquids and solids, perform repeat swallow as needed, supervision. 3. Tolerate easy to chew diet and thin liquids without overt s/s of aspiration or other difficulty. 4. Follow 2 part instructions with 80% accuracy. 5. Name common objects with 90% accuracy; describe item function with 70-80% accuracy. 6. Respond to yes/no questions re: general information, 80% accuracy. 7. Respond to simply phrased requests for information, 70-80% accuracy. Short term goals (by 8/24/23)  Patient will:  1. Perform oral/pharyngeal strengthening exercises with min cues. 2. Use safe swallowing techniques of slow rate of intake, small bites/sips, clear mouth between boluses, alternate liquids and solids, perform repeat swallow as needed, min cues. 3. Tolerate easy to chew diet and thin liquids without overt s/s of aspiration or other difficulty. 4. Follow 1 part instructions with 90% accuracy; 2 part with 50-60% accuracy  5. Name common objects with 80% accuracy; describe item function with 70% accuracy. 6. Respond to yes/no questions re: general information, 90% accuracy. 7. Respond to simply phrased requests for information, 70% accuracy. 8. Perform automatic speech tasks, 80% accuracy.   9. Produce phrases with patterned rhythm and intonation, 80-90% accuracy  Note:   SPEECH-LANGUAGE TREATMENT    Patient: Dedrick Bingham (65 y.o. female)  Date: 8/18/2023  Diagnosis: Acute CVA (cerebrovascular accident) [facility]) [I63.9] Acute CVA (cerebrovascular Call bell left within reach  []       Nursing notified  []       Caregiver present  []       Bed alarm activated      COMMUNICATION/EDUCATION:   [] Patient educated regarding compensatory speech/language/comprehension techniques provided via demonstration, verbalization and teach back of comprehension  [x] Patient/family have participated as able in goal setting and plan of care. [x] Patient/family agree to work toward stated goals and plan of care. [] Patient understands intent and goals of therapy, neutral about participation.   [] Patient unable to participate in goal setting/plan of care secondary to cognition, hearing/vision deficits; education ongoing with interdisciplinary staff       Yanet Salcedo, SLP

## 2023-08-18 NOTE — PLAN OF CARE
Problem: Occupational Therapy - Adult  Goal: By Discharge: Performs self-care activities at highest level of function for planned discharge setting. See evaluation for individualized goals. Description: Occupational Therapy Goals   Long Term Goals  Initiated 8/10/23 and to be accomplished within 2-3 week(s)  1. Pt will perform self-feeding with modified independence. 2. Pt will perform grooming with modified independence. 3. Pt will perform UB bathing with supervision. 4. Pt will perform LB bathing with supervision. 5. Pt will perform tub/shower transfer with supervision. 6. Pt will perform UB dressing with modified independence. 7. Pt will perform LB dressing with supervision. 8. Pt will perform toileting task with supervision. 9. Pt will perform toilet transfer with supervision. Short Term Goals   Initiated 8/10/23 and to be accomplished within 7 day(s); reassessed on 2023. 1. Pt will perform self-feeding with modified independence. 2. Pt will perform grooming with modified independence. 3. Pt will perform UB bathing with minimal assistance. (Goal met 2023)  4. Pt will perform LB bathing with minimal assistance. (Goal met 2023)  5. Pt will perform tub/shower transfer with supervision. 6. Pt will perform UB dressing with minimal assistance. (Goal met 2023)  7. Pt will perform LB dressing with minimal assistance. 8. Pt will perform toileting task with minimal assistance. (Goal met 2023)  9. Pt will perform toilet transfer with supervision. Outcome: Progressing   Occupational Therapy TREATMENT    Patient: Hailey Reyes [address] [address] y.o.     Patient identified with name and : yes    Date: 2023    First Tx Session  Time In: 0708  Time Out: 9746    Diagnosis: Acute CVA (cerebrovascular accident) [facility]) [I63.9]   Precautions:  Restrictions/Precautions: Fall Risk, Aspiration Risk (R hemiparesis; aphasia)               Chart, occupational therapy assessment, plan of care,

## 2023-08-19 PROCEDURE — 6370000000 HC RX 637 (ALT 250 FOR IP): Performed by: EMERGENCY MEDICINE

## 2023-08-19 PROCEDURE — 92507 TX SP LANG VOICE COMM INDIV: CPT

## 2023-08-19 PROCEDURE — 1180000000 HC REHAB R&B

## 2023-08-19 RX ADMIN — TRAZODONE HYDROCHLORIDE 50 MG: 50 TABLET ORAL at 21:03

## 2023-08-19 RX ADMIN — SERTRALINE 125 MG: 50 TABLET, FILM COATED ORAL at 08:00

## 2023-08-19 RX ADMIN — CHOLECALCIFEROL TAB 25 MCG (1000 UNIT) 1000 UNITS: 25 TAB at 08:00

## 2023-08-19 RX ADMIN — ASPIRIN 81 MG CHEWABLE TABLET 81 MG: 81 TABLET CHEWABLE at 08:00

## 2023-08-19 RX ADMIN — LISINOPRIL 10 MG: 5 TABLET ORAL at 08:00

## 2023-08-19 RX ADMIN — ATORVASTATIN CALCIUM 40 MG: 40 TABLET, FILM COATED ORAL at 21:03

## 2023-08-19 ASSESSMENT — PAIN SCALES - GENERAL
PAINLEVEL_OUTOF10: 0

## 2023-08-19 ASSESSMENT — PAIN SCALES - WONG BAKER
WONGBAKER_NUMERICALRESPONSE: 0
WONGBAKER_NUMERICALRESPONSE: 0

## 2023-08-19 NOTE — PLAN OF CARE
demonstration, verbalization and teach back of comprehension  [x] Patient/family have participated as able in goal setting and plan of care. [x] Patient/family agree to work toward stated goals and plan of care. [] Patient understands intent and goals of therapy, neutral about participation.   [] Patient unable to participate in goal setting/plan of care secondary to cognition, hearing/vision deficits; education ongoing with interdisciplinary staff       Erich Nieves, SLP

## 2023-08-20 PROCEDURE — 6370000000 HC RX 637 (ALT 250 FOR IP): Performed by: EMERGENCY MEDICINE

## 2023-08-20 PROCEDURE — 1180000000 HC REHAB R&B

## 2023-08-20 RX ADMIN — ASPIRIN 81 MG CHEWABLE TABLET 81 MG: 81 TABLET CHEWABLE at 07:38

## 2023-08-20 RX ADMIN — ATORVASTATIN CALCIUM 40 MG: 40 TABLET, FILM COATED ORAL at 20:59

## 2023-08-20 RX ADMIN — TRAZODONE HYDROCHLORIDE 50 MG: 50 TABLET ORAL at 20:59

## 2023-08-20 RX ADMIN — CHOLECALCIFEROL TAB 25 MCG (1000 UNIT) 1000 UNITS: 25 TAB at 07:38

## 2023-08-20 RX ADMIN — SERTRALINE 125 MG: 50 TABLET, FILM COATED ORAL at 07:38

## 2023-08-20 RX ADMIN — LISINOPRIL 10 MG: 5 TABLET ORAL at 07:38

## 2023-08-20 ASSESSMENT — PAIN SCALES - GENERAL
PAINLEVEL_OUTOF10: 0

## 2023-08-20 ASSESSMENT — PAIN SCALES - WONG BAKER
WONGBAKER_NUMERICALRESPONSE: 0

## 2023-08-21 PROCEDURE — 97110 THERAPEUTIC EXERCISES: CPT

## 2023-08-21 PROCEDURE — 97530 THERAPEUTIC ACTIVITIES: CPT

## 2023-08-21 PROCEDURE — 92507 TX SP LANG VOICE COMM INDIV: CPT

## 2023-08-21 PROCEDURE — 97535 SELF CARE MNGMENT TRAINING: CPT

## 2023-08-21 PROCEDURE — 1180000000 HC REHAB R&B

## 2023-08-21 PROCEDURE — 92508 TX SP LANG VOICE COMM GROUP: CPT

## 2023-08-21 PROCEDURE — 97112 NEUROMUSCULAR REEDUCATION: CPT

## 2023-08-21 PROCEDURE — 6370000000 HC RX 637 (ALT 250 FOR IP): Performed by: EMERGENCY MEDICINE

## 2023-08-21 PROCEDURE — 97116 GAIT TRAINING THERAPY: CPT

## 2023-08-21 PROCEDURE — 99232 SBSQ HOSP IP/OBS MODERATE 35: CPT | Performed by: EMERGENCY MEDICINE

## 2023-08-21 RX ADMIN — LISINOPRIL 10 MG: 5 TABLET ORAL at 08:39

## 2023-08-21 RX ADMIN — TRAZODONE HYDROCHLORIDE 50 MG: 50 TABLET ORAL at 20:46

## 2023-08-21 RX ADMIN — SERTRALINE 125 MG: 50 TABLET, FILM COATED ORAL at 08:39

## 2023-08-21 RX ADMIN — ATORVASTATIN CALCIUM 40 MG: 40 TABLET, FILM COATED ORAL at 20:46

## 2023-08-21 RX ADMIN — CHOLECALCIFEROL TAB 25 MCG (1000 UNIT) 1000 UNITS: 25 TAB at 08:39

## 2023-08-21 RX ADMIN — ASPIRIN 81 MG CHEWABLE TABLET 81 MG: 81 TABLET CHEWABLE at 08:39

## 2023-08-21 ASSESSMENT — PAIN SCALES - GENERAL
PAINLEVEL_OUTOF10: 0

## 2023-08-21 NOTE — PROGRESS NOTES
Comprehensive Nutrition Assessment    Type and Reason for Visit:  Initial, RD Nutrition Re-Screen/LOS    Nutrition Recommendations/Plan:   Add supplement: Ensure Plus once daily (350 kcal, 20 gm protein each)  Continue all other nutrition interventions. Encourage/ monitor po intake of meals and supplements. Malnutrition Assessment:  Malnutrition Status: At risk for malnutrition (Comment) (pt with fair meal intake per chart documentation.) (08/21/23 5749)    Context:  Acute Illness       Nutrition History and Allergies:   Past medical hx:  arthritis, hepatitis C, HTN, hypercholesterolemia, cholecystectomy. Wt trends PTA per chart hx: 157 lb on 1/4/22,  172 lb on 8/9/23. Question if recent wt gain is related to fluid status as pt has edema. No known food allergies     Nutrition Assessment:    Pt unavailable x2; admitted to ARU for therapy s/p recent CVA. Is on easy to chew diet; SLP following. Tolerating meals with fair intake, 51- 75% per chart documentation. Plan to add supplement. Nutrition Related Findings:    BM 8/19.   +edema. BMP last checked on 8/17; electrolytes WNL. Pertinent meds: vitamin D, zoloft, lipitor. Wound Type: None       Current Nutrition Intake & Therapies:    Average Meal Intake: 51-75%  Average Supplements Intake: None Ordered  ADULT DIET; Easy to Chew; Low Fat/Low Chol/High Fiber/2 gm Na    Anthropometric Measures:  Height: 5' 4\" (162.6 cm)  Ideal Body Weight (IBW): 120 lbs (55 kg)    Admission Body Weight: 172 lb (78 kg)  Current Body Weight: 172 lb (78 kg), 143.3 % IBW. Weight Source: Bed Scale  Current BMI (kg/m2): 29.5  Usual Body Weight: 157 lb (71.2 kg)  % Weight Change (Calculated): 9.6  Weight Adjustment For: No Adjustment  BMI Categories: Overweight (BMI 25.0-29. 9)    Estimated Daily Nutrient Needs:  Energy Requirements Based On: Formula (MSJ x1.2-1.3)  Weight Used for Energy Requirements: Usual  Energy (kcal/day): 8751-9823  Weight Used for Protein Requirements:

## 2023-08-21 NOTE — PLAN OF CARE
each side with CGA/min A for standing balance        ASSESSMENT:  Pt is progressing well towards PT goals. With verbal cues patient was attempting to correct right knee hyperextension in stance phase of gait. Pt continues to require cues for slow controlled movements and to allow her right LE to set the pace so that she can focus on proper movement patterns. Progression toward goals:  [x]      Improving appropriately and progressing toward goals  []      Improving slowly and progressing toward goals  []      Not making progress toward goals and plan of care will be adjusted      PLAN:  Patient continues to benefit from skilled intervention to address the above impairments. Continue treatment per established plan of care. Discharge Recommendations:  24 hour supervision or assist;Home with Home health PT  Further Equipment Recommendations for Discharge:    [address] [facility] [address]                 Estimated Discharge Date:8/25/23    Activity Tolerance:   good  Please refer to the flowsheet for vital signs taken during this treatment.     After treatment:   [] Patient left in no apparent distress in bed  [x] Patient left in no apparent distress sitting up in chair  [] Patient left in no apparent distress in w/c mobilizing under own power  [] Patient left in no apparent distress dining area  [] Patient left in no apparent distress mobilizing under own power  [x] Call bell left within reach  [x] Nursing notified  [] Caregiver present  [] Bed alarm activated   [x] Chair alarm activated        Marlena Dowell, PT  8/21/2023

## 2023-08-21 NOTE — PLAN OF CARE
Problem: Chronic Conditions and Co-morbidities  Goal: Patient's chronic conditions and co-morbidity symptoms are monitored and maintained or improved  Outcome: Progressing  Flowsheets (Taken 8/21/2023 1488 by Bradley Goodwin RN)  Care Plan - Patient's Chronic Conditions and Co-Morbidity Symptoms are Monitored and Maintained or Improved: Monitor and assess patient's chronic conditions and comorbid symptoms for stability, deterioration, or improvement     Problem: Skin/Tissue Integrity  Goal: Absence of new skin breakdown  Description: 1. Monitor for areas of redness and/or skin breakdown  2. Assess vascular access sites hourly  3. Every 4-6 hours minimum:  Change oxygen saturation probe site  4. Every 4-6 hours:  If on nasal continuous positive airway pressure, respiratory therapy assess nares and determine need for appliance change or resting period.   Outcome: Progressing     Problem: Safety - Adult  Goal: Free from fall injury  Outcome: Progressing     Problem: ABCDS Injury Assessment  Goal: Absence of physical injury  Outcome: Progressing

## 2023-08-21 NOTE — PLAN OF CARE
Problem: Occupational Therapy - Adult  Goal: By Discharge: Performs self-care activities at highest level of function for planned discharge setting. See evaluation for individualized goals. Description: Occupational Therapy Goals   Long Term Goals  Initiated 8/10/23 and to be accomplished within 2-3 week(s)  1. Pt will perform self-feeding with modified independence. 2. Pt will perform grooming with modified independence. 3. Pt will perform UB bathing with supervision. 4. Pt will perform LB bathing with supervision. 5. Pt will perform tub/shower transfer with supervision. 6. Pt will perform UB dressing with modified independence. 7. Pt will perform LB dressing with supervision. 8. Pt will perform toileting task with supervision. 9. Pt will perform toilet transfer with supervision. Short Term Goals   Initiated 8/10/23 and to be accomplished within 7 day(s); reassessed on 2023. 1. Pt will perform self-feeding with modified independence. 2. Pt will perform grooming with modified independence. 3. Pt will perform UB bathing with minimal assistance. (Goal met 2023)  4. Pt will perform LB bathing with minimal assistance. (Goal met 2023)  5. Pt will perform tub/shower transfer with supervision. 6. Pt will perform UB dressing with minimal assistance. (Goal met 2023)  7. Pt will perform LB dressing with minimal assistance. 8. Pt will perform toileting task with minimal assistance. (Goal met 2023)  9. Pt will perform toilet transfer with supervision. Outcome: Progressing  Occupational Therapy TREATMENT    Patient: Kilo Dial [address] [facility]   61 y.o.     Patient identified with name and : yes    Date: 2023    First Tx Session  Time In: 1330  Time Out: 1430    Diagnosis: Acute CVA (cerebrovascular accident) [facility]) [I63.9]   Precautions: Restrictions/Precautions: Fall Risk, Aspiration Risk (R hemiparesis; aphasia)     Chart, occupational therapy assessment, plan of care, and goals Recommendations:  Home with Home health OT and 24 hr assistance  Further Equipment Recommendations for Discharge: tub transfer ALEKSANDR ca  Estimated LOS: 8/25/2023       COMMUNICATION/EDUCATION:   [] Home safety education was provided and the patient/caregiver indicated understanding. [x] Patient/family have participated as able in goal setting and plan of care. [x] Patient/family agree to work toward stated goals and plan of care. [] Patient understands intent and goals of therapy, but is neutral about his/her participation. [] Patient is unable to participate in goal setting and plan of care. Please refer to the flowsheet for vital signs taken during this treatment.   After treatment:   [x]  Patient left in no apparent distress sitting up in wheelchair with needs met  []  Patient left in no apparent distress in bed  []  Patient handoff to SLP/PT  [x]  Call bell and immediate needs left within reach  [x]  Nursing notified  []  Caregiver present  [x]  Wheelchair alarm activated    Entered Differentiated Treatment minutes: yes    Joy Aguilar OT  8/21/2023

## 2023-08-22 PROCEDURE — 97150 GROUP THERAPEUTIC PROCEDURES: CPT

## 2023-08-22 PROCEDURE — 97116 GAIT TRAINING THERAPY: CPT

## 2023-08-22 PROCEDURE — 97530 THERAPEUTIC ACTIVITIES: CPT

## 2023-08-22 PROCEDURE — 92508 TX SP LANG VOICE COMM GROUP: CPT

## 2023-08-22 PROCEDURE — 6370000000 HC RX 637 (ALT 250 FOR IP): Performed by: EMERGENCY MEDICINE

## 2023-08-22 PROCEDURE — 99232 SBSQ HOSP IP/OBS MODERATE 35: CPT | Performed by: EMERGENCY MEDICINE

## 2023-08-22 PROCEDURE — 1180000000 HC REHAB R&B

## 2023-08-22 PROCEDURE — 97110 THERAPEUTIC EXERCISES: CPT

## 2023-08-22 PROCEDURE — 92507 TX SP LANG VOICE COMM INDIV: CPT

## 2023-08-22 RX ADMIN — CHOLECALCIFEROL TAB 25 MCG (1000 UNIT) 1000 UNITS: 25 TAB at 10:15

## 2023-08-22 RX ADMIN — SERTRALINE 125 MG: 50 TABLET, FILM COATED ORAL at 10:15

## 2023-08-22 RX ADMIN — TRAZODONE HYDROCHLORIDE 50 MG: 50 TABLET ORAL at 21:39

## 2023-08-22 RX ADMIN — ASPIRIN 81 MG CHEWABLE TABLET 81 MG: 81 TABLET CHEWABLE at 10:15

## 2023-08-22 RX ADMIN — LISINOPRIL 10 MG: 5 TABLET ORAL at 10:15

## 2023-08-22 RX ADMIN — ATORVASTATIN CALCIUM 40 MG: 40 TABLET, FILM COATED ORAL at 21:39

## 2023-08-22 ASSESSMENT — PAIN SCALES - GENERAL
PAINLEVEL_OUTOF10: 0

## 2023-08-22 ASSESSMENT — PAIN SCALES - WONG BAKER: WONGBAKER_NUMERICALRESPONSE: 0

## 2023-08-22 NOTE — PROGRESS NOTES
[address] [facility] PHYSICAL REHABILITATION  [address] [place] [address], [address] [facility]   INPATIENT REHABILITATION  DAILY PROGRESS NOTE     Date: 8/22/2023    Name: Hulan Runner Age / Sex: 61 y.o. / female   CSN: [id number] MRN: [id number]   Lyud[address]ila [address] Glo Date: 8/9/2023 Length of Stay: 13 days     Primary Rehabilitation Diagnosis: Impaired Mobility and ADLs in the setting of acute cerebral infarction due to unspecified occlusion or stenosis of the left middle cerebral artery with right hemiparesis and dysarthria and dysphagia. Subjective:     I personally saw and evaluated this patient on August 22, 2023. Patient is sitting in bed in no apparent distress, awake and alert. Patient is in good spirits and is looking forward to going home soon      Assessment     Primary Rehabilitation Diagnosis impaired mobility and ADLs in the setting of acute cerebral infarction due to unspecified occlusion or stenosis of the left middle cerebral artery with right hemiparesis and dysarthria and dysphagia. 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   Continue physical therapy and Occupational Therapy and speech therapy  Aspirin and statin  Fall precautions and aspiration precautions     -RIGHT hemiparesis  PT and OT     -Dysarthria and dysphagia  Speech and language pathology     -Hypertension  Continue lisinopril 10 mg p.o. daily, monitor blood pressure  As needed hydralazine     -Subarachnoid hemorrhage  Monitor blood pressure     -Dyslipidemia  Continue atorvastatin     -anxiety and depression  On Zoloft and trazodone     -History of tobacco use  Encouraged

## 2023-08-22 NOTE — PLAN OF CARE
Problem: Occupational Therapy - Adult  Goal: By Discharge: Performs self-care activities at highest level of function for planned discharge setting. See evaluation for individualized goals. Description: Occupational Therapy Goals   Long Term Goals  Initiated 8/10/23 and to be accomplished within 2-3 week(s)  1. Pt will perform self-feeding with modified independence. 2. Pt will perform grooming with modified independence. 3. Pt will perform UB bathing with supervision. 4. Pt will perform LB bathing with supervision. 5. Pt will perform tub/shower transfer with supervision. 6. Pt will perform UB dressing with modified independence. 7. Pt will perform LB dressing with supervision. 8. Pt will perform toileting task with supervision. 9. Pt will perform toilet transfer with supervision. Short Term Goals   Initiated 8/10/23 and to be accomplished within 7 day(s); reassessed on 2023. 1. Pt will perform self-feeding with modified independence. 2. Pt will perform grooming with modified independence. 3. Pt will perform UB bathing with minimal assistance. (Goal met 2023)  4. Pt will perform LB bathing with minimal assistance. (Goal met 2023)  5. Pt will perform tub/shower transfer with supervision. 6. Pt will perform UB dressing with minimal assistance. (Goal met 2023)  7. Pt will perform LB dressing with minimal assistance. 8. Pt will perform toileting task with minimal assistance. (Goal met 2023)  9. Pt will perform toilet transfer with supervision. Outcome: Progressing    Occupational Therapy GROUP TREATMENT    Patient: Alexy Bullock [address] [facility]   61 y.o.     Patient identified with name and : yes    Date: 2023    Tx Session  Time In:  1131  Time Out:  1201    Diagnosis: Acute CVA (cerebrovascular accident) [facility]) [I63.9]   Precautions: Fall risk; Aspiration Risk (R hemiparesis; aphasia); R side inattention  Chart, occupational therapy assessment, plan of care, and goals were reviewed. Pain:  Pt reports 0/10 pain or discomfort prior to treatment. Pt reports 0/10 pain or discomfort post treatment. Intervention Provided: No complaints of pain at onset, during, or at conclusion of skilled treatment session. Occupational Therapy GROUP TREATMENT    SUBJECTIVE:     Patient agreeable to participate in occupational therapy group treatment session. OBJECTIVE DATA SUMMARY:     OCCUPATIONAL THERAPY GROUP   [address] Comments   Seated UE Activity during ESPINOZA card game Patient participated in UE there act during [address] [facility] card game with therapist and fellow group members. Therapist providing initial instructions. There act performed for functional reach/ grasp, attention to task, problem solving, eye hand coordination, and to promote active engagement/ socialization among group members to maximize pt's participation in rehab program.    [facility] [place] Toss/ roll on tabletop in combination with questions and answers  UE there act performed during beach ball toss/ roll on tabletop in combination with questions to promote active engagement among group members (e.g. favorite novel/ movie, favorite dessert, favorite ice cream flavor, past time etc.)     OCCUPATIONAL THERAPY GROUP   THEREX Reps Sets Comments   MADHU/ AMINTA BOND (self range of motion exercises) performed using hand clasped technique for shoulder flexion/extension, elbow flexion, supination/ pronation, and 'rock the baby stretch' 15 1 Therapist providing verbal instruction with demonstration for technique. Rest breaks between sets. Shoulder shrugs; scapular retraction/ protraction 10 2      ASSESSMENT:  Patient continues to benefit from skilled intervention to address functional impairments. Patient is appropriate to continue group therapy sessions to promote increased participation in skilled therapy interventions and to provide opportunities for increased social interaction.    Progression toward goals:  []          Patient participated

## 2023-08-22 NOTE — PLAN OF CARE
Problem: Physical Therapy - Adult  Goal: By Discharge: Performs mobility at highest level of function for planned discharge setting. See evaluation for individualized goals. Description: Physical Therapy Short Term Goals  Initiated 8/10/2023, met 8/17/2023 and progressed to long term goals. 1.  Patient will supine to sit and sit to supine  in bed with supervision. 2.  Patient will transfer from bed to chair and chair to bed with minimal assistance using the least restrictive device. 3.  Patient will perform sit to stand with minimal assistance. 4.  Patient will ambulate with minimal assistance for 50 feet with the least restrictive device. 5.  Patient will propel w/c 150 ft with L UE and B LE's on level surfaces with supervision for mobility on unit. Physical Therapy Long Term Goals  Initiated 8/10/2023 and to be accomplished within 21 day(s) [8/31/23]  1. Patient will supine to sit, sit to supine, roll right, and roll left in bed with modified independence without use of bed rails. 2.  Patient will transfer from bed to chair and chair to bed with SBA/CGA using the least restrictive device. 3.  Patient will perform sit to stand with supervision from various surface heights. 4.  Patient will ambulate with contact guard assist for 150 feet with the least restrictive device. 5.  Patient will ascend/descend 4 stairs with one handrail with contact guard assist to enter/exit home. (pt has B rails, but wide apart)  Outcome: Progressing     PHYSICAL THERAPY TREATMENT    Patient: Cally Woo (83 y.o. female)  Date: 8/22/2023  Diagnosis: Acute CVA (cerebrovascular accident) [facility]) [I63.9]   Precautions: fall  Chart, physical therapy assessment, plan of care and goals were reviewed. Time in: 0945  Time out: 1030  Time in: 1500  Time out : 1530    Patient seen for: gait training, transfer training, LE strengthening, balance    Pain:  Pt pain was reported as 0 pre-treatment.   Pt pain was reported as 0

## 2023-08-22 NOTE — PLAN OF CARE
Problem: SLP Adult - Impaired Communication  Goal: By Discharge: Demonstrates communication skills at highest level of function for planned discharge setting. See evaluation for individualized goals. Description: Long term goals (initiated 8/10/23; to be completed by 8/31/23)  Patient will:  1. Perform oral/pharyngeal strengthening exercises with min cues. 2. Use safe swallowing techniques of slow rate of intake, small bites/sips, clear mouth between boluses, alternate liquids and solids, perform repeat swallow as needed, supervision. 3. Tolerate easy to chew diet and thin liquids without overt s/s of aspiration or other difficulty. 4. Follow 2 part instructions with 80% accuracy. 5. Name common objects with 90% accuracy; describe item function with 70-80% accuracy. 6. Respond to yes/no questions re: general information, 80% accuracy. 7. Respond to simply phrased requests for information, 70-80% accuracy. Short term goals (by 8/24/23)  Patient will:  1. Perform oral/pharyngeal strengthening exercises with min cues. 2. Use safe swallowing techniques of slow rate of intake, small bites/sips, clear mouth between boluses, alternate liquids and solids, perform repeat swallow as needed, min cues. 3. Tolerate easy to chew diet and thin liquids without overt s/s of aspiration or other difficulty. 4. Follow 1 part instructions with 90% accuracy; 2 part with 50-60% accuracy  5. Name common objects with 80% accuracy; describe item function with 70% accuracy. 6. Respond to yes/no questions re: general information, 90% accuracy. 7. Respond to simply phrased requests for information, 70% accuracy. 8. Perform automatic speech tasks, 80% accuracy.   9. Produce phrases with patterned rhythm and intonation, 80-90% accuracy  Note:   SPEECH-LANGUAGE TREATMENT    Patient: Toshia Green (8 y.o. female)  Date: 8/22/2023  Diagnosis: Acute CVA (cerebrovascular accident) [facility]) [I63.9] Acute CVA (cerebrovascular Call bell left within reach  []       Nursing notified  []       Caregiver present  []       Bed alarm activated    COMMUNICATION/EDUCATION:   [x] Patient educated regarding compensatory speech/language/comprehension techniques provided via demonstration, verbalization and teach back of comprehension  [x] Patient/family have participated as able in goal setting and plan of care. [x] Patient/family agree to work toward stated goals and plan of care. [] Patient understands intent and goals of therapy, neutral about participation.   [] Patient unable to participate in goal setting/plan of care secondary to cognition, hearing/vision deficits; education ongoing with interdisciplinary staff       Torito Mercado, SLP

## 2023-08-23 PROCEDURE — 92507 TX SP LANG VOICE COMM INDIV: CPT

## 2023-08-23 PROCEDURE — 99232 SBSQ HOSP IP/OBS MODERATE 35: CPT | Performed by: EMERGENCY MEDICINE

## 2023-08-23 PROCEDURE — 97530 THERAPEUTIC ACTIVITIES: CPT

## 2023-08-23 PROCEDURE — 92508 TX SP LANG VOICE COMM GROUP: CPT

## 2023-08-23 PROCEDURE — 97150 GROUP THERAPEUTIC PROCEDURES: CPT

## 2023-08-23 PROCEDURE — 97116 GAIT TRAINING THERAPY: CPT

## 2023-08-23 PROCEDURE — 1180000000 HC REHAB R&B

## 2023-08-23 PROCEDURE — 6370000000 HC RX 637 (ALT 250 FOR IP): Performed by: EMERGENCY MEDICINE

## 2023-08-23 RX ADMIN — TRAZODONE HYDROCHLORIDE 50 MG: 50 TABLET ORAL at 21:24

## 2023-08-23 RX ADMIN — SERTRALINE 125 MG: 50 TABLET, FILM COATED ORAL at 07:25

## 2023-08-23 RX ADMIN — ATORVASTATIN CALCIUM 40 MG: 40 TABLET, FILM COATED ORAL at 21:24

## 2023-08-23 RX ADMIN — ASPIRIN 81 MG CHEWABLE TABLET 81 MG: 81 TABLET CHEWABLE at 07:25

## 2023-08-23 RX ADMIN — CHOLECALCIFEROL TAB 25 MCG (1000 UNIT) 1000 UNITS: 25 TAB at 07:25

## 2023-08-23 ASSESSMENT — PAIN SCALES - GENERAL
PAINLEVEL_OUTOF10: 0

## 2023-08-23 ASSESSMENT — PAIN SCALES - WONG BAKER
WONGBAKER_NUMERICALRESPONSE: 0

## 2023-08-23 NOTE — PLAN OF CARE
occupational therapy assessment, plan of care, and goals were reviewed. Pain:  Pt reports 0/10 pain or discomfort prior to treatment. Pt reports 0/10 pain or discomfort post treatment. Intervention Provided: NA      SUBJECTIVE:   Patient stated that she was going to stay with her sister. OBJECTIVE DATA SUMMARY:     THERAPEUTIC ACTIVITY Daily Assessment      Pt participated in arm ladder activity to facilitate increased functional use of affected right UE and improve joint mobility to right shoulder. Pt's right hand attached to dowel using ace bandage due to decreased  strength in right hand. Pt ascended/descended ladder 2 sets x 10 reps with rest break between sets. Pt requiring CGA at right elbow for assistance during activity. Pt used large physio ball for shoulder extension activity to provide stretch to right shoulder region for improved shoulder mobility. Pt placed right hand on ball and used left hand to assist. Pt rolled ball forward, outside KAL, and held for ~2-3 seconds. Pt performed 2 sets x 15 reps. Pt utilized PowerWeb (blue) to perform flat-hand presses for weight bearing through right UE. Pt placed right hand on web, with left hand assisting, and pressed 3 sets x 15 reps. Therapist assisted with anchoring cali for stability during activity. Pt utilized therapy band attached over top of door to perform shoulder extension and shoulder abduction. Band was placed around pt's wrist allowing right UE to stretch over pt's head (x10 in each direction). Pt able to move right UE down slightly to obtain increased strength in right UE.     OCCUPATIONAL THERAPY GROUP   THERAC Time Comments   Seated UE Activity 30 Pt participated in group activity with one other pt. Pt played \"Tic Stac Toe\" ( a 3-D tic tac toe game). Pt requiring verbal cues throughout game for strategy.    Standing UE Activity     Ball Toss     FM Coordination Activity     Functional Reach activity       ASSESSMENT:  Pt making progress with gaining movement in affected right UE. Pt actively participating in treatment session. Pt continues to have difficulty with finding correct words, which does frustrate pt. Progression toward goals:  [x]          Improving appropriately and progressing toward goals  []          Improving slowly and progressing toward goals  []          Not making progress toward goals and plan of care will be adjusted     PLAN:  Patient continues to benefit from skilled intervention to address the above impairments. Continue treatment per established plan of care. Discharge Recommendations:  Home with Home health OT and 24 hr assistance  Further Equipment Recommendations for Discharge:  bedside commode and transfer bench     Activity Tolerance:    Fair+    EDUCATION:   Education Given To: Patient  Education Provided: Mobility Training;Home Exercise Program;Transfer Training; Fall Prevention Strategies; Safety  Education Method: Demonstration;Verbal;Teach Back  Barriers to Learning: None  Education Outcome: Verbalized understanding;Demonstrated understanding;Continued education needed    COMMUNICATION:   [] Home safety education was provided and the patient/caregiver indicated understanding. [] Patient/family have participated as able in goal setting and plan of care. [x] Patient/family agree to work toward stated goals and plan of care. [] Patient understands intent and goals of therapy, but is neutral about his/her participation. [] Patient is unable to participate in goal setting and plan of care. Please refer to the flowsheet for vital signs taken during this treatment.   After treatment:   [x]  Patient left in no apparent distress sitting up in chair   []  Patient left in no apparent distress in bed  [x]  Call bell left within reach  []  Nursing notified  []  Caregiver present  []  Bed alarm activated      Estimated LOS:8/25/2023  Entered Differentiated Treatment minutes: Yes    Aide Gorman

## 2023-08-23 NOTE — PLAN OF CARE
Problem: Chronic Conditions and Co-morbidities  Goal: Patient's chronic conditions and co-morbidity symptoms are monitored and maintained or improved  Outcome: Progressing  Flowsheets (Taken 8/22/2023 2000)  Care Plan - Patient's Chronic Conditions and Co-Morbidity Symptoms are Monitored and Maintained or Improved: Monitor and assess patient's chronic conditions and comorbid symptoms for stability, deterioration, or improvement     Problem: Skin/Tissue Integrity  Goal: Absence of new skin breakdown  Description: 1. Monitor for areas of redness and/or skin breakdown  2. Assess vascular access sites hourly  3. Every 4-6 hours minimum:  Change oxygen saturation probe site  4. Every 4-6 hours:  If on nasal continuous positive airway pressure, respiratory therapy assess nares and determine need for appliance change or resting period.   Outcome: Progressing     Problem: Safety - Adult  Goal: Free from fall injury  Outcome: Progressing  Flowsheets (Taken 8/22/2023 2356)  Free From Fall Injury: Instruct family/caregiver on patient safety     Problem: ABCDS Injury Assessment  Goal: Absence of physical injury  Outcome: Progressing  Flowsheets (Taken 8/22/2023 0794)  Absence of Physical Injury: Implement safety measures based on patient assessment     Problem: Pain  Goal: Verbalizes/displays adequate comfort level or baseline comfort level  Outcome: Progressing  Flowsheets (Taken 8/22/2023 2000)  Verbalizes/displays adequate comfort level or baseline comfort level:   Assess pain using appropriate pain scale   Administer analgesics based on type and severity of pain and evaluate response   Encourage patient to monitor pain and request assistance     Problem: Nutrition Deficit:  Goal: Optimize nutritional status  Outcome: Progressing

## 2023-08-23 NOTE — PLAN OF CARE
Problem: SLP Adult - Impaired Communication  Goal: By Discharge: Demonstrates communication skills at highest level of function for planned discharge setting. See evaluation for individualized goals. Description: Long term goals (initiated 8/10/23; to be completed by 8/31/23)  Patient will:  1. Perform oral/pharyngeal strengthening exercises with min cues. 2. Use safe swallowing techniques of slow rate of intake, small bites/sips, clear mouth between boluses, alternate liquids and solids, perform repeat swallow as needed, supervision. 3. Tolerate easy to chew diet and thin liquids without overt s/s of aspiration or other difficulty. 4. Follow 2 part instructions with 80% accuracy. 5. Name common objects with 90% accuracy; describe item function with 70-80% accuracy. 6. Respond to yes/no questions re: general information, 80% accuracy. 7. Respond to simply phrased requests for information, 70-80% accuracy. Short term goals (by 8/24/23)  Patient will:  1. Perform oral/pharyngeal strengthening exercises with min cues. 2. Use safe swallowing techniques of slow rate of intake, small bites/sips, clear mouth between boluses, alternate liquids and solids, perform repeat swallow as needed, min cues. 3. Tolerate easy to chew diet and thin liquids without overt s/s of aspiration or other difficulty. 4. Follow 1 part instructions with 90% accuracy; 2 part with 50-60% accuracy  5. Name common objects with 80% accuracy; describe item function with 70% accuracy. 6. Respond to yes/no questions re: general information, 90% accuracy. 7. Respond to simply phrased requests for information, 70% accuracy. 8. Perform automatic speech tasks, 80% accuracy.   9. Produce phrases with patterned rhythm and intonation, 80-90% accuracy  Note:   SPEECH-LANGUAGE TREATMENT    Patient: Massimo Dominguez (72 y.o. female)  Date: 8/23/2023  Diagnosis: Acute CVA (cerebrovascular accident) [facility]) [I63.9] Acute CVA (cerebrovascular

## 2023-08-23 NOTE — PROGRESS NOTES
[id number] [facility] PHYSICAL REHABILITATION  [address] [place] [address], [address] [facility]   INPATIENT REHABILITATION  DAILY PROGRESS NOTE     Date: 8/23/2023    Name: Elva Messer Age / Sex: 61 y.o. / female   CSN: [id number] MRN: [id number]   [address] Date: 8/9/2023 Length of Stay: 14 days     Primary Rehabilitation Diagnosis: Impaired Mobility and ADLs in the setting of acute cerebral infarction due to unspecified occlusion or stenosis of the left middle cerebral artery with right hemiparesis and dysarthria and dysphagia. Subjective:     I personally saw and evaluated this patient today. Patient sitting in bed in NAD, awake, follows simple commands. 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 Continue physical therapy and Occupational Therapy and speech therapy  Aspirin and statin  Fall precautions and aspiration precautions     -RIGHT hemiparesis  PT, OT     -Dysarthria and dysphagia  Speech and language pathology     -Hypertension  Continue lisinopril 10 mg p.o. daily, monitor blood pressure  As needed hydralazine     -Subarachnoid hemorrhage  Monitor blood pressure     -Dyslipidemia  Continue atorvastatin     -anxiety and depression  On Zoloft and trazodone     -History of tobacco use  Encouraged abstinence    I d/w patient.  I d/w       Functional Progress:    PHYSICAL THERAPY    ON

## 2023-08-23 NOTE — PLAN OF CARE
genu recurvatum with fatigue, step through to partial step through pattern with ambulation without AD. With ambulation with RW, pt ambulates for first gait trial with step to to partial step through pattern progressing to step through pattern with second trial requiring moderate verbal cues for attention to right hand placement on RW  due to functional weakness. Assistive device No Device for first gait trial, RW for second and third gait trials    Ambulation assistance - surface  Contact guard assistance without AD, SBA with RW  Level tile    Distance 20 feet prior to LOB with min assist to correct    Comments Pt requires CGA with one instance of minimal assistance to correct LOB with decreased right foot clearance after ambulating 20 feet, pt continued to ambulate for a total of 150 feet with intermittent right toe catch/drag due to decreased foot clearance and delayed ankle DF requiring intermittent minimal assistance for balance. Pt also ambulated 76 Feet, 15 Feet and 60 Feet with RW with SBA for safety requiring moderate verbal cuing for upright posture and to remain within RW. Ambulation-uneven surface  NT        BALANCE Daily Assessment    Posture Fair    Sitting - Static Good    Sitting - Dynamic Good    Standing - Static Fair    Standing - Dynamic Fair    Comments        WHEELCHAIR MOBILITY/MANAGEMENT Daily Assessment   Able to Propel  On unit - distance not challenged   Assist Level Modified independent   Curbs/ramps assistance required  NT   Wheelchair management manages B brakes   Comments Pt propels w/c with B LE over level surfaces with increased time for attention to right LE management     ASSESSMENT:  Pt is progressing with functional mobility requiring decreased assistance with transfers although patient continues to require cuing and education to promote safer and more efficient movement patterns to promote functional return as opposed to relying on compensatory strategies.   Pt demonstrates improved quality and safety of gait with ambulation with RW and may benefit from utilizing RW at home for decreased caregiver burden. Pt also demonstrates progress with ambulation without AD with fewer instances of right genu recurvatum noted. Progression toward goals:  []      Improving appropriately and progressing toward goals  []      Improving slowly and progressing toward goals  []      Not making progress toward goals and plan of care will be adjusted      PLAN:  Patient continues to benefit from skilled intervention to address the above impairments. Continue treatment per established plan of care. Emphasize NMRE, Postural re-education, and Gait training to promote increased safety and independence upon d/c home  Discharge Recommendations:  24 hour supervision or assist;Home with Home health PT  Further Equipment Recommendations for Discharge:        Ignacia Negrete             Estimated Discharge Date: 8/25/2023    Activity Tolerance:   Good  Please refer to the flowsheet for vital signs taken during this treatment.     After treatment:   [] Patient left in no apparent distress in bed  [x] Patient left in no apparent distress sitting up in chair  [] Patient left in no apparent distress in w/c mobilizing under own power  [] Patient left in no apparent distress dining area  [] Patient left in no apparent distress mobilizing under own power  [x] Call bell left within reach  [] Nursing notified  [] Caregiver present  [] Bed alarm activated   [x] Chair alarm activated        Stanley Goltz, PT, DPT  8/23/2023

## 2023-08-23 NOTE — PROGRESS NOTES
TEAM CONFERENCE FOLLOW-UP  Patient: Elva Messer (5 y.o. female)  Date: 8/23/2023  Diagnosis: Acute CVA (cerebrovascular accident) [facility]) [I63.9] Acute CVA (cerebrovascular accident) [facility])      Precautions:      Met with patient to discuss the findings from 8/23/2023 Team Conference.     Patient requested further information and/or had questions:   Nadeen Barton

## 2023-08-24 LAB
ANION GAP SERPL CALC-SCNC: 5 MMOL/L (ref 3–18)
BASOPHILS # BLD: 0.1 K/UL (ref 0–0.1)
BASOPHILS NFR BLD: 1 % (ref 0–2)
BUN SERPL-MCNC: 20 MG/DL (ref 7–18)
BUN/CREAT SERPL: 29 (ref 12–20)
CALCIUM SERPL-MCNC: 8.8 MG/DL (ref 8.5–10.1)
CHLORIDE SERPL-SCNC: 107 MMOL/L (ref 100–111)
CO2 SERPL-SCNC: 28 MMOL/L (ref 21–32)
CREAT SERPL-MCNC: 0.7 MG/DL (ref 0.6–1.3)
DIFFERENTIAL METHOD BLD: NORMAL
EOSINOPHIL # BLD: 0.1 K/UL (ref 0–0.4)
EOSINOPHIL NFR BLD: 3 % (ref 0–5)
ERYTHROCYTE [DISTWIDTH] IN BLOOD BY AUTOMATED COUNT: 12.7 % (ref 11.6–14.5)
GLUCOSE SERPL-MCNC: 92 MG/DL (ref 74–99)
HCT VFR BLD AUTO: 39.3 % (ref 35–45)
HGB BLD-MCNC: 12.7 G/DL (ref 12–16)
IMM GRANULOCYTES # BLD AUTO: 0 K/UL (ref 0–0.04)
IMM GRANULOCYTES NFR BLD AUTO: 0 % (ref 0–0.5)
LYMPHOCYTES # BLD: 1.5 K/UL (ref 0.9–3.6)
LYMPHOCYTES NFR BLD: 31 % (ref 21–52)
MCH RBC QN AUTO: 28.6 PG (ref 24–34)
MCHC RBC AUTO-ENTMCNC: 32.3 G/DL (ref 31–37)
MCV RBC AUTO: 88.5 FL (ref 78–100)
MONOCYTES # BLD: 0.5 K/UL (ref 0.05–1.2)
MONOCYTES NFR BLD: 10 % (ref 3–10)
NEUTS SEG # BLD: 2.7 K/UL (ref 1.8–8)
NEUTS SEG NFR BLD: 55 % (ref 40–73)
NRBC # BLD: 0 K/UL (ref 0–0.01)
NRBC BLD-RTO: 0 PER 100 WBC
PLATELET # BLD AUTO: 164 K/UL (ref 135–420)
PMV BLD AUTO: 10.5 FL (ref 9.2–11.8)
POTASSIUM SERPL-SCNC: 4 MMOL/L (ref 3.5–5.5)
RBC # BLD AUTO: 4.44 M/UL (ref 4.2–5.3)
SODIUM SERPL-SCNC: 140 MMOL/L (ref 136–145)
WBC # BLD AUTO: 4.8 K/UL (ref 4.6–13.2)

## 2023-08-24 PROCEDURE — 97110 THERAPEUTIC EXERCISES: CPT

## 2023-08-24 PROCEDURE — 99239 HOSP IP/OBS DSCHRG MGMT >30: CPT | Performed by: NURSE PRACTITIONER

## 2023-08-24 PROCEDURE — 97530 THERAPEUTIC ACTIVITIES: CPT

## 2023-08-24 PROCEDURE — 36415 COLL VENOUS BLD VENIPUNCTURE: CPT

## 2023-08-24 PROCEDURE — 97116 GAIT TRAINING THERAPY: CPT

## 2023-08-24 PROCEDURE — 92508 TX SP LANG VOICE COMM GROUP: CPT

## 2023-08-24 PROCEDURE — 97535 SELF CARE MNGMENT TRAINING: CPT

## 2023-08-24 PROCEDURE — 92507 TX SP LANG VOICE COMM INDIV: CPT

## 2023-08-24 PROCEDURE — 80048 BASIC METABOLIC PNL TOTAL CA: CPT

## 2023-08-24 PROCEDURE — 1180000000 HC REHAB R&B

## 2023-08-24 PROCEDURE — 6370000000 HC RX 637 (ALT 250 FOR IP): Performed by: EMERGENCY MEDICINE

## 2023-08-24 PROCEDURE — 85025 COMPLETE CBC W/AUTO DIFF WBC: CPT

## 2023-08-24 RX ORDER — ASPIRIN 81 MG/1
81 TABLET, CHEWABLE ORAL DAILY
Qty: 30 TABLET | Refills: 0 | Status: SHIPPED | OUTPATIENT
Start: 2023-08-24 | End: 2023-08-29 | Stop reason: SDUPTHER

## 2023-08-24 RX ORDER — TRAZODONE HYDROCHLORIDE 50 MG/1
50 TABLET ORAL NIGHTLY
Qty: 30 TABLET | Refills: 0 | Status: SHIPPED | OUTPATIENT
Start: 2023-08-24 | End: 2023-08-29 | Stop reason: SDUPTHER

## 2023-08-24 RX ORDER — ATORVASTATIN CALCIUM 40 MG/1
40 TABLET, FILM COATED ORAL NIGHTLY
Qty: 30 TABLET | Refills: 0 | Status: SHIPPED | OUTPATIENT
Start: 2023-08-24 | End: 2023-08-29 | Stop reason: SDUPTHER

## 2023-08-24 RX ORDER — ACETAMINOPHEN 325 MG/1
650 TABLET ORAL EVERY 4 HOURS PRN
Qty: 20 TABLET | Refills: 0 | Status: SHIPPED | OUTPATIENT
Start: 2023-08-24

## 2023-08-24 RX ORDER — LISINOPRIL 10 MG/1
10 TABLET ORAL DAILY
Qty: 30 TABLET | Refills: 0 | Status: SHIPPED | OUTPATIENT
Start: 2023-08-25 | End: 2023-08-29 | Stop reason: SDUPTHER

## 2023-08-24 RX ADMIN — LISINOPRIL 10 MG: 5 TABLET ORAL at 08:18

## 2023-08-24 RX ADMIN — CHOLECALCIFEROL TAB 25 MCG (1000 UNIT) 1000 UNITS: 25 TAB at 08:18

## 2023-08-24 RX ADMIN — SERTRALINE 125 MG: 50 TABLET, FILM COATED ORAL at 08:18

## 2023-08-24 RX ADMIN — ATORVASTATIN CALCIUM 40 MG: 40 TABLET, FILM COATED ORAL at 20:18

## 2023-08-24 RX ADMIN — TRAZODONE HYDROCHLORIDE 50 MG: 50 TABLET ORAL at 20:18

## 2023-08-24 RX ADMIN — ASPIRIN 81 MG CHEWABLE TABLET 81 MG: 81 TABLET CHEWABLE at 08:18

## 2023-08-24 ASSESSMENT — PAIN SCALES - WONG BAKER
WONGBAKER_NUMERICALRESPONSE: 0
WONGBAKER_NUMERICALRESPONSE: 0

## 2023-08-24 ASSESSMENT — PAIN SCALES - GENERAL
PAINLEVEL_OUTOF10: 0

## 2023-08-24 NOTE — PROGRESS NOTES
Problem: Occupational Therapy - Adult  Goal: By Discharge: Performs self-care activities at highest level of function for planned discharge setting. See evaluation for individualized goals. Description: Occupational Therapy Goals   Long Term Goals  Initiated 8/10/23 and to be accomplished within 2-3 week(s)  1. Pt will perform self-feeding with modified independence. 2. Pt will perform grooming with modified independence. 3. Pt will perform UB bathing with supervision. 4. Pt will perform LB bathing with supervision. 5. Pt will perform tub/shower transfer with supervision. 6. Pt will perform UB dressing with modified independence. 7. Pt will perform LB dressing with supervision. 8. Pt will perform toileting task with supervision. 9. Pt will perform toilet transfer with supervision. Short Term Goals   Initiated 8/10/23 and to be accomplished within 7 day(s); reassessed on 8/16/2023. 1. Pt will perform self-feeding with modified independence. 2. Pt will perform grooming with modified independence. 3. Pt will perform UB bathing with minimal assistance. (Goal met 8/16/2023)  4. Pt will perform LB bathing with minimal assistance. (Goal met 8/16/2023)  5. Pt will perform tub/shower transfer with supervision. 6. Pt will perform UB dressing with minimal assistance. (Goal met 8/16/2023)  7. Pt will perform LB dressing with minimal assistance. 8. Pt will perform toileting task with minimal assistance. (Goal met 8/16/2023)  9. Pt will perform toilet transfer with supervision.      Outcome: Progressing    OCCUPATIONAL THERAPY DISCHARGE    Patient: Anastasiia Blood (37 y.o. female)  Date: 8/24/2023    First Tx Session  Time In:0930  Time Out:1100    Primary Diagnosis: Acute CVA (cerebrovascular accident) [facility]) [I63.9] Acute CVA (cerebrovascular accident) [facility])    Precautions: Fall Risk, Aspiration Risk (R hemiparesis; aphasia),  ,  ,  ,  ,  ,  ,        Barriers to Learning/Limitations: yes;  cognitive, sensory

## 2023-08-24 NOTE — DISCHARGE INSTRUCTIONS
------------------------------------------------------------------------------------------------------------    DISCHARGE INSTRUCTIONS    1. Make sure that when you request refills at the pharmacy that the refill requests are sent to your PCP (NOT to the prescriber at the [facility] Physical Rehabilitation) to avoid any delays in getting your medication refills. The physician at the [facility] for [address] [place] [address] will not able to order medications or refills after discharge -- Please understand that though we would like to help, it is simply not safe for our physician to order you a medication that we cannot monitor. 2. If any of the prescribed medications require a PRIOR AUTHORIZATION, contact your Primary Care Physician or specialist to EITHER complete prior authorizations and paperwork on your behalf OR prescribe an alternative medication. -- Please understand that though we would like to help, it is simply not safe for our physician to order you a medication that we cannot monitor. 3. If any of your prescriptions medications PRIOR TO ADMISSION TO [address] [place] [address] needs a refill (and either the dosage, frequency or instructions was not changed), kindly contact your Primary Care Physician for refills.     -------------------------------------------------------------------------------------------------------------------

## 2023-08-24 NOTE — PLAN OF CARE
Education, Transfer Training, Gait Training, Balance Training    Pain:  Pt pain was reported as 0/10 pre-treatment. Pt pain was reported as 0/10 post-treatment. Intervention: N/A    Patient identified with name and : Yes    SUBJECTIVE:     Patient stated: \"I know;I'm sorry,\" re: decreased follow through with use of self manual cue for improved right sided function with mobility. Pt is pleasant and cooperative throughout treatment session but requires ongoing feedback re: safety with mobility and slower pace to attend to quality. OBJECTIVE DATA SUMMARY:     Education: Education Given To: Patient  Education Provided: Mobility Training;Home Exercise Program;Transfer Training; Fall Prevention Strategies; Safety  Education Method: Demonstration;Verbal;Teach Back  Barriers to Learning: None  Education Outcome: Verbalized understanding;Demonstrated understanding;Continued education needed  Problem List:    Decreased strength B LE  [x]     Decreased strength trunk/core  [x]     Decreased AROM   []     Decreased PROM  []    Decreased endurance  [x]     Decreased balance sitting  [x]     Decreased balance standing  [x]     Pain   [x]     Slow ambulation velocity  [x]    Decreased coordination  []    Decreased safety awareness  [x]      Functional Limitations:   Decreased independence with bed mobility  [x]     Decreased independence with functional transfers  [x]     Decreased independence with ambulation  [x]     Decreased independence with stair negotiation  [x]         GROSS ASSESSMENT Discharge Assessment 2023   AROM  RLE   LLE   [facility]  WFL   Strength  RLE  LLE   Exception  Exception   Coordination  Generally Decreased, WFL   Tone  RLE  LLE   Normotonic  Normotonic   Sensation  WFL   PROM  RLE  LLE   [facility]       POSTURE Discharge Assessment 2023   Posture (WDL)  Exceptions to WDL   Posture Assessment Rounded shoulders, downward tilt of right pelvis in standing and sitting with decreased right lateral lumbar flexor engagement. BALANCE Discharge Assessment 8/24/2023   Posture Fair   Sitting - Static Good   Sitting - Dynamic Good   Standing - Static Fair;+   Standing - Dynamic Fair      Ability to  small object from floor: Minimal assistance         BED/CHAIR/WHEELCHAIR TRANSFERS Initial Assessment Discharge Assessment   Rolling Right Stand by assistance (with use of bed rail) Independent   Rolling Left Stand by assistance (with use of bed rail) Independent   Supine to Sit Stand by assistance (with use of bed rail) Independent   Sit to Stand Moderate Assistance Modified independent   Sit to Supine Minimal assistance (assist to control lowering trunk and management of R LE, safety of R UE positioning) Independent   Transfer Assist Score Moderate assistance (stand step w/out AD with PT support, gait belt, and anterior approach) Modified independent   Comments   Pt demonstrates use of compensatory strategies with decreased right sided weight bearing requiring verbal cues to address quality and reminders for use of self manual cue for right LE weight bearing. Car Transfer Minimal assistance Modified independent   Car Type Car Transfer Trainer Car Transfer Trainer       WHEELCHAIR MOBILITY/MANAGEMENT Initial Assessment Discharge Assessment   Able to Propel 48 ft 150 Feet with B LE   Assist Level  Modified independence   Curbs/ramps assistance required  NT   Wheelchair management manages L brake with supervision and vc's; needs min A with extender to manage R brake manages B brakes   Comments  Pt propels w/c with B LE with increased time to manage right LE. GAIT Discharge Assessment 8/24/2023   Quality of Gait  Exceptions to WDL   Gait Deviations  Pt ambulates with decreased right foot clearance and requires reminders for right hand  with use of RW.        [facility] INDEPENDENCE Initial Assessment Discharge Assessment   Assistive device Large Base Quad Cane Rolling Walker/ No AD   Ambulation

## 2023-08-24 NOTE — PROGRESS NOTES
Prior to discharge, nurse practitioner discussed and went through current and discharge medications in detail with the patient at the bedside. The NP discussed which drugs to discontinue, resume, and continue after discharge. NP explained and answered questions about follow-up care/pcp/specialist appointments, as well as discharge process expectations. The patient expressed her understanding verbally. NP also reached out to daughter to update her, obtain preferred pharmacy, and discuss patients short term disability paperwork. Patient will be discharged with home health care nursing, PT/OT/SP. NP discussed discharge process with discharge RN.      Radha Mcgovern NP-BC

## 2023-08-25 VITALS
DIASTOLIC BLOOD PRESSURE: 65 MMHG | RESPIRATION RATE: 16 BRPM | OXYGEN SATURATION: 95 % | SYSTOLIC BLOOD PRESSURE: 118 MMHG | HEIGHT: 64 IN | WEIGHT: 172 LBS | BODY MASS INDEX: 29.37 KG/M2 | TEMPERATURE: 97.2 F | HEART RATE: 62 BPM

## 2023-08-25 PROCEDURE — 97116 GAIT TRAINING THERAPY: CPT

## 2023-08-25 PROCEDURE — 97530 THERAPEUTIC ACTIVITIES: CPT

## 2023-08-25 PROCEDURE — 6370000000 HC RX 637 (ALT 250 FOR IP): Performed by: EMERGENCY MEDICINE

## 2023-08-25 PROCEDURE — 92507 TX SP LANG VOICE COMM INDIV: CPT

## 2023-08-25 RX ADMIN — LISINOPRIL 10 MG: 5 TABLET ORAL at 08:52

## 2023-08-25 RX ADMIN — SERTRALINE 125 MG: 50 TABLET, FILM COATED ORAL at 08:52

## 2023-08-25 RX ADMIN — ASPIRIN 81 MG CHEWABLE TABLET 81 MG: 81 TABLET CHEWABLE at 08:52

## 2023-08-25 RX ADMIN — CHOLECALCIFEROL TAB 25 MCG (1000 UNIT) 1000 UNITS: 25 TAB at 08:53

## 2023-08-25 ASSESSMENT — PAIN SCALES - GENERAL
PAINLEVEL_OUTOF10: 0

## 2023-08-25 NOTE — PLAN OF CARE
Problem: SLP Adult - Impaired Communication  Goal: By Discharge: Demonstrates communication skills at highest level of function for planned discharge setting. See evaluation for individualized goals. Description: Long term goals (initiated 8/10/23; to be completed by 8/31/23)  Patient will:  1. Perform oral/pharyngeal strengthening exercises with min cues. 2. Use safe swallowing techniques of slow rate of intake, small bites/sips, clear mouth between boluses, alternate liquids and solids, perform repeat swallow as needed, supervision. 3. Tolerate easy to chew diet and thin liquids without overt s/s of aspiration or other difficulty. 4. Follow 2 part instructions with 80% accuracy. 5. Name common objects with 90% accuracy; describe item function with 70-80% accuracy. 6. Respond to yes/no questions re: general information, 80% accuracy. 7. Respond to simply phrased requests for information, 70-80% accuracy. Short term goals (by 8/25/23)  Patient will:  1. Perform oral/pharyngeal strengthening exercises with min cues. 2. Use safe swallowing techniques of slow rate of intake, small bites/sips, clear mouth between boluses, alternate liquids and solids, perform repeat swallow as needed, min cues. 3. Tolerate regular diet and thin liquids without overt s/s of aspiration or other difficulty. 4. Follow 1 part instructions with 90% accuracy; 2 part with 50-60% accuracy  5. Name common objects with 80% accuracy; describe item function with 70% accuracy. 6. Respond to yes/no questions re: general information, 90% accuracy. 7. Respond to simply phrased requests for information, 70% accuracy. 8. Perform automatic speech tasks, 80-90% accuracy.   9. Produce phrases with patterned rhythm and intonation, 90% accuracy  Note:   SPEECH-LANGUAGE TREATMENT    Patient: Eli Ruelas (17 y.o. female)  Date: 8/25/2023  Diagnosis: Acute CVA (cerebrovascular accident) [facility]) [I63.9] Acute CVA (cerebrovascular accident)

## 2023-08-25 NOTE — PROGRESS NOTES
Iftikhar Curran 61 y.o. female was discharged home on 08/25/23. Patient's armband removed and shredded. Discharge instructions were reviewed with patient, and she verbalized understanding. The patient was wheeled out to transportation vehicle by a staff member along with the patient's belongings. Transportation was provided by private vehicle.     Yadiel Shah RN BSN

## 2023-08-25 NOTE — PLAN OF CARE
Problem: Physical Therapy - Adult  Goal: By Discharge: Performs mobility at highest level of function for planned discharge setting. See evaluation for individualized goals. Description: Physical Therapy Short Term Goals  Initiated 8/10/2023, met 8/17/2023 and progressed to long term goals. 1.  Patient will supine to sit and sit to supine  in bed with supervision. 2.  Patient will transfer from bed to chair and chair to bed with minimal assistance using the least restrictive device. 3.  Patient will perform sit to stand with minimal assistance. 4.  Patient will ambulate with minimal assistance for 50 feet with the least restrictive device. 5.  Patient will propel w/c 150 ft with L UE and B LE's on level surfaces with supervision for mobility on unit. Physical Therapy Long Term Goals  Initiated 8/10/2023 and to be accomplished within 21 day(s) [8/31/23]  1. Patient will supine to sit, sit to supine, roll right, and roll left in bed with modified independence without use of bed rails. 2.  Patient will transfer from bed to chair and chair to bed with SBA/CGA using the least restrictive device. 3.  Patient will perform sit to stand with supervision from various surface heights. 4.  Patient will ambulate with contact guard assist for 150 feet with the least restrictive device. 5.  Patient will ascend/descend 4 stairs with one handrail with contact guard assist to enter/exit home. (pt has B rails, but wide apart)  Outcome: Adequate for Discharge - All Goals Met -       PHYSICAL THERAPY TREATMENT    Patient: Livier Lal (89 y.o. female)  Date: 8/25/2023  Diagnosis: Acute CVA (cerebrovascular accident) [facility]) [I63.9]   Precautions: Fall Risk Precautions  Chart, physical therapy assessment, plan of care and goals were reviewed.     Time in: 1434  Time out : 1500    Patient seen for: Patient Education, Transfer Training, Balance Training, Gait Training, Family Education    Pain:  Pt pain was reported Standing - Dynamic Fair      ASSESSMENT:  Pt and pt's sister demonstrate understanding of education provided  Progression toward goals:  Pt has achieved long term goals. PLAN:  Patient continues to benefit from skilled intervention to address the above impairments in a home health setting to progress to outpatient PT  Discharge Recommendations:   Please refer to d/c note dated 8/24/2023 for details. Further Equipment Recommendations for Discharge:         RW - Please refer to d/c note dated 8/24/2023 for details. Estimated Discharge Date: 8/25/2023    Activity Tolerance:   Good  Please refer to the flowsheet for vital signs taken during this treatment. After treatment:   [] Patient left in no apparent distress in bed  [x] Patient left in no apparent distress sitting up on EOB.   [] Patient left in no apparent distress in w/c mobilizing under own power  [] Patient left in no apparent distress dining area  [] Patient left in no apparent distress mobilizing under own power  [] Call bell left within reach  [x] Nursing notified  [] Caregiver present  [] Bed alarm activated   [x] Chair alarm activated        Monalisa Todd, PT, DPT  8/25/2023

## 2023-08-28 NOTE — PROGRESS NOTES
Pt to dc to her home, reporting that her sister will be in the pt's home to provide support. [facility] was offered for home health and pt deferred to her sister who selected [facility]. Sw placed referral and was pending insurance benefit review at time of dc. Agency states they will accept for services [facility] Sunday or will facilitate to another agency per insurance preference. Pt and sister state understanding. [facility]. No further needs are noted at this time.

## 2023-08-29 ENCOUNTER — OFFICE VISIT (OUTPATIENT)
Facility: CLINIC | Age: 63
End: 2023-08-29
Payer: COMMERCIAL

## 2023-08-29 VITALS
HEIGHT: 59 IN | BODY MASS INDEX: 31.21 KG/M2 | DIASTOLIC BLOOD PRESSURE: 70 MMHG | WEIGHT: 154.8 LBS | HEART RATE: 55 BPM | RESPIRATION RATE: 16 BRPM | TEMPERATURE: 98.7 F | OXYGEN SATURATION: 94 % | SYSTOLIC BLOOD PRESSURE: 118 MMHG

## 2023-08-29 DIAGNOSIS — Z72.0 TOBACCO USE: ICD-10-CM

## 2023-08-29 DIAGNOSIS — F41.9 ANXIETY AND DEPRESSION: ICD-10-CM

## 2023-08-29 DIAGNOSIS — F32.A ANXIETY AND DEPRESSION: ICD-10-CM

## 2023-08-29 DIAGNOSIS — E78.2 MIXED HYPERLIPIDEMIA: ICD-10-CM

## 2023-08-29 DIAGNOSIS — I10 ESSENTIAL (PRIMARY) HYPERTENSION: Primary | ICD-10-CM

## 2023-08-29 DIAGNOSIS — E55.9 VITAMIN D DEFICIENCY: ICD-10-CM

## 2023-08-29 DIAGNOSIS — Z09 HOSPITAL DISCHARGE FOLLOW-UP: ICD-10-CM

## 2023-08-29 DIAGNOSIS — I69.30 HISTORY OF CVA WITH RESIDUAL DEFICIT: ICD-10-CM

## 2023-08-29 PROCEDURE — 3078F DIAST BP <80 MM HG: CPT | Performed by: STUDENT IN AN ORGANIZED HEALTH CARE EDUCATION/TRAINING PROGRAM

## 2023-08-29 PROCEDURE — 3074F SYST BP LT 130 MM HG: CPT | Performed by: STUDENT IN AN ORGANIZED HEALTH CARE EDUCATION/TRAINING PROGRAM

## 2023-08-29 PROCEDURE — 99214 OFFICE O/P EST MOD 30 MIN: CPT | Performed by: STUDENT IN AN ORGANIZED HEALTH CARE EDUCATION/TRAINING PROGRAM

## 2023-08-29 PROCEDURE — 1111F DSCHRG MED/CURRENT MED MERGE: CPT | Performed by: STUDENT IN AN ORGANIZED HEALTH CARE EDUCATION/TRAINING PROGRAM

## 2023-08-29 RX ORDER — ASPIRIN 81 MG/1
81 TABLET, CHEWABLE ORAL DAILY
Qty: 90 TABLET | Refills: 1 | Status: SHIPPED | OUTPATIENT
Start: 2023-08-29

## 2023-08-29 RX ORDER — ATORVASTATIN CALCIUM 40 MG/1
40 TABLET, FILM COATED ORAL NIGHTLY
Qty: 90 TABLET | Refills: 1 | Status: SHIPPED | OUTPATIENT
Start: 2023-08-29

## 2023-08-29 RX ORDER — LISINOPRIL 10 MG/1
10 TABLET ORAL DAILY
Qty: 90 TABLET | Refills: 1 | Status: SHIPPED | OUTPATIENT
Start: 2023-08-29

## 2023-08-29 RX ORDER — TRAZODONE HYDROCHLORIDE 50 MG/1
50 TABLET ORAL NIGHTLY
Qty: 90 TABLET | Refills: 1 | Status: SHIPPED | OUTPATIENT
Start: 2023-08-29

## 2023-08-29 SDOH — ECONOMIC STABILITY: FOOD INSECURITY

## 2023-08-29 SDOH — ECONOMIC STABILITY: FOOD INSECURITY: WITHIN THE PAST 12 MONTHS, YOU WORRIED THAT YOUR FOOD WOULD RUN OUT BEFORE YOU GOT MONEY TO BUY MORE.: NEVER TRUE

## 2023-08-29 SDOH — ECONOMIC STABILITY: INCOME INSECURITY: HOW HARD IS IT FOR YOU TO PAY FOR THE VERY BASICS LIKE FOOD, HOUSING, MEDICAL CARE, AND HEATING?: NOT HARD AT ALL

## 2023-08-29 SDOH — ECONOMIC STABILITY: FOOD INSECURITY: WITHIN THE PAST 12 MONTHS, THE FOOD YOU BOUGHT JUST DIDN'T LAST AND YOU DIDN'T HAVE MONEY TO GET MORE.: NEVER TRUE

## 2023-08-29 SDOH — ECONOMIC STABILITY: HOUSING INSECURITY

## 2023-08-29 SDOH — ECONOMIC STABILITY: INCOME INSECURITY

## 2023-08-29 SDOH — ECONOMIC STABILITY: HOUSING INSECURITY
IN THE LAST 12 MONTHS, WAS THERE A TIME WHEN YOU DID NOT HAVE A STEADY PLACE TO SLEEP OR SLEPT IN A SHELTER (INCLUDING NOW)?: NO

## 2023-08-29 ASSESSMENT — PATIENT HEALTH QUESTIONNAIRE - PHQ9
1. LITTLE INTEREST OR PLEASURE IN DOING THINGS: 0
9. THOUGHTS THAT YOU WOULD BE BETTER OFF DEAD, OR OF HURTING YOURSELF: 0
SUM OF ALL RESPONSES TO PHQ QUESTIONS 1-9: 0
8. MOVING OR SPEAKING SO SLOWLY THAT OTHER PEOPLE COULD HAVE NOTICED. OR THE OPPOSITE, BEING SO FIGETY OR RESTLESS THAT YOU HAVE BEEN MOVING AROUND A LOT MORE THAN USUAL: 0
6. FEELING BAD ABOUT YOURSELF - OR THAT YOU ARE A FAILURE OR HAVE LET YOURSELF OR YOUR FAMILY DOWN: 0
10. IF YOU CHECKED OFF ANY PROBLEMS, HOW DIFFICULT HAVE THESE PROBLEMS MADE IT FOR YOU TO DO YOUR WORK, TAKE CARE OF THINGS AT HOME, OR GET ALONG WITH OTHER PEOPLE: 0
2. FEELING DOWN, DEPRESSED OR HOPELESS: 0
7. TROUBLE CONCENTRATING ON THINGS, SUCH AS READING THE NEWSPAPER OR WATCHING TELEVISION: 0
SUM OF ALL RESPONSES TO PHQ QUESTIONS 1-9: 0
SUM OF ALL RESPONSES TO PHQ9 QUESTIONS 1 & 2: 0
4. FEELING TIRED OR HAVING LITTLE ENERGY: 0
SUM OF ALL RESPONSES TO PHQ QUESTIONS 1-9: 0
5. POOR APPETITE OR OVEREATING: 0
SUM OF ALL RESPONSES TO PHQ QUESTIONS 1-9: 0
3. TROUBLE FALLING OR STAYING ASLEEP: 0

## 2023-08-29 ASSESSMENT — ENCOUNTER SYMPTOMS
BLOOD IN STOOL: 0
ABDOMINAL PAIN: 0
NAUSEA: 0
RHINORRHEA: 0
VOMITING: 0
SHORTNESS OF BREATH: 0
COUGH: 0
DIARRHEA: 0
WHEEZING: 0
CHEST TIGHTNESS: 0
BACK PAIN: 0

## 2023-08-29 NOTE — PROGRESS NOTES
congestion, nosebleeds, postnasal drip and rhinorrhea. Respiratory:  Negative for cough, chest tightness, shortness of breath and wheezing. Cardiovascular:  Negative for chest pain and leg swelling. Gastrointestinal:  Negative for abdominal pain, blood in stool, diarrhea, nausea and vomiting. Endocrine: Negative for polydipsia and polyphagia. Genitourinary:  Negative for decreased urine volume, dysuria, frequency and pelvic pain. Musculoskeletal:  Negative for back pain, myalgias and neck pain. Neurological:  Positive for facial asymmetry, speech difficulty and weakness. Negative for dizziness, tremors, seizures, syncope, numbness and headaches. Psychiatric/Behavioral:  Negative for agitation and confusion. The patient is not nervous/anxious. All other systems reviewed and are negative. No Known Allergies    PHQ Screening   No flowsheet data found. History  Past Medical History:   Diagnosis Date    Arthritis     Hepatitis C     treated     Hypercholesterolemia     Hypertension     no meds     Sleep apnea     no CPAP        Past Surgical History:   Procedure Laterality Date     SECTION      CHOLECYSTECTOMY      HYSTERECTOMY (CERVIX STATUS UNKNOWN)      KNEE ARTHROSCOPY  2009    Bilateral    NEUROVASCULAR PROCEDURE N/A 8/3/2023    Angiography cerebral intracrnial w anesthesia (46751) performed by Sophie Coffey MD at [address] [place] [address]         Social History     Socioeconomic History    Marital status: Single     Spouse name: Not on file    Number of children: Not on file    Years of education: Not on file    Highest education level: Not on file   Occupational History    Not on file   Tobacco Use    Smoking status: Every Day     Packs/day: 2.00     Types: Cigarettes     Start date:     Smokeless tobacco: Never   Substance and Sexual Activity    Alcohol use: No    Drug use:  No

## 2023-11-21 ENCOUNTER — TELEPHONE (OUTPATIENT)
Facility: CLINIC | Age: 63
End: 2023-11-21

## 2023-11-21 NOTE — TELEPHONE ENCOUNTER
Patients sister called and states the that patient needs and order for [facility] occupational [facility]. Please advise.

## 2023-11-28 ENCOUNTER — HOSPITAL ENCOUNTER (OUTPATIENT)
Facility: HOSPITAL | Age: 63
Setting detail: SPECIMEN
Discharge: HOME | End: 2023-12-01
Payer: COMMERCIAL

## 2023-11-28 DIAGNOSIS — E55.9 VITAMIN D DEFICIENCY: ICD-10-CM

## 2023-11-28 DIAGNOSIS — E78.2 MIXED HYPERLIPIDEMIA: ICD-10-CM

## 2023-11-28 DIAGNOSIS — I10 ESSENTIAL (PRIMARY) HYPERTENSION: ICD-10-CM

## 2023-11-28 LAB
25(OH)D3 SERPL-MCNC: 34.6 NG/ML (ref 30–100)
ANION GAP SERPL CALC-SCNC: 4 MMOL/L (ref 3–18)
BASOPHILS # BLD: 0.1 K/UL (ref 0–0.1)
BASOPHILS NFR BLD: 1 % (ref 0–2)
BUN SERPL-MCNC: 14 MG/DL (ref 7–18)
BUN/CREAT SERPL: 19 (ref 12–20)
CALCIUM SERPL-MCNC: 9.3 MG/DL (ref 8.5–10.1)
CHLORIDE SERPL-SCNC: 107 MMOL/L (ref 100–111)
CHOLEST SERPL-MCNC: 198 MG/DL
CO2 SERPL-SCNC: 28 MMOL/L (ref 21–32)
CREAT SERPL-MCNC: 0.74 MG/DL (ref 0.6–1.3)
DIFFERENTIAL METHOD BLD: ABNORMAL
EOSINOPHIL # BLD: 0.2 K/UL (ref 0–0.4)
EOSINOPHIL NFR BLD: 3 % (ref 0–5)
ERYTHROCYTE [DISTWIDTH] IN BLOOD BY AUTOMATED COUNT: 13.8 % (ref 11.6–14.5)
GLUCOSE SERPL-MCNC: 91 MG/DL (ref 74–99)
HCT VFR BLD AUTO: 45.2 % (ref 35–45)
HDLC SERPL-MCNC: 60 MG/DL (ref 40–60)
HDLC SERPL: 3.3 (ref 0–5)
HGB BLD-MCNC: 14 G/DL (ref 12–16)
IMM GRANULOCYTES # BLD AUTO: 0 K/UL (ref 0–0.04)
IMM GRANULOCYTES NFR BLD AUTO: 0 % (ref 0–0.5)
LDLC SERPL CALC-MCNC: 118.2 MG/DL (ref 0–100)
LIPID PANEL: ABNORMAL
LYMPHOCYTES # BLD: 1.8 K/UL (ref 0.9–3.6)
LYMPHOCYTES NFR BLD: 34 % (ref 21–52)
MCH RBC QN AUTO: 28.5 PG (ref 24–34)
MCHC RBC AUTO-ENTMCNC: 31 G/DL (ref 31–37)
MCV RBC AUTO: 91.9 FL (ref 78–100)
MONOCYTES # BLD: 0.4 K/UL (ref 0.05–1.2)
MONOCYTES NFR BLD: 8 % (ref 3–10)
NEUTS SEG # BLD: 2.7 K/UL (ref 1.8–8)
NEUTS SEG NFR BLD: 53 % (ref 40–73)
NRBC # BLD: 0 K/UL (ref 0–0.01)
NRBC BLD-RTO: 0 PER 100 WBC
PLATELET # BLD AUTO: 199 K/UL (ref 135–420)
PMV BLD AUTO: 11.1 FL (ref 9.2–11.8)
POTASSIUM SERPL-SCNC: 4.6 MMOL/L (ref 3.5–5.5)
RBC # BLD AUTO: 4.92 M/UL (ref 4.2–5.3)
SODIUM SERPL-SCNC: 139 MMOL/L (ref 136–145)
TRIGL SERPL-MCNC: 99 MG/DL
VLDLC SERPL CALC-MCNC: 19.8 MG/DL
WBC # BLD AUTO: 5.2 K/UL (ref 4.6–13.2)

## 2023-11-28 PROCEDURE — 80061 LIPID PANEL: CPT

## 2023-11-28 PROCEDURE — 80048 BASIC METABOLIC PNL TOTAL CA: CPT

## 2023-11-28 PROCEDURE — 82306 VITAMIN D 25 HYDROXY: CPT

## 2023-11-28 PROCEDURE — 36415 COLL VENOUS BLD VENIPUNCTURE: CPT

## 2023-11-28 PROCEDURE — 85025 COMPLETE CBC W/AUTO DIFF WBC: CPT

## 2023-11-29 ENCOUNTER — OFFICE VISIT (OUTPATIENT)
Facility: CLINIC | Age: 63
End: 2023-11-29
Payer: COMMERCIAL

## 2023-11-29 VITALS
RESPIRATION RATE: 16 BRPM | DIASTOLIC BLOOD PRESSURE: 67 MMHG | BODY MASS INDEX: 29.03 KG/M2 | WEIGHT: 144 LBS | OXYGEN SATURATION: 94 % | HEART RATE: 60 BPM | HEIGHT: 59 IN | TEMPERATURE: 97.8 F | SYSTOLIC BLOOD PRESSURE: 103 MMHG

## 2023-11-29 DIAGNOSIS — Z12.31 BREAST CANCER SCREENING BY MAMMOGRAM: ICD-10-CM

## 2023-11-29 DIAGNOSIS — E78.2 MIXED HYPERLIPIDEMIA: ICD-10-CM

## 2023-11-29 DIAGNOSIS — I69.30 HISTORY OF CVA WITH RESIDUAL DEFICIT: Primary | ICD-10-CM

## 2023-11-29 DIAGNOSIS — Z72.0 TOBACCO USE: ICD-10-CM

## 2023-11-29 DIAGNOSIS — I10 ESSENTIAL (PRIMARY) HYPERTENSION: ICD-10-CM

## 2023-11-29 DIAGNOSIS — R47.1 DYSARTHRIA: ICD-10-CM

## 2023-11-29 PROCEDURE — 90471 IMMUNIZATION ADMIN: CPT | Performed by: STUDENT IN AN ORGANIZED HEALTH CARE EDUCATION/TRAINING PROGRAM

## 2023-11-29 PROCEDURE — 90674 CCIIV4 VAC NO PRSV 0.5 ML IM: CPT | Performed by: STUDENT IN AN ORGANIZED HEALTH CARE EDUCATION/TRAINING PROGRAM

## 2023-11-29 PROCEDURE — 3078F DIAST BP <80 MM HG: CPT | Performed by: STUDENT IN AN ORGANIZED HEALTH CARE EDUCATION/TRAINING PROGRAM

## 2023-11-29 PROCEDURE — 99214 OFFICE O/P EST MOD 30 MIN: CPT | Performed by: STUDENT IN AN ORGANIZED HEALTH CARE EDUCATION/TRAINING PROGRAM

## 2023-11-29 PROCEDURE — 3074F SYST BP LT 130 MM HG: CPT | Performed by: STUDENT IN AN ORGANIZED HEALTH CARE EDUCATION/TRAINING PROGRAM

## 2023-11-29 RX ORDER — ATORVASTATIN CALCIUM 80 MG/1
80 TABLET, FILM COATED ORAL NIGHTLY
Qty: 90 TABLET | Refills: 1 | Status: SHIPPED | OUTPATIENT
Start: 2023-11-29

## 2023-11-29 SDOH — ECONOMIC STABILITY: INCOME INSECURITY: HOW HARD IS IT FOR YOU TO PAY FOR THE VERY BASICS LIKE FOOD, HOUSING, MEDICAL CARE, AND HEATING?: NOT HARD AT ALL

## 2023-11-29 SDOH — ECONOMIC STABILITY: FOOD INSECURITY: WITHIN THE PAST 12 MONTHS, YOU WORRIED THAT YOUR FOOD WOULD RUN OUT BEFORE YOU GOT MONEY TO BUY MORE.: NEVER TRUE

## 2023-11-29 SDOH — ECONOMIC STABILITY: FOOD INSECURITY: WITHIN THE PAST 12 MONTHS, THE FOOD YOU BOUGHT JUST DIDN'T LAST AND YOU DIDN'T HAVE MONEY TO GET MORE.: NEVER TRUE

## 2023-11-29 ASSESSMENT — PATIENT HEALTH QUESTIONNAIRE - PHQ9
5. POOR APPETITE OR OVEREATING: 0
1. LITTLE INTEREST OR PLEASURE IN DOING THINGS: 0
4. FEELING TIRED OR HAVING LITTLE ENERGY: 0
SUM OF ALL RESPONSES TO PHQ QUESTIONS 1-9: 0
2. FEELING DOWN, DEPRESSED OR HOPELESS: 0
9. THOUGHTS THAT YOU WOULD BE BETTER OFF DEAD, OR OF HURTING YOURSELF: 0
SUM OF ALL RESPONSES TO PHQ9 QUESTIONS 1 & 2: 0
SUM OF ALL RESPONSES TO PHQ QUESTIONS 1-9: 0
8. MOVING OR SPEAKING SO SLOWLY THAT OTHER PEOPLE COULD HAVE NOTICED. OR THE OPPOSITE, BEING SO FIGETY OR RESTLESS THAT YOU HAVE BEEN MOVING AROUND A LOT MORE THAN USUAL: 0
2. FEELING DOWN, DEPRESSED OR HOPELESS: 0
10. IF YOU CHECKED OFF ANY PROBLEMS, HOW DIFFICULT HAVE THESE PROBLEMS MADE IT FOR YOU TO DO YOUR WORK, TAKE CARE OF THINGS AT HOME, OR GET ALONG WITH OTHER PEOPLE: 0
1. LITTLE INTEREST OR PLEASURE IN DOING THINGS: 0
6. FEELING BAD ABOUT YOURSELF - OR THAT YOU ARE A FAILURE OR HAVE LET YOURSELF OR YOUR FAMILY DOWN: 0
SUM OF ALL RESPONSES TO PHQ QUESTIONS 1-9: 0
3. TROUBLE FALLING OR STAYING ASLEEP: 0
SUM OF ALL RESPONSES TO PHQ9 QUESTIONS 1 & 2: 0
SUM OF ALL RESPONSES TO PHQ QUESTIONS 1-9: 0
4. FEELING TIRED OR HAVING LITTLE ENERGY: 0
SUM OF ALL RESPONSES TO PHQ QUESTIONS 1-9: 0
6. FEELING BAD ABOUT YOURSELF - OR THAT YOU ARE A FAILURE OR HAVE LET YOURSELF OR YOUR FAMILY DOWN: 0
9. THOUGHTS THAT YOU WOULD BE BETTER OFF DEAD, OR OF HURTING YOURSELF: 0
5. POOR APPETITE OR OVEREATING: 0
10. IF YOU CHECKED OFF ANY PROBLEMS, HOW DIFFICULT HAVE THESE PROBLEMS MADE IT FOR YOU TO DO YOUR WORK, TAKE CARE OF THINGS AT HOME, OR GET ALONG WITH OTHER PEOPLE: 0
SUM OF ALL RESPONSES TO PHQ QUESTIONS 1-9: 0
7. TROUBLE CONCENTRATING ON THINGS, SUCH AS READING THE NEWSPAPER OR WATCHING TELEVISION: 0
7. TROUBLE CONCENTRATING ON THINGS, SUCH AS READING THE NEWSPAPER OR WATCHING TELEVISION: 0
8. MOVING OR SPEAKING SO SLOWLY THAT OTHER PEOPLE COULD HAVE NOTICED. OR THE OPPOSITE, BEING SO FIGETY OR RESTLESS THAT YOU HAVE BEEN MOVING AROUND A LOT MORE THAN USUAL: 0
3. TROUBLE FALLING OR STAYING ASLEEP: 0
SUM OF ALL RESPONSES TO PHQ QUESTIONS 1-9: 0
SUM OF ALL RESPONSES TO PHQ QUESTIONS 1-9: 0

## 2023-11-29 ASSESSMENT — COLUMBIA-SUICIDE SEVERITY RATING SCALE - C-SSRS
7. DID THIS OCCUR IN THE LAST THREE MONTHS: NO
7. DID THIS OCCUR IN THE LAST THREE MONTHS: NO
5. HAVE YOU STARTED TO WORK OUT OR WORKED OUT THE DETAILS OF HOW TO KILL YOURSELF? DO YOU INTEND TO CARRY OUT THIS PLAN?: NO
3. HAVE YOU BEEN THINKING ABOUT HOW YOU MIGHT KILL YOURSELF?: NO
5. HAVE YOU STARTED TO WORK OUT OR WORKED OUT THE DETAILS OF HOW TO KILL YOURSELF? DO YOU INTEND TO CARRY OUT THIS PLAN?: NO
4. HAVE YOU HAD THESE THOUGHTS AND HAD SOME INTENTION OF ACTING ON THEM?: NO
4. HAVE YOU HAD THESE THOUGHTS AND HAD SOME INTENTION OF ACTING ON THEM?: NO
3. HAVE YOU BEEN THINKING ABOUT HOW YOU MIGHT KILL YOURSELF?: NO

## 2023-11-29 ASSESSMENT — ANXIETY QUESTIONNAIRES
4. TROUBLE RELAXING: 0
GAD7 TOTAL SCORE: 0
7. FEELING AFRAID AS IF SOMETHING AWFUL MIGHT HAPPEN: 0
3. WORRYING TOO MUCH ABOUT DIFFERENT THINGS: 0
IF YOU CHECKED OFF ANY PROBLEMS ON THIS QUESTIONNAIRE, HOW DIFFICULT HAVE THESE PROBLEMS MADE IT FOR YOU TO DO YOUR WORK, TAKE CARE OF THINGS AT HOME, OR GET ALONG WITH OTHER PEOPLE: NOT DIFFICULT AT ALL
6. BECOMING EASILY ANNOYED OR IRRITABLE: 0
1. FEELING NERVOUS, ANXIOUS, OR ON EDGE: 0
5. BEING SO RESTLESS THAT IT IS HARD TO SIT STILL: 0
2. NOT BEING ABLE TO STOP OR CONTROL WORRYING: 0

## 2023-11-29 ASSESSMENT — ENCOUNTER SYMPTOMS
COUGH: 0
SHORTNESS OF BREATH: 0
BLOOD IN STOOL: 0
ABDOMINAL PAIN: 0
NAUSEA: 0
VOMITING: 0
RHINORRHEA: 0
DIARRHEA: 0
WHEEZING: 0
CHEST TIGHTNESS: 0
BACK PAIN: 0

## 2023-12-21 ENCOUNTER — HOSPITAL ENCOUNTER (OUTPATIENT)
Facility: HOSPITAL | Age: 63
Setting detail: RECURRING SERIES
Discharge: HOME | End: 2023-12-24
Payer: COMMERCIAL

## 2023-12-21 PROCEDURE — 97166 OT EVAL MOD COMPLEX 45 MIN: CPT

## 2023-12-21 NOTE — PROGRESS NOTES
OCCUPATIONAL THERAPY - DAILY TREATMENT NOTE    Patient Name: Massimo Dominguez    Date: 2023    : 1960  Insurance: Payor: Misty Oden / Plan: Nessa Gibson / Product Type: *No Product type* /        Ins Auth:     Next PN Due By:    24                      Patient  verified Yes     Visit #   Current / Total 1 12   Time   In / Out 243 321   Total Treatment Time 38   Pain   In / Out 0 0   Subjective Functional Status/Changes: See POC     TREATMENT AREA =  Generalized weakness [R53.1]    OBJECTIVE    EVAL - 38 minutes        Billed concurrently with other treatments Patient Education:  Reviewed HEP     Objective Information/Functional Measures/Assessment    See POC         Assessment/Plan:    See POC             []  See Progress Note/Re certification     Patient will continue to benefit from skilled OT services to modify and progress therapeutic interventions, analyze and address functional mobility deficits, analyze and address ROM deficits, analyze and address strength deficits, analyze and address soft tissue restrictions, analyze and cue for proper movement patterns, and instruct in home and community integration  to attain remaining goals. Progress toward goals / Updated goals:    Short Term Goals: To be accomplished in 2 weeks  Patient will be independent with HEP for right ROM/stretches/strengthening to increase ROM and strength for ADL/IADL tasks. Status at Eval: Not initiated     Patient will be independent in demonstrating and performing activity modification strategies to aid in success for work, self-care, meal preparation and home management tasks. Status at Eval: Not initiated     Long Term Goals: To be accomplished in 4 weeks     Patient will increase right hand  strength to 10# or greater to improve grasp on objects during ADL/IADL tasks.    Status at Eval: 15#     Patient will increase  Bilateral hand 3pt, lateral, and tip pinch strength by 3 pounds or greater to improve
extremity. Patient Self Reported Health Status: good  Rehabilitation Potential: good    Short Term Goals: To be accomplished in 2 weeks  Patient will be independent with HEP for right ROM/stretches/strengthening to increase ROM and strength for ADL/IADL tasks. Status at Eval: Not initiated    Patient will be independent in demonstrating and performing activity modification strategies to aid in success for work, self-care, meal preparation and home management tasks. Status at Eval: Not initiated    Long Term Goals: To be accomplished in 4 weeks    Patient will increase right hand  strength to 10# or greater to improve grasp on objects during ADL/IADL tasks. Status at Eval: 15#    Patient will increase  Bilateral hand 3pt, lateral, and tip pinch strength by 3 pounds or greater to improve participation in meal preparation and home management tasks. Status at Eval: right: 3pt= 3#; lateral= 3#; tip= 2#. Left 9pt= 3#; lateral= 5#; tip= 6#.    3. Pt will be able to print first and last name with no more than 1 error in letter formation to be able to fill out paperwork. Statues at Eval: see chart. 3 errors in letter formation    4. Pt will show a 8 second decrease in 9-hole peg test with the Right hand, demonstrating increased coordination for buttoning a shirt. Status at Eval: 33 seconds      Frequency / Duration: Patient to be seen 1-2 times per week for 4-6 weeks    Patient/ Caregiver education and instruction: Diagnosis, prognosis, self care, activity modification, brace/ splint application, and exercises [x]  Plan of care has been reviewed with MCKENZIE    Certification Period: 3/20/24    Fidelia Raymond, OT       12/21/2023       10:31 AM  ===================================================================  I certify that the above Therapy Services are being furnished while the patient is under my care. I agree with the treatment plan and certify that this therapy is necessary.     [address] [place] [address]

## 2023-12-22 ENCOUNTER — HOSPITAL ENCOUNTER (OUTPATIENT)
Facility: HOSPITAL | Age: 63
Setting detail: RECURRING SERIES
Discharge: HOME | End: 2023-12-25
Payer: COMMERCIAL

## 2023-12-22 PROCEDURE — 97530 THERAPEUTIC ACTIVITIES: CPT

## 2023-12-22 PROCEDURE — 92507 TX SP LANG VOICE COMM INDIV: CPT

## 2023-12-22 PROCEDURE — 97110 THERAPEUTIC EXERCISES: CPT

## 2023-12-22 NOTE — PROGRESS NOTES
Lanetta Marilyn, [place] [facility]   1/3/2024  9:20 AM AREN Guzman [facility]   1/4/2024 11:20 AM AREN Guzman [facility]   1/5/2024 10:00 AM [facility]   1/10/2024  8:40 AM [facility]   1/10/2024  9:20 AM AREN Guzman [facility]   1/12/2024 10:00 AM Miah Reyes, OT [facility]   1/12/2024 10:40 AM [facility]   1/17/2024  8:40 AM [facility]ta [facility]   1/17/2024  9:20 AM Miah Reyes, OT [facility]   1/19/2024 10:00 AM Miah Reyes, OT [facility]   1/19/2024 10:40 AM [facility]   1/24/2024  8:40 [facility]   1/24/2024  9:20 AM Tricia Corcoran [facility]   1/26/2024 10:40 [facility]   1/26/2024 11:20 AM Triciaarthur Corcoran [facility]   1/31/2024  8:40 AM [facility]   2/2/2024 10:40 AM [facility]   2/29/2024 10:00 AM Amilcar Hardin MD [facility]

## 2023-12-22 NOTE — PROGRESS NOTES
OCCUPATIONAL THERAPY - DAILY TREATMENT NOTE    Patient Name: Kaleb Jimenez    Date: 2023    : 1960  Insurance: Payor: Chad Ferreira / Plan: Vipin Foss / Product Type: *No Product type* /        Ins Auth:            Next PN Due By:                     24         Patient  verified Yes     Visit #   Current / Total 2 12   Time   In / Out 1121 1201   Total Treatment Time 40   Pain   In / Out 0 0   Subjective Functional Status/Changes: \"I'm ready to do some fun stuff today. \" \"I worked on a 100 piece puzzle yesterday with my granddaughter yesterday. \"     TREATMENT AREA =  Generalized weakness [R53.1]    OBJECTIVE    Therapeutic Procedures: Tx Min Billable or 1:1 Min (if diff from Tx Min) Procedure, Rationale, Specifics   25  91894 Therapeutic Activity (timed):  use of dynamic activities replicating functional movements to increase ability to complete ADLs/IADLs independently (see flow sheet as applicable)    Perfection with palm to digit translation X 25 using right hand to insert. Removal X 20 with right hand. Handwriting using pen again to print name (first and last) and date X 2 each. Pt also used built up handle with marker to print first and last name X 5.      15  96068 Therapeutic Exercise (timed):  increase ROM, strength, coordination, and proprioception to  increase independence for ADL/IADL tasks (see flow sheet as applicable)    Food theraputty exercises using right hand and soft theraputty. Removal of perfection pieces using 1# clip X 5 with right hand (tip pinch). TOTAL 1:1 TREATMENT TIME:  (Total Time - Paraffin/Vaso/Fluido)        40         Billed concurrently with other treatments Patient Education:  Reviewed HEP. Provided pt with food putty HEP and soft theraputty. Objective Information/Functional Measures/Assessment    Pt has difficulty with hand strength during handwriting while using pen again.  Pt also has difficulty with tip pinch strength

## 2024-01-03 ENCOUNTER — HOSPITAL ENCOUNTER (OUTPATIENT)
Facility: HOSPITAL | Age: 64
Setting detail: RECURRING SERIES
Discharge: HOME | End: 2024-01-06
Payer: COMMERCIAL

## 2024-01-03 PROCEDURE — 97530 THERAPEUTIC ACTIVITIES: CPT

## 2024-01-03 PROCEDURE — 97112 NEUROMUSCULAR REEDUCATION: CPT

## 2024-01-03 PROCEDURE — 97110 THERAPEUTIC EXERCISES: CPT

## 2024-01-03 PROCEDURE — 92507 TX SP LANG VOICE COMM INDIV: CPT

## 2024-01-03 NOTE — PROGRESS NOTES
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 No reported emotional distress. She reported compliance to HEP and that she did \"lots of puzzles.\" Pt also completed HEP to bring back 10 functional sentences she says every day.     OBJECTIVE  Treatment provided includes:  Increase/Improve:  []  Voice Quality []  Cognitive Linguistic Skills []  Laryngeal/Pharyngeal Exercises   []  Vocal Loudness []  Reading Comprehension []  Swallowing Skills    []  Vocal Cord Function []  Auditory Comprehension []  Oral Motor Skills   []  Resonance []  Writing Skills [x]  Compensatory strategies    [x]  Speech Intelligibility [x]  Expressive Language []  Attention   []  Breath Support/Coord. [x]  Receptive language []  Memory   [x]  Articulation []  Safety Awareness [x] Pt educ   [x]  Fluency []  Word Retrieval []        Treatment Provided:  Speech-Language Treatment [99521]:   -Melodic Intonation Therapy (RITA)    Patient/Caregiver  Education: [x] Review HEP      HEP/Handouts given: RITA sentences with intonation markers    Pain Level (0-10 scale) post treatment: 0    ASSESSMENT     []   Improving appropriately and progressing toward goals  [x]   Improving slowly and progressing toward goals  []   Approximating goals/maximum potential  [x]   Continues to benefit from skilled therapy to address remaining functional deficits  []   Not progressing toward goals and plan of care will be adjusted    Progress

## 2024-01-03 NOTE — PROGRESS NOTES
OCCUPATIONAL THERAPY - DAILY TREATMENT NOTE    Patient Name: Mena Curran    Date: 1/3/2024    : 1960  Insurance: Payor: BETTY / Plan: RO[facility]LINE [facility] / Product Type: *No Product type* /        Ins Auth:  REYNOLD          Next PN Due By:                     24         Patient  verified Yes     Visit #   Current / Total 3 12   Time   In / Out 920 959   Total Treatment Time 39   Pain   In / Out 0 0   Subjective Functional Status/Changes: I am trying to be a good person     Pt reporting sensory deficits in Right hand     Pt reporting ability to differentiate between hot and cold with Right hand      TREATMENT AREA =  Generalized weakness [R53.1]    OBJECTIVE      Therapeutic Procedures:  Tx Min Billable or 1:1 Min (if diff from Tx Min) Procedure, Rationale, Specifics   10  62101 Therapeutic Exercise (timed):  increase ROM, strength, coordination, and proprioception to improve  (see flow sheet as applicable)    Soft theraputty find- Right hand to manipulate putty       14  21308 Therapeutic Activity (timed):  use of dynamic activities replicating functional movements to improve  (see flow sheet as applicable)    Small peg removal from soft putty- Right hand- 10x   Grooved Pegs: Placed individually with Right hand- untimed- removed using digit to palm translation in sets of 5 with focus on slow, purposeful movements and visual connection to task      15  38493 Neuromuscular Re-Education (timed):  increase proprioception ROM, strength and muscle firing to improve proprioception. (see flow sheet as applicable)    Lg soft putty- Right hand- weightbearing in to putty with L Bar   Mini Massager to Right hand- administered information on purpose and self purchase options                           TOTAL 1:1 TREATMENT TIME:  (Total Time - Paraffin/Vaso/Fluido)        39           Billed concurrently with other treatments Patient Education:  Reviewed HEP. Provided pt with food putty HEP and soft theraputty.

## 2024-01-04 ENCOUNTER — HOSPITAL ENCOUNTER (OUTPATIENT)
Facility: HOSPITAL | Age: 64
Setting detail: RECURRING SERIES
Discharge: HOME | End: 2024-01-07
Payer: COMMERCIAL

## 2024-01-04 PROCEDURE — 97110 THERAPEUTIC EXERCISES: CPT

## 2024-01-04 PROCEDURE — 97530 THERAPEUTIC ACTIVITIES: CPT

## 2024-01-04 PROCEDURE — 97112 NEUROMUSCULAR REEDUCATION: CPT

## 2024-01-04 NOTE — PROGRESS NOTES
OCCUPATIONAL THERAPY - DAILY TREATMENT NOTE    Patient Name: Mena Curran    Date: 2024    : 1960  Insurance: Payor: BETTY / Plan: ROSELINE HERNÁNDEZ [facility] / Product Type: *No Product type* /        Ins Auth:  REYNOLD          Next PN Due By:                     24         Patient  verified Yes     Visit #   Current / Total 4 12   Time   In / Out 1120 1200   Total Treatment Time 40   Pain   In / Out 0 0   Subjective Functional Status/Changes:   I have been doing that thing! (Weightbearing)  and when I went to sign my name it looked so good!       TREATMENT AREA =  Generalized weakness [R53.1]    OBJECTIVE      Therapeutic Procedures:  Tx Min Billable or 1:1 Min (if diff from Tx Min) Procedure, Rationale, Specifics   10  07101 Therapeutic Exercise (timed):  increase ROM, strength, coordination, and proprioception to improve  (see flow sheet as applicable)    Red Therabar: 3 ways: 20x each          10  04291 Therapeutic Activity (timed):  use of dynamic activities replicating functional movements to improve  (see flow sheet as applicable)    Perfection: Placed individually with Right hand- untimed- removed using digit to palm translation in sets of 5 with focus on slow, purposeful movements and visual connection to task      20  15789 Neuromuscular Re-Education (timed):  increase proprioception ROM, strength and muscle firing to improve proprioception. (see flow sheet as applicable)    Lg soft putty- Right hand- weightbearing in to putty with L Bar     Mini Massager to Right hand- administered information on purpose and self purchase options                           TOTAL 1:1 TREATMENT TIME:  (Total Time - Paraffin/Vaso/Fluido)        40           Billed concurrently with other treatments Patient Education:  Reviewed HEP. Provided pt with food putty HEP and soft theraputty.     Objective Information/Functional Measures/Assessment    Patient purchase of mini hand massager for home use   Good response

## 2024-01-05 ENCOUNTER — HOSPITAL ENCOUNTER (OUTPATIENT)
Facility: HOSPITAL | Age: 64
Setting detail: RECURRING SERIES
Discharge: HOME | End: 2024-01-08
Payer: COMMERCIAL

## 2024-01-05 PROCEDURE — 92507 TX SP LANG VOICE COMM INDIV: CPT

## 2024-01-05 NOTE — PROGRESS NOTES
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     OBJECTIVE  Treatment provided includes:  Increase/Improve:  []  Voice Quality []  Cognitive Linguistic Skills []  Laryngeal/Pharyngeal Exercises   []  Vocal Loudness []  Reading Comprehension []  Swallowing Skills    []  Vocal Cord Function []  Auditory Comprehension []  Oral Motor Skills   []  Resonance []  Writing Skills [x]  Compensatory strategies    [x]  Speech Intelligibility []  Expressive Language []  Attention   []  Breath Support/Coord. []  Receptive language []  Memory   [x]  Articulation []  Safety Awareness [x] Pt educ   [x]  Fluency []  Word Retrieval []        Treatment Provided:  Speech-Language Treatment [68693]:   -Melodic Intonation Therapy (RITA)  -speech intelligibility comp strategies     Patient/Caregiver  Education: [x] Review HEP      HEP/Handouts given: Educ pt re: bringing her phone to record clinician for RITA/spoken in unison    Pain Level (0-10 scale) post treatment: 0    ASSESSMENT     []   Improving appropriately and progressing toward goals  [x]   Improving slowly and progressing toward goals  []   Approximating goals/maximum potential  [x]   Continues to benefit from skilled therapy to address remaining functional deficits  []   Not progressing toward goals and plan of care will be adjusted    Progress towards goals / Updated goals:  1) Pt will produce the target phoneme/phonemes in the initial, medial, final positions

## 2024-01-10 ENCOUNTER — HOSPITAL ENCOUNTER (OUTPATIENT)
Facility: HOSPITAL | Age: 64
Setting detail: RECURRING SERIES
Discharge: HOME | End: 2024-01-13
Payer: COMMERCIAL

## 2024-01-10 PROCEDURE — 97530 THERAPEUTIC ACTIVITIES: CPT

## 2024-01-10 PROCEDURE — 97110 THERAPEUTIC EXERCISES: CPT

## 2024-01-10 PROCEDURE — 97112 NEUROMUSCULAR REEDUCATION: CPT

## 2024-01-10 PROCEDURE — 92507 TX SP LANG VOICE COMM INDIV: CPT

## 2024-01-10 NOTE — PROGRESS NOTES
Onset: 1500  Location / description: Mother has a medication question and wants to know when to give him ibuprofen. At 1510 he had  2 tablets  excedrin headache  (250mg of tylenol per tablet  )and then at  1700   Tylenol  2 tablets of arthritis strength 650 mg each tablet. Total intake of tylenol this afternoon was 1800 mg  Weight 190 about 6 feet tall. Not sure when she should give tylenol or the ibuprofen. Discussed that tylenol or acetaminophen is often seen in \"compound \" products like cold medication or headache medications and it is important to check the labels to see what active ingredients  are in the products before use. Advised that I would connect her to the poison control center to discuss  Timeframe for when use of the other medications would be safe. Warm transfer completed to the poison control center.   Precipitating Factors: See Above  Pain Scale (1-10), 10 highest:n/a  Associated Symptoms: n/a  What  improves / worsens symptoms: n/a  Symptom specific medications: See Above  Recent visits (last 3-4 weeks) for same reason or recent surgery:  n/a  Did the patient have a positive coronavirus screening?: not addressed at this time  If yes, date of Covid-19 exposure:  n/a    PLAN:  Warm transferred to Poison Control    Patient/Caller agrees to follow recommendations.    Reason for Disposition  • All OTHER POTENTIALLY HARMFUL SUBSTANCES (e.g., nearly all drugs, plants, and chemicals) (Exception: harmless substances or harmless overdose such as double dose of OTC drug or antibiotic)    Protocols used: POISONING-A-      
Regarding: : wi-fever 101.5 and medication questions  ----- Message from Justyna No sent at 3/21/2021  5:26 PM CDT -----  Patient Name: Enrique Cat    Full Name of Provider seen for current symptoms:Dr. Ramón Calderon PA    Pregnant (If Yes, how long?):na    Symptoms: wi-fever 101.5 and medication questions    Do you or any of your household members have the following symptoms:  Fever >100.0#F or >38.0#C: Yes    New or worsening cough, shortness of breath, sore throat, congestion, or runny nose: No    New onset of nausea, vomiting or diarrhea: No    New onset of loss of taste or smell, chills, repeated shaking with chills, muscle pain, or headache: No    Have you, a household member, or another person you have been in contact with tested positive for COVID-19 in the last 14 days?: No    Call Back #:[phone] (M)    Call Center Account #:[id number]    Which State are you currently located in? (enter State name in Summary field): [place]    Please update the Demographics section with the patients permanent resident address     Emergent COVID-19 Symptoms requiring Nurse Triage:  Trouble breathing, Persistent pain or pressure in the chest, New confusion, Inability to wake or stay awake, Bluish lips or face      
increased right hand  strength, increased bilateral tip pinch, and increased left lateral and tip pinch. Pt right 3 point and lateral pinch strength consistent from date of evaluation. Pt progressing with printing first and last name with 3 errors in letter formation. Pt right hand coordination increased per 9 hole peg test. Pt reports she consistently performs weightbearing and hand strengthening HEP.           []  See Progress Note/Re certification     Patient will continue to benefit from skilled OT services to modify and progress therapeutic interventions, analyze and address functional mobility deficits, analyze and address ROM deficits, analyze and address strength deficits, analyze and address soft tissue restrictions, analyze and cue for proper movement patterns, analyze and modify for postural abnormalities, and instruct in home and community integration  to attain remaining goals.   Progress toward goals / Updated goals:    Short Term Goals: To be accomplished in 2 weeks  Patient will be independent with HEP for right ROM/stretches/strengthening to increase ROM and strength for ADL/IADL tasks.   Status at Eval: Not initiated  Current Status (1/10/2024): Performing Putty HEP  Status at PN (1/10/24): progressing     Patient will be independent in demonstrating and performing activity modification strategies to aid in success for work, self-care, meal preparation and home management tasks.   Status at Eval: Not initiated  Current Status (1/10/2024): initiated weightbearing as preparatory task   Status at PN (1/10/24): progressing     Long Term Goals: To be accomplished in 4 weeks     Patient will increase right hand  strength to 10# or greater to improve grasp on objects during ADL/IADL tasks.   Status at Eval: 15#  Status at PN (1/10/24): 24#, progressing     Patient will increase  Bilateral hand 3pt, lateral, and tip pinch strength by 3 pounds or greater to improve participation in meal preparation 
meal preparation and home management tasks.  Status at Eval: right: 3pt= 3#; lateral= 3#; tip= 2#. Left 9pt= 3#; lateral= 5#; tip= 6#.  Current status (12/22/23): 1# clip tip pinch on right hand.  Status at PN (1/10/24): progressing    Pinch Strength: Taken with Pinch Dynamometer, in Lbs      Eval 1/10/24 Change   Right 3pt 3  3  0    Lateral 3 3   0    Tip 2 3  1        Left 3pt 9 8   1    Lateral 5 6   1    Tip 6 8   2        3. Pt will be able to print first and last name with no more than 1 error in letter formation to be able to fill out paperwork.  Statues at Eval: see chart. 3 errors in letter formation  Status at PN (1/10/24): see chart. 3 errors in letter formation, progressing     4. Pt will show a 8 second decrease in 9-hole peg test with the Right hand, demonstrating increased coordination for buttoning a shirt.  Status at Eval: 33 seconds  Current Status (1/10/2024): Progressing with sensory re-ed, weightbearing tasks for improved ability to manipulate small items   Status at PN (1/10/24): 28, progressing       Non-Medicare, can change goals, can adjust or add frequency duration, no signature required      Frequency / Duration:   Patient to be seen   1-2   times per week for   4-6    weeks:    RECOMMENDATIONS  We would like to continue therapy for progress to goals stated above.  Continue per initial Plan of Care.    If you have any questions/comments please contact us directly.  Thank you for allowing us to assist in the care of your patient.    Batsheva Negrete OT       1/10/2024       1:08 PM

## 2024-01-10 NOTE — PROGRESS NOTES
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 Pt reported compliance to HEP. Pt reported difficulty in pronouncing \"electricity\" and \"social security.\"    OBJECTIVE  Treatment provided includes:  Increase/Improve:  []  Voice Quality []  Cognitive Linguistic Skills []  Laryngeal/Pharyngeal Exercises   []  Vocal Loudness []  Reading Comprehension []  Swallowing Skills    []  Vocal Cord Function []  Auditory Comprehension []  Oral Motor Skills   []  Resonance []  Writing Skills [x]  Compensatory strategies    [x]  Speech Intelligibility []  Expressive Language []  Attention   [x]  Breath Support/Coord. []  Receptive language []  Memory   [x]  Articulation []  Safety Awareness [x] Pt educ   [x]  Fluency []  Word Retrieval []        Treatment Provided:  Speech-Language Treatment [69144]:   -Melodic Intonation Therapy (RITA)  -Sound Production Treatment (SPT)    Patient/Caregiver  Education: [x] Review HEP      HEP/Handouts given: Clinician recording of RITA sentences for reference/visual cues.     Pain Level (0-10 scale) post treatment: 0    ASSESSMENT     []   Improving appropriately and progressing toward goals  [x]   Improving slowly and progressing toward goals  []   Approximating goals/maximum potential  [x]   Continues to benefit from skilled therapy to address remaining functional deficits  []   Not progressing toward goals and plan of care will be

## 2024-01-12 ENCOUNTER — HOSPITAL ENCOUNTER (OUTPATIENT)
Facility: HOSPITAL | Age: 64
Setting detail: RECURRING SERIES
Discharge: HOME | End: 2024-01-15
Payer: COMMERCIAL

## 2024-01-12 PROCEDURE — 97110 THERAPEUTIC EXERCISES: CPT

## 2024-01-12 PROCEDURE — 92507 TX SP LANG VOICE COMM INDIV: CPT

## 2024-01-12 PROCEDURE — 97112 NEUROMUSCULAR REEDUCATION: CPT

## 2024-01-12 PROCEDURE — 97535 SELF CARE MNGMENT TRAINING: CPT

## 2024-01-12 NOTE — PROGRESS NOTES
OCCUPATIONAL THERAPY - DAILY TREATMENT NOTE    Patient Name: Mena Curran    Date: 2024    : 1960  Insurance: Payor: BETTY / Plan: [facility] / Product Type: *No Product type* /        Ins Auth:  REYNOLD          Next PN Due By:                                   2024       Patient  verified Yes     Visit #   Current / Total 1 12   Time   In / Out 1121 1200   Total Treatment Time 39   Pain   In / Out 0 0   Subjective Functional Status/Changes: Wow I did that! (Opened water bottle)     TREATMENT AREA =  Generalized weakness [R53.1]    OBJECTIVE        Therapeutic Procedures:  Tx Min Billable or 1:1 Min (if diff from Tx Min) Procedure, Rationale, Specifics   15  69316 Therapeutic Exercise (timed):  increase ROM, strength, coordination, and proprioception to  (see flow sheet as applicable)    Red Therabar: 20x   6 band hand helper- Right      14  89046 Neuromuscular Re-Education (timed):  increase proprioception ROM, strength and muscle firing to improve functional use. (see flow sheet as applicable)    Weightbearing: L Bar- Med Soft      10  79665 Self Care/Home Management (timed):  improve patient knowledge and understanding of positioning, posture/ergonomics, home safety, and activity modification  to improve functional independence   (see flow sheet as applicable)    Dycem: Administered dycem with in clinic patient demo of use on carey jar, lidded jar and water bottle                           TOTAL 1:1 TREATMENT TIME:  (Total Time - Paraffin/Vaso/Fluido)        39               Billed concurrently with other treatments Patient Education:  Reviewed HEP.      Objective Information/Functional Measures/Assessment    -Upgraded putty to medium soft   - able to open jars and bottles with use of dycem          Assessment/Plan:             []  See Progress Note/Re certification     Patient will continue to benefit from skilled OT services to modify and progress therapeutic interventions, analyze

## 2024-01-12 NOTE — PROGRESS NOTES
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]   Onset Date: 2023      Referral Source: Catrina Hardin MD Start of Care (SOC): 12/15/23    Prior Hospitalization: See medical history Provider #: [id number]   Prior Level of Function: All aspects of speech/language, cognition, voice, and swallowing WNLs, per pt report     Comorbidities: Hx of CVA with R-sided weakness, COPD, WILTON, HTN, HLD, treated hepatitis C, OA s/p bilateral knee replacement, anxiety/depression    Medications: Verified on Patient Summary List     Visits from Start of Care: 7    Missed Visits: 0    The Plan of Care and following information is based on the patient's current status:  Short-term Goals:    Goal: Pt will produce the target phoneme/phonemes in the initial, medial, final positions of multisyllabic words with 80% accuracy given modA.   Status at last note/certification:per evaluation report; pt presents with moderate apraxia of speech 2* CVA.   Current Status: not met; Pt with 67% acc in CVC words, decreased accuracy with multisyllabic words. Pt may benefit on focusing on monosyllabic words prior to multisuyllabic words. -modify goal     Goal: Pt will produce functional phrases and sentences with RITA/spoken in unison with the clinician with 80% accuracy given modA.   Status at last note/certification:per evaluation report; pt presents with moderate apraxia of speech 2* CVA  Current Status: not met; Pt produced 4/10 sentences ANKIT utilizing tapping/RITA. Pt increased to 6/10 sentences given min verbal and visual cues; increase to 8/10 sentences after 
the initial, medial, final positions of multisyllabic words with 80% accuracy given modA.   Current: Pt with 67% acc in CVC words, decreased accuracy with multisyllabic words. Pt may benefit on focusing on monosyllabic words prior to multisuyllabic words. -modify goal     2) Pt will produce functional phrases and sentences with RITA/spoken in unison with the clinician with 80% accuracy given modA.   Current: Pt produced 4/10 sentences ANKIT utilizing tapping/RITA. Pt increased to 6/10 sentences given min verbal and visual cues; increase to 8/10 sentences after mod cues; and 9/10 sentences given max verbal cues. -progressing/continue addressing.      3) Pt will utilize speech intelligibility comp strategies (e.g., slowed rate, self-induced tactile cues) to produce sentences in response to simple wh-question (what’s your favorite holiday and why) with appropriate articulation at 80% accuracy given frequent modA.  Current: Pt reported that she continues to utilize tapping to help with fluency. She also reported pausing and waiting if she wants to say something vs increased speech rate.  -progressing/continue addressing     4) Pt will participate in further formal/informal evaluation to determine receptive and expressive language skills and to establish most appropriate POC.  Current: Informal probe for apraxia completed. Pt continues to demo deficits in producing mono- and multisyllabic words and continues to require visual cues/spoken unison. Pt also observed to demo deficits in automatic speech and reading out loud. Informal probe suggest pt demo mild deficits in reading comprehension. -d/c goal.     PLAN  [x]  Continue plan of care  []  Modify Goals/Treatment Plan      []  Discharge due to:  [] Other:    Jeny Rodrgiuez M.A., CCC-SLP  Speech Language Pathologist   1/12/2024  7:44 AM    Future Appointments   Date Time Provider Department Center   1/12/2024 10:40 AM Jeny Rodriguez, SLP [facility]   1/12/2024

## 2024-01-17 ENCOUNTER — HOSPITAL ENCOUNTER (OUTPATIENT)
Facility: HOSPITAL | Age: 64
Setting detail: RECURRING SERIES
Discharge: HOME | End: 2024-01-20
Payer: COMMERCIAL

## 2024-01-17 PROCEDURE — 97110 THERAPEUTIC EXERCISES: CPT

## 2024-01-17 PROCEDURE — 92507 TX SP LANG VOICE COMM INDIV: CPT

## 2024-01-17 PROCEDURE — 97112 NEUROMUSCULAR REEDUCATION: CPT

## 2024-01-17 PROCEDURE — 97530 THERAPEUTIC ACTIVITIES: CPT

## 2024-01-17 NOTE — PROGRESS NOTES
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 She reported that she has been using tapping throughout the week to help increase efficiency in her communication.     OBJECTIVE  Treatment provided includes:  Increase/Improve:  []  Voice Quality []  Cognitive Linguistic Skills []  Laryngeal/Pharyngeal Exercises   []  Vocal Loudness []  Reading Comprehension []  Swallowing Skills    []  Vocal Cord Function []  Auditory Comprehension []  Oral Motor Skills   []  Resonance []  Writing Skills [x]  Compensatory strategies    [x]  Speech Intelligibility [x]  Expressive Language []  Attention   []  Breath Support/Coord. []  Receptive language []  Memory   [x]  Articulation []  Safety Awareness [x] Pt educ   [x]  Fluency []  Word Retrieval []        Treatment Provided:  Speech-Language Treatment [53728]:    -Semantic Feature Analysis (SFA)  -Speech comp strategies  -Automatic speech    Patient/Caregiver  Education: [x] Review HEP      HEP/Handouts given: Semantic Feature Analysis    Pain Level (0-10 scale) post treatment: 0    ASSESSMENT     []   Improving appropriately and progressing toward goals  [x]   Improving slowly and progressing toward goals  []   Approximating goals/maximum potential  [x]   Continues to benefit from skilled therapy to address remaining functional deficits  []   Not progressing toward goals and plan of care will be adjusted    Progress towards goals / Updated goals:  1) Pt will

## 2024-01-17 NOTE — PROGRESS NOTES
OCCUPATIONAL THERAPY - DAILY TREATMENT NOTE    Patient Name: Mena Curran    Date: 2024    : 1960  Insurance: Payor: BETTY / [facility]: [facility] / Product Type: *No Product type* /        Ins Auth:  REYNOLD          Next PN Due By:                                   2024       Patient  verified Yes     Visit #   Current / Total 2 12   Time   In / Out 920 958   Total Treatment Time 38   Pain   In / Out 0 0   Subjective Functional Status/Changes: \"I've been using the putty to make meatballs at home.\"     TREATMENT AREA =  Generalized weakness [R53.1]    OBJECTIVE        Therapeutic Procedures:  Tx Min Billable or 1:1 Min (if diff from Tx Min) Procedure, Rationale, Specifics   20  53069 Therapeutic Exercise (timed):  increase ROM, strength, coordination, and proprioception to  (see flow sheet as applicable)    Removed 1 inch pegs X 20 using 15# gripper (right hand)    Removed 1/4 inch pegs from medium soft theraputty X 15 (right hand).    3 way red therabar X 20     10  05880 Neuromuscular Re-Education (timed):  increase proprioception ROM, strength and muscle firing to improve functional use. (see flow sheet as applicable)    Weightbearing: L Bar- Med Soft      8  81979 Therapeutic Activity (timed):  use of dynamic activities replicating functional movements to increase ability to complete ADLs/IADLs independently (see flow sheet as applicable)    Inserted 1 inch pegs X 20 using right hand and palm to distal translation                          TOTAL 1:1 TREATMENT TIME:  (Total Time - Paraffin/Vaso/Fluido)        38               Billed concurrently with other treatments Patient Education:  Reviewed HEP.      Objective Information/Functional Measures/Assessment    Pt has difficulty with right hand strength when using gripper to remove pegs.         Assessment/Plan:    Continue to perform weightbearing and work on pt right hand strength during next session.         []  See Progress Note/Re

## 2024-01-19 ENCOUNTER — HOSPITAL ENCOUNTER (OUTPATIENT)
Facility: HOSPITAL | Age: 64
Setting detail: RECURRING SERIES
Discharge: HOME | End: 2024-01-22
Payer: COMMERCIAL

## 2024-01-19 PROCEDURE — 97530 THERAPEUTIC ACTIVITIES: CPT

## 2024-01-19 PROCEDURE — 97110 THERAPEUTIC EXERCISES: CPT

## 2024-01-19 PROCEDURE — 97112 NEUROMUSCULAR REEDUCATION: CPT

## 2024-01-19 PROCEDURE — 92507 TX SP LANG VOICE COMM INDIV: CPT

## 2024-01-19 NOTE — PROGRESS NOTES
OCCUPATIONAL THERAPY - DAILY TREATMENT NOTE    Patient Name: Mena Curran    Date: 2024    : 1960  Insurance: Payor: BETTY / [facility]: [facility] / Product Type: *No Product type* /        Ins Auth:  REYNOLD          Next PN Due By:                                   2024       Patient  verified Yes     Visit #   Current / Total 3 12   Time   In / Out 1120 1200   Total Treatment Time 40   Pain   In / Out 0 0   Subjective Functional Status/Changes: I have a new puzzle      TREATMENT AREA =  Generalized weakness [R53.1]    OBJECTIVE        Therapeutic Procedures:  Tx Min Billable or 1:1 Min (if diff from Tx Min) Procedure, Rationale, Specifics   20  38385 Therapeutic Exercise (timed):  increase ROM, strength, coordination, and proprioception to  (see flow sheet as applicable)    1 in peg removal: Jurgen hands- 35#- 25x each   Red Therabar: 3 ways: 20x      10  61552 Neuromuscular Re-Education (timed):  increase proprioception ROM, strength and muscle firing to improve functional use. (see flow sheet as applicable)    Weightbearing: L Bar- Med Soft      10  84177 Therapeutic Activity (timed):  use of dynamic activities replicating functional movements to increase ability to complete ADLs/IADLs independently (see flow sheet as applicable)    1 in peg placement: translated palm to digit for placement, Jurgen hands 25x                           TOTAL 1:1 TREATMENT TIME:  (Total Time - Paraffin/Vaso/Fluido)        38               Billed concurrently with other treatments Patient Education:  Reviewed HEP.      Objective Information/Functional Measures/Assessment    - Rest breaks required with Right hand gripper task            Assessment/Plan:    Continue to perform weightbearing and work on pt right hand strength during next session.         []  See Progress Note/Re certification     Patient will continue to benefit from skilled OT services to modify and progress therapeutic interventions, analyze and

## 2024-01-24 ENCOUNTER — HOSPITAL ENCOUNTER (OUTPATIENT)
Facility: HOSPITAL | Age: 64
Setting detail: RECURRING SERIES
Discharge: HOME | End: 2024-01-27
Payer: COMMERCIAL

## 2024-01-24 PROCEDURE — 97112 NEUROMUSCULAR REEDUCATION: CPT

## 2024-01-24 PROCEDURE — 97530 THERAPEUTIC ACTIVITIES: CPT

## 2024-01-24 PROCEDURE — 97110 THERAPEUTIC EXERCISES: CPT

## 2024-01-24 PROCEDURE — 92507 TX SP LANG VOICE COMM INDIV: CPT

## 2024-01-24 NOTE — PROGRESS NOTES
will be adjusted      Progress towards goals / Updated goals:  1) Pt will produce the target phoneme/phonemes in the initial, and final positions of CV, VC, and CVC with 80% accuracy given modA.  Current:      2) Pt will produce functional phrases and sentences with RITA/spoken in unison with the clinician with 80% accuracy given modA.   Current: Pt able to completed steps for each sentence:  Step 5 (Response to a question): 4/7 of sentences  Step 4 (Immediate Repetition): 5/7 of sentences  Step 3 (Unison intoning with fading): 6/7 of sentences  Step 2 (Unison intoning): 7/7 of sentences.  -progressing/continue addressing.      3) Pt will utilize speech intelligibility comp strategies (e.g., slowed rate, self-induced tactile cues) to produce sentences in response to simple wh-question (what’s your favorite holiday and why) with appropriate articulation at 80% accuracy given frequent modA.  Current: Pt utilized slowed rate and tapping/pacing to increase fluency during spontaneous conversation with 70% acc given min-mod visual cues. -progressing/continue addressing.      4) Pt will participate in semantic networking and lexical exercises (e.g., divergent and convergent naming, Semantic Feature Analysis, opposites, synonyms, associations, sentence completion, etc) to increase word-finding and accurate semantic use with 80% accuracy given modA to increase communication efficiency, maintain safety, and participate socially in functional living environment.  Current: Not targeted this date.      5) Patient will answer simple ---> moderately complex yes/no questions and wh-questions regarding verbally presented/written novel information/2-3-sentence paragraphs with 80% acc given modA.  Current: . Not targeted this date.      6) Pt will recite automatic speech tasks/serial sequences with 80% acc when provided with mod cues to increase verbal expression.  Current: Not targeted this date.     PLAN  [x]  Continue plan of

## 2024-01-24 NOTE — PROGRESS NOTES
OCCUPATIONAL THERAPY - DAILY TREATMENT NOTE    Patient Name: Mena Curran    Date: 2024    : 1960  Insurance: Payor: BETTY / Plan: [facility] [place] / Product Type: *No Product type* /        Ins Auth:  REYNOLD          Next PN Due By:                                   2024       Patient  verified Yes     Visit #   Current / Total 4 12   Time   In / Out 920 10   Total Treatment Time 40   Pain   In / Out 0 0   Subjective Functional Status/Changes: Speech was good      TREATMENT AREA =  Generalized weakness [R53.1]    OBJECTIVE        Therapeutic Procedures:  Tx Min Billable or 1:1 Min (if diff from Tx Min) Procedure, Rationale, Specifics   15  14656 Therapeutic Exercise (timed):  increase ROM, strength, coordination, and proprioception to  (see flow sheet as applicable)    1 in peg removal: Jurgen hands- 35#- 25x each   Red Therabar: 3 ways: 20x      8  40710 Neuromuscular Re-Education (timed):  increase proprioception ROM, strength and muscle firing to improve functional use. (see flow sheet as applicable)    Weightbearing: L Bar- Med Soft      17  21509 Therapeutic Activity (timed):  use of dynamic activities replicating functional movements to increase ability to complete ADLs/IADLs independently (see flow sheet as applicable)    1 in peg placement: translated palm to digit for placement, Jurgen hands 25x   Grooved Pegs: Right hand- placed with right hand, removed using digit to palm                           TOTAL 1:1 TREATMENT TIME:  (Total Time - Paraffin/Vaso/Fluido)        40               Billed concurrently with other treatments Patient Education:  Reviewed HEP.      Objective Information/Functional Measures/Assessment    - Rest breaks required with Right hand gripper task   - improved speed with grooved pegs            Assessment/Plan:    Continue to perform weightbearing and work on pt right hand strength during next session.         []  See Progress Note/Re certification     Patient will

## 2024-01-26 ENCOUNTER — HOSPITAL ENCOUNTER (OUTPATIENT)
Facility: HOSPITAL | Age: 64
Setting detail: RECURRING SERIES
Discharge: HOME | End: 2024-01-29
Payer: COMMERCIAL

## 2024-01-26 PROCEDURE — 92507 TX SP LANG VOICE COMM INDIV: CPT

## 2024-01-26 PROCEDURE — 97110 THERAPEUTIC EXERCISES: CPT

## 2024-01-26 PROCEDURE — 97530 THERAPEUTIC ACTIVITIES: CPT

## 2024-01-26 PROCEDURE — 97112 NEUROMUSCULAR REEDUCATION: CPT

## 2024-01-26 NOTE — PROGRESS NOTES
OCCUPATIONAL THERAPY - DAILY TREATMENT NOTE    Patient Name: Mena Curran    Date: 2024    : 1960  Insurance: Payor: BETTY / Plan: [facility] [place] / Product Type: *No Product type* /        Ins Auth:  REYNOLD          Next PN Due By:                                   2024       Patient  verified Yes     Visit #   Current / Total 5 12   Time   In / Out 1120 1158   Total Treatment Time 38   Pain   In / Out 0 0   Subjective Functional Status/Changes: I am retired now     TREATMENT AREA =  Generalized weakness [R53.1]    OBJECTIVE        Therapeutic Procedures:  Tx Min Billable or 1:1 Min (if diff from Tx Min) Procedure, Rationale, Specifics   18  55348 Therapeutic Exercise (timed):  increase ROM, strength, coordination, and proprioception to  (see flow sheet as applicable)    Red Therabar: 3 ways: 20x   Med Soft theraputty manipulated with Right hand to reveal and remove small pegs 15x     10  83044 Neuromuscular Re-Education (timed):  increase proprioception ROM, strength and muscle firing to improve functional use. (see flow sheet as applicable)    Weightbearing: knob- Med Soft      10  20707 Therapeutic Activity (timed):  use of dynamic activities replicating functional movements to increase ability to complete ADLs/IADLs independently (see flow sheet as applicable)      Grooved Pegs: Right hand- placed with right hand, removed using digit to palm                           TOTAL 1:1 TREATMENT TIME:  (Total Time - Paraffin/Vaso/Fluido)        38               Billed concurrently with other treatments Patient Education:  Reviewed HEP.      Objective Information/Functional Measures/Assessment    - overall slower pace today           Assessment/Plan:    Continue to perform weightbearing and work on pt right hand strength during next session.         []  See Progress Note/Re certification     Patient will continue to benefit from skilled OT services to modify and progress therapeutic interventions,

## 2024-01-26 NOTE — PROGRESS NOTES
initial, and final positions of CV, VC, and CVC with 80% accuracy given modA.  Current: Not targeted this date.     2) Pt will produce functional phrases and sentences with RITA/spoken in unison with the clinician with 80% accuracy given modA.   Current: Not targeted this date.     3) Pt will utilize speech intelligibility comp strategies (e.g., slowed rate, self-induced tactile cues) to produce sentences in response to simple wh-question (what’s your favorite holiday and why) with appropriate articulation at 80% accuracy given frequent modA.  Current: Pt utilized slowed rate and tapping/pacing to increase accuracy and fluency in spontaneous sentences in 64% of opportunities given min-mod verbal cues and max visual cues. Pt requires intermittent reminders to utilize tapping, especially when she demos increased dysfluencies. -progressing/continue addressing.      4) Pt will participate in semantic networking and lexical exercises (e.g., divergent and convergent naming, Semantic Feature Analysis, opposites, synonyms, associations, sentence completion, etc) to increase word-finding and accurate semantic use with 80% accuracy given modA to increase communication efficiency, maintain safety, and participate socially in functional living environment.  Current: SFA (HEP): 60% acc given mod verbal cues. Pt observed to have inconsistent accuracy in word association during SFA map exercises.   Word association:  75% acc given min-mod verbal cues.     Pt observed to have difficulties with spelling more complex/longer but common words (e.g., refrigerator, chocolate) and required mod visual cues and max verbal cues to accurately spell word. Pt required spelling assistance (e.g., sounding out phonemes, writing underlines for letters) in 6/6 opportunities. -progressing/continue addressing.      5) Patient will answer simple ---> moderately complex yes/no questions and wh-questions regarding verbally presented/written novel

## 2024-01-31 ENCOUNTER — HOSPITAL ENCOUNTER (OUTPATIENT)
Facility: HOSPITAL | Age: 64
Setting detail: RECURRING SERIES
Discharge: HOME | End: 2024-02-03
Payer: COMMERCIAL

## 2024-01-31 PROCEDURE — 92507 TX SP LANG VOICE COMM INDIV: CPT

## 2024-01-31 PROCEDURE — 97110 THERAPEUTIC EXERCISES: CPT

## 2024-01-31 PROCEDURE — 97535 SELF CARE MNGMENT TRAINING: CPT

## 2024-01-31 PROCEDURE — 97112 NEUROMUSCULAR REEDUCATION: CPT

## 2024-01-31 NOTE — PROGRESS NOTES
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 Pt educ re: self-advocacy (e.g., telling others to allow her to take her time to respond, speak to one person at a time) to decrease frustration during socialization during her dinner with friends.     OBJECTIVE  Treatment provided includes:  Increase/Improve:  []  Voice Quality []  Cognitive Linguistic Skills []  Laryngeal/Pharyngeal Exercises   []  Vocal Loudness []  Reading Comprehension []  Swallowing Skills    []  Vocal Cord Function []  Auditory Comprehension []  Oral Motor Skills   []  Resonance []  Writing Skills []  Compensatory strategies    []  Speech Intelligibility [x]  Expressive Language []  Attention   [x]  Breath Support/Coord. [x]  Receptive language []  Memory   [x]  Articulation []  Safety Awareness [x] Pt educ   [x]  Fluency []  Word Retrieval []        Treatment Provided:  Speech-Language Treatment [55297]:     -pt educ  -automatic speech tasks  -Sound Production Treatment (SPT)    Patient/Caregiver  Education: [x] Review HEP      HEP/Handouts given: Tips for speaking with people with aphasia, tips for people living with aphasia    Pain Level (0-10 scale) post treatment: 0    ASSESSMENT     []   Improving appropriately and progressing toward goals  [x]   Improving slowly and progressing toward goals  []   Approximating goals/maximum potential  [x]   Continues to benefit from skilled therapy to

## 2024-01-31 NOTE — PROGRESS NOTES
OCCUPATIONAL THERAPY - DAILY TREATMENT NOTE    Patient Name: Mena Curran    Date: 2024    : 1960  Insurance: Payor: BETTY / Plan: [facility] [place] / Product Type: *No Product type* /        Ins Auth:  REYNOLD          Next PN Due By:                                   2024       Patient  verified Yes     Visit #   Current / Total 6 12   Time   In / Out 840 920   Total Treatment Time 40   Pain   In / Out 0 0   Subjective Functional Status/Changes: I try to practice my signature at home  They are working on my schedule     TREATMENT AREA =  Generalized weakness [R53.1]    OBJECTIVE        Therapeutic Procedures:  Tx Min Billable or 1:1 Min (if diff from Tx Min) Procedure, Rationale, Specifics   18  90885 Therapeutic Exercise (timed):  increase ROM, strength, coordination, and proprioception to  (see flow sheet as applicable)    Green Therabar: 3 ways: 10x   Right - Coins palm>digit, handful into resistive slot   Right - 4# tip pinch and sponges     10  74393 Neuromuscular Re-Education (timed):  increase proprioception ROM, strength and muscle firing to improve functional use. (see flow sheet as applicable)    Weightbearing: BOSU ball - rocking for 1 minute, full WB x1 minute, then rocking to hit each hand x10           12  95663 Self Care/Home Management (timed):  improve patient knowledge and understanding of activity modification  to increase ability to complete ADLs/IADLs independently  (see flow sheet as applicable)    Handwriting practice: First name each letter  Tracing x4 then copying 4+ times                     TOTAL 1:1 TREATMENT TIME:  (Total Time - Paraffin/Vaso/Fluido)     40               Billed concurrently with other treatments Patient Education:  Reviewed HEP.      Objective Information/Functional Measures/Assessment    X1 rest break for green therabar  Pt able to use 4# clip with right tip pinch without difficulty  Better letter formation after tracing  Provided handwriting

## 2024-02-01 ENCOUNTER — OFFICE VISIT (OUTPATIENT)
Age: 64
End: 2024-02-01
Payer: COMMERCIAL

## 2024-02-01 VITALS
HEART RATE: 66 BPM | OXYGEN SATURATION: 95 % | TEMPERATURE: 97.9 F | HEIGHT: 60 IN | DIASTOLIC BLOOD PRESSURE: 82 MMHG | WEIGHT: 146 LBS | BODY MASS INDEX: 28.66 KG/M2 | RESPIRATION RATE: 20 BRPM | SYSTOLIC BLOOD PRESSURE: 130 MMHG

## 2024-02-01 DIAGNOSIS — F82 SPEECH OR LANGUAGE DYSPRAXIA: ICD-10-CM

## 2024-02-01 DIAGNOSIS — Z86.73 HISTORY OF STROKE: Primary | ICD-10-CM

## 2024-02-01 DIAGNOSIS — I69.321 DYSPHASIA AS LATE EFFECT OF CEREBROVASCULAR ACCIDENT (CVA): ICD-10-CM

## 2024-02-01 DIAGNOSIS — R41.9 COGNITIVE COMPLAINTS: ICD-10-CM

## 2024-02-01 PROCEDURE — 3075F SYST BP GE 130 - 139MM HG: CPT | Performed by: NURSE PRACTITIONER

## 2024-02-01 PROCEDURE — 99203 OFFICE O/P NEW LOW 30 MIN: CPT | Performed by: NURSE PRACTITIONER

## 2024-02-01 PROCEDURE — 3079F DIAST BP 80-89 MM HG: CPT | Performed by: NURSE PRACTITIONER

## 2024-02-01 NOTE — PROGRESS NOTES
[facility]  [address], [place] [address]  Office:  [phone]  Fax: [phone]    Referring: [facility].     Chief Complaint   Patient presents with    Follow-up       HPI:  Mena Curran presents in evaluation for history of stroke.  She presented to [facility] ED in August 2023 for new onset right-sided weakness and dysarthria.  She woke with symptoms.  CTA showed an acute left M1 occlusion.  She was transferred to [facility] for possible thrombectomy.  On arrival to ED, /121.  She was evaluated by teleneurology and NIHSS was 5 for mild right-sided weakness, dysarthria and aphasia.  She was out of the therapeutic window for IV thrombolytics.  Repeat CT head showed acute left occipital lobe infarct with associated petechial hemorrhage.  CTP was attempted unsuccessfully.  She underwent emergent L MCA M1 thrombectomy and was noted to have worsening neurological deficits after the procedure.  CT head was repeated and it showed the left occipital infarct along with acute left hemispheric SAH versus contrast staining.  No prior history of stroke or TIA.  No history of hypertension, diabetes or hyperlipidemia.  She was smoking half a pack of cigarettes per day.  Normal hemoglobin A1c.  Total cholesterol 207, LDL-C 151.  MRI brain noted evolving LMCA territorial infarction; no hemorrhagic conversion; nonspecific focal encephalomalacia of the R temporal lobe and R parietal lobe which could be from prior insult/trauma; and paranasal sinusitis.  TTE noted LVEF 58%; LA mildly dilated; no shunt seen.  Neuro exams showed right hemiparesis, severe dysarthria, loss of sensation on right, and mild global aphasia.  She was neurologically improving.  She was on single antiplatelet therapy with aspirin 81 mg; DAPT held due to large stroke and hemorrhage.  She was also on atorvastatin 80 mg.  She was discharged to [facility] ARU where she had a length of stay of 16 days and was discharged on

## 2024-02-02 ENCOUNTER — HOSPITAL ENCOUNTER (OUTPATIENT)
Facility: HOSPITAL | Age: 64
Setting detail: RECURRING SERIES
Discharge: HOME | End: 2024-02-05
Payer: COMMERCIAL

## 2024-02-02 PROCEDURE — 97530 THERAPEUTIC ACTIVITIES: CPT

## 2024-02-02 PROCEDURE — 97112 NEUROMUSCULAR REEDUCATION: CPT

## 2024-02-02 PROCEDURE — 92507 TX SP LANG VOICE COMM INDIV: CPT

## 2024-02-02 PROCEDURE — 97110 THERAPEUTIC EXERCISES: CPT

## 2024-02-02 NOTE — PROGRESS NOTES
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 Her f/u appt with neuro will be after 6 mos. She reported difficulty with memory. Pt educ re: SLP role in targeting memory, however, she reported she would like to focus on her memory later. She reported that she was able to participate socially during dinner and expressed excitement for concert she will be going to [employer].     OBJECTIVE  Treatment provided includes:  Increase/Improve:  []  Voice Quality []  Cognitive Linguistic Skills []  Laryngeal/Pharyngeal Exercises   []  Vocal Loudness []  Reading Comprehension []  Swallowing Skills    []  Vocal Cord Function []  Auditory Comprehension []  Oral Motor Skills   []  Resonance []  Writing Skills [x]  Compensatory strategies    []  Speech Intelligibility [x]  Expressive Language []  Attention   []  Breath Support/Coord. []  Receptive language []  Memory   []  Articulation []  Safety Awareness [x] Pt educ   []  Fluency []  Word Retrieval []        Treatment Provided:  Speech-Language Treatment [64560]:    -word associations  -divergent naming  -convergent naming  -pt educ    Patient/Caregiver  Education: [x] Review HEP      HEP/Handouts given: Divergent/convergent reasoning worksheets    Pain Level (0-10 scale) post treatment: 0    ASSESSMENT     []   Improving appropriately and progressing toward goals  [x]   Improving slowly and progressing toward goals  []   Approximating

## 2024-02-02 NOTE — PROGRESS NOTES
OCCUPATIONAL THERAPY - DAILY TREATMENT NOTE    Patient Name: Mena Curran    Date: 2024    : 1960  Insurance: Payor: BETTY / Plan: [facility] [place] / Product Type: *No Product type* /        Ins Auth:  REYNOLD          Next PN Due By:                                   2024       Patient  verified Yes     Visit #   Current / Total 7 12   Time   In / Out 840 920   Total Treatment Time 40   Pain   In / Out 0 0   Subjective Functional Status/Changes: I worked hard on my homework     TREATMENT AREA =  Generalized weakness [R53.1]    OBJECTIVE        Therapeutic Procedures:  Tx Min Billable or 1:1 Min (if diff from Tx Min) Procedure, Rationale, Specifics   10  55262 Therapeutic Exercise (timed):  increase ROM, strength, coordination, and proprioception to  (see flow sheet as applicable)      Right - 4# tip pinch removal of 1/4\" pegs   Medium soft putty: L bar pulls and small cap turns     20  24734 Therapeutic Activity (timed):  use of dynamic activities replicating functional movements to increase ability to complete ADLs/IADLs independently (see flow sheet as applicable)    1/4\" pegs handful following pattern palm>digit      10  58860 Neuromuscular Re-Education (timed):  increase proprioception ROM, strength and muscle firing to improve functional use. (see flow sheet as applicable)    Weightbearing: large knob                             TOTAL 1:1 TREATMENT TIME:  (Total Time - Paraffin/Vaso/Fluido)     40               Billed concurrently with other treatments Patient Education:  Reviewed HEP.      Objective Information/Functional Measures/Assessment  Increased ability to perform letter formation with handwriting worksheet   Min drops   Increased fatigue with L bar pulls          Assessment/Plan:    Continue to perform weightbearing and work on pt right hand strength during next session.    PN DUE NEXT VISIT         []  See Progress Note/Re certification     Patient will continue to benefit from

## 2024-02-07 ENCOUNTER — HOSPITAL ENCOUNTER (OUTPATIENT)
Facility: HOSPITAL | Age: 64
Setting detail: RECURRING SERIES
Discharge: HOME | End: 2024-02-10
Payer: COMMERCIAL

## 2024-02-07 PROCEDURE — 97112 NEUROMUSCULAR REEDUCATION: CPT

## 2024-02-07 PROCEDURE — 97530 THERAPEUTIC ACTIVITIES: CPT

## 2024-02-07 PROCEDURE — 97110 THERAPEUTIC EXERCISES: CPT

## 2024-02-07 PROCEDURE — 92507 TX SP LANG VOICE COMM INDIV: CPT

## 2024-02-07 NOTE — PROGRESS NOTES
deficits  []   Not progressing toward goals and plan of care will be adjusted    Progress towards goals / Updated goals:  1) Pt will produce the target phoneme/phonemes in the initial, and final positions of CV, VC, and CVC with 80% accuracy given modA.  Current: Not targeted this date.      2) Pt will produce functional phrases and sentences with RITA/spoken in unison with the clinician with 80% accuracy given modA.   Current: Pt completed RITA steps for each sentence:  Step 5 (Response to a question): 1/3 of sentences  Step 4 (Immediate Repetition): 1/3 of sentences  Step 3 (Unison intoning with fading): 2/3 of sentences  Step 2 (Unison intoning): 3/3 of sentences.  -progressing/continue addressing     3) Pt will utilize speech intelligibility comp strategies (e.g., slowed rate, self-induced tactile cues) to produce sentences in response to simple wh-question (what’s your favorite holiday and why) with appropriate articulation at 80% accuracy given frequent modA.  Current: Pt utilized pacing/tapping to complete automatic speech tasks with 75% acc given min verbal cues. -progressing/continue addressing     4) Pt will participate in semantic networking and lexical exercises (e.g., divergent and convergent naming, Semantic Feature Analysis, opposites, synonyms, associations, sentence completion, etc) to increase word-finding and accurate semantic use with 80% accuracy given modA to increase communication efficiency, maintain safety, and participate socially in functional living environment.  Current: Pt completed word-finding exercise (association) with 83% acc ANKIT, increase to 100% acc given mod verbal cues. Pt with anomia x1 exacerbated by apraxia during exercise, cued to describe to increase fluency and word-finding. Observed to utilize tapping and gestures to increase efficiency during communication. -progressing/continue addressing.     5) Patient will answer simple ---> moderately complex yes/no questions and

## 2024-02-07 NOTE — PROGRESS NOTES
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 Pt is currently unable to work and lives at home with daughter and 2 grand kids who assist her when needed.   Comorbidities: Included but not limited to the following: right hemiparesis, dysarthria and dysphagia, hypertension, subarachnoid hemorrhage, dyslipidemia,anxiety and depression, history of tobacco use     Visits from Start of Care: 12    Missed Visits: 0    The Plan of Care and following information is based on the patient's current status:  Patient will be independent with HEP for right ROM/stretches/strengthening to increase ROM and strength for ADL/IADL tasks.   Status at Eval: Not initiated  Status at PN (1/10/24): progressing  Status at PN (24): goal met.      Patient will be independent in demonstrating and performing activity modification strategies to aid in success for work, self-care, meal preparation and home management tasks.   Status at Eval: Not initiated  Status at PN (1/10/24): progressing  Status at PN (24): pt reports using dycem at home, continue to address.       Patient will increase right hand  strength to 10# or greater to improve grasp on objects during ADL/IADL tasks.   Status at Eval: 15#  Status at PN (1/10/24): 24#, progressing  Current status (24): 15# gripper  Status at PN (24): 26#, goal met.      Patient will 
2/7/24   Right 33  28 29   Left 22             Assessment/Plan:   Patient with increased Right  strength (+2), increased 3pt pinch strength (+1), increased lateral pinch strength (+3), and maintained tip pinch strength (+0). Pt with maintained FM speed per 9-hole peg testing. Pt with increased abilities to print her first and last name.  Pt with increased left 3pt pinch strength (+1), increased lateral pinch strength (+2), and decreased tip pinch strength (-1). Pt is compliant with all HEPs and clinic tasks, pt would like to work towards increasing independence in IADLs such as baking.       Baking  Cakes  Cupcakes  Cookies         []  See Progress Note/Re certification     Patient will continue to benefit from skilled OT services to modify and progress therapeutic interventions, analyze and address functional mobility deficits, analyze and address ROM deficits, analyze and address strength deficits, analyze and address soft tissue restrictions, analyze and cue for proper movement patterns, analyze and modify for postural abnormalities, and instruct in home and community integration  to attain remaining goals.   Progress toward goals / Updated goals:  Short Term Goals: To be accomplished in 2 weeks  Patient will be independent with HEP for right ROM/stretches/strengthening to increase ROM and strength for ADL/IADL tasks.   Status at Eval: Not initiated  Status at PN (1/10/24): progressing  Status at PN (2/7/24): goal met.      Patient will be independent in demonstrating and performing activity modification strategies to aid in success for work, self-care, meal preparation and home management tasks.   Status at Eval: Not initiated  Status at PN (1/10/24): progressing  Status at PN (2/7/24): pt reports using dycem at home, continue to address.       Patient will increase right hand  strength to 10# or greater to improve grasp on objects during ADL/IADL tasks.   Status at Eval: 15#  Status at PN (1/10/24):

## 2024-02-09 ENCOUNTER — HOSPITAL ENCOUNTER (OUTPATIENT)
Facility: HOSPITAL | Age: 64
Setting detail: RECURRING SERIES
Discharge: HOME | End: 2024-02-12
Payer: COMMERCIAL

## 2024-02-09 ENCOUNTER — APPOINTMENT (OUTPATIENT)
Facility: HOSPITAL | Age: 64
End: 2024-02-09
Payer: COMMERCIAL

## 2024-02-09 PROCEDURE — 92507 TX SP LANG VOICE COMM INDIV: CPT

## 2024-02-09 NOTE — PROGRESS NOTES
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     OBJECTIVE  Treatment provided includes:  Increase/Improve:  []  Voice Quality []  Cognitive Linguistic Skills []  Laryngeal/Pharyngeal Exercises   []  Vocal Loudness []  Reading Comprehension []  Swallowing Skills    []  Vocal Cord Function []  Auditory Comprehension []  Oral Motor Skills   []  Resonance []  Writing Skills [x]  Compensatory strategies    [x]  Speech Intelligibility [x]  Expressive Language []  Attention   [x]  Breath Support/Coord. [x]  Receptive language []  Memory   [x]  Articulation []  Safety Awareness [x] Pt educ   []  Fluency []  Word Retrieval []        Treatment Provided:  Speech-Language Treatment [22266]:   -divergent naming  -convergent naming  -word-finding reasoning  -yes/no questions  -pt educ    Patient/Caregiver  Education: [x] Review HEP      HEP/Handouts given: Divergent/convergent naming worksheet    Pain Level (0-10 scale) post treatment: 0    ASSESSMENT     []   Improving appropriately and progressing toward goals  [x]   Improving slowly and progressing toward goals  []   Approximating goals/maximum potential  [x]   Continues to benefit from skilled therapy to address remaining functional deficits  []   Not progressing toward goals and plan of care will be adjusted      Progress towards goals / Updated 
continues to be medically necessary.     RECOMMENDATIONS:  [x]Continue therapy per initial plan/protocol at a frequency of  2 x per week for 4/12 weeks  []Continue therapy with the following recommended changes:  []Discontinue therapy progressing towards or have reached established goals  []Discontinue therapy due to lack of appreciable progress towards goals  []Discontinue therapy due to lack of attendance or compliance  []Await Physician's recommendations/decisions regarding therapy  []Other:    Certification Period 12/15/23 to 3/8/24        Thank you for this referral.   Jeny Rodriguez M.A., CCC-SLP  Speech Language Pathologist       2/7/2024     8:44 AM        Physician's Signature:____________Date:_________TIME:________     ** Signature, Date and Time must be completed for valid certification **

## 2024-02-14 ENCOUNTER — HOSPITAL ENCOUNTER (OUTPATIENT)
Facility: HOSPITAL | Age: 64
Setting detail: RECURRING SERIES
Discharge: HOME | End: 2024-02-17
Payer: COMMERCIAL

## 2024-02-14 PROCEDURE — 97110 THERAPEUTIC EXERCISES: CPT

## 2024-02-14 PROCEDURE — 97112 NEUROMUSCULAR REEDUCATION: CPT

## 2024-02-14 PROCEDURE — 97530 THERAPEUTIC ACTIVITIES: CPT

## 2024-02-14 NOTE — PROGRESS NOTES
Patient will continue to benefit from skilled OT services to modify and progress therapeutic interventions, analyze and address functional mobility deficits, analyze and address ROM deficits, analyze and address strength deficits, analyze and address soft tissue restrictions, analyze and cue for proper movement patterns, analyze and modify for postural abnormalities, and instruct in home and community integration  to attain remaining goals.   Progress toward goals / Updated goals:  Patient will be independent in demonstrating and performing activity modification strategies to aid in success for work, self-care, meal preparation and home management tasks.   Status at Eval: Not initiated  Status at PN (1/10/24): progressing  Status at PN (2/7/24): pt reports using dycem at home, continue to address.        Patient will increase  Bilateral hand 3pt, lateral, and tip pinch strength by 3 pounds or greater to improve participation in meal preparation and home management tasks.  Status at Eval: right: 3pt= 3#; lateral= 3#; tip= 2#. Left 9pt= 3#; lateral= 5#; tip= 6#.  Status at PN (1/10/24): progressing  Status at PN (2/7/24): 3pt= 4#; lateral= 6#; tip= 3#. Left 9pt= 9#; lateral= 8#; tip= 7#;  Goal met for bilateral lateral pinches. Goal not met for bilateral tip or 3pt pinch.      3.    Pt will show a 8 second decrease in 9-hole peg test with the Right hand, demonstrating increased coordination for buttoning a shirt.  Status at Eval: 33 seconds  Status at PN (1/10/24): 28, progressing  Status at PN (2/7/24): 28 seconds     4.  Patient will increase right hand  strength to 30# or greater to improve grasp on objects during ADL/IADL tasks.   Status at PN (2/7/24): 26#      5.   Patient will be able to complete a baking recipe of 10 steps or less with no more than min A, demonstrating increased independence and return to PLOF.  Status at PN (2/7/24): pt reports she has not tried completing this hobby since her CVA

## 2024-02-16 ENCOUNTER — HOSPITAL ENCOUNTER (OUTPATIENT)
Facility: HOSPITAL | Age: 64
Setting detail: RECURRING SERIES
Discharge: HOME | End: 2024-02-19
Payer: COMMERCIAL

## 2024-02-16 PROCEDURE — 97110 THERAPEUTIC EXERCISES: CPT

## 2024-02-16 PROCEDURE — 97530 THERAPEUTIC ACTIVITIES: CPT

## 2024-02-16 NOTE — PROGRESS NOTES
OCCUPATIONAL THERAPY - DAILY TREATMENT NOTE    Patient Name: Mena Curran    Date: 2024    : 1960  Insurance: Payor: BETTY / Plan: [facility] / Product Type: *No Product type* /        Ins Auth:  REYNOLD          Next PN Due By:                     3/6/24       Patient  verified Yes     Visit #   Current / Total 2 12   Time   In / Out 1240 120   Total Treatment Time 40   Pain   In / Out 0 0   Subjective Functional Status/Changes: When I left the other day that man was still smoking outside.      TREATMENT AREA =  Generalized weakness [R53.1]    OBJECTIVE        Therapeutic Procedures:  Tx Min Billable or 1:1 Min (if diff from Tx Min) Procedure, Rationale, Specifics   10  43582 Therapeutic Exercise (timed):  increase ROM, strength, coordination, and proprioception to  (see flow sheet as applicable)  Removal of perfection pieces 6# clip 3pt pinch    30  91349 Therapeutic Activity (timed):  use of dynamic activities replicating functional movements to increase ability to complete ADLs/IADLs independently (see flow sheet as applicable)    Parque piees following pattern opposition to place  Perfection sets of 3 palm>digit                                  TOTAL 1:1 TREATMENT TIME:  (Total Time - Paraffin/Vaso/Fluido)     40               Billed concurrently with other treatments Patient Education: provided upgraded putty      Objective Information/Functional Measures/Assessment    Unable to tolerate upgraded clip with 3pt pinch       Assessment/Plan:           []  See Progress Note/Re certification     Patient will continue to benefit from skilled OT services to modify and progress therapeutic interventions, analyze and address functional mobility deficits, analyze and address ROM deficits, analyze and address strength deficits, analyze and address soft tissue restrictions, analyze and cue for proper movement patterns, analyze and modify for postural abnormalities, and instruct in home and

## 2024-02-21 ENCOUNTER — HOSPITAL ENCOUNTER (OUTPATIENT)
Facility: HOSPITAL | Age: 64
Setting detail: RECURRING SERIES
Discharge: HOME | End: 2024-02-24
Payer: COMMERCIAL

## 2024-02-21 PROCEDURE — 92507 TX SP LANG VOICE COMM INDIV: CPT

## 2024-02-21 PROCEDURE — 97110 THERAPEUTIC EXERCISES: CPT

## 2024-02-21 PROCEDURE — 97530 THERAPEUTIC ACTIVITIES: CPT

## 2024-02-21 NOTE — PROGRESS NOTES
OCCUPATIONAL THERAPY - DAILY TREATMENT NOTE    Patient Name: Mena Curran    Date: 2024    : 1960  Insurance: Payor: BETTY / Plan: [facility] / Product Type: *No Product type* /        Ins Auth:  REYNOLD          Next PN Due By:                     3/6/24       Patient  verified Yes     Visit #   Current / Total 3 12   Time   In / Out 320 353   Total Treatment Time 33   Pain   In / Out 0 0   Subjective Functional Status/Changes: Just have the sniffles today  I am more tired in the afternoon   I do better in the morning     TREATMENT AREA =  Generalized weakness [R53.1]    OBJECTIVE        Therapeutic Procedures:  Tx Min Billable or 1:1 Min (if diff from Tx Min) Procedure, Rationale, Specifics   15  88117 Therapeutic Exercise (timed):  increase ROM, strength, coordination, and proprioception to  (see flow sheet as applicable)    Removal of grooved pegs with 6# clip 3pt pinch right x12; 8# 3pt pinch with left x13  Thumb claw with pom poms     18  58758 Therapeutic Activity (timed):  use of dynamic activities replicating functional movements to increase ability to complete ADLs/IADLs independently (see flow sheet as applicable)  Handwriting   Grooved pegs sets of 3 palm>digit  Large massager to right UE                                TOTAL 1:1 TREATMENT TIME:  (Total Time - Paraffin/Vaso/Fluido)    33               Billed concurrently with other treatments Patient Education: provided upgraded putty      Objective Information/Functional Measures/Assessment  Session ended early d/t pt fatigue       Assessment/Plan:           []  See Progress Note/Re certification     Patient will continue to benefit from skilled OT services to modify and progress therapeutic interventions, analyze and address functional mobility deficits, analyze and address ROM deficits, analyze and address strength deficits, analyze and address soft tissue restrictions, analyze and cue for proper movement patterns, analyze and

## 2024-02-21 NOTE — PROGRESS NOTES
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     OBJECTIVE  Treatment provided includes:  Increase/Improve:  []  Voice Quality []  Cognitive Linguistic Skills []  Laryngeal/Pharyngeal Exercises   []  Vocal Loudness []  Reading Comprehension []  Swallowing Skills    []  Vocal Cord Function []  Auditory Comprehension []  Oral Motor Skills   []  Resonance [x]  Writing Skills [x]  Compensatory strategies    []  Speech Intelligibility [x]  Expressive Language []  Attention   []  Breath Support/Coord. [x]  Receptive language []  Memory   []  Articulation []  Safety Awareness [x] Pt educ   []  Fluency []  Word Retrieval []        Treatment Provided:  Speech-Language Treatment [54576]:   -pt educ  -spelling  -writing  -sentence completion    Patient/Caregiver  Education: [x] Review HEP      HEP/Handouts given: Sentence completion writing exercise    Pain Level (0-10 scale) post treatment: 0    ASSESSMENT     []   Improving appropriately and progressing toward goals  [x]   Improving slowly and progressing toward goals  []   Approximating goals/maximum potential  [x]   Continues to benefit from skilled therapy to address remaining functional deficits  []   Not progressing toward goals and plan of care will be adjusted      Progress towards goals / Updated goals:  1) Pt will produce the target phoneme/phonemes in the initial, and final positions of CV, VC, and CVC with 80% accuracy

## 2024-02-23 ENCOUNTER — HOSPITAL ENCOUNTER (OUTPATIENT)
Facility: HOSPITAL | Age: 64
Setting detail: RECURRING SERIES
Discharge: HOME | End: 2024-02-26
Payer: COMMERCIAL

## 2024-02-23 PROCEDURE — 97110 THERAPEUTIC EXERCISES: CPT

## 2024-02-23 PROCEDURE — 97530 THERAPEUTIC ACTIVITIES: CPT

## 2024-02-23 PROCEDURE — 92507 TX SP LANG VOICE COMM INDIV: CPT

## 2024-02-23 NOTE — PROGRESS NOTES
of CV, VC, and CVC with 80% accuracy given modA.  Current: Not targeted this date.      2) Pt will produce functional phrases and sentences with RITA/spoken in unison with the clinician with 80% accuracy given modA.   Current: Not targeted this date.      3) Pt will utilize speech intelligibility comp strategies (e.g., slowed rate, self-induced tactile cues) to produce sentences in response to simple wh-question (what’s your favorite holiday and why) with appropriate articulation at 80% accuracy given frequent modA.  Current: Utilized pacing/tapping during phrase production in 1/2 opportunities, increased to 2/2 opportunities given max verbal and visual cues. She was fluently produced spontaneous sentence during conversation x2 throughout the session. -progressing/continue addressing.      4) Pt will participate in semantic networking and lexical exercises (e.g., divergent and convergent naming, Semantic Feature Analysis, opposites, synonyms, associations, sentence completion, etc) to increase word-finding and accurate semantic use with 80% accuracy given modA to increase communication efficiency, maintain safety, and participate socially in functional living environment.  Current: Sentence completion: 42% acc ANKIT, increase to 100% acc given max verbal cues. -progressing/continue addressing.      5) Patient will answer simple ---> moderately complex yes/no questions and wh-questions regarding verbally presented/written novel information/2-3-sentence paragraphs with 80% acc given modA.  Current: Moderately complex yes/no questions re: verbally presented 2-3 sentence paragraphs with visual stimuli with 90% acc given min verbal cues. -progressing/continue addressing     6) Pt will recite automatic speech tasks/serial sequences with 80% acc when provided with mod cues to increase verbal expression.  Current: Days of the week:7/7 days ANKIT  Months of the year: 4/12 months of the year ANKIT; increase to 6/12 months given max

## 2024-02-23 NOTE — PROGRESS NOTES
OCCUPATIONAL THERAPY - DAILY TREATMENT NOTE    Patient Name: Mena Curran    Date: 2024    : 1960  Insurance: Payor: BETTY / Plan: [facility] / Product Type: *No Product type* /        Ins Auth:  REYNOLD          Next PN Due By:                     3/6/24       Patient  verified Yes     Visit #   Current / Total 4 12   Time   In / Out 1120 1158   Total Treatment Time 38   Pain   In / Out 0 0   Subjective Functional Status/Changes: I am going out for chicken wings this weekend     TREATMENT AREA =  Generalized weakness [R53.1]    OBJECTIVE      Therapeutic Procedures:  Tx Min Billable or 1:1 Min (if diff from Tx Min) Procedure, Rationale, Specifics   24  15510 Therapeutic Exercise (timed):  increase ROM, strength, coordination, and proprioception to  (see flow sheet as applicable)    Green Therabar: 3 way: 10x  8#- Graded Clothes pins- Jurgen 3pt   Med Soft Theraputty: Manipulated with Jurgen Hands to reveal and remove small Pegs      14  16149 Therapeutic Activity (timed):  use of dynamic activities replicating functional movements to improve functional use  (see flow sheet as applicable)    Grooved Pegs: Jurgen hand: translated palm to digit for placement and removal, sets of 3                                TOTAL 1:1 TREATMENT TIME:  (Total Time - Paraffin/Vaso/Fluido)        38           Billed concurrently with other treatments Patient Education: provided upgraded putty      Objective Information/Functional Measures/Assessment    Fatigue with therabar       Assessment/Plan:    Strength improving          []  See Progress Note/Re certification     Patient will continue to benefit from skilled OT services to modify and progress therapeutic interventions, analyze and address functional mobility deficits, analyze and address ROM deficits, analyze and address strength deficits, analyze and address soft tissue restrictions, analyze and cue for proper movement patterns, analyze and modify for postural

## 2024-02-28 ENCOUNTER — HOSPITAL ENCOUNTER (OUTPATIENT)
Facility: HOSPITAL | Age: 64
Setting detail: RECURRING SERIES
Discharge: HOME | End: 2024-03-02
Payer: COMMERCIAL

## 2024-02-28 PROCEDURE — 92507 TX SP LANG VOICE COMM INDIV: CPT

## 2024-02-28 PROCEDURE — 97530 THERAPEUTIC ACTIVITIES: CPT

## 2024-02-28 PROCEDURE — 97110 THERAPEUTIC EXERCISES: CPT

## 2024-02-28 PROCEDURE — 97112 NEUROMUSCULAR REEDUCATION: CPT

## 2024-02-28 NOTE — PROGRESS NOTES
OCCUPATIONAL THERAPY - DAILY TREATMENT NOTE    Patient Name: Mena Curran    Date: 2024    : 1960  Insurance: Payor: BETTY / Plan: [facility] / Product Type: *No Product type* /        Ins Auth:  REYNOLD          Next PN Due By:                     3/6/24       Patient  verified Yes     Visit #   Current / Total 5 12   Time   In / Out 140 200   Total Treatment Time 40   Pain   In / Out 0 0   Subjective Functional Status/Changes: I went to the [employer] for my birthday  That was fun!     TREATMENT AREA =  Generalized weakness [R53.1]    OBJECTIVE      Therapeutic Procedures:  Tx Min Billable or 1:1 Min (if diff from Tx Min) Procedure, Rationale, Specifics   15  56868 Therapeutic Exercise (timed):  increase ROM, strength, coordination, and proprioception to  (see flow sheet as applicable)    UBE level 1.5 4 mins forward, 4 mins backwards  Rebounder 20 throws each hand     8    16070 Neuromuscular Re-Education (timed):  increase proprioception ROM, strength and muscle firing to improve functional use. (see flow sheet as applicable)        Large massager to right UE   17  97664 Therapeutic Activity (timed):  use of dynamic activities replicating functional movements to improve functional use  (see flow sheet as applicable)    1/4\" pegs tip pinch to place following pattern  Sewing task                              TOTAL 1:1 TREATMENT TIME:  (Total Time - Paraffin/Vaso/Fluido)        40           Billed concurrently with other treatments Patient Education: provided upgraded putty      Objective Information/Functional Measures/Assessment    Continue with Ube for activity tolerance       Assessment/Plan:             []  See Progress Note/Re certification     Patient will continue to benefit from skilled OT services to modify and progress therapeutic interventions, analyze and address functional mobility deficits, analyze and address ROM deficits, analyze and address strength deficits, analyze and

## 2024-02-28 NOTE — PROGRESS NOTES
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     OBJECTIVE  Treatment provided includes:  Increase/Improve:  []  Voice Quality []  Cognitive Linguistic Skills []  Laryngeal/Pharyngeal Exercises   []  Vocal Loudness []  Reading Comprehension []  Swallowing Skills    []  Vocal Cord Function []  Auditory Comprehension []  Oral Motor Skills   []  Resonance []  Writing Skills [x]  Compensatory strategies    [x]  Speech Intelligibility []  Expressive Language []  Attention   []  Breath Support/Coord. []  Receptive language []  Memory   [x]  Articulation []  Safety Awareness [x] Pt educ   [x]  Fluency []  Word Retrieval []        Treatment Provided:  Speech-Language Treatment [88815]:     -writing  -sentence completion  -speech intelligibility comp strategies  -Sound production treatment    Patient/Caregiver  Education: [x] Review HEP      HEP/Handouts given: Review HEP--SPT bilabial VC and CV words.     Pain Level (0-10 scale) post treatment: 0    ASSESSMENT   []   Improving appropriately and progressing toward goals  [x]   Improving slowly and progressing toward goals  []   Approximating goals/maximum potential  [x]   Continues to benefit from skilled therapy to address remaining functional deficits  []   Not progressing toward goals and plan of care will be adjusted      Progress towards goals / Updated goals:  1) Pt will produce the target phoneme/phonemes in the initial, and final positions of CV, VC, and CVC

## 2024-02-29 ENCOUNTER — OFFICE VISIT (OUTPATIENT)
Facility: CLINIC | Age: 64
End: 2024-02-29
Payer: COMMERCIAL

## 2024-02-29 VITALS
RESPIRATION RATE: 16 BRPM | WEIGHT: 139 LBS | BODY MASS INDEX: 27.29 KG/M2 | TEMPERATURE: 98 F | OXYGEN SATURATION: 94 % | SYSTOLIC BLOOD PRESSURE: 123 MMHG | DIASTOLIC BLOOD PRESSURE: 67 MMHG | HEIGHT: 60 IN | HEART RATE: 61 BPM

## 2024-02-29 DIAGNOSIS — E78.2 MIXED HYPERLIPIDEMIA: ICD-10-CM

## 2024-02-29 DIAGNOSIS — F32.A ANXIETY AND DEPRESSION: ICD-10-CM

## 2024-02-29 DIAGNOSIS — I10 ESSENTIAL (PRIMARY) HYPERTENSION: ICD-10-CM

## 2024-02-29 DIAGNOSIS — F41.9 ANXIETY AND DEPRESSION: ICD-10-CM

## 2024-02-29 DIAGNOSIS — R41.89 COGNITIVE DEFICITS: ICD-10-CM

## 2024-02-29 DIAGNOSIS — Z72.0 TOBACCO USE: ICD-10-CM

## 2024-02-29 DIAGNOSIS — R47.01 APHASIA: ICD-10-CM

## 2024-02-29 DIAGNOSIS — I69.30 HISTORY OF CVA WITH RESIDUAL DEFICIT: Primary | ICD-10-CM

## 2024-02-29 DIAGNOSIS — Z12.11 SCREENING FOR COLON CANCER: ICD-10-CM

## 2024-02-29 PROCEDURE — 3078F DIAST BP <80 MM HG: CPT | Performed by: STUDENT IN AN ORGANIZED HEALTH CARE EDUCATION/TRAINING PROGRAM

## 2024-02-29 PROCEDURE — 3074F SYST BP LT 130 MM HG: CPT | Performed by: STUDENT IN AN ORGANIZED HEALTH CARE EDUCATION/TRAINING PROGRAM

## 2024-02-29 PROCEDURE — 99214 OFFICE O/P EST MOD 30 MIN: CPT | Performed by: STUDENT IN AN ORGANIZED HEALTH CARE EDUCATION/TRAINING PROGRAM

## 2024-02-29 RX ORDER — LOSARTAN POTASSIUM 25 MG/1
25 TABLET ORAL DAILY
Qty: 90 TABLET | Refills: 1 | Status: SHIPPED | OUTPATIENT
Start: 2024-02-29

## 2024-02-29 RX ORDER — ACETAMINOPHEN 325 MG/1
650 TABLET ORAL EVERY 4 HOURS PRN
Qty: 20 TABLET | Refills: 0 | Status: CANCELLED | OUTPATIENT
Start: 2024-02-29

## 2024-02-29 RX ORDER — ASPIRIN 81 MG/1
81 TABLET, CHEWABLE ORAL DAILY
Qty: 90 TABLET | Refills: 1 | Status: SHIPPED | OUTPATIENT
Start: 2024-02-29

## 2024-02-29 RX ORDER — LISINOPRIL 10 MG/1
10 TABLET ORAL DAILY
Qty: 90 TABLET | Refills: 1 | Status: CANCELLED | OUTPATIENT
Start: 2024-02-29

## 2024-02-29 RX ORDER — ATORVASTATIN CALCIUM 80 MG/1
80 TABLET, FILM COATED ORAL NIGHTLY
Qty: 90 TABLET | Refills: 1 | Status: SHIPPED | OUTPATIENT
Start: 2024-02-29

## 2024-02-29 SDOH — ECONOMIC STABILITY: FOOD INSECURITY: WITHIN THE PAST 12 MONTHS, THE FOOD YOU BOUGHT JUST DIDN'T LAST AND YOU DIDN'T HAVE MONEY TO GET MORE.: NEVER TRUE

## 2024-02-29 SDOH — ECONOMIC STABILITY: INCOME INSECURITY: HOW HARD IS IT FOR YOU TO PAY FOR THE VERY BASICS LIKE FOOD, HOUSING, MEDICAL CARE, AND HEATING?: NOT HARD AT ALL

## 2024-02-29 SDOH — ECONOMIC STABILITY: FOOD INSECURITY: WITHIN THE PAST 12 MONTHS, YOU WORRIED THAT YOUR FOOD WOULD RUN OUT BEFORE YOU GOT MONEY TO BUY MORE.: NEVER TRUE

## 2024-02-29 ASSESSMENT — ENCOUNTER SYMPTOMS
NAUSEA: 0
VOMITING: 0
RHINORRHEA: 0
BLOOD IN STOOL: 0
COUGH: 0
WHEEZING: 0
CHEST TIGHTNESS: 0
SHORTNESS OF BREATH: 0
BACK PAIN: 0
ABDOMINAL PAIN: 0
DIARRHEA: 0

## 2024-02-29 ASSESSMENT — PATIENT HEALTH QUESTIONNAIRE - PHQ9
5. POOR APPETITE OR OVEREATING: 0
8. MOVING OR SPEAKING SO SLOWLY THAT OTHER PEOPLE COULD HAVE NOTICED. OR THE OPPOSITE, BEING SO FIGETY OR RESTLESS THAT YOU HAVE BEEN MOVING AROUND A LOT MORE THAN USUAL: 0
SUM OF ALL RESPONSES TO PHQ QUESTIONS 1-9: 0
7. TROUBLE CONCENTRATING ON THINGS, SUCH AS READING THE NEWSPAPER OR WATCHING TELEVISION: 0
2. FEELING DOWN, DEPRESSED OR HOPELESS: 0
9. THOUGHTS THAT YOU WOULD BE BETTER OFF DEAD, OR OF HURTING YOURSELF: 0
4. FEELING TIRED OR HAVING LITTLE ENERGY: 0
3. TROUBLE FALLING OR STAYING ASLEEP: 0
SUM OF ALL RESPONSES TO PHQ QUESTIONS 1-9: 0
6. FEELING BAD ABOUT YOURSELF - OR THAT YOU ARE A FAILURE OR HAVE LET YOURSELF OR YOUR FAMILY DOWN: 0
SUM OF ALL RESPONSES TO PHQ QUESTIONS 1-9: 0
1. LITTLE INTEREST OR PLEASURE IN DOING THINGS: 0
SUM OF ALL RESPONSES TO PHQ9 QUESTIONS 1 & 2: 0
10. IF YOU CHECKED OFF ANY PROBLEMS, HOW DIFFICULT HAVE THESE PROBLEMS MADE IT FOR YOU TO DO YOUR WORK, TAKE CARE OF THINGS AT HOME, OR GET ALONG WITH OTHER PEOPLE: 0

## 2024-02-29 ASSESSMENT — ANXIETY QUESTIONNAIRES
1. FEELING NERVOUS, ANXIOUS, OR ON EDGE: 0
3. WORRYING TOO MUCH ABOUT DIFFERENT THINGS: 0
6. BECOMING EASILY ANNOYED OR IRRITABLE: 0
2. NOT BEING ABLE TO STOP OR CONTROL WORRYING: 0
4. TROUBLE RELAXING: 0
GAD7 TOTAL SCORE: 0
5. BEING SO RESTLESS THAT IT IS HARD TO SIT STILL: 0
IF YOU CHECKED OFF ANY PROBLEMS ON THIS QUESTIONNAIRE, HOW DIFFICULT HAVE THESE PROBLEMS MADE IT FOR YOU TO DO YOUR WORK, TAKE CARE OF THINGS AT HOME, OR GET ALONG WITH OTHER PEOPLE: NOT DIFFICULT AT ALL
7. FEELING AFRAID AS IF SOMETHING AWFUL MIGHT HAPPEN: 0

## 2024-02-29 NOTE — PROGRESS NOTES
\"Have you been to the ER, urgent care clinic since your last visit?  Hospitalized since your last visit?\"    NO    “Have you seen or consulted any other health care providers outside of [facility] since your last visit?”    NO    “Have you had a colorectal cancer screening such as a colonoscopy/FIT/Cologuard?    NO     Have you had a mammogram?”   NO       
Empaneled Provider      Assessment / Plan:    ICD-10-CM    1. History of CVA with residual deficit  I69.30 atorvastatin (LIPITOR) 80 MG tablet     aspirin 81 MG chewable tablet      2. Anxiety and depression  F41.9 sertraline (ZOLOFT) 50 MG tablet    F32.A       3. Essential (primary) hypertension  I10 losartan (COZAAR) 25 MG tablet      4. Mixed hyperlipidemia  E78.2 atorvastatin (LIPITOR) 80 MG tablet     Comprehensive Metabolic Panel     Lipid Panel      5. Screening for colon cancer  Z12.11 Cologuaalexsander (Fecal DNA Colorectal Cancer Screening)      6. Tobacco use  Z72.0       7. Cognitive deficits  R41.89       8. Aphasia  R47.01            CVA: Continue ASA and statin.  Patient is following with neurology and speech therapy.  She continues to have word finding difficulty, cognitive issues.    HTN: Controlled on lisinopril but experiencing a cough.  Will switch over to losartan    HLD: LDL was not at goal and dose of Lipitor was increased.  Check repeat lipid panel and CMP    Tobacco abuse: Patient has been occasionally smoking-last was 1 week ago.  Counseled on cessation    Anxiety/depression: Continue Zoloft.  Stable.    Patient is due for cancer screenings.  Mammogram pending.  Cynthia ordered    Advised to get COVID-19 booster and RSV vaccine        No follow-ups on file.     I asked the patient if she  had any questions and answered her  questions.  The patient stated that she understands the treatment plan and agrees with the treatment plan    This document was created with a voice activated dictation system and may contain transcription errors.

## 2024-03-01 ENCOUNTER — HOSPITAL ENCOUNTER (OUTPATIENT)
Facility: HOSPITAL | Age: 64
Setting detail: RECURRING SERIES
Discharge: HOME | End: 2024-03-04
Payer: COMMERCIAL

## 2024-03-01 PROCEDURE — 97110 THERAPEUTIC EXERCISES: CPT

## 2024-03-01 PROCEDURE — 92507 TX SP LANG VOICE COMM INDIV: CPT

## 2024-03-01 PROCEDURE — 97530 THERAPEUTIC ACTIVITIES: CPT

## 2024-03-01 NOTE — PROGRESS NOTES
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     OBJECTIVE  Treatment provided includes:  Increase/Improve:  []  Voice Quality []  Cognitive Linguistic Skills []  Laryngeal/Pharyngeal Exercises   []  Vocal Loudness []  Reading Comprehension []  Swallowing Skills    []  Vocal Cord Function []  Auditory Comprehension []  Oral Motor Skills   []  Resonance []  Writing Skills [x]  Compensatory strategies    []  Speech Intelligibility [x]  Expressive Language []  Attention   [x]  Breath Support/Coord. [x]  Receptive language []  Memory   [x]  Articulation []  Safety Awareness [x] Pt educ   [x]  Fluency []  Word Retrieval []        Treatment Provided:  Speech-Language Treatment [69685]:     -pt educ  -speech intelligibility comp strategies  -Melodic Intonation Therapy (RITA)    Patient/Caregiver  Education: [x] Review HEP      HEP/Handouts given: Review RITA HEP, Semantic Feature Analysis worksheets    Pain Level (0-10 scale) post treatment: 0    ASSESSMENT     []   Improving appropriately and progressing toward goals  [x]   Improving slowly and progressing toward goals  []   Approximating goals/maximum potential  [x]   Continues to benefit from skilled therapy to address remaining functional deficits  []   Not progressing toward goals and plan of care will be adjusted      Progress towards goals / Updated goals:  1) Pt will produce the target phoneme/phonemes in the initial, and final positions

## 2024-03-01 NOTE — PROGRESS NOTES
OCCUPATIONAL THERAPY - DAILY TREATMENT NOTE    Patient Name: Mena Curran    Date: 3/1/2024    : 1960  Insurance: Payor: BETTY / Plan: [facility] [place] [place] / Product Type: *No Product type* /        Ins Auth:  REYNOLD          Next PN Due By:                     3/6/24       Patient  verified Yes     Visit #   Current / Total 6 12   Time   In / Out 1240 110   Total Treatment Time 30   Pain   In / Out 0 0   Subjective Functional Status/Changes: My sister is taking me to the [facility] for a conference  I am celebrating 30 years     TREATMENT AREA =  Generalized weakness [R53.1]    OBJECTIVE      Therapeutic Procedures:  Tx Min Billable or 1:1 Min (if diff from Tx Min) Procedure, Rationale, Specifics   15  24466 Therapeutic Exercise (timed):  increase ROM, strength, coordination, and proprioception to  (see flow sheet as applicable)    UBE level 1.5 4 mins forward, 4 mins backwards  Band helper 3 bands, matching colors then stacking    15  38968 Therapeutic Activity (timed):  use of dynamic activities replicating functional movements to improve functional use  (see flow sheet as applicable)    Adaptive chop sticks and pom pom  Rubber bands following pattern                               TOTAL 1:1 TREATMENT TIME:  (Total Time - Paraffin/Vaso/Fluido)        30           Billed concurrently with other treatments Patient Education: provided upgraded putty      Objective Information/Functional Measures/Assessment    Continue with Ube for activity tolerance  Able to stack 6 blocks with band helps  Difficulty following rubber band pattern  Shortened session to allow pt to leave for conference for AA       Assessment/Plan:  Continue POC           []  See Progress Note/Re certification     Patient will continue to benefit from skilled OT services to modify and progress therapeutic interventions, analyze and address functional mobility deficits, analyze and address ROM deficits, analyze and address strength deficits,

## 2024-03-02 DIAGNOSIS — F32.A ANXIETY AND DEPRESSION: ICD-10-CM

## 2024-03-02 DIAGNOSIS — F41.9 ANXIETY AND DEPRESSION: ICD-10-CM

## 2024-03-02 DIAGNOSIS — I69.30 HISTORY OF CVA WITH RESIDUAL DEFICIT: ICD-10-CM

## 2024-03-02 DIAGNOSIS — E78.2 MIXED HYPERLIPIDEMIA: ICD-10-CM

## 2024-03-04 NOTE — PROGRESS NOTES
[facility]  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]  Aphasia following cerebral infarction [I69.320] Onset Date:2023        Prior Hospitalization: see medical history Provider#: [id number]   Medications: Verified on Patient Summary List    Comorbidities: Hx of CVA with R-sided weakness, COPD, WILTON, HTN, HLD, treated hepatitis C, OA s/p bilateral knee replacement, anxiety/depression     Prior Level of Function:All aspects of speech/language, cognition, voice, and swallowing WNLs, per pt report      Payor: BETTY / Plan: [facility] [place] / Product Type: *No Product type* /     Visits from Start of Care: 21    Missed Visits: 0    The Plan of Care and following information is based on the patient's current status:  Short-term Goals:  Goal: Pt will produce the target phoneme/phonemes in the initial, and final positions of CV, VC, and CVC with 80% accuracy given modA.   Status at last note/certification:not met; Current: Pt completed steps of SPT for bilabial CV words:  Step 1 (model + repetition): 2/2 words     Interdental/labiodental CV words:  Step 1 (model + repetition): 2/7 words  Step 2 (written cue, model + repetition): 1/7 words  Step 3 (\"watch me, listen to me, say it with me\"): 1/7 words  Step 4: (\"watch me, listen to me, say it with me\" + articulatory cue): 1/7 words  Current Status: not met; Pt completed steps of SPT for bilabial CV words:  Step 1 (model + repetition): 6/10 words  Step 2 (written cue, model + repetition): 0/10 words  Step 3 (\"watch me, listen to me, say it with me\"): 1/10 words  Step 4: (\"watch me,

## 2024-03-06 ENCOUNTER — HOSPITAL ENCOUNTER (OUTPATIENT)
Facility: HOSPITAL | Age: 64
Setting detail: RECURRING SERIES
Discharge: HOME | End: 2024-03-09
Payer: COMMERCIAL

## 2024-03-06 PROCEDURE — 97530 THERAPEUTIC ACTIVITIES: CPT

## 2024-03-06 PROCEDURE — 92507 TX SP LANG VOICE COMM INDIV: CPT

## 2024-03-06 PROCEDURE — 97110 THERAPEUTIC EXERCISES: CPT

## 2024-03-06 RX ORDER — TRAZODONE HYDROCHLORIDE 50 MG/1
50 TABLET ORAL NIGHTLY
Qty: 90 TABLET | Refills: 1 | OUTPATIENT
Start: 2024-03-06

## 2024-03-06 RX ORDER — ATORVASTATIN CALCIUM 40 MG/1
40 TABLET, FILM COATED ORAL NIGHTLY
Qty: 90 TABLET | Refills: 1 | OUTPATIENT
Start: 2024-03-06

## 2024-03-06 NOTE — PROGRESS NOTES
(1/10/24): 28, progressing  Status at PN (2/7/24): 28 seconds  Status at PN (3/6/2024): 25 seconds     4.  Patient will increase right hand  strength to 30# or greater to improve grasp on objects during ADL/IADL tasks.   Status at PN (2/7/24): 26#   Status at PN (3/6/2024):  22#     5.   Patient will be able to complete a baking recipe of 10 steps or less with no more than min A, demonstrating increased independence and return to PLOF.  Status at PN (2/7/24): pt reports she has not tried completing this hobby since her CVA  Status at PN (3/6/2024): Difficulty with direction following     6.   Pt will be able to print her first name, last name, address, phone number, and today's date no more than 1 error in letter/number formation to be able to fill out paperwork.   Status at PN (2/7/24): pt with difficulty with number formation   Status at PN (3/6/2024): Progressing, continue to address          PLAN  [x]  Continue Plan of Care  []  Upgrade activities as tolerated    []  Discharge due to :    []  Other:      Therapist: AREN MÉNDEZ COTA/L    Date: 3/6/2024 Time: 9:52 AM     Future Appointments   Date Time Provider Department Center   3/6/2024  2:00 PM Jeny Rodriguez SLP [facility]   3/8/2024 11:20 AM Celeste Ortiz, OT [facility]   3/8/2024 12:00 PM Jeny Rodriguez SLP [facility]   3/13/2024  2:00 PM Erica Willams SLP [facility]   3/13/2024  2:40 PM Celeste Ortiz, OT [facility]   3/15/2024 10:40 AM Jeny Rodriguez SLP [facility]   3/15/2024 11:20 AM Celeste Ortiz, OT [facility]   7/9/2024  9:20 AM Catrina Hardin MD [facility]   8/14/2024  1:00 PM Aidee Booth APRN - NP [facility]

## 2024-03-06 NOTE — PROGRESS NOTES
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 Pt reported that others were commenting that she sounded well.    OBJECTIVE  Treatment provided includes:  Increase/Improve:  []  Voice Quality []  Cognitive Linguistic Skills []  Laryngeal/Pharyngeal Exercises   []  Vocal Loudness [x]  Reading Comprehension []  Swallowing Skills    []  Vocal Cord Function [x]  Auditory Comprehension []  Oral Motor Skills   []  Resonance []  Writing Skills []  Compensatory strategies    []  Speech Intelligibility [x]  Expressive Language []  Attention   []  Breath Support/Coord. [x]  Receptive language []  Memory   []  Articulation []  Safety Awareness [x] Pt educ   []  Fluency []  Word Retrieval []        Treatment Provided:  Speech-Language Treatment [58313]:   -QAB re-assessment    Patient/Caregiver  Education: [x] Review HEP      HEP/Handouts given: Review HEP, Semantic Feature Analysis    Pain Level (0-10 scale) post treatment: 0    ASSESSMENT   The Quick Aphasia Battery (QAB) was re-administered to reassess pt's expressive and receptive language skills and identify presence of aphasia. The QAB is made up of eight subtests, each comprising sets of items that probe different language domains, vary in difficulty, and are scored with a graded system to maximize the informativeness of each item. The following are the pt's scores:      Subtest Score Severity   Word Comprehension 10

## 2024-03-06 NOTE — PROGRESS NOTES
9-hole peg test with the Right hand, demonstrating increased coordination for buttoning a shirt.  Status at PN (3/6/2024): 25 seconds      4.  Patient will increase right hand  strength to 30# or greater to improve grasp on objects during ADL/IADL tasks.   Status at PN (3/6/2024):  22#     5.   Patient will be able to complete a baking recipe of 10 steps or less with no more than min A, demonstrating increased independence and return to PLOF.  Status at PN (3/6/2024): Difficulty with direction following     6.   Pt will be able to print her first name, last name, address, phone number, and today's date no more than 1 error in letter/number formation to be able to fill out paperwork.    Status at PN (3/6/2024): Progressing, continue to address     Frequency / Duration:   Patient to be seen   2   times per week for   4-6    WEEKS    RECOMMENDATIONS  We would like to continue therapy for progress to goals stated above.  Continue per initial Plan of Care.    If you have any questions/comments please contact us directly.  Thank you for allowing us to assist in the care of your patient.    AREN MÉNDEZ COTA/L      3/6/2024       9:56 AM

## 2024-03-08 ENCOUNTER — HOSPITAL ENCOUNTER (OUTPATIENT)
Facility: HOSPITAL | Age: 64
Setting detail: RECURRING SERIES
Discharge: HOME | End: 2024-03-11
Payer: COMMERCIAL

## 2024-03-08 PROCEDURE — 97110 THERAPEUTIC EXERCISES: CPT

## 2024-03-08 PROCEDURE — 92507 TX SP LANG VOICE COMM INDIV: CPT

## 2024-03-08 PROCEDURE — 97530 THERAPEUTIC ACTIVITIES: CPT

## 2024-03-08 NOTE — PROGRESS NOTES
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     OBJECTIVE  Treatment provided includes:  Increase/Improve:  []  Voice Quality []  Cognitive Linguistic Skills []  Laryngeal/Pharyngeal Exercises   []  Vocal Loudness []  Reading Comprehension []  Swallowing Skills    []  Vocal Cord Function []  Auditory Comprehension []  Oral Motor Skills   []  Resonance []  Writing Skills []  Compensatory strategies    []  Speech Intelligibility []  Expressive Language []  Attention   []  Breath Support/Coord. []  Receptive language []  Memory   []  Articulation []  Safety Awareness [x] Pt educ   []  Fluency []  Word Retrieval []        Treatment Provided:  -DAWSON-2 re-administration    Patient/Caregiver  Education: [x] Review HEP      HEP/Handouts given: Review HEP    Pain Level (0-10 scale) post treatment: 0    ASSESSMENT   SLP conducted the Apraxia Battery for Adults to measure the presence and severity of apraxia in adolescents and adults.       Subtest Raw Score Level of Impairment   1 Diadochokinetic Rate 20 MILD   2A Increasing Word Length 13 SEVERE   2B Increasing Word Length 13 SEVERE   3A Limb Apraxia 50 WNL   3B Oral Apraxia 46 WNL   4 Utterance time for Polysyllabic Words 72 SEVERE   5 Repeated trials 14 MODERATE   6 Inventory of Articulation Not administered d/t time constraints.   Characteristic of Apraxia        Impression: Pt continues to demo mod-severe apraxia of speech. Though pt overall with

## 2024-03-08 NOTE — PROGRESS NOTES
7/9/2024  9:20 AM Catrina Hardin MD [facility]   8/14/2024  1:00 PM [facility]

## 2024-03-13 ENCOUNTER — HOSPITAL ENCOUNTER (OUTPATIENT)
Facility: HOSPITAL | Age: 64
Setting detail: RECURRING SERIES
Discharge: HOME | End: 2024-03-16
Payer: COMMERCIAL

## 2024-03-13 PROCEDURE — 97530 THERAPEUTIC ACTIVITIES: CPT

## 2024-03-13 PROCEDURE — 92507 TX SP LANG VOICE COMM INDIV: CPT

## 2024-03-13 PROCEDURE — 97110 THERAPEUTIC EXERCISES: CPT

## 2024-03-13 NOTE — PROGRESS NOTES
OCCUPATIONAL THERAPY - DAILY TREATMENT NOTE    Patient Name: Mena Curran    Date: 3/13/2024    : 1960  Insurance: Payor: BETTY / Plan: [facility] [place] / Product Type: *No Product type* /        Ins Auth:  REYNOLD          Next PN Due By:                            Patient  verified Yes     Visit #   Current / Total 1 12   Time   In / Out 240 318   Total Treatment Time 38   Pain   In / Out 0 0   Subjective Functional Status/Changes: Lets do the bar and get it over with      TREATMENT AREA =  Generalized weakness [R53.1]    OBJECTIVE    Therapeutic Procedures:  Tx Min Billable or 1:1 Min (if diff from Tx Min) Procedure, Rationale, Specifics   25  55068 Therapeutic Exercise (timed):  increase ROM, strength, coordination, and proprioception to  (see flow sheet as applicable)  Medium putty right only  Therabar green x15 each way     13  65418 Therapeutic Activity (timed):  use of dynamic activities replicating functional movements to improve functional use (see flow sheet as applicable)  Parquetry: following pattern tip pinch opposition to remove  Origami - bird                               TOTAL 1:1 TREATMENT TIME:  (Total Time - Paraffin/Vaso/Fluido)     38               Billed concurrently with other treatments Patient Education: provided upgraded putty      Objective Information/Functional Measures/Assessment  Pt able to self-correct mistakes with pattern  Slow pace with increased putty resistance, however able to tolerate       Assessment/Plan:           [x]  See Progress Note/Re certification     Patient will continue to benefit from skilled OT services to modify and progress therapeutic interventions, analyze and address functional mobility deficits, analyze and address ROM deficits, analyze and address strength deficits, analyze and address soft tissue restrictions, analyze and cue for proper movement patterns, analyze and modify for postural abnormalities, and instruct in home and community

## 2024-03-13 NOTE — PROGRESS NOTES
Celeste Ortiz, OT [facility]   3/15/2024 10:40 AM Jeny Rodriguez, MEHDI [facility]   3/15/2024 11:20 AM Celeste Ortiz, OT [facility]   7/9/2024  9:20 AM Catrina Hardin MD [facility]   8/14/2024  1:00 PM Aidee Booth APRN - NP [facility]

## 2024-03-15 ENCOUNTER — HOSPITAL ENCOUNTER (OUTPATIENT)
Facility: HOSPITAL | Age: 64
Setting detail: RECURRING SERIES
Discharge: HOME | End: 2024-03-18
Payer: COMMERCIAL

## 2024-03-15 PROCEDURE — 97112 NEUROMUSCULAR REEDUCATION: CPT

## 2024-03-15 PROCEDURE — 97110 THERAPEUTIC EXERCISES: CPT

## 2024-03-15 PROCEDURE — 97530 THERAPEUTIC ACTIVITIES: CPT

## 2024-03-15 PROCEDURE — 92507 TX SP LANG VOICE COMM INDIV: CPT

## 2024-03-15 NOTE — PROGRESS NOTES
OCCUPATIONAL THERAPY - DAILY TREATMENT NOTE    Patient Name: Mena Curran    Date: 3/15/2024    : 1960  Insurance: Payor: BETTY / Plan: [facility] [place] / Product Type: *No Product type* /        Ins Auth:  REYNOLD          Next PN Due By:                            Patient  verified Yes     Visit #   Current / Total 2 12   Time   In / Out 1120 1200   Total Treatment Time 40   Pain   In / Out 0 0   Subjective Functional Status/Changes: I am going to get outside while it is nice out this wekeend     TREATMENT AREA =  Generalized weakness [R53.1]    OBJECTIVE    Therapeutic Procedures:  Tx Min Billable or 1:1 Min (if diff from Tx Min) Procedure, Rationale, Specifics   17  97669 Therapeutic Exercise (timed):  increase ROM, strength, coordination, and proprioception to  (see flow sheet as applicable)    UBE level 2.5 4 mins forward 4 mins backwards  Removing cylindrical pegs with 25# gripper right only  Wrist well with 2# at the end, up/down x1 seated     15  59407 Therapeutic Activity (timed):  use of dynamic activities replicating functional movements to improve functional use (see flow sheet as applicable)    Cylindrical pegs, sorting and matching correct size; in hand manipulation, with palm facing down right only     8  80618 Neuromuscular Re-Education (timed):  increase proprioception ROM, strength and muscle firing to improve functional use. (see flow sheet as applicable)    Mini massager ot right UE   Sensory re-education information handout provided                         TOTAL 1:1 TREATMENT TIME:  (Total Time - Paraffin/Vaso/Fluido)  40               Billed concurrently with other treatments Patient Education: provided upgraded putty      Objective Information/Functional Measures/Assessment  Pt tolerated upgraded UBE resistance  3 verbal cues for technique for in hand manipulation with cylindrical pegs  Slight difficulty with grasping large gripper with 25# resistance

## 2024-03-15 NOTE — PROGRESS NOTES
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     OBJECTIVE  Treatment provided includes:  Increase/Improve:  []  Voice Quality []  Cognitive Linguistic Skills []  Laryngeal/Pharyngeal Exercises   []  Vocal Loudness []  Reading Comprehension []  Swallowing Skills    []  Vocal Cord Function []  Auditory Comprehension []  Oral Motor Skills   []  Resonance []  Writing Skills []  Compensatory strategies    []  Speech Intelligibility [x]  Expressive Language []  Attention   []  Breath Support/Coord. [x]  Receptive language []  Memory   []  Articulation []  Safety Awareness [x] Pt educ   [x]  Fluency []  Word Retrieval []        Treatment Provided:  -Sound Production Treatment (SPT)  -speech intelligibility comp strategies  -Melodic Intonation Therapy (RITA)  -pt educ      Patient/Caregiver  Education: [x] Review HEP      HEP/Handouts given: Review HEP    Pain Level (0-10 scale) post treatment: 0    ASSESSMENT     []   Improving appropriately and progressing toward goals  [x]   Improving slowly and progressing toward goals  []   Approximating goals/maximum potential  [x]   Continues to benefit from skilled therapy to address remaining functional deficits  []   Not progressing toward goals and plan of care will be adjusted      Progress towards goals / Updated goals:  1) Pt will produce the target phoneme/phonemes in the initial, and final positions of CV, VC, and CVC with 80%

## 2024-03-16 LAB — NONINV COLON CA DNA+OCC BLD SCRN STL QL: NEGATIVE

## 2024-03-20 ENCOUNTER — HOSPITAL ENCOUNTER (OUTPATIENT)
Facility: HOSPITAL | Age: 64
Setting detail: RECURRING SERIES
Discharge: HOME | End: 2024-03-23
Payer: COMMERCIAL

## 2024-03-20 PROCEDURE — 97110 THERAPEUTIC EXERCISES: CPT

## 2024-03-20 PROCEDURE — 97530 THERAPEUTIC ACTIVITIES: CPT

## 2024-03-20 PROCEDURE — 92507 TX SP LANG VOICE COMM INDIV: CPT

## 2024-03-20 NOTE — PROGRESS NOTES
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     OBJECTIVE  Treatment provided includes:  Increase/Improve:  []  Voice Quality []  Cognitive Linguistic Skills []  Laryngeal/Pharyngeal Exercises   []  Vocal Loudness []  Reading Comprehension []  Swallowing Skills    []  Vocal Cord Function []  Auditory Comprehension []  Oral Motor Skills   []  Resonance []  Writing Skills []  Compensatory strategies    []  Speech Intelligibility [x]  Expressive Language []  Attention   []  Breath Support/Coord. [x]  Receptive language []  Memory   []  Articulation []  Safety Awareness [x] Pt educ   [x]  Fluency []  Word Retrieval []        Treatment Provided:  -word retrieval  -functional expressive writing  -speech production  -pt educ      Patient/Caregiver  Education: [x] Review HEP      HEP/Handouts given: Review HEP    Pain Level (0-10 scale) post treatment: 0    ASSESSMENT     []   Improving appropriately and progressing toward goals  [x]   Improving slowly and progressing toward goals  []   Approximating goals/maximum potential  [x]   Continues to benefit from skilled therapy to address remaining functional deficits  []   Not progressing toward goals and plan of care will be adjusted      Progress towards goals / Updated goals:  1) Pt will produce the target phoneme/phonemes in the initial, and final positions of CV, VC, and CVC with 80% accuracy given modA.  Current:  Bi syllabic

## 2024-03-20 NOTE — PROGRESS NOTES
mobility deficits, analyze and address ROM deficits, analyze and address strength deficits, analyze and address soft tissue restrictions, analyze and cue for proper movement patterns, analyze and modify for postural abnormalities, and instruct in home and community integration  to attain remaining goals.   Progress toward goals / Updated goals:    Patient will be independent in demonstrating and performing activity modification strategies to aid in success for work, self-care, meal preparation and home management tasks.   Status at PN (3/6/2024): Progressing, ongoing safety concerns       Patient will increase  Bilateral hand 3pt  and tip pinch strength by 3 pounds or greater to improve participation in meal preparation and home management tasks.  Status at PN (3/6/2024): Right 3pt 5, Tip  4,   Left  3pt 10, Tip 7     3.    Pt will show a 3 second decrease in 9-hole peg test with the Right hand, demonstrating increased coordination for buttoning a shirt.  Status at PN (3/6/2024): 25 seconds      4.  Patient will increase right hand  strength to 30# or greater to improve grasp on objects during ADL/IADL tasks.   Status at PN (3/6/2024):  22#     5.   Patient will be able to complete a baking recipe of 10 steps or less with no more than min A, demonstrating increased independence and return to PLOF.  Status at PN (3/6/2024): Difficulty with direction following     6.   Pt will be able to print her first name, last name, address, phone number, and today's date no more than 1 error in letter/number formation to be able to fill out paperwork.    Status at PN (3/6/2024): Progressing, continue to address         PLAN  [x]  Continue Plan of Care  []  Upgrade activities as tolerated    []  Discharge due to :    []  Other:      Therapist: AREN MÉNDEZ    Date: 3/20/2024 Time: 8:20 AM     Future Appointments   Date Time Provider Department Center   3/25/2024 12:40 PM Erica Dukes OTA [facility]

## 2024-03-25 ENCOUNTER — HOSPITAL ENCOUNTER (OUTPATIENT)
Facility: HOSPITAL | Age: 64
Setting detail: RECURRING SERIES
Discharge: HOME | End: 2024-03-28
Payer: COMMERCIAL

## 2024-03-25 PROCEDURE — 97530 THERAPEUTIC ACTIVITIES: CPT

## 2024-03-25 PROCEDURE — 97110 THERAPEUTIC EXERCISES: CPT

## 2024-03-25 PROCEDURE — 92507 TX SP LANG VOICE COMM INDIV: CPT

## 2024-03-25 NOTE — PROGRESS NOTES
goals and plan of care will be adjusted      Progress towards goals / Updated goals:  1) Pt will produce the target phoneme/phonemes in the initial, and final positions of CV, VC, and CVC with 80% accuracy given modA.  Current: Not targeted this date    2) Pt will produce functional phrases and sentences with RITA/spoken in unison with the clinician with 80% accuracy given modA.   Current:Not targeted this date      3) Pt will utilize speech intelligibility comp strategies (e.g., slowed rate, self-induced tactile cues) to produce sentences in response to simple wh-question (what’s your favorite holiday and why) with appropriate articulation at 80% accuracy given frequent modA.  Current: Not targeted this date      4) Pt will participate in semantic networking and lexical exercises (e.g., divergent and convergent naming, Semantic Feature Analysis, opposites, synonyms, associations, sentence completion, etc) to increase word-finding and accurate semantic use with 80% accuracy given modA to increase communication efficiency, maintain safety, and participate socially in functional living environment.  Current: Responsive namin% acc SAIRA and 100% acc given Lazaro  Convergent naming (concrete):  and then add an additional member to category 100% acc Saira- increase to abstract category     5) Patient will answer simple ---> moderately complex yes/no questions and wh-questions regarding verbally presented/written novel information/2-3-sentence paragraphs with 80% acc given Lazaro.   Current:  Y/N questions: (2-4 sentences):89% acc yesenia, advance to open ended questions     6) Pt will recite automatic speech tasks/serial sequences with 80% acc when provided with mod cues to increase verbal expression.  Current: Count 1-10 & 1-20: 100% acc given  Lazaro/REPS  Days of the week: 100% acc given Lazaro to initiate  Months: 92% acc  given Lazaro/reps barrier of limitation was # of syllables to produce, utilize self cuing by writing the

## 2024-03-25 NOTE — PROGRESS NOTES
SLP [facility]   3/27/2024 12:40 PM Jeny Rodriguezyza, SLP [facility]   3/27/2024  1:20 PM Erica Dukes, AREN [facility]   4/2/2024  2:00 PM Diana Celeste, OT [facility]   4/2/2024  2:40 PM Erica Willams, SLP [facility]   4/5/2024 10:00 AM Erica Dukes, AREN [facility]   4/5/2024 10:40 AM Jeny Rodriguezyza, SLP [facility]   4/9/2024 12:40 PM Erica Willams, SLP [facility]   4/9/2024  1:20 PM Erica Dukes, AREN [facility]   4/12/2024 11:20 AM Jeny Rodriguezyza, SLP [facility]   4/12/2024 12:00 PM Erica Dukes, AREN [facility]   4/16/2024 12:00 PM Erica Dukes, AREN [facility]   4/16/2024 12:40 PM Erica Willams, SLP [facility]   4/19/2024 12:00 PM Jeny Rodriguezyza, SLP [facility]   4/19/2024 12:40 PM Diana Celeste, OT [facility]   4/23/2024 12:40 PM Erica Willams, SLP [facility]   4/23/2024  1:20 PM Erica Dukes, AREN [facility]   4/26/2024 12:00 PM Jeny Rodriguezyza, SLP [facility]   4/26/2024 12:40 PM Diana, Celeste, OT [facility]   7/9/2024  9:20 AM Catrina Hardin MD [facility]   8/14/2024  1:00 PM Aidee Booth APRN - NP HR [facility] NE[facility] BS AMB

## 2024-03-27 ENCOUNTER — HOSPITAL ENCOUNTER (OUTPATIENT)
Facility: HOSPITAL | Age: 64
Setting detail: RECURRING SERIES
Discharge: HOME | End: 2024-03-30
Payer: COMMERCIAL

## 2024-03-27 PROCEDURE — 97110 THERAPEUTIC EXERCISES: CPT

## 2024-03-27 PROCEDURE — 97530 THERAPEUTIC ACTIVITIES: CPT

## 2024-03-27 PROCEDURE — 92507 TX SP LANG VOICE COMM INDIV: CPT

## 2024-03-27 NOTE — PROGRESS NOTES
OCCUPATIONAL THERAPY - DAILY TREATMENT NOTE    Patient Name: Mena Curran    Date: 3/27/2024    : 1960  Insurance: Payor: BETTY / Plan: [place] / Product Type: *No Product type* /        Ins Auth:  REYNOLD          Next PN Due By:                            Patient  verified Yes     Visit #   Current / Total 6 12   Time   In / Out 120 200   Total Treatment Time 40   Pain   In / Out 0 0   Subjective Functional Status/Changes: I am working  hard      TREATMENT AREA =  Generalized weakness [R53.1]    OBJECTIVE      Therapeutic Procedures:  Tx Min Billable or 1:1 Min (if diff from Tx Min) Procedure, Rationale, Specifics   32  66100 Therapeutic Exercise (timed):  increase ROM, strength, coordination, and proprioception to  (see flow sheet as applicable)    UBE: 8 min, Lv 3.0  Med theraputty manipulated with Right hand to reveal and remove 1/4 in pegs 10/10  GreenTherabar: 3 ways: 20x  Weight Well: 5x- 2#       8  44208 Therapeutic Activity (timed):  use of dynamic activities replicating functional movements to improve ability to manipulate small items (see flow sheet as applicable)      Grooved Pegs: Placed individually, removed using digit to palm translation                                TOTAL 1:1 TREATMENT TIME:  (Total Time - Paraffin/Vaso/Fluido)        40         Billed concurrently with other treatments Patient Education: provided upgraded putty      Objective Information/Functional Measures/Assessment    - able to tolerate upgraded resistance with UBE         Assessment/Plan:    Functional use improving            [x]  See Progress Note/Re certification     Patient will continue to benefit from skilled OT services to modify and progress therapeutic interventions, analyze and address functional mobility deficits, analyze and address ROM deficits, analyze and address strength deficits, analyze and address soft tissue restrictions, analyze and cue for proper movement patterns, analyze and

## 2024-03-27 NOTE — PROGRESS NOTES
Bisyllabic letters: 86% acc ANKIT, increase to 100% acc given min-mod verbal cues. -progressing/continue addressing.     PLAN  [x]  Continue plan of care  []  Modify Goals/Treatment Plan      []  Discharge due to:  [] Other:    Jeny Rodriguez M.A., CCC-SLP  Speech Language Pathologist   3/27/2024  7:41 AM    Future Appointments   Date Time Provider Department Center   3/27/2024 12:40 PM Jeny Rodriguez, SLP [facility]   3/27/2024  1:20 PM Erica Dukes AREN [facility]   4/2/2024  2:00 PM Celeste Ortiz, OT [facility]   4/2/2024  2:40 PM Erica Willams SLP [facility]   4/5/2024 10:00 AM Erica Dukes [facility]EN [facility]   4/5/2024 10:40 AM Jeny Rodriguez SLP [facility]   4/9/2024 12:40 PM Erica Willams SLP [facility]   4/9/2024  1:20 PM Erica Dukes, AREN [facility]   4/12/2024 11:20 AM Jeny Rodriguez SLP [facility]   4/12/2024 12:00 PM Erica Dukes, AREN [facility]   4/16/2024 12:00 PM Erica Dukes, AREN [facility]   4/16/2024 12:40 PM Erica Willams SLP [facility]   4/19/2024 12:00 PM Jeny Rodriguez SLP [facility]   4/19/2024 12:40 PM Celeste Ortiz, OT [facility]   4/23/2024 12:40 PM Erica Willams SLP [facility]   4/23/2024  1:20 PM Erica Dukes, AREN [facility]   4/26/2024 12:00 PM Jeny Rodriguez SLP [facility]   4/26/2024 12:40 PM Celeste Ortiz, OT [facility]   7/9/2024  9:20 AM Catrina Hardin MD [facility] BS AMB   8/14/2024  1:00 PM Aidee Booth APRN - NP HR [facility] NEUR BS AMB

## 2024-03-28 ENCOUNTER — TELEPHONE (OUTPATIENT)
Facility: HOSPITAL | Age: 64
End: 2024-03-28

## 2024-03-28 NOTE — TELEPHONE ENCOUNTER
INFORMED PT'S SISTER OF INS REJECTION. INS HAS BEEN INACTIVE SINCE FEBRUARY. PT IS HAVING HER DAUGHTER LOOK INTO THIS. THEY SHOULD BE CALLING BEFORE NEXT APPT TO GIVE US MORE INFO.

## 2024-04-23 ENCOUNTER — TELEPHONE (OUTPATIENT)
Facility: CLINIC | Age: 64
End: 2024-04-23

## 2024-04-23 NOTE — TELEPHONE ENCOUNTER
Lev Christian the sister for Mena Curran called,stating that  a new order for Occupational And Speech Therapy at [facility] needs to be sent over .    Fax# [phone]

## 2024-04-24 NOTE — TELEPHONE ENCOUNTER
Please inform patient's sister that I went through the speech therapy notes.  Unfortunately it was inconsistent continued due to insurance denial.  They will need a repeat follow-up appointment with me to discuss and for evaluation purposes before I can put in another order

## 2024-04-29 ENCOUNTER — TELEPHONE (OUTPATIENT)
Facility: CLINIC | Age: 64
End: 2024-04-29

## 2024-04-29 NOTE — TELEPHONE ENCOUNTER
Patient's sister called to follow up on request for referral for OT and PT.  She was advised an appointment was needed to discuss for insurance approval purposes.  Patient is scheduled for May 10.  Her sister would like a call back to discuss, stating appointment this far out is not acceptable.  Please advise.

## 2024-04-30 ENCOUNTER — TELEPHONE (OUTPATIENT)
Facility: CLINIC | Age: 64
End: 2024-04-30

## 2024-04-30 NOTE — TELEPHONE ENCOUNTER
TC- Correction to previous telephone encounter with Levmonisha Grace. Correction Lev Grace is pt's sister not daughter.

## 2024-04-30 NOTE — TELEPHONE ENCOUNTER
TC-call was made. Spoke to pt's daughter(Lev Grace) Ms. Grace was very aldomet and argument al  about previous conversation had with someone in office. I assured her I am giving the report that Dr. Hardin inform me to make her aware of. I assure her I hear her concerns and I will relay them to Dr. Hardin for further discussion. Sanjay consistently went on and on venting. I explained to her I will give Dr Hardin the message but I am exiting on this call due to time management. Pt did not accept verbal understanding due to disappointment.

## 2024-05-10 ENCOUNTER — OFFICE VISIT (OUTPATIENT)
Facility: CLINIC | Age: 64
End: 2024-05-10
Payer: COMMERCIAL

## 2024-05-10 VITALS
SYSTOLIC BLOOD PRESSURE: 155 MMHG | BODY MASS INDEX: 29.25 KG/M2 | TEMPERATURE: 97.5 F | HEART RATE: 68 BPM | HEIGHT: 60 IN | OXYGEN SATURATION: 96 % | WEIGHT: 149 LBS | DIASTOLIC BLOOD PRESSURE: 86 MMHG | RESPIRATION RATE: 16 BRPM

## 2024-05-10 DIAGNOSIS — Z72.0 TOBACCO USE: ICD-10-CM

## 2024-05-10 DIAGNOSIS — R41.89 COGNITIVE DEFICITS: Primary | ICD-10-CM

## 2024-05-10 DIAGNOSIS — F33.1 MAJOR DEPRESSIVE DISORDER, RECURRENT, MODERATE (HCC): ICD-10-CM

## 2024-05-10 DIAGNOSIS — R47.1 DYSARTHRIA: ICD-10-CM

## 2024-05-10 DIAGNOSIS — J44.1 CHRONIC OBSTRUCTIVE PULMONARY DISEASE WITH (ACUTE) EXACERBATION (HCC): ICD-10-CM

## 2024-05-10 DIAGNOSIS — I69.951 HEMIPLEGIA OF RIGHT DOMINANT SIDE AS LATE EFFECT OF CEREBROVASCULAR DISEASE, UNSPECIFIED CEREBROVASCULAR DISEASE TYPE, UNSPECIFIED HEMIPLEGIA TYPE (HCC): ICD-10-CM

## 2024-05-10 DIAGNOSIS — I10 ESSENTIAL (PRIMARY) HYPERTENSION: ICD-10-CM

## 2024-05-10 DIAGNOSIS — R47.01 APHASIA: ICD-10-CM

## 2024-05-10 DIAGNOSIS — I69.30 HISTORY OF CVA WITH RESIDUAL DEFICIT: ICD-10-CM

## 2024-05-10 PROBLEM — J96.00 ACUTE RESPIRATORY FAILURE (HCC): Status: RESOLVED | Noted: 2021-12-19 | Resolved: 2024-05-10

## 2024-05-10 PROCEDURE — 3077F SYST BP >= 140 MM HG: CPT | Performed by: STUDENT IN AN ORGANIZED HEALTH CARE EDUCATION/TRAINING PROGRAM

## 2024-05-10 PROCEDURE — 99213 OFFICE O/P EST LOW 20 MIN: CPT | Performed by: STUDENT IN AN ORGANIZED HEALTH CARE EDUCATION/TRAINING PROGRAM

## 2024-05-10 PROCEDURE — 3079F DIAST BP 80-89 MM HG: CPT | Performed by: STUDENT IN AN ORGANIZED HEALTH CARE EDUCATION/TRAINING PROGRAM

## 2024-05-10 SDOH — ECONOMIC STABILITY: INCOME INSECURITY: HOW HARD IS IT FOR YOU TO PAY FOR THE VERY BASICS LIKE FOOD, HOUSING, MEDICAL CARE, AND HEATING?: NOT HARD AT ALL

## 2024-05-10 SDOH — ECONOMIC STABILITY: FOOD INSECURITY: WITHIN THE PAST 12 MONTHS, YOU WORRIED THAT YOUR FOOD WOULD RUN OUT BEFORE YOU GOT MONEY TO BUY MORE.: NEVER TRUE

## 2024-05-10 SDOH — ECONOMIC STABILITY: FOOD INSECURITY: WITHIN THE PAST 12 MONTHS, THE FOOD YOU BOUGHT JUST DIDN'T LAST AND YOU DIDN'T HAVE MONEY TO GET MORE.: NEVER TRUE

## 2024-05-10 ASSESSMENT — PATIENT HEALTH QUESTIONNAIRE - PHQ9
4. FEELING TIRED OR HAVING LITTLE ENERGY: NOT AT ALL
SUM OF ALL RESPONSES TO PHQ QUESTIONS 1-9: 0
7. TROUBLE CONCENTRATING ON THINGS, SUCH AS READING THE NEWSPAPER OR WATCHING TELEVISION: NOT AT ALL
5. POOR APPETITE OR OVEREATING: NOT AT ALL
10. IF YOU CHECKED OFF ANY PROBLEMS, HOW DIFFICULT HAVE THESE PROBLEMS MADE IT FOR YOU TO DO YOUR WORK, TAKE CARE OF THINGS AT HOME, OR GET ALONG WITH OTHER PEOPLE: NOT DIFFICULT AT ALL
8. MOVING OR SPEAKING SO SLOWLY THAT OTHER PEOPLE COULD HAVE NOTICED. OR THE OPPOSITE, BEING SO FIGETY OR RESTLESS THAT YOU HAVE BEEN MOVING AROUND A LOT MORE THAN USUAL: NOT AT ALL
SUM OF ALL RESPONSES TO PHQ QUESTIONS 1-9: 0
SUM OF ALL RESPONSES TO PHQ QUESTIONS 1-9: 0
1. LITTLE INTEREST OR PLEASURE IN DOING THINGS: NOT AT ALL
2. FEELING DOWN, DEPRESSED OR HOPELESS: NOT AT ALL
3. TROUBLE FALLING OR STAYING ASLEEP: NOT AT ALL
SUM OF ALL RESPONSES TO PHQ QUESTIONS 1-9: 0
SUM OF ALL RESPONSES TO PHQ9 QUESTIONS 1 & 2: 0
6. FEELING BAD ABOUT YOURSELF - OR THAT YOU ARE A FAILURE OR HAVE LET YOURSELF OR YOUR FAMILY DOWN: NOT AT ALL
9. THOUGHTS THAT YOU WOULD BE BETTER OFF DEAD, OR OF HURTING YOURSELF: NOT AT ALL

## 2024-05-10 ASSESSMENT — ENCOUNTER SYMPTOMS
ABDOMINAL PAIN: 0
SHORTNESS OF BREATH: 0
RHINORRHEA: 0
VOMITING: 0
CHEST TIGHTNESS: 0
BLOOD IN STOOL: 0
WHEEZING: 0
COUGH: 0
DIARRHEA: 0
NAUSEA: 0
BACK PAIN: 0

## 2024-05-10 ASSESSMENT — ANXIETY QUESTIONNAIRES
GAD7 TOTAL SCORE: 0
3. WORRYING TOO MUCH ABOUT DIFFERENT THINGS: NOT AT ALL
6. BECOMING EASILY ANNOYED OR IRRITABLE: NOT AT ALL
5. BEING SO RESTLESS THAT IT IS HARD TO SIT STILL: NOT AT ALL
2. NOT BEING ABLE TO STOP OR CONTROL WORRYING: NOT AT ALL
1. FEELING NERVOUS, ANXIOUS, OR ON EDGE: NOT AT ALL
IF YOU CHECKED OFF ANY PROBLEMS ON THIS QUESTIONNAIRE, HOW DIFFICULT HAVE THESE PROBLEMS MADE IT FOR YOU TO DO YOUR WORK, TAKE CARE OF THINGS AT HOME, OR GET ALONG WITH OTHER PEOPLE: NOT DIFFICULT AT ALL
4. TROUBLE RELAXING: NOT AT ALL
7. FEELING AFRAID AS IF SOMETHING AWFUL MIGHT HAPPEN: NOT AT ALL

## 2024-05-10 NOTE — PROGRESS NOTES
\"Have you been to the ER, urgent care clinic since your last visit?  Hospitalized since your last visit?\"    NO    “Have you seen or consulted any other health care providers outside of [facility] since your last visit?”    NO    Have you had a mammogram?”   NO    Date of last Mammogram: 1/4/2022         “Have you had a colorectal cancer screening such as a colonoscopy/FIT/Cologuard?    YES - Type: Cologuard     Date of last Colonoscopy: 2/27/2019  Date of last Cologuard: 3/8/2024  No FIT/FOBT on file   No flexible sigmoidoscopy on file         Click Here for Release of Records Request   
Catrina Luis MD as PCP - Empaneled Provider  Vitaliy Dickens, RN as Ambulatory Care Manager      Assessment / Plan:    ICD-10-CM    1. Cognitive deficits  R41.89 [facility] [place]      2. Chronic obstructive pulmonary disease with (acute) exacerbation (HCC)  J44.1       3. Hemiplegia of right dominant side as late effect of cerebrovascular disease, unspecified cerebrovascular disease type, unspecified hemiplegia type (HCC)  I69.951 [facility] [place]      4. History of CVA with residual deficit  I69.30       5. Essential (primary) hypertension  I10       6. Tobacco use  Z72.0       7. Aphasia  R47.01       8. Dysarthria  R47.1 [facility] Speech [facility] [place]      9. Major depressive disorder, recurrent, moderate  F33.1            CVA: Continue ASA and statin.  Patient is following with neurology.  She continues to have word finding difficulty and cognitive issues.  Patient would greatly benefit from continued speech therapy.  Will place repeat referral.    HTN: Continue losartan.  BP today is elevated but patient was also upset.    HLD: LDL was not at goal and dose of Lipitor was increased.  Repeat lipid panel is pending.    Tobacco abuse: Patient has still been intermittently smoking.  Counseled on cessation.  Discussed option of medications.  Patient wants to hold off for now    Anxiety/depression: Continue Zoloft.     Mammogram pending.  Cologuard reviewed    Advised to get COVID-19 booster and RSV vaccine        No follow-ups on file.     I asked the patient if she  had any questions and answered her  questions.  The patient stated that she understands the treatment plan and agrees with the treatment plan    This document was created with a voice activated dictation system and may contain transcription errors.

## 2024-05-21 ENCOUNTER — HOSPITAL ENCOUNTER (EMERGENCY)
Facility: HOSPITAL | Age: 64
Discharge: HOME | End: 2024-05-21
Attending: EMERGENCY MEDICINE
Payer: COMMERCIAL

## 2024-05-21 VITALS
WEIGHT: 149 LBS | TEMPERATURE: 98.1 F | HEIGHT: 60 IN | DIASTOLIC BLOOD PRESSURE: 81 MMHG | SYSTOLIC BLOOD PRESSURE: 131 MMHG | OXYGEN SATURATION: 97 % | RESPIRATION RATE: 16 BRPM | HEART RATE: 67 BPM | BODY MASS INDEX: 29.25 KG/M2

## 2024-05-21 DIAGNOSIS — L29.9 PRURITIC DISORDER: Primary | ICD-10-CM

## 2024-05-21 PROCEDURE — 6370000000 HC RX 637 (ALT 250 FOR IP): Performed by: EMERGENCY MEDICINE

## 2024-05-21 PROCEDURE — 99283 EMERGENCY DEPT VISIT LOW MDM: CPT

## 2024-05-21 RX ORDER — DIPHENHYDRAMINE HCL 25 MG
25 CAPSULE ORAL
Status: COMPLETED | OUTPATIENT
Start: 2024-05-21 | End: 2024-05-21

## 2024-05-21 RX ORDER — PREDNISONE 20 MG/1
60 TABLET ORAL
Status: COMPLETED | OUTPATIENT
Start: 2024-05-21 | End: 2024-05-21

## 2024-05-21 RX ORDER — PREDNISONE 50 MG/1
50 TABLET ORAL DAILY
Qty: 3 TABLET | Refills: 0 | Status: SHIPPED | OUTPATIENT
Start: 2024-05-21 | End: 2024-05-24

## 2024-05-21 RX ADMIN — DIPHENHYDRAMINE HYDROCHLORIDE 25 MG: 25 CAPSULE ORAL at 02:48

## 2024-05-21 RX ADMIN — PREDNISONE 60 MG: 20 TABLET ORAL at 02:49

## 2024-05-21 NOTE — ED TRIAGE NOTES
Patient comes in complaining of itching to the bottoms of her feet and to her hands. Patient states that it has been like this for the last 3 days.

## 2024-05-21 NOTE — ED PROVIDER NOTES
EMERGENCY DEPARTMENT HISTORY AND PHYSICAL EXAM    2:34 AM EDT seen at this time in room 6        Date: 2024  Patient Name: Mena Curran    History of Presenting Illness     Chief Complaint   Patient presents with    Pruritis         History Provided By: patient    Additional History (Context): Mena Curran is a 64 y.o. female presents with has 3 days of itching particularly on the palms of her hands and soles of her feet.  No new chemicals lotions clothing etc. no exposure to plants.  She has tried some Benadryl cream on the affected areas.  Finds it very distressing and keeping her up at night.  No throat closing shortness of breath chest pain syncope.    PCP: Catrina Hardin MD    Chief Complaint:   Duration:    Timing:    Location:   Quality:   Severity:   Modifying Factors:   Associated Symptoms:       Current Facility-Administered Medications   Medication Dose Route Frequency Provider Last Rate Last Admin    predniSONE (DELTASONE) tablet 60 mg  60 mg Oral NOW Costa Harvey MD        diphenhydrAMINE (BENADRYL) capsule 25 mg  25 mg Oral NOW Costa Harvey MD         Current Outpatient Medications   Medication Sig Dispense Refill    predniSONE (DELTASONE) 50 MG tablet Take 1 tablet by mouth daily for 3 days 3 tablet 0    sertraline (ZOLOFT) 50 MG tablet Take 2.5 tablets by mouth daily 75 tablet 2    atorvastatin (LIPITOR) 80 MG tablet Take 1 tablet by mouth at bedtime 90 tablet 1    aspirin 81 MG chewable tablet Take 1 tablet by mouth daily 90 tablet 1    losartan (COZAAR) 25 MG tablet Take 1 tablet by mouth daily 90 tablet 1       Past History     Past Medical History:  Past Medical History:   Diagnosis Date    Arthritis     Hepatitis C     treated     Hypercholesterolemia     Hypertension     no meds     Sleep apnea     no CPAP        Past Surgical History:  Past Surgical History:   Procedure Laterality Date     SECTION      CHOLECYSTECTOMY      HYSTERECTOMY (CERVIX

## 2024-05-22 ENCOUNTER — APPOINTMENT (OUTPATIENT)
Facility: HOSPITAL | Age: 64
End: 2024-05-22
Payer: COMMERCIAL

## 2024-05-27 DIAGNOSIS — F41.9 ANXIETY AND DEPRESSION: ICD-10-CM

## 2024-05-27 DIAGNOSIS — F32.A ANXIETY AND DEPRESSION: ICD-10-CM

## 2024-05-28 RX ORDER — TRAZODONE HYDROCHLORIDE 50 MG/1
50 TABLET ORAL NIGHTLY
Qty: 90 TABLET | Refills: 1 | OUTPATIENT
Start: 2024-05-28

## 2024-05-29 ENCOUNTER — HOSPITAL ENCOUNTER (OUTPATIENT)
Facility: HOSPITAL | Age: 64
Setting detail: RECURRING SERIES
Discharge: HOME | End: 2024-06-01
Payer: COMMERCIAL

## 2024-05-29 PROCEDURE — 92523 SPEECH SOUND LANG COMPREHEN: CPT

## 2024-05-29 NOTE — PROGRESS NOTES
SPEECH-LANGUAGE EVALUATION  The Quick Aphasia Battery (QAB) was administered to reassess pt's expressive and receptive language skills and identify presence of aphasia. The QAB is made up of eight subtests, each comprising sets of items that probe different language domains, vary in difficulty, and are scored with a graded system to maximize the informativeness of each item. The following are the pt's scores:      Subtest Score Severity   Word Comprehension 10.00 WNL   Sentence Comprehension 3.75 Severe   Word Finding 7.00 Moderate   Grammatical construction 6.50 Moderate   Speech motor programming 5.0 Moderate   Repetition 5.42 Moderate   Reading 5.00 Moderate   QAB Overall 6.33 Moderate     Impression: Pt presents with moderate expressive and receptive aphasia c/b anomia, semantic and phonemic paraphasias, deficits in grammatical construction and syntax, sentence comprehension with increasing length and complexity. Pt also with difficulty reading connected speech with increasing length and complexity. Further probe on pt's writing and reading comprehension recommended.       SLP administered portions of the Apraxia Battery for Adults-2nd edition (DAWSON-2) to measure the presence and severity of apraxia. D/t time constraints, subtests were not fully administered. Rec continued administration to fully determine severity of pt's apraxia of speech.     Impression: The 2nd subtest (Increasing Word Length) was administered to determine deterioration in accurate production in words with increasing length. Pt does demonstrate increasing inaccuracies with increasing word length/syllables. Pt able to produce simple CVC/monosyllabic words but break down is present with bi-syllabic to multi-syllabic production. Further, pt demo apraxic errors during repetition and reading during QAB administration. Pt did not demo oral apraxia during probe, however, she demo inconsistent errors during alternating motion rate (AMR) with her 
appropriate POC.     Long-term Goals: To be accomplished in 4 weeks  1) Pt will develop functional motor programming, articulatory proficiency and utilize compensatory strategies to express wants and needs for intelligible speech and functional prosody with 90% acc as evidenced by clinician and pt judgment, informal observation, or by a rating of mild in the DAWSON-2.      2) Pt will develop functional linguistic-based skills and utilize compensatory strategies to communicate wants and needs effectively to different conversational partners, maintain safety, and participate socially in functional living environment in 90% of opportunities given Lazaro or mild overall QAB score during reassessment.        Frequency / Duration: Patient to be seen 2 times per week for 4 weeks      Patient/ Caregiver education and instruction: Diagnosis, prognosis, other Results of evaluation, POC including goals, frequency, and duration        Certification Period: 5/29/24 to 6/26/24    Jeny Rodriguez M.A., CCC-SLP  Speech Language Pathologist     5/29/2024   7:59 AM    Payor: BETTY / Plan: [place] / Product Type: *No Product type* /       Physician signature required for Medicare, Medicaid, Worker's Comp, Direct Access   ________________________________________________________________________     I certify that the above Therapy Services are being furnished while the patient is under my care. I agree with the treatment plan and certify that this therapy is necessary.     Physician's Signature:____________Date:_________TIME:________  Irene Hardin MD  ** Signature, Date and Time must be completed for valid certification **     Please sign and return to [facility].  [address] [place] [address]  Ph: [phone]           Fax: [phone]      Thank you

## 2024-06-05 ENCOUNTER — HOSPITAL ENCOUNTER (OUTPATIENT)
Facility: HOSPITAL | Age: 64
Setting detail: RECURRING SERIES
Discharge: HOME | End: 2024-06-08
Payer: COMMERCIAL

## 2024-06-05 PROCEDURE — 92507 TX SP LANG VOICE COMM INDIV: CPT

## 2024-06-05 NOTE — PROGRESS NOTES
ST DAILY TREATMENT NOTE    Patient Name: Mena Curran  Date:2024  : 1960  [x]  Patient  Verified  Payor: Payor: BETTY / Plan: [place] / Product Type: *No Product type* /   In time:10:02  Out time:10:40  Total Treatment Time (min): 38  Visit #: 2 of 8  PN due 24     Treatment Diagnosis: Aphasia following cerebral infarction [I69.320]  Apraxia following cerebral infarction [I69.390]    SUBJECTIVE  Pain Level (0-10 scale): 0    Subjective functional status/changes:   [] No changes reported  Pt reported that over the weekend, different conversational partners commented on her improved overall communication.     OBJECTIVE  Treatment provided includes:  Increase/Improve:  []  Voice Quality []  Cognitive Linguistic Skills []  Laryngeal/Pharyngeal Exercises   []  Vocal Loudness []  Reading Comprehension []  Swallowing Skills    []  Vocal Cord Function []  Auditory Comprehension []  Oral Motor Skills   []  Resonance []  Writing Skills []  Compensatory strategies    [x]  Speech Intelligibility []  Expressive Language []  Attention   [x]  Breath Support/Coord. []  Receptive language []  Memory   [x]  Articulation []  Safety Awareness [x] Pt educ   [x]  Fluency []  Word Retrieval []        Treatment Provided:  Speech-Language Treatment [59888]:  -DAWSON re-administration    Patient/Caregiver  Education: [x] Review HEP      HEP/Handouts given: Read different materials out loud.     Pain Level (0-10 scale) post treatment: 0    ASSESSMENT     []   Improving appropriately and progressing toward goals  []   Improving slowly and progressing toward goals  []   Approximating goals/maximum potential  [x]   Continues to benefit from skilled therapy to address remaining functional deficits  []   Not progressing toward goals and plan of care will be adjusted    Patient will continue to benefit from skilled therapy to address remaining functional deficits: aphasia, apraxia    Progress towards goals / Updated

## 2024-06-07 ENCOUNTER — HOSPITAL ENCOUNTER (OUTPATIENT)
Facility: HOSPITAL | Age: 64
Setting detail: RECURRING SERIES
Discharge: HOME | End: 2024-06-10
Payer: COMMERCIAL

## 2024-06-07 PROCEDURE — 92507 TX SP LANG VOICE COMM INDIV: CPT

## 2024-06-07 NOTE — PROGRESS NOTES
ST DAILY TREATMENT NOTE    Patient Name: Mena Curran  Date:2024  : 1960  [x]  Patient  Verified  Payor: Payor: BETTY / Plan: [place] / Product Type: *No Product type* /   In time:8:00  Out time:8:40  Total Treatment Time (min): 40  Visit #: 3 of 8  PN due 24     Treatment Diagnosis: Aphasia following cerebral infarction [I69.320]  Apraxia following cerebral infarction [I69.390]    SUBJECTIVE  Pain Level (0-10 scale): 0    Subjective functional status/changes:   [] No changes reported  Pt reported she has been getting better. Intermittent compliance to HEP.     OBJECTIVE  Treatment provided includes:  Increase/Improve:  []  Voice Quality []  Cognitive Linguistic Skills []  Laryngeal/Pharyngeal Exercises   []  Vocal Loudness []  Reading Comprehension []  Swallowing Skills    []  Vocal Cord Function []  Auditory Comprehension []  Oral Motor Skills   []  Resonance []  Writing Skills [x]  Compensatory strategies    [x]  Speech Intelligibility [x]  Expressive Language []  Attention   []  Breath Support/Coord. []  Receptive language []  Memory   [x]  Articulation []  Safety Awareness [x] Pt educ   [x]  Fluency []  Word Retrieval []        Treatment Provided:  Speech-Language Treatment [05130]:   -Sound Production Treatment (SPT)  -Promoting Aphasic's Communication Effectiveness (PACE)  -pt educ    Patient/Caregiver  Education: [x] Review HEP      HEP/Handouts given: CVC words     Pain Level (0-10 scale) post treatment: 0    ASSESSMENT     []   Improving appropriately and progressing toward goals  []   Improving slowly and progressing toward goals  []   Approximating goals/maximum potential  [x]   Continues to benefit from skilled therapy to address remaining functional deficits  []   Not progressing toward goals and plan of care will be adjusted    Patient will continue to benefit from skilled therapy to address remaining functional deficits: aphasia/apraxia    Progress towards goals /

## 2024-06-12 ENCOUNTER — HOSPITAL ENCOUNTER (OUTPATIENT)
Facility: HOSPITAL | Age: 64
Setting detail: RECURRING SERIES
Discharge: HOME | End: 2024-06-15
Payer: COMMERCIAL

## 2024-06-12 PROCEDURE — 92507 TX SP LANG VOICE COMM INDIV: CPT

## 2024-06-12 NOTE — PROGRESS NOTES
ST DAILY TREATMENT NOTE    Patient Name: Mena Curran  Date:2024  : 1960  [x]  Patient  Verified  Payor: Payor: BETTY / [place]: [place] / Product Type: *No Product type* /   In time:10:01  Out time:10:40  Total Treatment Time (min): 39  Visit #: 4 of 8  PN due 24     Treatment Diagnosis: Aphasia following cerebral infarction [I69.320]  Apraxia following cerebral infarction [I69.390]    SUBJECTIVE  Pain Level (0-10 scale): 0    Subjective functional status/changes:   [] No changes reported  Pt reported compliance to HEP. She reported that she went to a meeting over the weekend and was able to communicate functionally.     OBJECTIVE  Treatment provided includes:  Increase/Improve:  []  Voice Quality []  Cognitive Linguistic Skills []  Laryngeal/Pharyngeal Exercises   []  Vocal Loudness []  Reading Comprehension []  Swallowing Skills    []  Vocal Cord Function []  Auditory Comprehension []  Oral Motor Skills   []  Resonance []  Writing Skills [x]  Compensatory strategies    []  Speech Intelligibility [x]  Expressive Language []  Attention   []  Breath Support/Coord. [x]  Receptive language []  Memory   []  Articulation []  Safety Awareness [x] Pt educ   []  Fluency []  Word Retrieval []        Treatment Provided:  Speech-Language Treatment [41905]:   -yes/no questions re: stories  -divergent naming  -semantic reasoning    Patient/Caregiver  Education: [x] Review HEP      HEP/Handouts given: Divergent/convergent naming.     Pain Level (0-10 scale) post treatment: 0    ASSESSMENT     []   Improving appropriately and progressing toward goals  [x]   Improving slowly and progressing toward goals  []   Approximating goals/maximum potential  [x]   Continues to benefit from skilled therapy to address remaining functional deficits  []   Not progressing toward goals and plan of care will be adjusted    Patient will continue to benefit from skilled therapy to address remaining functional deficits:

## 2024-06-13 ENCOUNTER — TELEPHONE (OUTPATIENT)
Facility: HOSPITAL | Age: 64
End: 2024-06-13

## 2024-06-13 NOTE — TELEPHONE ENCOUNTER
I called  Mena Curran, to assist her  in scheduling a Mammogram.  A voice mail message was left for this patient to return my call  at [phone] ; to assist her with scheduling her  Breast Cancer screening .    Marianna Silver LPN   Panel Manager

## 2024-06-17 NOTE — PROGRESS NOTES
"Dr. Stone,    Please see message below and advise. Last VV 06/06/24.   Lexapro was added at this visit. Patient experiencing side effects of dizziness/\"whoozie\". Patient wondering if this should be dosed lower and then increased to the preferred dose.    Thank you,  Nitesh MANUEL RN  [facility]    " Meaghan Mccormack presents today for   Chief Complaint   Patient presents with    New Patient     [facility] Follow Up     Covid-19 positive admission    Medication Refill     patient have not had medicatrion in 90 days       Is someone accompanying this pt? no    Is the patient using any DME equipment during OV? no    Depression Screening:  3 most recent PHQ Screens 12/29/2021   PHQ Not Done -   Little interest or pleasure in doing things Not at all   Feeling down, depressed, irritable, or hopeless Not at all   Total Score PHQ 2 0       Learning Assessment:  Learning Assessment 12/28/2018   PRIMARY LEARNER Patient   PRIMARY LANGUAGE ENGLISH   LEARNER PREFERENCE PRIMARY DEMONSTRATION   ANSWERED BY Patient   RELATIONSHIP SELF       Abuse Screening:  Abuse Screening Questionnaire 12/29/2021   Do you ever feel afraid of your partner? N   Are you in a relationship with someone who physically or mentally threatens you? N   Is it safe for you to go home? Y       Fall Risk  No flowsheet data found. Health Maintenance reviewed and discussed and ordered per Provider. Health Maintenance Due   Topic Date Due    Hepatitis C Screening  Never done    COVID-19 Vaccine (1) Never done    DTaP/Tdap/Td series (1 - Tdap) Never done    Colorectal Cancer Screening Combo  Never done    Shingrix Vaccine Age 50> (1 of 2) Never done    Low dose CT lung screening  Never done    Breast Cancer Screen Mammogram  Never done    Lipid Screen  01/19/2020    Flu Vaccine (1) Never done   . Coordination of Care:  1. Have you been to the ER, urgent care clinic since your last visit? Hospitalized since your last visit? no    2. Have you seen or consulted any other health care providers outside of the [address] [place] [address] since your last visit? Include any pap smears or colon screening.  no

## 2024-06-21 ENCOUNTER — HOSPITAL ENCOUNTER (OUTPATIENT)
Facility: HOSPITAL | Age: 64
Setting detail: RECURRING SERIES
Discharge: HOME | End: 2024-06-24
Payer: COMMERCIAL

## 2024-06-21 PROCEDURE — 92507 TX SP LANG VOICE COMM INDIV: CPT

## 2024-06-21 NOTE — PROGRESS NOTES
ST DAILY TREATMENT NOTE    Patient Name: Mena Curran  Date:2024  : 1960  [x]  Patient  Verified  Payor: Payor: BETTY / Plan: [facility] / Product Type: *No Product type* /   In time:10:07  Out time:10:39  Total Treatment Time (min): 32  Visit #: 5 of 8  PN due 24     Treatment Diagnosis: Aphasia following cerebral infarction [I69.320]  Apraxia following cerebral infarction [I69.390]    SUBJECTIVE  Pain Level (0-10 scale): 0    Subjective functional status/changes:   [] No changes reported  Pt reported compliance to HEP.     OBJECTIVE  Treatment provided includes:  Increase/Improve:  []  Voice Quality []  Cognitive Linguistic Skills []  Laryngeal/Pharyngeal Exercises   []  Vocal Loudness []  Reading Comprehension []  Swallowing Skills    []  Vocal Cord Function []  Auditory Comprehension []  Oral Motor Skills   []  Resonance []  Writing Skills []  Compensatory strategies    []  Speech Intelligibility [x]  Expressive Language []  Attention   []  Breath Support/Coord. [x]  Receptive language []  Memory   [x]  Articulation []  Safety Awareness [x] Pt educ   [x]  Fluency []  Word Retrieval []        Treatment Provided:  Speech-Language Treatment [16064]:   -semantic reasoning  -sound production treatment (SPT)  -speech intelligibility compensatory strategies    Patient/Caregiver  Education: [x] Review HEP      HEP/Handouts given: Semantic reasoning worksheet, CVCV word list    Pain Level (0-10 scale) post treatment: 0    ASSESSMENT     []   Improving appropriately and progressing toward goals  [x]   Improving slowly and progressing toward goals  []   Approximating goals/maximum potential  [x]   Continues to benefit from skilled therapy to address remaining functional deficits  []   Not progressing toward goals and plan of care will be adjusted    Patient will continue to benefit from skilled therapy to address remaining functional deficits: aphasia/apraxia    Progress towards goals / Updated

## 2024-06-26 ENCOUNTER — HOSPITAL ENCOUNTER (OUTPATIENT)
Facility: HOSPITAL | Age: 64
Setting detail: RECURRING SERIES
Discharge: HOME | End: 2024-06-29
Payer: COMMERCIAL

## 2024-06-26 PROCEDURE — 92507 TX SP LANG VOICE COMM INDIV: CPT

## 2024-06-26 NOTE — PROGRESS NOTES
ST DAILY TREATMENT NOTE    Patient Name: Mena Curran  Date:2024  : 1960  [x]  Patient  Verified  Payor: Payor: BETTY / Plan: [facility] / Product Type: *No Product type* /   In time:8:40  Out time:9:20  Total Treatment Time (min): 40  Visit #: 1 of 8  PN due 24     Treatment Diagnosis: Aphasia following cerebral infarction [I69.320]  Apraxia following cerebral infarction [I69.390]    SUBJECTIVE  Pain Level (0-10 scale): 0  Subjective functional status/changes:   [x] No changes reported  Pt reported compliance to HEP. She reported that she continues to demo difficulty with \"mommy\" in HEP. Pt reported that she is \"scared\" of her sister and feels pressured to \"talk quickly.\" Pt reported that she was able to communicate with a maintenance personnel working on her washer, advocated for herself.     OBJECTIVE  Treatment provided includes:  Increase/Improve:  []  Voice Quality [x]  Cognitive Linguistic Skills []  Laryngeal/Pharyngeal Exercises   []  Vocal Loudness []  Reading Comprehension []  Swallowing Skills    []  Vocal Cord Function [x]  Auditory Comprehension []  Oral Motor Skills   []  Resonance []  Writing Skills [x]  Compensatory strategies    []  Speech Intelligibility [x]  Expressive Language []  Attention   []  Breath Support/Coord. [x]  Receptive language [x]  Memory   []  Articulation []  Safety Awareness [x] Pt educ   []  Fluency []  Word Retrieval []        Treatment Provided:  Speech-Language Treatment [58758]:   -informal cog  -speech intelligibility comp strategies  -auditory comprehension    Patient/Caregiver  Education: [x] Review HEP      HEP/Handouts given: CVCV words review    Pain Level (0-10 scale) post treatment: 0    ASSESSMENT     []   Improving appropriately and progressing toward goals  [x]   Improving slowly and progressing toward goals  []   Approximating goals/maximum potential  [x]   Continues to benefit from skilled therapy to address remaining functional 
with 90% acc as evidenced by clinician and pt judgment, informal observation, or by a rating of mild in the DAWSON-2.      2) Pt will develop functional linguistic-based skills and utilize compensatory strategies to communicate wants and needs effectively to different conversational partners, maintain safety, and participate socially in functional living environment in 90% of opportunities given Lazaro or mild overall QAB score during reassessment.     3) Pt will increase cognitive-communication skills through recall, executive function, planning, and problem-solving exercises with 90% accuracy given min cues as evident by informal evaluationto increase safety in the functional living environment.     ASSESSMENT:   Pt is making steady functional progress in goals established in POC. During this reporting period, pt skill trained in convergent and divergent naming, semantic reasoning, synonyms and antonyms, and semantic feature analysis (SFA) to increase word-finding. Sound Production Treatment was also utilized to increase pt's production of bisyllabic words. Pt continues to require further training in to address aphasia and apraxia. Further, pt with mild cognitive-coommunication deficits c/b deficts in STM recall and delayed recall. However, presence of aphasia may be a barrier. Rec utilizing visual pictures --->words/digits to facilitate pt's recall. OP ST continues to be medically necessary.     RECOMMENDATIONS:  [x]Continue therapy per initial plan/protocol at a frequency of  2 x per week for 4 weeks  []Continue therapy with the following recommended changes:  []Discontinue therapy progressing towards or have reached established goals  []Discontinue therapy due to lack of appreciable progress towards goals  []Discontinue therapy due to lack of attendance or compliance  []Await Physician's recommendations/decisions regarding therapy  []Other:    Certification Period 6/26/24 to 7/24/24      Thank you for this referral.

## 2024-06-28 ENCOUNTER — HOSPITAL ENCOUNTER (OUTPATIENT)
Facility: HOSPITAL | Age: 64
Setting detail: RECURRING SERIES
Discharge: HOME | End: 2024-07-01
Payer: COMMERCIAL

## 2024-06-28 PROCEDURE — 92507 TX SP LANG VOICE COMM INDIV: CPT

## 2024-06-28 NOTE — PROGRESS NOTES
towards goals / Updated goals:  1) Pt will accurately participate in semantic networking and lexical exercises (e.g., divergent and convergent naming, Semantic Feature Analysis, opposites, synonyms, associations, sentence completion, etc) to increase word-finding and accurate semantic use with 85% acc given Lazaro to increase communication efficiency, maintain safety, and participate socially in functional living environment.  Current: Not targeted this date.      2) Pt will answer complex yes/no questions and wh-questions using multimodal communication (e.g., verbally, gesturally, communication board, and facial expression) with 80% acc given Lazaro to increase participation in decision-making in the functional living environment   Current: Not targeted this date.      3) Pt will produce the target phoneme/phonemes in the initial, medial, and final word positions of monosyllabic--->bisyllabic words with 80% accuracy given modA.   Current: Pt completed steps of SPT for bisyllabic words:  Step 1 (model + repetition): 11/15 words  Step 2 (written cue, model + repetition): 2/15 words  Step 3 (\"watch me, listen to me, say it with me\"): 1/15 words  Step 4: (\"watch me, listen to me, say it with me\" + articulatory cue): 0/15 words       4) Pt will utilize speech intelligibility comp strategies (e.g., slowed rate, self-induced tactile cues) to produce sentences in response to simple wh-question (what’s your favorite holiday and why) with appropriate articulation at 80% accuracy given frequent modA.   Current: Skill trained in syllable division to increase accuracy in bisyllabic words. Utilized syllable division and tapping/pacing to increase accuracy in producing bisyllabic words during SPT with 50% acc given min verbal cues, increase to 100% acc given max verbal and visual cues.      5) Pt will complete immediate recall tasks with 3-4 digits, picture retention, and novel information utilizing comp strategies with 80% acc given

## 2024-07-03 ENCOUNTER — HOSPITAL ENCOUNTER (OUTPATIENT)
Facility: HOSPITAL | Age: 64
Setting detail: RECURRING SERIES
Discharge: HOME | End: 2024-07-06

## 2024-07-03 PROCEDURE — 92507 TX SP LANG VOICE COMM INDIV: CPT

## 2024-07-03 NOTE — PROGRESS NOTES
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 All aspects of speech/language, cognitition, voice, and swallowing WNLs per pt report prior to 2023 CVA     Payor: BETTY / Plan: [facility] / Product Type: *No Product type* /     Treatment Plan: Aphasia Treatment, Cognitive/Language Treatment, Patient Education, and Other: Apraxia of Speech; [address] SPEECH TREATMENT ([facility]- Speech/Language Therapy)        Visits from Start of Care: 8    Missed Visits: 0    The Plan of Care and following information is based on the patient's current status:  Short-term Goals:  Goal: 1) Pt will accurately participate in semantic networking and lexical exercises (e.g., divergent and convergent naming, Semantic Feature Analysis, opposites, synonyms, associations, sentence completion, etc) to increase word-finding and accurate semantic use with 85% acc given Lazaro to increase communication efficiency, maintain safety, and participate socially in functional living environment   Status

## 2024-07-03 NOTE — PROGRESS NOTES
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 A new recertification with adjusted frequency will be written.     OBJECTIVE  Treatment provided includes:  Increase/Improve:  []  Voice Quality []  Cognitive Linguistic Skills []  Laryngeal/Pharyngeal Exercises   []  Vocal Loudness []  Reading Comprehension []  Swallowing Skills    []  Vocal Cord Function []  Auditory Comprehension []  Oral Motor Skills   []  Resonance []  Writing Skills [x]  Compensatory strategies    [x]  Speech Intelligibility [x]  Expressive Language []  Attention   []  Breath Support/Coord. [x]  Receptive language []  Memory   [x]  Articulation []  Safety Awareness [x] Pt educ   []  Fluency []  Word Retrieval []        Treatment Provided:  Speech-Language Treatment [53425]:   -immediate recall  -delayed recall  -complex yes/no questions  -naming given semantic cue    Patient/Caregiver  Education: [x] Review HEP      HEP/Handouts given: Naming given semantic cue    Pain Level (0-10 scale) post treatment: 0    ASSESSMENT     []   Improving appropriately and progressing toward goals  [x]   Improving slowly and progressing toward goals  []   Approximating goals/maximum potential  [x]   Continues to benefit from skilled therapy to address remaining functional deficits  []   Not progressing toward goals and plan of care will be adjusted    Patient will

## 2024-07-09 ENCOUNTER — OFFICE VISIT (OUTPATIENT)
Facility: CLINIC | Age: 64
End: 2024-07-09
Payer: COMMERCIAL

## 2024-07-09 ENCOUNTER — HOSPITAL ENCOUNTER (OUTPATIENT)
Facility: HOSPITAL | Age: 64
Setting detail: SPECIMEN
Discharge: HOME | End: 2024-07-12
Payer: COMMERCIAL

## 2024-07-09 VITALS
HEIGHT: 60 IN | WEIGHT: 150 LBS | SYSTOLIC BLOOD PRESSURE: 138 MMHG | DIASTOLIC BLOOD PRESSURE: 72 MMHG | HEART RATE: 61 BPM | OXYGEN SATURATION: 96 % | BODY MASS INDEX: 29.45 KG/M2 | RESPIRATION RATE: 16 BRPM | TEMPERATURE: 98.4 F

## 2024-07-09 DIAGNOSIS — F32.A ANXIETY AND DEPRESSION: ICD-10-CM

## 2024-07-09 DIAGNOSIS — I69.951 HEMIPLEGIA OF RIGHT DOMINANT SIDE AS LATE EFFECT OF CEREBROVASCULAR DISEASE, UNSPECIFIED CEREBROVASCULAR DISEASE TYPE, UNSPECIFIED HEMIPLEGIA TYPE (HCC): ICD-10-CM

## 2024-07-09 DIAGNOSIS — E78.2 MIXED HYPERLIPIDEMIA: ICD-10-CM

## 2024-07-09 DIAGNOSIS — F41.9 ANXIETY AND DEPRESSION: ICD-10-CM

## 2024-07-09 DIAGNOSIS — R41.9 COGNITIVE COMPLAINTS: ICD-10-CM

## 2024-07-09 DIAGNOSIS — R41.89 COGNITIVE DEFICITS: Primary | ICD-10-CM

## 2024-07-09 DIAGNOSIS — I69.30 HISTORY OF CVA WITH RESIDUAL DEFICIT: ICD-10-CM

## 2024-07-09 DIAGNOSIS — F33.1 MAJOR DEPRESSIVE DISORDER, RECURRENT, MODERATE (HCC): ICD-10-CM

## 2024-07-09 DIAGNOSIS — R47.01 APHASIA: ICD-10-CM

## 2024-07-09 DIAGNOSIS — I10 ESSENTIAL (PRIMARY) HYPERTENSION: ICD-10-CM

## 2024-07-09 DIAGNOSIS — Z72.0 TOBACCO USE: ICD-10-CM

## 2024-07-09 DIAGNOSIS — Z02.89 ENCOUNTER FOR COMPLETION OF FORM WITH PATIENT: ICD-10-CM

## 2024-07-09 LAB
ALBUMIN SERPL-MCNC: 3.8 G/DL (ref 3.4–5)
ALBUMIN/GLOB SERPL: 1.1 (ref 0.8–1.7)
ALP SERPL-CCNC: 76 U/L (ref 45–117)
ALT SERPL-CCNC: 19 U/L (ref 13–56)
ANION GAP SERPL CALC-SCNC: 6 MMOL/L (ref 3–18)
AST SERPL-CCNC: 13 U/L (ref 10–38)
BILIRUB SERPL-MCNC: 0.8 MG/DL (ref 0.2–1)
BUN SERPL-MCNC: 9 MG/DL (ref 7–18)
BUN/CREAT SERPL: 12 (ref 12–20)
CALCIUM SERPL-MCNC: 9.4 MG/DL (ref 8.5–10.1)
CHLORIDE SERPL-SCNC: 107 MMOL/L (ref 100–111)
CHOLEST SERPL-MCNC: 205 MG/DL
CO2 SERPL-SCNC: 27 MMOL/L (ref 21–32)
CREAT SERPL-MCNC: 0.73 MG/DL (ref 0.6–1.3)
FOLATE SERPL-MCNC: 5.1 NG/ML (ref 3.1–17.5)
GLOBULIN SER CALC-MCNC: 3.4 G/DL (ref 2–4)
GLUCOSE SERPL-MCNC: 90 MG/DL (ref 74–99)
HDLC SERPL-MCNC: 74 MG/DL (ref 40–60)
HDLC SERPL: 2.8 (ref 0–5)
LDLC SERPL CALC-MCNC: 112.2 MG/DL (ref 0–100)
LIPID PANEL: ABNORMAL
POTASSIUM SERPL-SCNC: 4.2 MMOL/L (ref 3.5–5.5)
PROT SERPL-MCNC: 7.2 G/DL (ref 6.4–8.2)
SODIUM SERPL-SCNC: 140 MMOL/L (ref 136–145)
T4 FREE SERPL-MCNC: 1.1 NG/DL (ref 0.7–1.5)
TRIGL SERPL-MCNC: 94 MG/DL
TSH SERPL DL<=0.05 MIU/L-ACNC: 0.58 UIU/ML (ref 0.36–3.74)
VIT B12 SERPL-MCNC: 217 PG/ML (ref 211–911)
VLDLC SERPL CALC-MCNC: 18.8 MG/DL

## 2024-07-09 PROCEDURE — 82746 ASSAY OF FOLIC ACID SERUM: CPT

## 2024-07-09 PROCEDURE — 84443 ASSAY THYROID STIM HORMONE: CPT

## 2024-07-09 PROCEDURE — 82607 VITAMIN B-12: CPT

## 2024-07-09 PROCEDURE — 80061 LIPID PANEL: CPT

## 2024-07-09 PROCEDURE — 3075F SYST BP GE 130 - 139MM HG: CPT | Performed by: STUDENT IN AN ORGANIZED HEALTH CARE EDUCATION/TRAINING PROGRAM

## 2024-07-09 PROCEDURE — 84439 ASSAY OF FREE THYROXINE: CPT

## 2024-07-09 PROCEDURE — 99214 OFFICE O/P EST MOD 30 MIN: CPT | Performed by: STUDENT IN AN ORGANIZED HEALTH CARE EDUCATION/TRAINING PROGRAM

## 2024-07-09 PROCEDURE — 36415 COLL VENOUS BLD VENIPUNCTURE: CPT

## 2024-07-09 PROCEDURE — 3078F DIAST BP <80 MM HG: CPT | Performed by: STUDENT IN AN ORGANIZED HEALTH CARE EDUCATION/TRAINING PROGRAM

## 2024-07-09 PROCEDURE — 80053 COMPREHEN METABOLIC PANEL: CPT

## 2024-07-09 SDOH — ECONOMIC STABILITY: FOOD INSECURITY: WITHIN THE PAST 12 MONTHS, THE FOOD YOU BOUGHT JUST DIDN'T LAST AND YOU DIDN'T HAVE MONEY TO GET MORE.: NEVER TRUE

## 2024-07-09 SDOH — ECONOMIC STABILITY: FOOD INSECURITY: WITHIN THE PAST 12 MONTHS, YOU WORRIED THAT YOUR FOOD WOULD RUN OUT BEFORE YOU GOT MONEY TO BUY MORE.: NEVER TRUE

## 2024-07-09 SDOH — ECONOMIC STABILITY: INCOME INSECURITY: HOW HARD IS IT FOR YOU TO PAY FOR THE VERY BASICS LIKE FOOD, HOUSING, MEDICAL CARE, AND HEATING?: NOT HARD AT ALL

## 2024-07-09 ASSESSMENT — ANXIETY QUESTIONNAIRES
4. TROUBLE RELAXING: NOT AT ALL
IF YOU CHECKED OFF ANY PROBLEMS ON THIS QUESTIONNAIRE, HOW DIFFICULT HAVE THESE PROBLEMS MADE IT FOR YOU TO DO YOUR WORK, TAKE CARE OF THINGS AT HOME, OR GET ALONG WITH OTHER PEOPLE: NOT DIFFICULT AT ALL
2. NOT BEING ABLE TO STOP OR CONTROL WORRYING: NOT AT ALL
5. BEING SO RESTLESS THAT IT IS HARD TO SIT STILL: NOT AT ALL
7. FEELING AFRAID AS IF SOMETHING AWFUL MIGHT HAPPEN: NOT AT ALL
6. BECOMING EASILY ANNOYED OR IRRITABLE: NOT AT ALL
1. FEELING NERVOUS, ANXIOUS, OR ON EDGE: NOT AT ALL
GAD7 TOTAL SCORE: 0
3. WORRYING TOO MUCH ABOUT DIFFERENT THINGS: NOT AT ALL

## 2024-07-09 ASSESSMENT — MINI MENTAL STATE EXAM
NOW WHAT WERE THE THREE OBJECTS I ASKED YOU TO REMEMBER?: 3
SAY: FOLD THE PAPER IN HALF ONCE WITH BOTH HANDS, SCORE IF PAPER IS CORRECTLY FOLDED IN HALF.: 1
SHOW: WRISTWATCH [OBJECT] ASK: WHAT IS THIS CALLED?: 1
SAY: I AM GOING TO NAME THREE OBJECTS. WHEN I AM FINISHED, I WANT YOU TO REPEAT
THEM. REMEMBER WHAT THEY ARE BECAUSE I AM GOING TO ASK YOU TO NAME THEM AGAIN IN
A FEW MINUTES.  SAY THE FOLLOWING WORDS SLOWLY AT 1-SECOND INTERVALS - BALL/ CAR/ MAN [ITERATIONS FOR REPEAT ADMINISTRATION]: 3
WHAT YEAR IS THIS?: 1
WHAT STATE [OR PROVINCE] ARE WE IN?: 1
SAY: PUT THE PAPER DOWN ON THE FLOOR, SCORE IF PAPER IS PLACED BACK ON FLOOR: 1
WHAT COUNTRY ARE WE IN?: 1
SAY: READ THE WORDS ON THE PAGE AND THEN DO WHAT IT SAYS. THEN HAND THE PERSON
THE SHEET WITH CLOSE YOUR EYES ON IT. IF THE SUBJECT READS AND DOES NOT CLOSE THEIR EYES, REPEAT UP TO THREE TIMES. SCORE ONLY IF SUBJECT CLOSES EYES.: 1
WHAT MONTH IS THIS?: 1
SAY: I WOULD LIKE YOU TO COUNT BACKWARD FROM 100 BY SEVENS: 3
WHICH SEASON IS THIS?: 1
WHAT IS TODAY'S DATE?: 1
WHAT FLOOR ARE WE ON [IN FACILITY]?/ WHAT ROOM ARE WE IN [IN HOME]?: 1
PLACE DESIGN, ERASER AND PENCIL IN FRONT OF THE PERSON.  SAY:  COPY THIS DESIGN PLEASE.  SHOW: DESIGN. ALLOW: MULTIPLE TRIES. WAIT UNTIL PERSON IS FINISHED AND HANDS IT BACK. SCORE: ONLY FOR DIAGRAM WITH 4-SIDED FIGURE BETWEEN TWO 5-SIDED FIGURES: 1
SHOW: PENCIL [OBJECT] ASK: WHAT IS THIS CALLED?: 1
WHAT IS THE NAME OF THIS BUILDING [IN FACILITY]?/WHAT IS THE STREET ADDRESS OF THIS HOUSE [IN HOME]?: 1
HAND THE PERSON A PENCIL AND PAPER. SAY: WRITE ANY COMPLETE SENTENCE ON THAT
PIECE OF PAPER. (NOTE: THE SENTENCE MUST MAKE SENSE. IGNORE SPELLING ERRORS): 1
SUM ALL MMSE QUESTIONS FOR TOTAL SCORE [OUT OF 30].: 27
WHAT DAY OF THE WEEK IS THIS?: 1
SAY: I WOULD LIKE YOU TO REPEAT THIS PHRASE AFTER ME: NO IFS, ANDS, OR BUTS.: 0
WHAT CITY/TOWN ARE WE IN?: 1
ASK THE PERSON IF HE IS RIGHT OR LEFT-HANDED. TAKE A PIECE OF PAPER AND HOLD IT UP IN
FRONT OF THE PERSON. SAY: TAKE THIS PAPER IN YOUR RIGHT/LEFT HAND (WHICHEVER IS NON-
DOMINANT), SCORE IF PAPER IS PICKED UP IN CORRECT HAND.: 1

## 2024-07-09 ASSESSMENT — PATIENT HEALTH QUESTIONNAIRE - PHQ9
8. MOVING OR SPEAKING SO SLOWLY THAT OTHER PEOPLE COULD HAVE NOTICED. OR THE OPPOSITE, BEING SO FIGETY OR RESTLESS THAT YOU HAVE BEEN MOVING AROUND A LOT MORE THAN USUAL: NOT AT ALL
SUM OF ALL RESPONSES TO PHQ QUESTIONS 1-9: 0
SUM OF ALL RESPONSES TO PHQ QUESTIONS 1-9: 0
7. TROUBLE CONCENTRATING ON THINGS, SUCH AS READING THE NEWSPAPER OR WATCHING TELEVISION: NOT AT ALL
SUM OF ALL RESPONSES TO PHQ9 QUESTIONS 1 & 2: 0
SUM OF ALL RESPONSES TO PHQ QUESTIONS 1-9: 0
9. THOUGHTS THAT YOU WOULD BE BETTER OFF DEAD, OR OF HURTING YOURSELF: NOT AT ALL
3. TROUBLE FALLING OR STAYING ASLEEP: NOT AT ALL
1. LITTLE INTEREST OR PLEASURE IN DOING THINGS: NOT AT ALL
10. IF YOU CHECKED OFF ANY PROBLEMS, HOW DIFFICULT HAVE THESE PROBLEMS MADE IT FOR YOU TO DO YOUR WORK, TAKE CARE OF THINGS AT HOME, OR GET ALONG WITH OTHER PEOPLE: NOT DIFFICULT AT ALL
SUM OF ALL RESPONSES TO PHQ QUESTIONS 1-9: 0
6. FEELING BAD ABOUT YOURSELF - OR THAT YOU ARE A FAILURE OR HAVE LET YOURSELF OR YOUR FAMILY DOWN: NOT AT ALL
5. POOR APPETITE OR OVEREATING: NOT AT ALL
4. FEELING TIRED OR HAVING LITTLE ENERGY: NOT AT ALL
2. FEELING DOWN, DEPRESSED OR HOPELESS: NOT AT ALL

## 2024-07-09 ASSESSMENT — ENCOUNTER SYMPTOMS
RHINORRHEA: 0
NAUSEA: 0
DIARRHEA: 0
BACK PAIN: 0
WHEEZING: 0
VOMITING: 0
CHEST TIGHTNESS: 0
BLOOD IN STOOL: 0
ABDOMINAL PAIN: 0
SHORTNESS OF BREATH: 0
COUGH: 0

## 2024-07-09 NOTE — PROGRESS NOTES
Mena Curran is a 64 y.o. female presenting today for Follow-up  .     Chief Complaint   Patient presents with    Follow-up       HPI:  Mena Curran presents to the office today for Follow up.    Patient has a past medical history significant for CVA with right-sided weakness, aphasia, COPD, WILTON not on CPAP, hypertension, HLD, treated hepatitis C, OA s/p bilateral knee replacement, anxiety/depression.    Patient was  admitted to [facility] in 8/23 with an acute stroke causing right-sided weakness.  She had an acute occlusion of the left M1 underwent thrombolysis.  She was noted to have a subarachnoid hemorrhage postprocedure.  Patient had impaired mobility/ADLs and was admitted to acute rehab.  Patient has slurred speech, word finding difficulty and right-sided weakness.  Patient has been undergoing PT/OT and ST.  She has had improvement in the right-sided weakness but still has word finding difficulty.  Patient is now following with neurology.  Patient is going to speech therapy-her right-sided weakness has improved but she continues to have apraxia and aphasia with cognitive issues.    Speech therapy notes reviewed-patient continues to struggle with word repetition, word selection.  Patient reports she has had several incidents when due to her speech people think she is 'dumb or stupid'.  She is very tearful talking about this.    TSH, vitamin B12 and folate have been ordered and are pending.    HTN: Patient was switched over to losartan last visit due to a cough with improvement.    HLD: Lipid panel in 8/23 showed , triglyceride 96, HDL 37, total cholesterol 207.   Repeat lipid panel in 11/23 showed .2, HDL 60, triglycerides 99.  Dose of Lipitor was increased recently.    Patient reports that she was smoking since 50 years - 3 pack/day.  She quit smoking when she was admitted to the hospital for the CVA.  She had intermittently began smoking cigarettes again but it has not

## 2024-07-09 NOTE — PROGRESS NOTES
\"Have you been to the ER, urgent care clinic since your last visit?  Hospitalized since your last visit?\"    NO    “Have you seen or consulted any other health care providers outside of [facility] since your last visit?”    NO    Have you had a mammogram?”   NO    Date of last Mammogram: 1/4/2022             Click Here for Release of Records Request

## 2024-07-10 ENCOUNTER — HOSPITAL ENCOUNTER (OUTPATIENT)
Facility: HOSPITAL | Age: 64
Setting detail: RECURRING SERIES
Discharge: HOME | End: 2024-07-13
Payer: COMMERCIAL

## 2024-07-10 PROCEDURE — 92507 TX SP LANG VOICE COMM INDIV: CPT

## 2024-07-10 NOTE — PROGRESS NOTES
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 Pt reported that she answered questions well and that MD noted improvement. She also reported that she and her sister are trying to get insurance coverage and benefits to continue with therapy.     OBJECTIVE  Treatment provided includes:  Increase/Improve:  []  Voice Quality []  Cognitive Linguistic Skills []  Laryngeal/Pharyngeal Exercises   []  Vocal Loudness []  Reading Comprehension []  Swallowing Skills    []  Vocal Cord Function []  Auditory Comprehension []  Oral Motor Skills   []  Resonance []  Writing Skills [x]  Compensatory strategies    [x]  Speech Intelligibility [x]  Expressive Language []  Attention   []  Breath Support/Coord. []  Receptive language []  Memory   [x]  Articulation []  Safety Awareness [x] Pt educ   [x]  Fluency []  Word Retrieval []        Treatment Provided:  Speech-Language Treatment [75128]:   -naming given semantic cues  -Sound Production Treatment (SPT)     Patient/Caregiver  Education: [x] Review HEP      HEP/Handouts given: Multisyllabic word list    Pain Level (0-10 scale) post treatment: 0    ASSESSMENT     []   Improving appropriately and progressing toward goals  [x]   Improving slowly and progressing toward goals  []   Approximating goals/maximum potential  [x]   Continues to benefit from skilled therapy to address remaining functional deficits  []   Not progressing toward

## 2024-07-12 ENCOUNTER — APPOINTMENT (OUTPATIENT)
Facility: HOSPITAL | Age: 64
End: 2024-07-12
Payer: COMMERCIAL

## 2024-07-14 DIAGNOSIS — F32.A ANXIETY AND DEPRESSION: ICD-10-CM

## 2024-07-14 DIAGNOSIS — F41.9 ANXIETY AND DEPRESSION: ICD-10-CM

## 2024-07-17 ENCOUNTER — HOSPITAL ENCOUNTER (OUTPATIENT)
Facility: HOSPITAL | Age: 64
Setting detail: RECURRING SERIES
Discharge: HOME | End: 2024-07-20
Payer: COMMERCIAL

## 2024-07-17 PROCEDURE — 92507 TX SP LANG VOICE COMM INDIV: CPT

## 2024-07-17 NOTE — PROGRESS NOTES
date.      6) Pt will recall x3 memory comp strategies (WRAP, chunking/grouping, keywords, etc) and apply them as deemed appropriate in order to complete delayed recall tasks (novel information and 4+items, and picture retention tasks, verbally presented 3-4 sentence paragraphs, etc.) presented verbally/visually given a 10-15 minute delay with 80% acc given modA to improve STM for safety and independence.  Current: Pt was provided small notebook for daily activity log, educ pt re: using daily activity log with at least 2 activities per day including summary of movies/TV shows she watched or summary of MD visits.     PLAN  [x]  Continue plan of care  []  Modify Goals/Treatment Plan      []  Discharge due to:  [] Other:    Jeny Rodriguez M.A., CCC-SLP  Speech Language Pathologist   7/17/2024  7:56 AM    Future Appointments   Date Time Provider Department Center   7/17/2024  3:20 PM Jeny Rodriguez SLP [id number]   7/24/2024  8:00 AM Jeny Rodriguez, SLP [facility]   7/31/2024  9:20 AM Jeny Rodriguez SLP [id number]   8/7/2024  9:20 AM Jeny Rodriguez, SLP [facility]   8/14/2024  1:00 PM Aidee Booth APRN - NP [facility]   9/18/2024  9:00 AM Catrina Hardin MD [facility]   11/13/2024  9:00 AM Catrina Hardin MD [facility]

## 2024-07-24 ENCOUNTER — HOSPITAL ENCOUNTER (OUTPATIENT)
Facility: HOSPITAL | Age: 64
Setting detail: RECURRING SERIES
Discharge: HOME | End: 2024-07-27
Payer: COMMERCIAL

## 2024-07-24 PROCEDURE — 92507 TX SP LANG VOICE COMM INDIV: CPT

## 2024-07-24 NOTE — PROGRESS NOTES
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     OBJECTIVE  Treatment provided includes:  Increase/Improve:  []  Voice Quality []  Cognitive Linguistic Skills []  Laryngeal/Pharyngeal Exercises   []  Vocal Loudness []  Reading Comprehension []  Swallowing Skills    []  Vocal Cord Function []  Auditory Comprehension []  Oral Motor Skills   []  Resonance []  Writing Skills [x]  Compensatory strategies    [x]  Speech Intelligibility [x]  Expressive Language []  Attention   []  Breath Support/Coord. [x]  Receptive language []  Memory   [x]  Articulation []  Safety Awareness [x] Pt educ   [x]  Fluency []  Word Retrieval []        Treatment Provided:  Speech-Language Treatment [65781]:   -Sound production treatment (SPT)  -Delayed recall  -Immediate recall  -Complex yes/no questions  -Wh-questions  -Speech intelligibility compensatory strategies  -Convergent naming:    Patient/Caregiver  Education: [x] Review HEP      HEP/Handouts given: Memory list    Pain Level (0-10 scale) post treatment: 0    ASSESSMENT     [x]   Improving appropriately and progressing toward goals  []   Improving slowly and progressing toward goals  []   Approximating goals/maximum potential  [x]   Continues to benefit from skilled therapy to address remaining functional deficits  []   Not progressing toward goals and plan of care will be adjusted    Patient will continue to benefit from skilled therapy to

## 2024-07-31 ENCOUNTER — APPOINTMENT (OUTPATIENT)
Facility: HOSPITAL | Age: 64
End: 2024-07-31
Payer: COMMERCIAL

## 2024-07-31 ENCOUNTER — HOSPITAL ENCOUNTER (OUTPATIENT)
Facility: HOSPITAL | Age: 64
Setting detail: OBSERVATION
LOS: 1 days | Discharge: HOME | End: 2024-08-02
Attending: INTERNAL MEDICINE | Admitting: INTERNAL MEDICINE
Payer: COMMERCIAL

## 2024-07-31 DIAGNOSIS — R51.9 ACUTE NONINTRACTABLE HEADACHE, UNSPECIFIED HEADACHE TYPE: ICD-10-CM

## 2024-07-31 DIAGNOSIS — I63.9 CEREBROVASCULAR ACCIDENT (CVA), UNSPECIFIED MECHANISM (HCC): Primary | ICD-10-CM

## 2024-07-31 LAB
ALBUMIN SERPL-MCNC: 3.7 G/DL (ref 3.4–5)
ALBUMIN/GLOB SERPL: 1.3 (ref 0.8–1.7)
ALP SERPL-CCNC: 74 U/L (ref 45–117)
ALT SERPL-CCNC: 18 U/L (ref 13–56)
ANION GAP SERPL CALC-SCNC: 5 MMOL/L (ref 3–18)
AST SERPL-CCNC: 13 U/L (ref 10–38)
BASOPHILS # BLD: 0.1 K/UL (ref 0–0.1)
BASOPHILS NFR BLD: 1 % (ref 0–2)
BILIRUB SERPL-MCNC: 0.5 MG/DL (ref 0.2–1)
BUN SERPL-MCNC: 12 MG/DL (ref 7–18)
BUN/CREAT SERPL: 16 (ref 12–20)
CALCIUM SERPL-MCNC: 8.8 MG/DL (ref 8.5–10.1)
CHLORIDE SERPL-SCNC: 109 MMOL/L (ref 100–111)
CO2 SERPL-SCNC: 27 MMOL/L (ref 21–32)
CREAT SERPL-MCNC: 0.74 MG/DL (ref 0.6–1.3)
DIFFERENTIAL METHOD BLD: NORMAL
EOSINOPHIL # BLD: 0.2 K/UL (ref 0–0.4)
EOSINOPHIL NFR BLD: 3 % (ref 0–5)
ERYTHROCYTE [DISTWIDTH] IN BLOOD BY AUTOMATED COUNT: 12.8 % (ref 11.6–14.5)
ERYTHROCYTE [SEDIMENTATION RATE] IN BLOOD: 10 MM/HR (ref 0–30)
FLUAV RNA SPEC QL NAA+PROBE: NOT DETECTED
FLUBV RNA SPEC QL NAA+PROBE: NOT DETECTED
GLOBULIN SER CALC-MCNC: 2.8 G/DL (ref 2–4)
GLUCOSE SERPL-MCNC: 105 MG/DL (ref 74–99)
HCT VFR BLD AUTO: 42.1 % (ref 35–45)
HGB BLD-MCNC: 14 G/DL (ref 12–16)
IMM GRANULOCYTES # BLD AUTO: 0 K/UL (ref 0–0.04)
IMM GRANULOCYTES NFR BLD AUTO: 0 % (ref 0–0.5)
LYMPHOCYTES # BLD: 2.7 K/UL (ref 0.9–3.6)
LYMPHOCYTES NFR BLD: 46 % (ref 21–52)
MAGNESIUM SERPL-MCNC: 2.2 MG/DL (ref 1.6–2.6)
MCH RBC QN AUTO: 29.4 PG (ref 24–34)
MCHC RBC AUTO-ENTMCNC: 33.3 G/DL (ref 31–37)
MCV RBC AUTO: 88.3 FL (ref 78–100)
MONOCYTES # BLD: 0.5 K/UL (ref 0.05–1.2)
MONOCYTES NFR BLD: 9 % (ref 3–10)
NEUTS SEG # BLD: 2.5 K/UL (ref 1.8–8)
NEUTS SEG NFR BLD: 42 % (ref 40–73)
NRBC # BLD: 0 K/UL (ref 0–0.01)
NRBC BLD-RTO: 0 PER 100 WBC
PLATELET # BLD AUTO: 193 K/UL (ref 135–420)
PMV BLD AUTO: 10.9 FL (ref 9.2–11.8)
POTASSIUM SERPL-SCNC: 3.9 MMOL/L (ref 3.5–5.5)
PROT SERPL-MCNC: 6.5 G/DL (ref 6.4–8.2)
RBC # BLD AUTO: 4.77 M/UL (ref 4.2–5.3)
SARS-COV-2 RNA RESP QL NAA+PROBE: NOT DETECTED
SODIUM SERPL-SCNC: 141 MMOL/L (ref 136–145)
WBC # BLD AUTO: 6 K/UL (ref 4.6–13.2)

## 2024-07-31 PROCEDURE — 85652 RBC SED RATE AUTOMATED: CPT

## 2024-07-31 PROCEDURE — 96375 TX/PRO/DX INJ NEW DRUG ADDON: CPT

## 2024-07-31 PROCEDURE — 6360000002 HC RX W HCPCS: Performed by: PHYSICIAN ASSISTANT

## 2024-07-31 PROCEDURE — 99285 EMERGENCY DEPT VISIT HI MDM: CPT

## 2024-07-31 PROCEDURE — 70450 CT HEAD/BRAIN W/O DYE: CPT

## 2024-07-31 PROCEDURE — 87636 SARSCOV2 & INF A&B AMP PRB: CPT

## 2024-07-31 PROCEDURE — 96374 THER/PROPH/DIAG INJ IV PUSH: CPT

## 2024-07-31 PROCEDURE — 70498 CT ANGIOGRAPHY NECK: CPT

## 2024-07-31 PROCEDURE — 85025 COMPLETE CBC W/AUTO DIFF WBC: CPT

## 2024-07-31 PROCEDURE — 80053 COMPREHEN METABOLIC PANEL: CPT

## 2024-07-31 PROCEDURE — 6360000004 HC RX CONTRAST MEDICATION: Performed by: PHYSICIAN ASSISTANT

## 2024-07-31 PROCEDURE — 83735 ASSAY OF MAGNESIUM: CPT

## 2024-07-31 PROCEDURE — 2580000003 HC RX 258: Performed by: PHYSICIAN ASSISTANT

## 2024-07-31 RX ORDER — 0.9 % SODIUM CHLORIDE 0.9 %
1000 INTRAVENOUS SOLUTION INTRAVENOUS ONCE
Status: COMPLETED | OUTPATIENT
Start: 2024-07-31 | End: 2024-07-31

## 2024-07-31 RX ORDER — PROCHLORPERAZINE EDISYLATE 5 MG/ML
5 INJECTION INTRAMUSCULAR; INTRAVENOUS
Status: COMPLETED | OUTPATIENT
Start: 2024-07-31 | End: 2024-07-31

## 2024-07-31 RX ORDER — DIPHENHYDRAMINE HYDROCHLORIDE 50 MG/ML
25 INJECTION INTRAMUSCULAR; INTRAVENOUS
Status: COMPLETED | OUTPATIENT
Start: 2024-07-31 | End: 2024-07-31

## 2024-07-31 RX ADMIN — PROCHLORPERAZINE EDISYLATE 5 MG: 5 INJECTION INTRAMUSCULAR; INTRAVENOUS at 21:36

## 2024-07-31 RX ADMIN — DIPHENHYDRAMINE HYDROCHLORIDE 25 MG: 50 INJECTION INTRAMUSCULAR; INTRAVENOUS at 21:36

## 2024-07-31 RX ADMIN — SODIUM CHLORIDE 1000 ML: 9 INJECTION, SOLUTION INTRAVENOUS at 21:42

## 2024-07-31 ASSESSMENT — ENCOUNTER SYMPTOMS
WHEEZING: 0
NAUSEA: 0
BACK PAIN: 0
RHINORRHEA: 1
COUGH: 0
ABDOMINAL PAIN: 0
SHORTNESS OF BREATH: 0
EYE REDNESS: 0
SORE THROAT: 0
STRIDOR: 0
EYE DISCHARGE: 0
VOMITING: 0

## 2024-07-31 ASSESSMENT — PAIN - FUNCTIONAL ASSESSMENT: PAIN_FUNCTIONAL_ASSESSMENT: 0-10

## 2024-08-01 ENCOUNTER — APPOINTMENT (OUTPATIENT)
Facility: HOSPITAL | Age: 64
End: 2024-08-01
Payer: COMMERCIAL

## 2024-08-01 PROBLEM — R51.9 HEADACHE: Status: ACTIVE | Noted: 2024-08-01

## 2024-08-01 PROBLEM — Z86.73 REMOTE HISTORY OF STROKE: Status: ACTIVE | Noted: 2024-08-01

## 2024-08-01 PROCEDURE — A9577 INJ MULTIHANCE: HCPCS | Performed by: PHYSICIAN ASSISTANT

## 2024-08-01 PROCEDURE — 70553 MRI BRAIN STEM W/O & W/DYE: CPT

## 2024-08-01 PROCEDURE — 94761 N-INVAS EAR/PLS OXIMETRY MLT: CPT

## 2024-08-01 PROCEDURE — 6370000000 HC RX 637 (ALT 250 FOR IP): Performed by: INTERNAL MEDICINE

## 2024-08-01 PROCEDURE — 2580000003 HC RX 258: Performed by: INTERNAL MEDICINE

## 2024-08-01 PROCEDURE — 96372 THER/PROPH/DIAG INJ SC/IM: CPT

## 2024-08-01 PROCEDURE — 6360000004 HC RX CONTRAST MEDICATION: Performed by: PHYSICIAN ASSISTANT

## 2024-08-01 PROCEDURE — 6360000002 HC RX W HCPCS: Performed by: INTERNAL MEDICINE

## 2024-08-01 PROCEDURE — G0378 HOSPITAL OBSERVATION PER HR: HCPCS

## 2024-08-01 RX ORDER — POLYETHYLENE GLYCOL 3350 17 G/17G
17 POWDER, FOR SOLUTION ORAL DAILY PRN
Status: DISCONTINUED | OUTPATIENT
Start: 2024-08-01 | End: 2024-08-02 | Stop reason: HOSPADM

## 2024-08-01 RX ORDER — ONDANSETRON 4 MG/1
4 TABLET, ORALLY DISINTEGRATING ORAL EVERY 8 HOURS PRN
Status: DISCONTINUED | OUTPATIENT
Start: 2024-08-01 | End: 2024-08-02 | Stop reason: HOSPADM

## 2024-08-01 RX ORDER — ACETAMINOPHEN 325 MG/1
650 TABLET ORAL EVERY 6 HOURS PRN
Status: DISCONTINUED | OUTPATIENT
Start: 2024-08-01 | End: 2024-08-02 | Stop reason: HOSPADM

## 2024-08-01 RX ORDER — POTASSIUM CHLORIDE 7.45 MG/ML
10 INJECTION INTRAVENOUS PRN
Status: DISCONTINUED | OUTPATIENT
Start: 2024-08-01 | End: 2024-08-02 | Stop reason: HOSPADM

## 2024-08-01 RX ORDER — ACETAMINOPHEN 650 MG/1
650 SUPPOSITORY RECTAL EVERY 6 HOURS PRN
Status: DISCONTINUED | OUTPATIENT
Start: 2024-08-01 | End: 2024-08-02 | Stop reason: HOSPADM

## 2024-08-01 RX ORDER — ASPIRIN 325 MG
325 TABLET ORAL DAILY
Status: DISCONTINUED | OUTPATIENT
Start: 2024-08-01 | End: 2024-08-01

## 2024-08-01 RX ORDER — UREA 10 %
500 LOTION (ML) TOPICAL DAILY
Status: DISCONTINUED | OUTPATIENT
Start: 2024-08-01 | End: 2024-08-02 | Stop reason: HOSPADM

## 2024-08-01 RX ORDER — ENOXAPARIN SODIUM 100 MG/ML
40 INJECTION SUBCUTANEOUS DAILY
Status: DISCONTINUED | OUTPATIENT
Start: 2024-08-01 | End: 2024-08-02 | Stop reason: HOSPADM

## 2024-08-01 RX ORDER — POTASSIUM CHLORIDE 20 MEQ/1
40 TABLET, EXTENDED RELEASE ORAL PRN
Status: DISCONTINUED | OUTPATIENT
Start: 2024-08-01 | End: 2024-08-02 | Stop reason: HOSPADM

## 2024-08-01 RX ORDER — LOSARTAN POTASSIUM 25 MG/1
25 TABLET ORAL DAILY
Status: DISCONTINUED | OUTPATIENT
Start: 2024-08-01 | End: 2024-08-02 | Stop reason: HOSPADM

## 2024-08-01 RX ORDER — ONDANSETRON 2 MG/ML
4 INJECTION INTRAMUSCULAR; INTRAVENOUS EVERY 6 HOURS PRN
Status: DISCONTINUED | OUTPATIENT
Start: 2024-08-01 | End: 2024-08-02 | Stop reason: HOSPADM

## 2024-08-01 RX ORDER — SODIUM CHLORIDE 9 MG/ML
INJECTION, SOLUTION INTRAVENOUS PRN
Status: DISCONTINUED | OUTPATIENT
Start: 2024-08-01 | End: 2024-08-02 | Stop reason: HOSPADM

## 2024-08-01 RX ORDER — ATORVASTATIN CALCIUM 40 MG/1
80 TABLET, FILM COATED ORAL NIGHTLY
Status: DISCONTINUED | OUTPATIENT
Start: 2024-08-01 | End: 2024-08-02 | Stop reason: HOSPADM

## 2024-08-01 RX ORDER — SODIUM CHLORIDE 0.9 % (FLUSH) 0.9 %
5-40 SYRINGE (ML) INJECTION PRN
Status: DISCONTINUED | OUTPATIENT
Start: 2024-08-01 | End: 2024-08-02 | Stop reason: HOSPADM

## 2024-08-01 RX ORDER — ASPIRIN 81 MG/1
81 TABLET, CHEWABLE ORAL DAILY
Status: DISCONTINUED | OUTPATIENT
Start: 2024-08-01 | End: 2024-08-02 | Stop reason: HOSPADM

## 2024-08-01 RX ORDER — SODIUM CHLORIDE 0.9 % (FLUSH) 0.9 %
5-40 SYRINGE (ML) INJECTION EVERY 12 HOURS SCHEDULED
Status: DISCONTINUED | OUTPATIENT
Start: 2024-08-01 | End: 2024-08-02 | Stop reason: HOSPADM

## 2024-08-01 RX ORDER — MAGNESIUM SULFATE IN WATER 40 MG/ML
2000 INJECTION, SOLUTION INTRAVENOUS PRN
Status: DISCONTINUED | OUTPATIENT
Start: 2024-08-01 | End: 2024-08-02 | Stop reason: HOSPADM

## 2024-08-01 RX ADMIN — ACETAMINOPHEN 325MG 650 MG: 325 TABLET ORAL at 17:27

## 2024-08-01 RX ADMIN — SODIUM CHLORIDE, PRESERVATIVE FREE 10 ML: 5 INJECTION INTRAVENOUS at 20:37

## 2024-08-01 RX ADMIN — Medication 500 MCG: at 09:31

## 2024-08-01 RX ADMIN — ENOXAPARIN SODIUM 40 MG: 100 INJECTION SUBCUTANEOUS at 09:31

## 2024-08-01 RX ADMIN — SERTRALINE 100 MG: 50 TABLET, FILM COATED ORAL at 09:30

## 2024-08-01 RX ADMIN — ACETAMINOPHEN 325MG 650 MG: 325 TABLET ORAL at 09:35

## 2024-08-01 RX ADMIN — GADOBENATE DIMEGLUMINE 15 ML: 529 INJECTION, SOLUTION INTRAVENOUS at 11:07

## 2024-08-01 RX ADMIN — ASPIRIN 81 MG CHEWABLE TABLET 81 MG: 81 TABLET CHEWABLE at 09:30

## 2024-08-01 RX ADMIN — LOSARTAN POTASSIUM 25 MG: 25 TABLET, FILM COATED ORAL at 09:31

## 2024-08-01 RX ADMIN — IOPAMIDOL 90 ML: 755 INJECTION, SOLUTION INTRAVENOUS at 00:17

## 2024-08-01 RX ADMIN — SODIUM CHLORIDE, PRESERVATIVE FREE 10 ML: 5 INJECTION INTRAVENOUS at 09:31

## 2024-08-01 RX ADMIN — ATORVASTATIN CALCIUM 80 MG: 40 TABLET, FILM COATED ORAL at 20:37

## 2024-08-01 ASSESSMENT — PAIN DESCRIPTION - PAIN TYPE
TYPE: ACUTE PAIN
TYPE: ACUTE PAIN

## 2024-08-01 ASSESSMENT — PAIN DESCRIPTION - LOCATION
LOCATION: HEAD
LOCATION: HEAD

## 2024-08-01 ASSESSMENT — PAIN DESCRIPTION - DESCRIPTORS
DESCRIPTORS: ACHING
DESCRIPTORS: ACHING

## 2024-08-01 ASSESSMENT — PAIN DESCRIPTION - FREQUENCY
FREQUENCY: INTERMITTENT
FREQUENCY: INTERMITTENT

## 2024-08-01 ASSESSMENT — PAIN DESCRIPTION - ORIENTATION
ORIENTATION: LEFT;ANTERIOR
ORIENTATION: LEFT;ANTERIOR

## 2024-08-01 ASSESSMENT — PAIN - FUNCTIONAL ASSESSMENT
PAIN_FUNCTIONAL_ASSESSMENT: ACTIVITIES ARE NOT PREVENTED
PAIN_FUNCTIONAL_ASSESSMENT: ACTIVITIES ARE NOT PREVENTED

## 2024-08-01 ASSESSMENT — PAIN DESCRIPTION - ONSET
ONSET: GRADUAL
ONSET: GRADUAL

## 2024-08-01 ASSESSMENT — PAIN SCALES - GENERAL
PAINLEVEL_OUTOF10: 5
PAINLEVEL_OUTOF10: 5
PAINLEVEL_OUTOF10: 0
PAINLEVEL_OUTOF10: 0

## 2024-08-01 NOTE — PLAN OF CARE
Problem: Safety - Adult  Goal: Free from fall injury  8/1/2024 1352 by Lisa Solano, RN  Outcome: Progressing  8/1/2024 0349 by Gricelda Simmons, RN  Outcome: Progressing     Problem: Pain  Goal: Verbalizes/displays adequate comfort level or baseline comfort level  Outcome: Progressing

## 2024-08-01 NOTE — ED PROVIDER NOTES
EMERGENCY DEPARTMENT HISTORY AND PHYSICAL EXAM    Date: 7/31/2024  Patient Name: Mena Curran    History of Presenting Illness     Chief Complaint   Patient presents with    Headache         History Provided By: patient     Chief Complaint: headache  Duration: 4 days  Timing:  acute. intermittent  Location: R temple and R periorbital   Quality: sharp   Severity: moderate  Modifying Factors: took tylenol with some improvement   Associated Symptoms: congestion and runny nose       Additional History (Context): Mena Curran is a 64 y.o. female with PMH htn and stroke with residual speech deficit (expressive aphasia) who presents with c/o 4 days of intermittent R temporal and R periorbital region described as sharp. Pt states she has taken tylenol with some improvement in her pain but states her headache continues to return after the medication wears off. She also reports some congestion and runny nose. No known sick exposures. Denies vision changes.  No other complaints reported.     PCP: Catrina Hardin MD    Current Facility-Administered Medications   Medication Dose Route Frequency Provider Last Rate Last Admin    aspirin tablet 325 mg  325 mg Oral Daily Tiffanie Hernandez, PASadiqC         Current Outpatient Medications   Medication Sig Dispense Refill    sertraline (ZOLOFT) 50 MG tablet take 2 & 1/2 tablets by mouth daily 75 tablet 2    vitamin B-12 (CYANOCOBALAMIN) 500 MCG tablet Take 1 tablet by mouth daily 30 tablet 6    atorvastatin (LIPITOR) 80 MG tablet Take 1 tablet by mouth at bedtime 90 tablet 1    aspirin 81 MG chewable tablet Take 1 tablet by mouth daily 90 tablet 1    losartan (COZAAR) 25 MG tablet Take 1 tablet by mouth daily 90 tablet 1       Past History     Past Medical History:  Past Medical History:   Diagnosis Date    Arthritis     Hepatitis C     treated     Hypercholesterolemia     Hypertension     no meds     Sleep apnea     no CPAP        Past Surgical History:  Past Surgical History:  detected NOTD     CBC with Auto Differential    Collection Time: 07/31/24  9:10 PM   Result Value Ref Range    WBC 6.0 4.6 - 13.2 K/uL    RBC 4.77 4.20 - 5.30 M/uL    Hemoglobin 14.0 12.0 - 16.0 g/dL    Hematocrit 42.1 35.0 - 45.0 %    MCV 88.3 78.0 - 100.0 FL    MCH 29.4 24.0 - 34.0 PG    MCHC 33.3 31.0 - 37.0 g/dL    RDW 12.8 11.6 - 14.5 %    Platelets 193 135 - 420 K/uL    MPV 10.9 9.2 - 11.8 FL    Nucleated RBCs 0.0 0  WBC    nRBC 0.00 0.00 - 0.01 K/uL    Neutrophils % 42 40 - 73 %    Lymphocytes % 46 21 - 52 %    Monocytes % 9 3 - 10 %    Eosinophils % 3 0 - 5 %    Basophils % 1 0 - 2 %    Immature Granulocytes % 0 0.0 - 0.5 %    Neutrophils Absolute 2.5 1.8 - 8.0 K/UL    Lymphocytes Absolute 2.7 0.9 - 3.6 K/UL    Monocytes Absolute 0.5 0.05 - 1.2 K/UL    Eosinophils Absolute 0.2 0.0 - 0.4 K/UL    Basophils Absolute 0.1 0.0 - 0.1 K/UL    Immature Granulocytes Absolute 0.0 0.00 - 0.04 K/UL    Differential Type AUTOMATED     Comprehensive Metabolic Panel    Collection Time: 07/31/24  9:10 PM   Result Value Ref Range    Sodium 141 136 - 145 mmol/L    Potassium 3.9 3.5 - 5.5 mmol/L    Chloride 109 100 - 111 mmol/L    CO2 27 21 - 32 mmol/L    Anion Gap 5 3.0 - 18 mmol/L    Glucose 105 (H) 74 - 99 mg/dL    BUN 12 7.0 - 18 MG/DL    Creatinine 0.74 0.6 - 1.3 MG/DL    BUN/Creatinine Ratio 16 12 - 20      Est, Glom Filt Rate >90 >60 ml/min/1.73m2    Calcium 8.8 8.5 - 10.1 MG/DL    Total Bilirubin 0.5 0.2 - 1.0 MG/DL    ALT 18 13 - 56 U/L    AST 13 10 - 38 U/L    Alk Phosphatase 74 45 - 117 U/L    Total Protein 6.5 6.4 - 8.2 g/dL    Albumin 3.7 3.4 - 5.0 g/dL    Globulin 2.8 2.0 - 4.0 g/dL    Albumin/Globulin Ratio 1.3 0.8 - 1.7     Magnesium    Collection Time: 07/31/24  9:10 PM   Result Value Ref Range    Magnesium 2.2 1.6 - 2.6 mg/dL   Sedimentation Rate    Collection Time: 07/31/24  9:10 PM   Result Value Ref Range    Sed Rate, Automated 10 0 - 30 mm/hr       Radiologic Studies -   CTA HEAD NECK W CONTRAST

## 2024-08-01 NOTE — PROGRESS NOTES
MRI negative for acute CVA, patient is at baseline, anticipate discharge home in a.m. if no further interventions, will discuss with neurology and confirm.

## 2024-08-01 NOTE — PROGRESS NOTES
Spiritual Health Assessment/Progress Note  [facility]    Spiritual/Emotional Needs,  ,  ,      Name: Mena Curran MRN: [id number]    Age: 64 y.o.     Sex: female   Language: English   Caodaism: Yazidism   Headache     Date: 8/1/2024            Total Time Calculated: 7 min              Spiritual Assessment began in [facility] 4 [facility] MEDICAL        Referral/Consult From: Rounding   Encounter Overview/Reason: Spiritual/Emotional Needs  Service Provided For: Patient    Carmen, Belief, Meaning:   Patient has beliefs or practices that help with coping during difficult times  Family/Friends No family/friends present      Importance and Influence:  Patient has spiritual/personal beliefs that influence decisions regarding their health  Family/Friends no family/friends present    Community:  Patient feels well-supported. Support system includes: Children and Extended family  Family/Friends Other: unknown    Assessment and Plan of Care:     Patient Interventions include: Affirmed coping skills/support systems  Family/Friends Interventions include: Affirmed coping skills/support systems    Patient Plan of Care: No spiritual needs identified for follow-up  Family/Friends Plan of Care: No spiritual needs identified for follow-up    Electronically signed by MELANY Moffett on 8/1/2024 at 3:40 PM

## 2024-08-01 NOTE — PLAN OF CARE
Problem: Discharge Planning  Goal: Discharge to home or other facility with appropriate resources  Outcome: Progressing  Flowsheets (Taken 8/1/2024 0327)  Discharge to home or other facility with appropriate resources: Identify barriers to discharge with patient and caregiver     Problem: Safety - Adult  Goal: Free from fall injury  Outcome: Progressing

## 2024-08-01 NOTE — ED NOTES
TRANSFER - OUT REPORT:    Verbal report given to violet campo rn on Mena Curran  being transferred to [facility] for routine progression of patient care       Report consisted of patient's Situation, Background, Assessment and   Recommendations(SBAR).     Information from the following report(s) Nurse Handoff Report, ED Encounter Summary, ED SBAR, MAR, Recent Results, and Neuro Assessment was reviewed with the receiving nurse.    Perdido Fall Assessment:    Presents to emergency department  because of falls (Syncope, seizure, or loss of consciousness): No  Age > 70: No  Altered Mental Status, Intoxication with alcohol or substance confusion (Disorientation, impaired judgment, poor safety awaremess, or inability to follow instructions): No  Impaired Mobility: Ambulates or transfers with assistive devices or assistance; Unable to ambulate or transer.: No  Nursing Judgement: No          Lines:   Peripheral IV 07/31/24 Left Antecubital (Active)        Opportunity for questions and clarification was provided.      Patient transported with:  transport

## 2024-08-01 NOTE — CARE COORDINATION
08/01/24 0845   Service Assessment   Patient Orientation Alert and Oriented   Cognition Alert   History Provided By Patient   Primary Caregiver Self   Support Systems Family Members   Patient's Healthcare Decision Maker is: Legal Next of Kin   PCP Verified by CM Yes   Last Visit to PCP Within last 3 months   Prior Functional Level Independent in ADLs/IADLs   Current Functional Level Independent in ADLs/IADLs   Can patient return to prior living arrangement Yes   Ability to make needs known: Good   Family able to assist with home care needs: Yes   Financial Resources None   Social/Functional History   Lives With Family  (Sister, Charley lFores.)   Type of Home House   Home Layout One level   Home Access Level entry   Bathroom Shower/Tub Tub/Shower unit   Bathroom Toilet Standard   Bathroom Accessibility Accessible   Home Equipment None   Receives Help From Family   ADL Assistance Independent   Homemaking Assistance Independent   Ambulation Assistance Independent   Transfer Assistance Independent   Active  No   Patient's  Info Sister and family to assist with transport.   Discharge Planning   Type of Residence House   Living Arrangements Family Members   Current Services Prior To Admission None   Potential Assistance Needed N/A   DME Ordered? No   Type of Home Care Services None   Patient expects to be discharged to: House   One/Two Story Residence One story   Services At/After Discharge   Transition of Care Consult (CM Consult) N/A   Services At/After Discharge None    Resource Information Provided? No   Mode of Transport at Discharge Other (see comment)  (Sister to provide transport.)   Confirm Follow Up Transport Self   Condition of Participation: Discharge Planning   The Plan for Transition of Care is related to the following treatment goals: Pt to return home with additonal services as accepted.   The Patient and/or Patient Representative was provided with a Choice of Provider? Patient   The

## 2024-08-01 NOTE — PROGRESS NOTES
.7:10 Bedside and Verbal shift change report given to Lisa (oncoming nurse) by Gricelda (offgoing nurse). Report included the following information Nurse Handoff Report.      9:35 AM meds given, assessment completed   12:00 no change in patient condition, no distress noted   -- PM meds given, no distress noted, patient resting in bed with call bell in reach  Bedside and Verbal shift change report given to . (oncoming nurse) by Lisa  (offgoing nurse). Report included the following information Nurse Handoff Report.

## 2024-08-01 NOTE — PROGRESS NOTES
Patient seen evaluated, lying in bed, no acute distress.  Patient admitted by my colleague earlier this morning.    64-year-old female complains of left-sided frontal and temporal headache.  She was noted to have abnormal CT of the head suggesting interval new large MCA infarct with a late subacute to chronic appearance.  MRI brain currently pending.  Appreciate neurology input.  Echocardiogram.  Continue aspirin and statin therapy.  Patient is a chronic smoker, continue bronchodilators as needed, patient has been counseled to stop smoking.  History of depression/anxiety-continue Zoloft.  Further treatment plan as outlined in H&P, will continue to follow.

## 2024-08-01 NOTE — CONSULTS
Consult Note            Date:8/1/2024        Patient Name:Mena Curran     YOB: 1960     Age:64 y.o.    Neurology Consult    Chief Complaint     Chief Complaint   Patient presents with    Headache          History Obtained From   Patient and daughter    History of Present Illness   Chief complaint  Headache since the start of the week    History of present illness    - Headache started at the beginning of the week, located on the left frontal part of the head  - No nausea, vomiting, dizziness, new weakness, or vision changes  - Speech has improved since the stroke last year, residual expressive aphasia, had large left mca hemorrhagic infarct 8/2023, requiring surgical decompression    Past medical history  - History of stroke last year, mainly affecting speech  - Heavy smoker at the time of the stroke  - Poor health management and hypertension likely contributed to the stroke  Per patient and daughter  Past surgical history  Underwent surgery for a hemorrhagic stroke last year    Social history  - Was a heavy smoker at the time of the stroke  - Under a lot of stress and not taking care of health    Current medications  Recently started B12 supplement for new dx b12 def    Lab results  B12 was low on July 9th    Imaging results  - CAT scan from August 2023 showed an acute subarachnoid hemorrhage in the left hemisphere, chronic lacunar infarction in the left basal ganglia, and a subacute left occipital infarct  - CTA of the neck showed no blockage but some plaque, atherosclerosis noted  - MRI shows old stroke, no new stroke detected upon my preliminary review, official report pending    Physical exam  HEENT: Smiling normally.  MUSCULOSKELETAL: Strength in hands is good.  NEUROLOGIC: Awake, alert, oriented to self, loc problem, some word finding difficulties and aphasia but can speak in complete sentences and follow commands, no neglect CN2-12 intact 5/5 strength in all 4 ext          Past Medical

## 2024-08-01 NOTE — ED NOTES
Swallow screen completed, pt passed    Family member at bedside    NAD, call bell with in reach    Pt denies pain at this time

## 2024-08-01 NOTE — PROGRESS NOTES
[facility]  Progress Note  **Note written by medical student for educational purposes only. Not to be used as diagnostic information or for patient plan. Please see co-signing attending physician for questions**    Patient: Mena Curran Age: 64 y.o. : 1960 MR#: [id number] SSN:   Date/Time: 2024     Subjective:     Ms. Curran presented to the ED yesterday, 24, for fluctuating left supraorbital-temporal headache since Saturday. Ms. Curran has a history of a CVA, , that was thrombolyzed. Residual deficits are conductive and anomic aphasia. Today, pt continues to have fluctuating \"headache-like\" pain that she says is similar to the last time she had a stroke, which is partially relieved when lying down. Ms Curran is not completely comfortable but not in distress. Pt denies N/V/D/C. No change to bladder or bowel habits.   Labs - elevated glucose.   Imaging - CT WO contrast results showed large LMCA infarct  Physical Exam  Cardio - RRR, carotid bruits  Pulm - wheezes in the R and L lower lobes, no dullness to percussion  Neuro - R pupil lags in light reflex and mild ptosis. L eye, difficulty looking L and Up (L superior rectus). Sensory of CN V 1-3, C6-8, L4-S1 intact bilaterally and symmetric. LE strength 5/5 bilaterally  Xanthomas present on left eyelid and below the eye            Assessment/Plan:     Continuing Headache - large LMCA infarct (CT); waiting MRI, ECHO, and neuro consult; continue Asprin, @325mg, po, 1xday; discontinue losartan, immediately; add plavix 75mg, po, 1xday; continue speech and language therapy; clearance from PT and OT; inquire with Sister or Daughter about pt's daily compliance with meds   Elevated cholesterol - continue taking atorvastatin, 80mg, po, 1x night; consider adding bile acid resin (cholestyrin, 9g, dissolvable, 1xdinnertime); see note above about med compliance  Elevated glucose - similar pattern to

## 2024-08-01 NOTE — ED TRIAGE NOTES
Pt arrives ambulatory to triage reporting HA since Saturday   Reports taking tylenol without relief   Denies any other symptoms   Reports HA to left temple that comes & goes

## 2024-08-01 NOTE — H&P
HISTORY & PHYSICAL      Patient: Mena Curran MRN: [id number]  [facility]: [id number]    YOB: 1960  Age: 64 y.o.  Sex: female    DOA: 7/31/2024 LOS:  LOS: 1 day        DOA: 7/31/2024        Assessment/Plan     64 years old presented with complaint of having left-sided frontal and temporal headache  She was noted to have abnormal CT of the head suggesting interval new large left MCA infarct with a late subacute to chronic chronic appearance.  The patient is placed under observation  Aspirin  Requested MRI of the brain  Neurology consult  Consider echocardiogram if MRI of the brain shows acute/subacute stroke    History of hemorrhagic CVA, with residual speech deficit and expressive aphasia  Continue aspirin    COPD  Not in acute exacerbation  Bronchodilators as needed    Hypertension  Continue home meds and monitor blood pressure    Depression/anxiety  Continue home Zoloft  Monitor    Other medical problems as below including history of treated hep C, sleep apnea and obesity  Continue home meds and outpatient follow-up    Admission plan was discussed    Patient Active Problem List   Diagnosis    Hyperglycemia, drug-induced    Hyperlipemia    Tobacco abuse    Pneumonia due to COVID-19 virus    Severe obesity (HCC)    Essential hypertension, benign    Osteoarthritis, knee    Acute ischemic stroke (HCC)    Acute CVA (cerebrovascular accident) (HCC)    Chronic obstructive pulmonary disease with (acute) exacerbation (HCC)    Hemiplegia of right dominant side as late effect of cerebrovascular disease, unspecified cerebrovascular disease type, unspecified hemiplegia type (HCC)    Major depressive disorder, recurrent, moderate    Headache    Remote history of stroke       History of Presenting Illness:  64 years old who is admitted for further evaluation of headache and abnormal CT scan of the head.  Typically the ED with 4 days history of having right frontal and temporal headache.  No associated visual  changes, nausea, vomiting, or focal weakness or numbness.  She has medical history significant for hemorrhagic stroke a year ago with residual speech deficit.  Her medical history also significant for COPD, treated hep C, sleep apnea, depression and anxiety, hypertension and bilateral knee replacement.  CT scan of the head as below suggested interval new large left MCA infarct with a late subacute to chronic chronic appearance.  ED consulted neurology who recommended observation admission for MRI of the brain for further clarification.  The patient has no other concerns or other symptoms.      Past Medical History:   Diagnosis Date    Arthritis     Hepatitis C     treated     Hypercholesterolemia     Hypertension     no meds     Sleep apnea     no CPAP        Past Surgical History:   Procedure Laterality Date     SECTION      CHOLECYSTECTOMY      HYSTERECTOMY (CERVIX STATUS UNKNOWN)      KNEE ARTHROSCOPY  2009    Bilateral    NEUROVASCULAR PROCEDURE N/A 8/3/2023    Angiography cerebral intracrnial w anesthesia (23298) performed by Elsa Rosaels MD at [facility] NEUROVASCULAR SUITE    STPLER HEMMORROID [id number]      TONSILLECTOMY  1968    TOTAL KNEE ARTHROPLASTY         Family History   Problem Relation Age of Onset    High Cholesterol Father     Diabetes Sister     High Cholesterol Sister     High Cholesterol Mother     Stroke Mother     Heart Disease Father        Social History     Socioeconomic History    Marital status: Single     Spouse name: None    Number of children: None    Years of education: None    Highest education level: None   Tobacco Use    Smoking status: Former     Current packs/day: 2.00     Average packs/day: 2.0 packs/day for 42.6 years (85.2 ttl pk-yrs)     Types: Cigarettes     Start date:      Passive exposure: Past    Smokeless tobacco: Never   Substance and Sexual Activity    Alcohol use: No    Drug use: No     Social Determinants of Health     Financial Resource Strain:

## 2024-08-02 ENCOUNTER — PREP FOR PROCEDURE (OUTPATIENT)
Age: 64
End: 2024-08-02

## 2024-08-02 ENCOUNTER — TELEPHONE (OUTPATIENT)
Age: 64
End: 2024-08-02

## 2024-08-02 VITALS
BODY MASS INDEX: 29.45 KG/M2 | DIASTOLIC BLOOD PRESSURE: 70 MMHG | TEMPERATURE: 98.4 F | RESPIRATION RATE: 16 BRPM | HEART RATE: 63 BPM | OXYGEN SATURATION: 94 % | HEIGHT: 60 IN | WEIGHT: 150 LBS | SYSTOLIC BLOOD PRESSURE: 125 MMHG

## 2024-08-02 PROBLEM — R51.9 HEAD ACHE: Status: ACTIVE | Noted: 2024-08-02

## 2024-08-02 LAB — CRP SERPL HS-MCNC: 0.4 MG/L

## 2024-08-02 PROCEDURE — 36415 COLL VENOUS BLD VENIPUNCTURE: CPT

## 2024-08-02 PROCEDURE — 6360000002 HC RX W HCPCS: Performed by: INTERNAL MEDICINE

## 2024-08-02 PROCEDURE — 96372 THER/PROPH/DIAG INJ SC/IM: CPT

## 2024-08-02 PROCEDURE — 6370000000 HC RX 637 (ALT 250 FOR IP): Performed by: INTERNAL MEDICINE

## 2024-08-02 PROCEDURE — 94761 N-INVAS EAR/PLS OXIMETRY MLT: CPT

## 2024-08-02 PROCEDURE — 86141 C-REACTIVE PROTEIN HS: CPT

## 2024-08-02 PROCEDURE — 99239 HOSP IP/OBS DSCHRG MGMT >30: CPT | Performed by: HOSPITALIST

## 2024-08-02 PROCEDURE — 2580000003 HC RX 258: Performed by: INTERNAL MEDICINE

## 2024-08-02 PROCEDURE — G0378 HOSPITAL OBSERVATION PER HR: HCPCS

## 2024-08-02 RX ORDER — PREDNISONE 20 MG/1
60 TABLET ORAL DAILY
Qty: 21 TABLET | Refills: 0 | Status: SHIPPED | OUTPATIENT
Start: 2024-08-02 | End: 2024-08-09

## 2024-08-02 RX ADMIN — ACETAMINOPHEN 325MG 650 MG: 325 TABLET ORAL at 08:36

## 2024-08-02 RX ADMIN — SODIUM CHLORIDE, PRESERVATIVE FREE 10 ML: 5 INJECTION INTRAVENOUS at 08:37

## 2024-08-02 RX ADMIN — LOSARTAN POTASSIUM 25 MG: 25 TABLET, FILM COATED ORAL at 08:37

## 2024-08-02 RX ADMIN — Medication 500 MCG: at 08:37

## 2024-08-02 RX ADMIN — ASPIRIN 81 MG CHEWABLE TABLET 81 MG: 81 TABLET CHEWABLE at 08:37

## 2024-08-02 RX ADMIN — ENOXAPARIN SODIUM 40 MG: 100 INJECTION SUBCUTANEOUS at 08:33

## 2024-08-02 RX ADMIN — SERTRALINE 100 MG: 50 TABLET, FILM COATED ORAL at 08:37

## 2024-08-02 ASSESSMENT — PAIN DESCRIPTION - LOCATION: LOCATION: HEAD

## 2024-08-02 ASSESSMENT — PAIN DESCRIPTION - ORIENTATION: ORIENTATION: LEFT;ANTERIOR

## 2024-08-02 ASSESSMENT — PAIN DESCRIPTION - PAIN TYPE: TYPE: ACUTE PAIN

## 2024-08-02 ASSESSMENT — PAIN DESCRIPTION - DESCRIPTORS: DESCRIPTORS: ACHING

## 2024-08-02 ASSESSMENT — PAIN SCALES - GENERAL
PAINLEVEL_OUTOF10: 0
PAINLEVEL_OUTOF10: 0
PAINLEVEL_OUTOF10: 4
PAINLEVEL_OUTOF10: 0
PAINLEVEL_OUTOF10: 0

## 2024-08-02 ASSESSMENT — PAIN - FUNCTIONAL ASSESSMENT: PAIN_FUNCTIONAL_ASSESSMENT: ACTIVITIES ARE NOT PREVENTED

## 2024-08-02 ASSESSMENT — PAIN DESCRIPTION - ONSET: ONSET: GRADUAL

## 2024-08-02 ASSESSMENT — PAIN DESCRIPTION - FREQUENCY: FREQUENCY: INTERMITTENT

## 2024-08-02 NOTE — H&P (VIEW-ONLY)
Mena Curran  Chief Complaint   Patient presents with    Headache     Consulted for temporal artery biopsy    History and Physical    64 years old right handed lady presented with complaint of having left-sided frontal and temporal headache  She was noted to have abnormal CT of the head suggesting interval new large left MCA infarct with a late subacute to chronic chronic appearance. MRI did not reveal any acute strokes  History of hemorrhagic CVA, with  mild residual speech deficit and expressive aphasia    Denies any visual  abnormalities  Lives with her daughter     Past Medical History:   Diagnosis Date    Arthritis     Hepatitis C     treated     Hypercholesterolemia     Hypertension     no meds     Sleep apnea     no CPAP      Patient Active Problem List   Diagnosis    Hyperglycemia, drug-induced    Hyperlipemia    Tobacco abuse    Pneumonia due to COVID-19 virus    Severe obesity (HCC)    Essential hypertension, benign    Osteoarthritis, knee    Acute ischemic stroke (HCC)    Acute CVA (cerebrovascular accident) (HCC)    Chronic obstructive pulmonary disease with (acute) exacerbation (HCC)    Hemiplegia of right dominant side as late effect of cerebrovascular disease, unspecified cerebrovascular disease type, unspecified hemiplegia type (HCC)    Major depressive disorder, recurrent, moderate    Headache    Remote history of stroke     Past Surgical History:   Procedure Laterality Date     SECTION      CHOLECYSTECTOMY      HYSTERECTOMY (CERVIX STATUS UNKNOWN)      KNEE ARTHROSCOPY  2009    Bilateral    NEUROVASCULAR PROCEDURE N/A 8/3/2023    Angiography cerebral intracrnial w anesthesia (94586) performed by Elsa Rosales MD at [facility] NEUROVASCULAR SUITE    STPLER HEMMORROID [id number]      TONSILLECTOMY  1968    TOTAL KNEE ARTHROPLASTY       Current Facility-Administered Medications   Medication Dose Route Frequency Provider Last Rate Last Admin    atorvastatin (LIPITOR) tablet 80 mg  80 mg

## 2024-08-02 NOTE — PROGRESS NOTES
Progress Note  Date:2024       Room:[facility]  Patient Name:Mena Curran     YOB: 1960     Age:64 y.o.        Subjective    Subjective:  Symptoms:  Improved.    Diet:  Adequate intake.    Activity level: Normal.    Pain:  She complains of pain that is mild.  She reports pain is improving.  Pain is well controlled.       Review of Systems   Neurological:  Positive for speech difficulty and headaches.   All other systems reviewed and are negative.  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 Case d/w Dr. Wilkerson, medical student, and patient. Thank you for allowing me to participate in the care of your patient. Please call with further questions.    Objective         Vitals Last 24 Hours:  TEMPERATURE:  Temp  Av.9 °F (36.6 °C)  Min: 97.5 °F (36.4 °C)  Max: 98.1 °F (36.7 °C)  RESPIRATIONS RANGE: Resp  Av.4  Min: 16  Max: 18  PULSE OXIMETRY RANGE: SpO2  Av %  Min: 94 %  Max: 97 %  PULSE RANGE: Pulse  Av.5  Min: 56  Max: 69  BLOOD PRESSURE RANGE: Systolic (24hrs), Av , Min:94 , Max:162   ; Diastolic (24hrs), Av, Min:59, Max:79    I/O

## 2024-08-02 NOTE — CONSULTS
Mena Curran  Chief Complaint   Patient presents with    Headache     Consulted for temporal artery biopsy    History and Physical    64 years old right handed lady presented with complaint of having left-sided frontal and temporal headache  She was noted to have abnormal CT of the head suggesting interval new large left MCA infarct with a late subacute to chronic chronic appearance. MRI did not reveal any acute strokes  History of hemorrhagic CVA, with  mild residual speech deficit and expressive aphasia    Denies any visual  abnormalities  Lives with her daughter     Past Medical History:   Diagnosis Date    Arthritis     Hepatitis C     treated     Hypercholesterolemia     Hypertension     no meds     Sleep apnea     no CPAP      Patient Active Problem List   Diagnosis    Hyperglycemia, drug-induced    Hyperlipemia    Tobacco abuse    Pneumonia due to COVID-19 virus    Severe obesity (HCC)    Essential hypertension, benign    Osteoarthritis, knee    Acute ischemic stroke (HCC)    Acute CVA (cerebrovascular accident) (HCC)    Chronic obstructive pulmonary disease with (acute) exacerbation (HCC)    Hemiplegia of right dominant side as late effect of cerebrovascular disease, unspecified cerebrovascular disease type, unspecified hemiplegia type (HCC)    Major depressive disorder, recurrent, moderate    Headache    Remote history of stroke     Past Surgical History:   Procedure Laterality Date     SECTION      CHOLECYSTECTOMY      HYSTERECTOMY (CERVIX STATUS UNKNOWN)      KNEE ARTHROSCOPY  2009    Bilateral    NEUROVASCULAR PROCEDURE N/A 8/3/2023    Angiography cerebral intracrnial w anesthesia (39844) performed by Elsa Rosales MD at [facility] NEUROVASCULAR SUITE    STPLER HEMMORROID [id number]      TONSILLECTOMY  1968    TOTAL KNEE ARTHROPLASTY       Current Facility-Administered Medications   Medication Dose Route Frequency Provider Last Rate Last Admin    atorvastatin (LIPITOR) tablet 80 mg  80 mg  artery biopsy 8/5/2024-Monday.    Discussed with hospitalist Dr. Wilkerson    The treatment plan was reviewed with the patient in detail.  The patient voiced understanding of this plan and all questions and concerns were addressed.  The patient agrees with this plan.  We discussed the signs and symptoms that would require earlier attention or intervention.     I appreciate the opportunity to participate in the care of your patient.  I will be sure to keep you informed of any subsequent changes in the treatment plan.  If you have any questions or concerns, please feel free to contact me.      Cydney Roth MD

## 2024-08-02 NOTE — PROGRESS NOTES
Discharge instructions given,-As part of the discharge instructions, medications already given today were discussed with the patient. The next dose due of all ordered meds was highlighted as part of the medication discharge instructions. Discussed with the patient the importance of taking medications as directed, as well as the side effects and adverse reactions to medications ordered.   Verbalized understanding.

## 2024-08-02 NOTE — CARE COORDINATION
Discharge order noted for today. Orders received. No additional needs identified at this time. Pt to be transported by sister.    Case management remains available as needed.      NABEEL Nevarez    Case Management Department

## 2024-08-02 NOTE — DISCHARGE INSTRUCTIONS
Dr. De Jesus Dedra called and due to quantity of emergent procedures in OR this evening, he will have to re-schedule patient's procedure for to joshua. Not sure on time until OR morning availability is posted. Patient needed 2Ls O2 while sleeping. Patient armband removed and shredded    DISCHARGE SUMMARY from Nurse    PATIENT INSTRUCTIONS:    After general anesthesia or intravenous sedation, for 24 hours or while taking prescription Narcotics:  Limit your activities  Do not drive and operate hazardous machinery  Do not make important personal or business decisions  Do  not drink alcoholic beverages  If you have not urinated within 8 hours after discharge, please contact your surgeon on call.    Report the following to your surgeon:  Excessive pain, swelling, redness or odor of or around the surgical area  Temperature over 100.5  Nausea and vomiting lasting longer than 4 hours or if unable to take medications  Any signs of decreased circulation or nerve impairment to extremity: change in color, persistent  numbness, tingling, coldness or increase pain  Any questions    What to do at Home:  Recommended activity: activity as tolerated,     If you experience any of the following symptoms severe headache,  chest pain, shortness of breath please follow up with PCP or call 911.    *  Please give a list of your current medications to your Primary Care Provider.    *  Please update this list whenever your medications are discontinued, doses are      changed, or new medications (including over-the-counter products) are added.    *  Please carry medication information at all times in case of emergency situations.    These are general instructions for a healthy lifestyle:    No smoking/ No tobacco products/ Avoid exposure to second hand smoke  Surgeon General's Warning:  Quitting smoking now greatly reduces serious risk to your health.    Obesity, smoking, and sedentary lifestyle greatly increases your risk for illness    A healthy diet, regular physical exercise & weight monitoring are important for maintaining a healthy lifestyle    You may be retaining fluid if you have a history of heart failure or if you experience any of the following symptoms:  Weight gain of 3  pounds or more overnight or 5 pounds in a week, increased swelling in our hands or feet or shortness of breath while lying flat in bed.  Please call your doctor as soon as you notice any of these symptoms; do not wait until your next office visit.        The discharge information has been reviewed with the patient.  The patient verbalized understanding.  Discharge medications reviewed with the patient and appropriate educational materials and side effects teaching were provided.  ___________________________________________________________________________________________________________________________________

## 2024-08-02 NOTE — DISCHARGE SUMMARY
Hospitalist Discharge Summary      Patient: Mena Curran MRN: [id number]  CSN: [id number]    YOB: 1960  Age: 64 y.o.  Sex: female    DOA: 7/31/2024 LOS:  LOS: 1 day   Discharge Date:     Admission Diagnoses: Remote history of stroke [Z86.73]  Headache [R51.9]  Acute nonintractable headache, unspecified headache type [R51.9]  Cerebrovascular accident (CVA), unspecified mechanism (HCC) [I63.9]    Discharge Diagnoses:    ***    Discharge Condition: {condition:48037160}    Discharge To: {Discharge Destination:35157}    CODE STATUS: Full Code         PHYSICAL EXAM  Visit Vitals  BP (!) 160/79   Pulse 56   Temp 98 °F (36.7 °C) (Oral)   Resp 16   Ht 1.524 m (5')   Wt 68 kg (150 lb)   SpO2 94%   BMI 29.29 kg/m²       General: Alert, cooperative, no acute distress    Lungs:  CTA Bilaterally. No Wheezing/Rhonchi/Rales.  Heart:  Regular rate and Rhythm.  Abdomen: Soft, Non distended, Non tender. + Bowel sounds.  Extremities: No edema/ cyanosis/ clubbing  Neurologic:  AA oriented X 3. Moves all extremities.                                HPI:    ***    Hospital Course:     ***    Discharge plan discussed with patient/family who verbalized understanding.    Readmission Risk:    ***    FOLLOW-UP RECOMMENDATIONS:     Catrina Hardin MD  [address]  [address]  [place] [id number] [phone]    Schedule an appointment as soon as possible for a visit in 2 week(s)      Catrina Hardin MD  [address]  [address]  [facility] [address]  [phone]          Cydney Roth MD  [address] [facility]  [address] [place] [address] [id number] [phone]    Go on 8/5/2024  For Temporal Artery Biopsy         Consults: {consults:[id number]}    Significant Diagnostic Studies:     Imaging:  CTA HEAD NECK W CONTRAST    Result Date: 8/1/2024  EXAM: CTA HEAD NECK W CONTRAST PROVIDED REASON FOR EXAM: headache L sided abnormal CT Patient has a history of left MCA occlusion, left MCA territory chronic

## 2024-08-02 NOTE — PROGRESS NOTES
Per neurology patient would benefit from a temporal artery biopsy.  Vascular surgery consulted, mentioned that patient can be discharged home on p.o. steroids with temporal artery biopsy possibly scheduled on Monday 8/5.  Discharge plan discussed with patient who is agreeable to go home today.

## 2024-08-02 NOTE — PLAN OF CARE
Problem: Discharge Planning  Goal: Discharge to home or other facility with appropriate resources  8/1/2024 2104 by Cass Lee RN  Outcome: Progressing  8/1/2024 2103 by Cass Lee RN  Outcome: Progressing  8/1/2024 1352 by Lisa Solano RN  Outcome: Progressing  Flowsheets (Taken 8/1/2024 0937)  Discharge to home or other facility with appropriate resources: Identify barriers to discharge with patient and caregiver     Problem: Safety - Adult  Goal: Free from fall injury  8/1/2024 2104 by Cass Lee RN  Outcome: Progressing  8/1/2024 2103 by Cass Lee RN  Outcome: Progressing  8/1/2024 1352 by Lisa Solano RN  Outcome: Progressing     Problem: Pain  Goal: Verbalizes/displays adequate comfort level or baseline comfort level  8/1/2024 2104 by Cass Lee RN  Outcome: Progressing  8/1/2024 2103 by Cass Lee RN  Outcome: Progressing  8/1/2024 1353 by Lisa Solano RN  Outcome: Progressing

## 2024-08-02 NOTE — TELEPHONE ENCOUNTER
Patient called regarding her upcoming surgery on 8/5/2024 with Dr. Roth. Patient was at the ED and was discharged today.     Patient instructions:   Check in at [facility] Main entrance at 8:00am.  Do not eat, drink or chew gum after midnight prior to surgery.   Only take heart and blood pressure medication with a sip of water the morning of the procedure.   Patient instructed to have RIDE available when discharged from hospital. Patient is not allowed to drive, walk or go home using public transportation (including [employer] and [employer]).   Patient given hospital discharge appointment on 8/29/2024 and 1:15pm with Dr. Roth .   Patient confirmed and repeated instructions.

## 2024-08-02 NOTE — PLAN OF CARE
Problem: Safety - Adult  Goal: Free from fall injury  8/2/2024 0855 by Lisa Solano RN  Outcome: Progressing  8/1/2024 2104 by Cass Lee RN  Outcome: Progressing  8/1/2024 2103 by Cass Lee RN  Outcome: Progressing     Problem: Pain  Goal: Verbalizes/displays adequate comfort level or baseline comfort level  8/2/2024 0855 by Lisa Solano RN  Outcome: Progressing  Flowsheets (Taken 8/2/2024 0836)  Verbalizes/displays adequate comfort level or baseline comfort level:   Encourage patient to monitor pain and request assistance   Assess pain using appropriate pain scale   Administer analgesics based on type and severity of pain and evaluate response  8/1/2024 2104 by Cass Lee RN  Outcome: Progressing  8/1/2024 2103 by Cass Lee RN  Outcome: Progressing

## 2024-08-05 ENCOUNTER — ANESTHESIA (OUTPATIENT)
Dept: CARDIOTHORACIC SURGERY | Facility: HOSPITAL | Age: 64
End: 2024-08-05
Payer: COMMERCIAL

## 2024-08-05 ENCOUNTER — HOSPITAL ENCOUNTER (OUTPATIENT)
Facility: HOSPITAL | Age: 64
Setting detail: OUTPATIENT SURGERY
Discharge: HOME | End: 2024-08-05
Attending: SURGERY | Admitting: SURGERY
Payer: COMMERCIAL

## 2024-08-05 ENCOUNTER — ANESTHESIA EVENT (OUTPATIENT)
Dept: CARDIOTHORACIC SURGERY | Facility: HOSPITAL | Age: 64
End: 2024-08-05
Payer: COMMERCIAL

## 2024-08-05 ENCOUNTER — CLINICAL DOCUMENTATION (OUTPATIENT)
Facility: CLINIC | Age: 64
End: 2024-08-05

## 2024-08-05 VITALS
SYSTOLIC BLOOD PRESSURE: 170 MMHG | HEART RATE: 61 BPM | TEMPERATURE: 98.9 F | DIASTOLIC BLOOD PRESSURE: 87 MMHG | OXYGEN SATURATION: 96 % | RESPIRATION RATE: 17 BRPM

## 2024-08-05 PROCEDURE — 88305 TISSUE EXAM BY PATHOLOGIST: CPT

## 2024-08-05 PROCEDURE — 2709999900 HC NON-CHARGEABLE SUPPLY: Performed by: SURGERY

## 2024-08-05 PROCEDURE — 6360000002 HC RX W HCPCS: Performed by: SURGERY

## 2024-08-05 PROCEDURE — 2500000003 HC RX 250 WO HCPCS: Performed by: STUDENT IN AN ORGANIZED HEALTH CARE EDUCATION/TRAINING PROGRAM

## 2024-08-05 PROCEDURE — 3700000000 HC ANESTHESIA ATTENDED CARE: Performed by: SURGERY

## 2024-08-05 PROCEDURE — 2580000003 HC RX 258: Performed by: SURGERY

## 2024-08-05 PROCEDURE — 7100000010 HC PHASE II RECOVERY - FIRST 15 MIN: Performed by: SURGERY

## 2024-08-05 PROCEDURE — 7100000000 HC PACU RECOVERY - FIRST 15 MIN: Performed by: SURGERY

## 2024-08-05 PROCEDURE — A4217 STERILE WATER/SALINE, 500 ML: HCPCS | Performed by: SURGERY

## 2024-08-05 PROCEDURE — 7100000011 HC PHASE II RECOVERY - ADDTL 15 MIN: Performed by: SURGERY

## 2024-08-05 PROCEDURE — 7100000001 HC PACU RECOVERY - ADDTL 15 MIN: Performed by: SURGERY

## 2024-08-05 PROCEDURE — 6360000002 HC RX W HCPCS: Performed by: STUDENT IN AN ORGANIZED HEALTH CARE EDUCATION/TRAINING PROGRAM

## 2024-08-05 PROCEDURE — 2580000003 HC RX 258: Performed by: STUDENT IN AN ORGANIZED HEALTH CARE EDUCATION/TRAINING PROGRAM

## 2024-08-05 PROCEDURE — 2500000003 HC RX 250 WO HCPCS: Performed by: SURGERY

## 2024-08-05 PROCEDURE — 3600000012 HC SURGERY LEVEL 2 ADDTL 15MIN: Performed by: SURGERY

## 2024-08-05 PROCEDURE — 3600000002 HC SURGERY LEVEL 2 BASE: Performed by: SURGERY

## 2024-08-05 PROCEDURE — 3700000001 HC ADD 15 MINUTES (ANESTHESIA): Performed by: SURGERY

## 2024-08-05 PROCEDURE — 88313 SPECIAL STAINS GROUP 2: CPT

## 2024-08-05 RX ORDER — ACETAMINOPHEN 325 MG/1
650 TABLET ORAL EVERY 4 HOURS PRN
Status: DISCONTINUED | OUTPATIENT
Start: 2024-08-05 | End: 2024-08-05 | Stop reason: HOSPADM

## 2024-08-05 RX ORDER — HYDRALAZINE HYDROCHLORIDE 20 MG/ML
10 INJECTION INTRAMUSCULAR; INTRAVENOUS
Status: DISCONTINUED | OUTPATIENT
Start: 2024-08-05 | End: 2024-08-05 | Stop reason: HOSPADM

## 2024-08-05 RX ORDER — ROCURONIUM BROMIDE 10 MG/ML
INJECTION, SOLUTION INTRAVENOUS PRN
Status: DISCONTINUED | OUTPATIENT
Start: 2024-08-05 | End: 2024-08-05 | Stop reason: SDUPTHER

## 2024-08-05 RX ORDER — ESMOLOL HYDROCHLORIDE 10 MG/ML
INJECTION INTRAVENOUS PRN
Status: DISCONTINUED | OUTPATIENT
Start: 2024-08-05 | End: 2024-08-05 | Stop reason: SDUPTHER

## 2024-08-05 RX ORDER — SODIUM CHLORIDE, SODIUM LACTATE, POTASSIUM CHLORIDE, CALCIUM CHLORIDE 600; 310; 30; 20 MG/100ML; MG/100ML; MG/100ML; MG/100ML
INJECTION, SOLUTION INTRAVENOUS CONTINUOUS PRN
Status: DISCONTINUED | OUTPATIENT
Start: 2024-08-05 | End: 2024-08-05 | Stop reason: SDUPTHER

## 2024-08-05 RX ORDER — MAGNESIUM HYDROXIDE 1200 MG/15ML
LIQUID ORAL CONTINUOUS PRN
Status: DISCONTINUED | OUTPATIENT
Start: 2024-08-05 | End: 2024-08-05 | Stop reason: HOSPADM

## 2024-08-05 RX ORDER — PROPOFOL 10 MG/ML
INJECTION, EMULSION INTRAVENOUS PRN
Status: DISCONTINUED | OUTPATIENT
Start: 2024-08-05 | End: 2024-08-05 | Stop reason: SDUPTHER

## 2024-08-05 RX ORDER — LABETALOL 20 MG/4 ML (5 MG/ML) INTRAVENOUS SYRINGE
10
Status: DISCONTINUED | OUTPATIENT
Start: 2024-08-05 | End: 2024-08-05 | Stop reason: HOSPADM

## 2024-08-05 RX ORDER — DEXAMETHASONE SODIUM PHOSPHATE 4 MG/ML
INJECTION, SOLUTION INTRA-ARTICULAR; INTRALESIONAL; INTRAMUSCULAR; INTRAVENOUS; SOFT TISSUE PRN
Status: DISCONTINUED | OUTPATIENT
Start: 2024-08-05 | End: 2024-08-05 | Stop reason: SDUPTHER

## 2024-08-05 RX ORDER — NEOSTIGMINE METHYLSULFATE 1 MG/ML
INJECTION, SOLUTION INTRAVENOUS PRN
Status: DISCONTINUED | OUTPATIENT
Start: 2024-08-05 | End: 2024-08-05 | Stop reason: SDUPTHER

## 2024-08-05 RX ORDER — ONDANSETRON 2 MG/ML
4 INJECTION INTRAMUSCULAR; INTRAVENOUS
Status: DISCONTINUED | OUTPATIENT
Start: 2024-08-05 | End: 2024-08-05 | Stop reason: HOSPADM

## 2024-08-05 RX ORDER — GLYCOPYRROLATE 0.2 MG/ML
INJECTION INTRAMUSCULAR; INTRAVENOUS PRN
Status: DISCONTINUED | OUTPATIENT
Start: 2024-08-05 | End: 2024-08-05 | Stop reason: SDUPTHER

## 2024-08-05 RX ORDER — PHENYLEPHRINE HCL IN 0.9% NACL 1 MG/10 ML
SYRINGE (ML) INTRAVENOUS PRN
Status: DISCONTINUED | OUTPATIENT
Start: 2024-08-05 | End: 2024-08-05 | Stop reason: SDUPTHER

## 2024-08-05 RX ORDER — PROCHLORPERAZINE EDISYLATE 5 MG/ML
5 INJECTION INTRAMUSCULAR; INTRAVENOUS
Status: DISCONTINUED | OUTPATIENT
Start: 2024-08-05 | End: 2024-08-05 | Stop reason: HOSPADM

## 2024-08-05 RX ORDER — DIPHENHYDRAMINE HYDROCHLORIDE 50 MG/ML
12.5 INJECTION INTRAMUSCULAR; INTRAVENOUS
Status: DISCONTINUED | OUTPATIENT
Start: 2024-08-05 | End: 2024-08-05 | Stop reason: HOSPADM

## 2024-08-05 RX ORDER — ONDANSETRON 2 MG/ML
INJECTION INTRAMUSCULAR; INTRAVENOUS PRN
Status: DISCONTINUED | OUTPATIENT
Start: 2024-08-05 | End: 2024-08-05 | Stop reason: SDUPTHER

## 2024-08-05 RX ORDER — FENTANYL CITRATE 50 UG/ML
INJECTION, SOLUTION INTRAMUSCULAR; INTRAVENOUS PRN
Status: DISCONTINUED | OUTPATIENT
Start: 2024-08-05 | End: 2024-08-05 | Stop reason: SDUPTHER

## 2024-08-05 RX ORDER — NALOXONE HYDROCHLORIDE 0.4 MG/ML
INJECTION, SOLUTION INTRAMUSCULAR; INTRAVENOUS; SUBCUTANEOUS PRN
Status: DISCONTINUED | OUTPATIENT
Start: 2024-08-05 | End: 2024-08-05 | Stop reason: HOSPADM

## 2024-08-05 RX ORDER — LIDOCAINE HYDROCHLORIDE 20 MG/ML
INJECTION, SOLUTION EPIDURAL; INFILTRATION; INTRACAUDAL; PERINEURAL PRN
Status: DISCONTINUED | OUTPATIENT
Start: 2024-08-05 | End: 2024-08-05 | Stop reason: SDUPTHER

## 2024-08-05 RX ORDER — IPRATROPIUM BROMIDE AND ALBUTEROL SULFATE 2.5; .5 MG/3ML; MG/3ML
1 SOLUTION RESPIRATORY (INHALATION)
Status: DISCONTINUED | OUTPATIENT
Start: 2024-08-05 | End: 2024-08-05 | Stop reason: HOSPADM

## 2024-08-05 RX ORDER — CEFAZOLIN SODIUM 1 G/3ML
INJECTION, POWDER, FOR SOLUTION INTRAMUSCULAR; INTRAVENOUS
Status: DISCONTINUED
Start: 2024-08-05 | End: 2024-08-05 | Stop reason: HOSPADM

## 2024-08-05 RX ORDER — WATER 10 ML/10ML
INJECTION INTRAMUSCULAR; INTRAVENOUS; SUBCUTANEOUS
Status: DISCONTINUED
Start: 2024-08-05 | End: 2024-08-05 | Stop reason: HOSPADM

## 2024-08-05 RX ADMIN — SODIUM CHLORIDE, SODIUM LACTATE, POTASSIUM CHLORIDE, AND CALCIUM CHLORIDE: 600; 310; 30; 20 INJECTION, SOLUTION INTRAVENOUS at 10:15

## 2024-08-05 RX ADMIN — ROCURONIUM BROMIDE 30 MG: 10 INJECTION, SOLUTION INTRAVENOUS at 10:21

## 2024-08-05 RX ADMIN — LIDOCAINE HYDROCHLORIDE 60 MG: 20 INJECTION, SOLUTION EPIDURAL; INFILTRATION; INTRACAUDAL; PERINEURAL at 10:21

## 2024-08-05 RX ADMIN — ESMOLOL HYDROCHLORIDE 5 MG: 10 INJECTION, SOLUTION INTRAVENOUS at 10:41

## 2024-08-05 RX ADMIN — Medication 100 MCG: at 10:29

## 2024-08-05 RX ADMIN — ONDANSETRON 4 MG: 2 INJECTION INTRAMUSCULAR; INTRAVENOUS at 10:32

## 2024-08-05 RX ADMIN — GLYCOPYRROLATE 0.4 MG: 0.2 INJECTION INTRAMUSCULAR; INTRAVENOUS at 10:30

## 2024-08-05 RX ADMIN — GLYCOPYRROLATE 0.2 MG: 0.2 INJECTION INTRAMUSCULAR; INTRAVENOUS at 11:00

## 2024-08-05 RX ADMIN — Medication 100 MCG: at 10:44

## 2024-08-05 RX ADMIN — PROPOFOL 150 MG: 10 INJECTION, EMULSION INTRAVENOUS at 10:21

## 2024-08-05 RX ADMIN — DEXAMETHASONE SODIUM PHOSPHATE 4 MG: 4 INJECTION, SOLUTION INTRAMUSCULAR; INTRAVENOUS at 10:32

## 2024-08-05 RX ADMIN — NEOSTIGMINE METHYLSULFATE 3 MG: 1 INJECTION, SOLUTION INTRAVENOUS at 10:51

## 2024-08-05 RX ADMIN — Medication 100 MCG: at 10:35

## 2024-08-05 RX ADMIN — GLYCOPYRROLATE 0.2 MG: 0.2 INJECTION INTRAMUSCULAR; INTRAVENOUS at 10:51

## 2024-08-05 RX ADMIN — FENTANYL CITRATE 50 MCG: 50 INJECTION INTRAMUSCULAR; INTRAVENOUS at 10:21

## 2024-08-05 RX ADMIN — FENTANYL CITRATE 50 MCG: 50 INJECTION INTRAMUSCULAR; INTRAVENOUS at 10:38

## 2024-08-05 RX ADMIN — ESMOLOL HYDROCHLORIDE 5 MG: 10 INJECTION, SOLUTION INTRAVENOUS at 10:44

## 2024-08-05 RX ADMIN — WATER 2000 MG: 1 INJECTION, SOLUTION INTRAMUSCULAR; INTRAVENOUS; SUBCUTANEOUS at 10:30

## 2024-08-05 RX ADMIN — ESMOLOL HYDROCHLORIDE 5 MG: 10 INJECTION, SOLUTION INTRAVENOUS at 10:42

## 2024-08-05 RX ADMIN — ESMOLOL HYDROCHLORIDE 5 MG: 10 INJECTION, SOLUTION INTRAVENOUS at 10:48

## 2024-08-05 ASSESSMENT — PAIN - FUNCTIONAL ASSESSMENT
PAIN_FUNCTIONAL_ASSESSMENT: NONE - DENIES PAIN
PAIN_FUNCTIONAL_ASSESSMENT: 0-10

## 2024-08-05 ASSESSMENT — PAIN SCALES - GENERAL
PAINLEVEL_OUTOF10: 0
PAINLEVEL_OUTOF10: 0

## 2024-08-05 ASSESSMENT — COPD QUESTIONNAIRES: CAT_SEVERITY: MILD

## 2024-08-05 NOTE — PERIOP NOTE
Patient /Family /Designee has been informed that [facility] is not responsible for patient belongings per policy and the signed [facility] Patient Agreement document.  Personal items should be sent home or checked in with security.  Patient /Family /Designee selected the following action:                            []  Send personal items home with a family member or friend                                                 []  Check in personal items with security, excluding clothing                            [x]  Maintain personal items at the bedside, against recommendation                                 by Gildardo Sahni [facility]                                   ** If patient /family /designee chooses to maintain personal items at the bedside,                                      Complete the patient belongings inventory in the EMR.

## 2024-08-05 NOTE — OP NOTE
Operative Note      Patient: Mena Curran  YOB: 1960  MRN: [id number]    Date of Procedure: 8/5/2024    Pre-Op Diagnosis Codes:     * Head ache [R51.9]  Temporal arteritis  Post-Op Diagnosis: Same       Procedure(s):  LEFT TEMPORAL ARTERY BIOPSY LIGATION  Left temporal artery biopsy    Surgeon(s):  Cydney Roth MD    Assistant:   Surgical Assistant: Esdras Thapa    Anesthesia: General, ETT    Estimated Blood Loss (mL): Minimal    Complications: None    Specimens:   ID Type Source Tests Collected by Time Destination   A : LEFT TEMPORAL ARTERY BIOPSY Tissue Head SURGICAL PATHOLOGY Cydney Roth MD 8/5/2024 1049        Implants:  * No implants in log *      Drains: * No LDAs found *    Findings:  Infection Present At Time Of Surgery (PATOS) (choose all levels that have infection present):  No infection present  Other Findings: Patent left temporal artery    Detailed Description of Procedure:     Indication for the procedure: The patient is a 64-year-old lady, with history of multiple left hemispheric strokes, who was hospitalized with speech abnormality, left-sided headache and right-sided weakness.  MRI of the brain revealed no acute stroke.  Her ESR was 10 mm.  No vision changes.  However neurology had recommended temporal artery biopsy.  She has been initiated on oral steroids since  the last 3 days.  Informed consent was obtained from the patient.    The patient was identified and placed supine on the operating room table.  General anesthesia with endotracheal tube was administered.  The left temporal and preauricular regions were cleaned and draped in a sterile fashion.  She received 2 g Ancef intravenously as preoperative antibiotic prophylaxis.  A timeout was performed.  A skin  incision was made, in the left preauricular region, overlying the temporal artery pulse.  The incision was deepened to the skin and the fascia.  The temporal artery was identified and dissected for 4 cm,

## 2024-08-05 NOTE — ANESTHESIA POSTPROCEDURE EVALUATION
Department of Anesthesiology  Postprocedure Note    Patient: Mena Curran  MRN: [id number]  YOB: 1960  Date of evaluation: 8/5/2024    Procedure Summary       Date: 08/05/24 Room / Location: [facility] 02 / [facility] CARDIAC SURGERY    Anesthesia Start: 1015 Anesthesia Stop: 1119    Procedure: LEFT TEMPORAL ARTERY BIOPSY LIGATION (Left) Diagnosis:       Head ache      (Head ache [R51.9])    Surgeons: Cydney Roth MD Responsible Provider: Eliot Rodriguez MD    Anesthesia Type: General ASA Status: 3            Anesthesia Type: General    Elen Phase I:      Elen Phase II:      Anesthesia Post Evaluation    Patient location during evaluation: PACU  Patient participation: complete - patient participated  Level of consciousness: sleepy but conscious  Pain score: 0  Airway patency: patent  Nausea & Vomiting: no nausea and no vomiting  Cardiovascular status: blood pressure returned to baseline  Respiratory status: acceptable  Hydration status: euvolemic  Pain management: adequate    No notable events documented.

## 2024-08-05 NOTE — DISCHARGE INSTRUCTIONS
teaching were provided.  ___________________________________________________________________________________________________________________________________

## 2024-08-05 NOTE — INTERVAL H&P NOTE
Update History & Physical    The patient's History and Physical of August 2, 2024 was reviewed with the patient and I examined the patient. There was no change. The surgical site was confirmed by the patient and me.     Plan: The risks, benefits, expected outcome, and alternative to the recommended procedure have been discussed with the patient. Patient understands and wants to proceed with the procedure.     Electronically signed by Cydney Roth MD on 8/5/2024 at 9:53 AM

## 2024-08-05 NOTE — ANESTHESIA PRE PROCEDURE
Department of Anesthesiology  Preprocedure Note       Name:  Mena Curran   Age:  64 y.o.  :  1960                                          MRN:  [id number]         Date:  2024      Surgeon: Surgeon(s):  Cydney Roth MD    Procedure: Procedure(s):  BILATERAL TEMPORAL ARTERY BIOPSY LIGATION    Medications prior to admission:   Prior to Admission medications    Medication Sig Start Date End Date Taking? Authorizing Provider   predniSONE (DELTASONE) 20 MG tablet Take 3 tablets by mouth daily for 7 days 24  Ryan Wilkerson MD   sertraline (ZOLOFT) 50 MG tablet take 2 & 1/2 tablets by mouth daily 24   Catrina Hardin MD   vitamin B-12 (CYANOCOBALAMIN) 500 MCG tablet Take 1 tablet by mouth daily 24   Aidee Booth APRN - NP   atorvastatin (LIPITOR) 80 MG tablet Take 1 tablet by mouth at bedtime 24   Catrina Hardin MD   aspirin 81 MG chewable tablet Take 1 tablet by mouth daily 24   Catrina Hardin MD   losartan (COZAAR) 25 MG tablet Take 1 tablet by mouth daily 24   Catrina Hardin MD       Current medications:    No current facility-administered medications for this encounter.       Allergies:  No Known Allergies    Problem List:    Patient Active Problem List   Diagnosis Code   • Hyperglycemia, drug-induced R73.9, T50.905A   • Hyperlipemia E78.5   • Tobacco abuse Z72.0   • Pneumonia due to COVID-19 virus U07.1, J12.82   • Severe obesity (HCC) E66.01   • Essential hypertension, benign I10   • Osteoarthritis, knee M17.9   • Acute ischemic stroke (HCC) I63.9   • Acute CVA (cerebrovascular accident) ([facility]) I63.9   • Chronic obstructive pulmonary disease with (acute) exacerbation ([facility]) J44.1   • Hemiplegia of right dominant side as late effect of cerebrovascular disease, unspecified cerebrovascular disease type, unspecified hemiplegia type ([facility]) I69.951   • Major depressive disorder, recurrent, moderate F33.1   • Headache R51.9   • Remote history of stroke

## 2024-08-06 ENCOUNTER — TELEPHONE (OUTPATIENT)
Facility: CLINIC | Age: 64
End: 2024-08-06

## 2024-08-06 NOTE — TELEPHONE ENCOUNTER
Care Transitions Initial Follow Up Call    Outreach made within 2 business days of discharge: Yes    Patient: Mena Curran Patient : 1960   MRN: [id number]  Reason for Admission: 24  Discharge Date: 24       Spoke with: Pt daughter pt was at home resting. I will give them a call back when daughter gets home     Discharge department/facility:    TCM Interactive Patient Contact:  Was patient able to fill all prescriptions:   Was patient instructed to bring all medications to the follow-up visit:   Is patient taking all medications as directed in the discharge summary?   Does patient understand their discharge instructions:   Does patient have questions or concerns that need addressed prior to 7-14 day follow up office visit:     Additional needs identified to be addressed with provider  No needs identified             Scheduled appointment with PCP within 7-14 days    Follow Up  Future Appointments   Date Time Provider Department Center   2024  9:20 AM Jeny Rodriguez, SLP [id number]   2024  1:00 PM Aidee Booth APRN - NP [facility] NEUR BS AMB   2024  1:15 PM Cydney Roth MD [facility]   2024  9:00 AM Catrina Hardin MD [facility] DEP   2024  9:00 AM Catrina Hardin MD [facility] DEP       Glendy Meraz MA

## 2024-08-12 ENCOUNTER — OFFICE VISIT (OUTPATIENT)
Facility: CLINIC | Age: 64
End: 2024-08-12
Payer: COMMERCIAL

## 2024-08-12 VITALS
RESPIRATION RATE: 16 BRPM | WEIGHT: 154 LBS | BODY MASS INDEX: 30.23 KG/M2 | HEIGHT: 60 IN | DIASTOLIC BLOOD PRESSURE: 68 MMHG | SYSTOLIC BLOOD PRESSURE: 146 MMHG | HEART RATE: 66 BPM | OXYGEN SATURATION: 95 %

## 2024-08-12 DIAGNOSIS — I10 ESSENTIAL (PRIMARY) HYPERTENSION: ICD-10-CM

## 2024-08-12 DIAGNOSIS — F32.A ANXIETY AND DEPRESSION: ICD-10-CM

## 2024-08-12 DIAGNOSIS — I69.30 HISTORY OF CVA WITH RESIDUAL DEFICIT: Primary | ICD-10-CM

## 2024-08-12 DIAGNOSIS — R47.01 APHASIA: ICD-10-CM

## 2024-08-12 DIAGNOSIS — I69.951 HEMIPLEGIA OF RIGHT DOMINANT SIDE AS LATE EFFECT OF CEREBROVASCULAR DISEASE, UNSPECIFIED CEREBROVASCULAR DISEASE TYPE, UNSPECIFIED HEMIPLEGIA TYPE (HCC): ICD-10-CM

## 2024-08-12 DIAGNOSIS — F41.9 ANXIETY AND DEPRESSION: ICD-10-CM

## 2024-08-12 DIAGNOSIS — F33.1 MAJOR DEPRESSIVE DISORDER, RECURRENT, MODERATE (HCC): ICD-10-CM

## 2024-08-12 DIAGNOSIS — Z09 HOSPITAL DISCHARGE FOLLOW-UP: ICD-10-CM

## 2024-08-12 DIAGNOSIS — Z72.0 TOBACCO USE: ICD-10-CM

## 2024-08-12 DIAGNOSIS — R41.89 COGNITIVE DEFICITS: ICD-10-CM

## 2024-08-12 DIAGNOSIS — E78.2 MIXED HYPERLIPIDEMIA: ICD-10-CM

## 2024-08-12 PROCEDURE — 3077F SYST BP >= 140 MM HG: CPT | Performed by: STUDENT IN AN ORGANIZED HEALTH CARE EDUCATION/TRAINING PROGRAM

## 2024-08-12 PROCEDURE — 3078F DIAST BP <80 MM HG: CPT | Performed by: STUDENT IN AN ORGANIZED HEALTH CARE EDUCATION/TRAINING PROGRAM

## 2024-08-12 PROCEDURE — 99214 OFFICE O/P EST MOD 30 MIN: CPT | Performed by: STUDENT IN AN ORGANIZED HEALTH CARE EDUCATION/TRAINING PROGRAM

## 2024-08-12 PROCEDURE — 1111F DSCHRG MED/CURRENT MED MERGE: CPT | Performed by: STUDENT IN AN ORGANIZED HEALTH CARE EDUCATION/TRAINING PROGRAM

## 2024-08-12 RX ORDER — LOSARTAN POTASSIUM 50 MG/1
50 TABLET ORAL DAILY
Qty: 90 TABLET | Refills: 1 | Status: SHIPPED | OUTPATIENT
Start: 2024-08-12

## 2024-08-12 SDOH — ECONOMIC STABILITY: FOOD INSECURITY: WITHIN THE PAST 12 MONTHS, YOU WORRIED THAT YOUR FOOD WOULD RUN OUT BEFORE YOU GOT MONEY TO BUY MORE.: NEVER TRUE

## 2024-08-12 SDOH — ECONOMIC STABILITY: FOOD INSECURITY: WITHIN THE PAST 12 MONTHS, THE FOOD YOU BOUGHT JUST DIDN'T LAST AND YOU DIDN'T HAVE MONEY TO GET MORE.: NEVER TRUE

## 2024-08-12 SDOH — ECONOMIC STABILITY: INCOME INSECURITY: HOW HARD IS IT FOR YOU TO PAY FOR THE VERY BASICS LIKE FOOD, HOUSING, MEDICAL CARE, AND HEATING?: NOT HARD AT ALL

## 2024-08-12 ASSESSMENT — ANXIETY QUESTIONNAIRES
2. NOT BEING ABLE TO STOP OR CONTROL WORRYING: NOT AT ALL
3. WORRYING TOO MUCH ABOUT DIFFERENT THINGS: NOT AT ALL
IF YOU CHECKED OFF ANY PROBLEMS ON THIS QUESTIONNAIRE, HOW DIFFICULT HAVE THESE PROBLEMS MADE IT FOR YOU TO DO YOUR WORK, TAKE CARE OF THINGS AT HOME, OR GET ALONG WITH OTHER PEOPLE: NOT DIFFICULT AT ALL
GAD7 TOTAL SCORE: 0
6. BECOMING EASILY ANNOYED OR IRRITABLE: NOT AT ALL
1. FEELING NERVOUS, ANXIOUS, OR ON EDGE: NOT AT ALL
4. TROUBLE RELAXING: NOT AT ALL
7. FEELING AFRAID AS IF SOMETHING AWFUL MIGHT HAPPEN: NOT AT ALL
5. BEING SO RESTLESS THAT IT IS HARD TO SIT STILL: NOT AT ALL

## 2024-08-12 ASSESSMENT — PATIENT HEALTH QUESTIONNAIRE - PHQ9
6. FEELING BAD ABOUT YOURSELF - OR THAT YOU ARE A FAILURE OR HAVE LET YOURSELF OR YOUR FAMILY DOWN: NOT AT ALL
1. LITTLE INTEREST OR PLEASURE IN DOING THINGS: NOT AT ALL
8. MOVING OR SPEAKING SO SLOWLY THAT OTHER PEOPLE COULD HAVE NOTICED. OR THE OPPOSITE, BEING SO FIGETY OR RESTLESS THAT YOU HAVE BEEN MOVING AROUND A LOT MORE THAN USUAL: NOT AT ALL
7. TROUBLE CONCENTRATING ON THINGS, SUCH AS READING THE NEWSPAPER OR WATCHING TELEVISION: NOT AT ALL
2. FEELING DOWN, DEPRESSED OR HOPELESS: NOT AT ALL
5. POOR APPETITE OR OVEREATING: NOT AT ALL
4. FEELING TIRED OR HAVING LITTLE ENERGY: NOT AT ALL
SUM OF ALL RESPONSES TO PHQ QUESTIONS 1-9: 0
SUM OF ALL RESPONSES TO PHQ9 QUESTIONS 1 & 2: 0
SUM OF ALL RESPONSES TO PHQ QUESTIONS 1-9: 0
10. IF YOU CHECKED OFF ANY PROBLEMS, HOW DIFFICULT HAVE THESE PROBLEMS MADE IT FOR YOU TO DO YOUR WORK, TAKE CARE OF THINGS AT HOME, OR GET ALONG WITH OTHER PEOPLE: NOT DIFFICULT AT ALL
9. THOUGHTS THAT YOU WOULD BE BETTER OFF DEAD, OR OF HURTING YOURSELF: NOT AT ALL

## 2024-08-12 ASSESSMENT — ENCOUNTER SYMPTOMS
DIARRHEA: 0
NAUSEA: 0
SHORTNESS OF BREATH: 0
CHEST TIGHTNESS: 0
BACK PAIN: 0
BLOOD IN STOOL: 0
COUGH: 0
RHINORRHEA: 0
VOMITING: 0
WHEEZING: 0

## 2024-08-12 NOTE — PROGRESS NOTES
Mena Curran is a 64 y.o. female presenting today for Follow-Up from Hospital  .     Chief Complaint   Patient presents with    Follow-Up from Hospital       HPI:  Mena Curran presents to the office today for Follow up.    Patient has a past medical history significant for CVA with right-sided weakness, aphasia, COPD, WILTON not on CPAP, hypertension, HLD, treated hepatitis C, OA s/p bilateral knee replacement, anxiety/depression.    Patient was readmitted to the hospital on 7/31/2024 due to worsening headache concerning for TIA.  She was noted to have abnormal CT of the head suggesting interval new large MCA infarct with a late subacute or chronic appearance.  MRI brain was then negative for acute CVA.  Patient was seen by neurology-temporal biopsy was recommended as outpatient.  She was discharged on prednisone therapy.  She underwent temporal artery biopsy on 8/5/2024-pathology was negative for arteritis.  She is no longer taking the prednisone.    Patient admits she has been intermittently smoking.    Patient was  admitted to [facility] in 8/23 with an acute stroke causing right-sided weakness.  She had an acute occlusion of the left M1 underwent thrombolysis.  She was noted to have a subarachnoid hemorrhage postprocedure.  Patient had impaired mobility/ADLs and was admitted to acute rehab.  Patient has slurred speech, word finding difficulty and right-sided weakness.  Patient has been undergoing PT/OT and ST.  She has had improvement in the right-sided weakness but still has word finding difficulty.  Patient is now following with neurology.  Patient is going to speech therapy-her right-sided weakness has improved but she continues to have apraxia and aphasia with cognitive issues.    Speech therapy notes reviewed-patient continues to struggle with word repetition, word selection.  Patient reports she has had several incidents when due to her speech people think she is 'dumb or stupid'.

## 2024-08-14 ENCOUNTER — OFFICE VISIT (OUTPATIENT)
Age: 64
End: 2024-08-14

## 2024-08-14 VITALS
HEART RATE: 70 BPM | SYSTOLIC BLOOD PRESSURE: 128 MMHG | TEMPERATURE: 98.2 F | RESPIRATION RATE: 17 BRPM | WEIGHT: 152.6 LBS | HEIGHT: 60 IN | DIASTOLIC BLOOD PRESSURE: 76 MMHG | OXYGEN SATURATION: 97 % | BODY MASS INDEX: 29.96 KG/M2

## 2024-08-14 DIAGNOSIS — E53.8 LOW SERUM VITAMIN B12: ICD-10-CM

## 2024-08-14 DIAGNOSIS — Z86.73 HISTORY OF STROKE: ICD-10-CM

## 2024-08-14 PROCEDURE — 99213 OFFICE O/P EST LOW 20 MIN: CPT | Performed by: NURSE PRACTITIONER

## 2024-08-14 PROCEDURE — 3074F SYST BP LT 130 MM HG: CPT | Performed by: NURSE PRACTITIONER

## 2024-08-14 PROCEDURE — 3078F DIAST BP <80 MM HG: CPT | Performed by: NURSE PRACTITIONER

## 2024-08-14 RX ORDER — UREA 10 %
500 LOTION (ML) TOPICAL DAILY
Qty: 30 TABLET | Refills: 6 | Status: SHIPPED | OUTPATIENT
Start: 2024-08-14

## 2024-08-14 NOTE — PROGRESS NOTES
[facility]  [address] [facility]. [address], [place] [address]  Office:  [phone]  Fax: [phone]  Chief Complaint   Patient presents with    Follow-up     6 month follow up,  History of stroke         HPI: Mena Curran presents in follow-up for history of stroke.  She had a left MCA stroke in August 2023 and underwent thrombectomy.  She went to the acute rehab unit on discharge.  She was now at home and outpatient OT and PT.  Continue secondary stroke prevention measures.  She is on aspirin and statin.  Labs to be obtained due to cognitive complaints.  May consider cognitive therapy with ST.  She is on SSRI.    Labs on 7/9/2024 showed low vitamin B12 level of 217, folate of 5.1.  TSH and free T4 normal.  She was started on oral vitamin B12 supplement.    She had a recent hospitalization at [facility] for presentation of headache.  Headache was left frontal.  No nausea, vomiting, dizziness, new weakness, or vision changes.  Speech has improved since the stroke last year.  Plan is to check sed rate and CRP due to new headache, arrange for left temporal artery biopsy, and discharged home on prednisone 60 mg daily until the results come back.  She underwent left temporal artery biopsy outpatient on 8/5.  Sed rate and CRP were normal. Pathology was negative for arteritis.  She is no longer on prednisone.    She presents today in follow-up.  She is with her sister.  Had a headache on left temple for about 4 days so went in to the hospital.  No vision change.  Took Tylenol but it wouldn't go away.  Not having the HA anymore.  That was the only HA.  It would come intermittently at L forehead at first.  No light sensitivity or other features.  Had a migraine many years ago but none recent.  Has mild incisional pain.  Sister reports her smoking has increased.  1 cigarette here and there.  Losartan was increased the other day because BP was high in the hospital.  No drug use.  No seizure like

## 2024-08-14 NOTE — PROGRESS NOTES
Mena Curran is a 64 y.o. female (: 1960) presenting to address:    Chief Complaint   Patient presents with    Follow-up     6 month follow up,  History of stroke         Vitals:    24 1303   BP: 128/76   Pulse: 70   Resp: 17   Temp: 98.2 °F (36.8 °C)   SpO2: 97%       Coordination of Care Questionaire:   1. \"Have you been to the ER, urgent care clinic since your last visit?  Hospitalized since your last visit?\" Yes 2024    2. \"Have you seen or consulted any other health care providers outside of the [facility] since your last visit?\" no     3. For patients aged 45-75: Has the patient had a colonoscopy / FIT/ Cologuard? yes      If the patient is female:    4. For patients aged 40-74: Has the patient had a mammogram within the past 2 years? no      5. For patients aged 21-65: Has the patient had a pap smear? no    Advanced Directive:   1. Do you have an Advanced Directive? yes    2. Would you like information on Advanced Directives? no

## 2024-08-16 ENCOUNTER — COMMUNITY OUTREACH (OUTPATIENT)
Facility: CLINIC | Age: 64
End: 2024-08-16

## 2024-08-21 ENCOUNTER — TELEPHONE (OUTPATIENT)
Facility: HOSPITAL | Age: 64
End: 2024-08-21

## 2024-08-21 NOTE — TELEPHONE ENCOUNTER
Pt last seen on 7/24/24. SLP reached out to pt's dtr (primary emergency contact) re: 30-day non-attendance policy. Pt was admitted to the hospital 8/5/24 d/t concerns for another CVA. Pt's dtr requested SLP reached out to pt's sister as she is the one who makes appts. Pt and pt's sister are currently working on transitioning pt's insurance, which may be the reason pt has not been scheduled, per dtr.     SLP left VM to pt's sister re: 30-day non-attendance policy. Left name, title, purpose of call, and call back number.

## 2024-08-26 ENCOUNTER — TELEPHONE (OUTPATIENT)
Facility: HOSPITAL | Age: 64
End: 2024-08-26

## 2024-08-26 NOTE — TELEPHONE ENCOUNTER
SLP left VM to pt's sister (Lev Grace). Pt last seen 7/24/24. Pt's sister was informed re: 30-day non-attendance policy. Pt last seen >30 days. SLP requested pt and pt's sister reach out to this clinic by clinic closing at 2 pm on Friday, 8/30/24 if they would like to make more appts.

## 2024-08-29 ENCOUNTER — OFFICE VISIT (OUTPATIENT)
Age: 64
End: 2024-08-29

## 2024-08-29 VITALS
DIASTOLIC BLOOD PRESSURE: 78 MMHG | HEART RATE: 66 BPM | OXYGEN SATURATION: 96 % | SYSTOLIC BLOOD PRESSURE: 110 MMHG | WEIGHT: 150 LBS | BODY MASS INDEX: 29.45 KG/M2 | HEIGHT: 60 IN

## 2024-08-29 DIAGNOSIS — I69.30 HISTORY OF CVA WITH RESIDUAL DEFICIT: ICD-10-CM

## 2024-08-29 DIAGNOSIS — I63.412 CEREBROVASCULAR ACCIDENT (CVA) DUE TO EMBOLISM OF LEFT MIDDLE CEREBRAL ARTERY (HCC): Primary | ICD-10-CM

## 2024-08-29 PROCEDURE — 99024 POSTOP FOLLOW-UP VISIT: CPT | Performed by: SURGERY

## 2024-08-29 RX ORDER — ASPIRIN 81 MG
81 TABLET,CHEWABLE ORAL DAILY
Qty: 90 TABLET | Refills: 1 | Status: SHIPPED | OUTPATIENT
Start: 2024-08-29

## 2024-08-29 NOTE — PROGRESS NOTES
08/29/24    Mena Curran        History and Physical    Mena Curran is a 64 y.o. female who had left temporal artery biopsy on 8/2/2024-the biopsy was negative.  She had a course of steroid therapy.  No further headaches.  She was noted to have sinus issues which have since resolved.  She is on aspirin 81 mg and atorvastatin 80 mg daily.    She quit smoking since the past 1 month.    She is right handed who was hospitalized to [facility] in 7/2024 with   left-sided frontal and temporal headache  She was noted to have abnormal CT of the head suggesting interval new large left MCA infarct with a late subacute to chronic chronic appearance. MRI did not reveal any acute strokes. CT angiogram of the head and neck-left M1 distal segment occlusion with recanalization.  No significant extracranial carotid stenosis.  Patent bilateral vertebral arteries.   She was seen with neurologist in the hospital, who advised temporal artery biopsy for the left-sided headache, although her ESR was normal  History of hemorrhagic CVA, with  mild residual speech deficit and expressive aphasia     No visual  abnormalities  Lives with her daughter     Physical Exam:    /78   Pulse 66   Ht 1.524 m (5')   Wt 68 kg (150 lb)   SpO2 96%   BMI 29.29 kg/m²      Constitutional:  Patient is well developed, well nourished, and not distressed.   HENT: atraumatic, normocephalic,   Eyes:   Cunjunctivae clear, no scleral icterus  Neck:   No JVD present.     Cardiovascular:  Normal rate, regular rhythm, normal heart sounds.     Peripheral vascular:   No carotid bruits  B/L radials +2 palpable.  Well-healed left temporal op scar.     Pulmonary/Chest: Effort normal and breath sounds normal.  she has no wheezes or no rales.   Abdominal:  Bowel sounds are present. Soft. No tenderness to palpation.   Extremities: Normal range of motion.   Neurological:  she  is alert and oriented x3 .  Mild imperceptible facial asymmetry.  Mild expressive aphasia.

## 2024-09-04 ENCOUNTER — TELEPHONE (OUTPATIENT)
Facility: CLINIC | Age: 64
End: 2024-09-04

## 2024-09-04 DIAGNOSIS — R47.1 DYSARTHRIA: ICD-10-CM

## 2024-09-04 DIAGNOSIS — I69.30 HISTORY OF CVA WITH RESIDUAL DEFICIT: Primary | ICD-10-CM

## 2024-09-04 DIAGNOSIS — R41.89 COGNITIVE DEFICITS: ICD-10-CM

## 2024-09-04 NOTE — TELEPHONE ENCOUNTER
Lev butler's sister called  and says her sister now has Insurance and is asking for Dr Hardin to put in an order the Speech Therapy for [employer].     # [phone]  F#[phone]

## 2024-09-16 ENCOUNTER — HOSPITAL ENCOUNTER (OUTPATIENT)
Facility: HOSPITAL | Age: 64
Setting detail: RECURRING SERIES
Discharge: HOME | End: 2024-09-19
Payer: COMMERCIAL

## 2024-09-16 PROCEDURE — 92507 TX SP LANG VOICE COMM INDIV: CPT

## 2024-09-18 ENCOUNTER — OFFICE VISIT (OUTPATIENT)
Facility: CLINIC | Age: 64
End: 2024-09-18

## 2024-09-18 VITALS
WEIGHT: 155 LBS | BODY MASS INDEX: 30.43 KG/M2 | HEIGHT: 60 IN | DIASTOLIC BLOOD PRESSURE: 88 MMHG | HEART RATE: 64 BPM | OXYGEN SATURATION: 97 % | SYSTOLIC BLOOD PRESSURE: 150 MMHG

## 2024-09-18 DIAGNOSIS — F41.9 ANXIETY AND DEPRESSION: ICD-10-CM

## 2024-09-18 DIAGNOSIS — Z86.73 HISTORY OF STROKE: ICD-10-CM

## 2024-09-18 DIAGNOSIS — Z72.0 TOBACCO USE: ICD-10-CM

## 2024-09-18 DIAGNOSIS — E78.2 MIXED HYPERLIPIDEMIA: ICD-10-CM

## 2024-09-18 DIAGNOSIS — R41.89 COGNITIVE DEFICITS: ICD-10-CM

## 2024-09-18 DIAGNOSIS — Z87.891 PERSONAL HISTORY OF TOBACCO USE: ICD-10-CM

## 2024-09-18 DIAGNOSIS — F33.1 MAJOR DEPRESSIVE DISORDER, RECURRENT, MODERATE (HCC): ICD-10-CM

## 2024-09-18 DIAGNOSIS — F32.A ANXIETY AND DEPRESSION: ICD-10-CM

## 2024-09-18 DIAGNOSIS — R47.01 APHASIA: ICD-10-CM

## 2024-09-18 DIAGNOSIS — Z23 ENCOUNTER FOR IMMUNIZATION: ICD-10-CM

## 2024-09-18 DIAGNOSIS — I10 ESSENTIAL (PRIMARY) HYPERTENSION: Primary | ICD-10-CM

## 2024-09-18 DIAGNOSIS — I69.30 HISTORY OF CVA WITH RESIDUAL DEFICIT: ICD-10-CM

## 2024-09-18 SDOH — ECONOMIC STABILITY: FOOD INSECURITY: WITHIN THE PAST 12 MONTHS, YOU WORRIED THAT YOUR FOOD WOULD RUN OUT BEFORE YOU GOT MONEY TO BUY MORE.: NEVER TRUE

## 2024-09-18 SDOH — ECONOMIC STABILITY: FOOD INSECURITY: WITHIN THE PAST 12 MONTHS, THE FOOD YOU BOUGHT JUST DIDN'T LAST AND YOU DIDN'T HAVE MONEY TO GET MORE.: NEVER TRUE

## 2024-09-18 SDOH — ECONOMIC STABILITY: INCOME INSECURITY: HOW HARD IS IT FOR YOU TO PAY FOR THE VERY BASICS LIKE FOOD, HOUSING, MEDICAL CARE, AND HEATING?: NOT HARD AT ALL

## 2024-09-18 ASSESSMENT — PATIENT HEALTH QUESTIONNAIRE - PHQ9
SUM OF ALL RESPONSES TO PHQ QUESTIONS 1-9: 0
1. LITTLE INTEREST OR PLEASURE IN DOING THINGS: NOT AT ALL
SUM OF ALL RESPONSES TO PHQ QUESTIONS 1-9: 0
2. FEELING DOWN, DEPRESSED OR HOPELESS: NOT AT ALL
SUM OF ALL RESPONSES TO PHQ9 QUESTIONS 1 & 2: 0

## 2024-09-18 ASSESSMENT — ENCOUNTER SYMPTOMS
COUGH: 0
DIARRHEA: 0
BACK PAIN: 0
VOMITING: 0
WHEEZING: 0
ABDOMINAL PAIN: 0
BLOOD IN STOOL: 0
SHORTNESS OF BREATH: 0
RHINORRHEA: 0
CHEST TIGHTNESS: 0

## 2024-09-25 ENCOUNTER — HOSPITAL ENCOUNTER (OUTPATIENT)
Facility: HOSPITAL | Age: 64
Setting detail: RECURRING SERIES
Discharge: HOME | End: 2024-09-28
Payer: COMMERCIAL

## 2024-09-25 PROCEDURE — 92507 TX SP LANG VOICE COMM INDIV: CPT

## 2024-10-14 ENCOUNTER — HOSPITAL ENCOUNTER (OUTPATIENT)
Facility: HOSPITAL | Age: 64
Setting detail: RECURRING SERIES
Discharge: HOME | End: 2024-10-17
Payer: COMMERCIAL

## 2024-10-14 PROCEDURE — 92507 TX SP LANG VOICE COMM INDIV: CPT

## 2024-10-14 NOTE — PROGRESS NOTES
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 Pt with increased difficulty with word-finding and speech.     OBJECTIVE  Treatment provided includes:  Increase/Improve:  []  Voice Quality []  Cognitive Linguistic Skills []  Laryngeal/Pharyngeal Exercises   []  Vocal Loudness []  Reading Comprehension []  Swallowing Skills    []  Vocal Cord Function []  Auditory Comprehension []  Oral Motor Skills   []  Resonance []  Writing Skills [x]  Compensatory strategies    [x]  Speech Intelligibility [x]  Expressive Language []  Attention   [x]  Breath Support/Coord. [x]  Receptive language []  Memory   [x]  Articulation []  Safety Awareness [x] Pt educ   []  Fluency []  Word Retrieval []        Treatment Provided:  Speech-Language Treatment [15963]:   -sound production treatment (SPT)  -complex yes/no questions  -speech comp strategies  -pt educ    Patient/Caregiver  Education: [x] Review HEP      HEP/Handouts given: Months of the year    Pain Level (0-10 scale) post treatment: 5    ASSESSMENT     []   Improving appropriately and progressing toward goals  [x]   Improving slowly and progressing toward goals  []   Approximating goals/maximum potential  [x]   Continues to benefit from skilled therapy to address remaining functional deficits  []   Not progressing toward goals and plan of care will be

## 2024-10-14 NOTE — PROGRESS NOTES
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 All aspects of speech/language, cognitition, voice, and swallowing WNLs per pt report prior to 2023 CVA    Comorbidities: Hx of CVA with R-sided weakness, COPD, WILTON, HTN, HLD, treated hepatitis C, OA s/p bilateral knee replacement, anxiety/depression   Cognitive impairment, Neurologic condition, and Social determinants of health: Tobacco, Financial, and Stress      Medications: Verified on Patient Summary List     Visits from Start of Care: 14    Missed Visits: 1    The Plan of Care and following information is based on the patient's current status:  Short-term Goals:  Goal: 1) Pt will accurately participate in semantic networking and lexical exercises (e.g., divergent and convergent naming, Semantic Feature Analysis, opposites, synonyms, associations, sentence completion, etc) to increase word-finding and accurate semantic use with 85% acc given Lazaro to increase communication efficiency, maintain safety, and participate socially in functional living environment.   Status at last note/certification: not met; Per QAB re-assessment, pt with mod deficits in word-finding.

## 2024-10-16 ENCOUNTER — HOSPITAL ENCOUNTER (OUTPATIENT)
Facility: HOSPITAL | Age: 64
Setting detail: RECURRING SERIES
End: 2024-10-16
Payer: COMMERCIAL

## 2024-10-21 ENCOUNTER — HOSPITAL ENCOUNTER (OUTPATIENT)
Facility: HOSPITAL | Age: 64
Setting detail: RECURRING SERIES
Discharge: HOME | End: 2024-10-24
Payer: COMMERCIAL

## 2024-10-21 DIAGNOSIS — F32.A ANXIETY AND DEPRESSION: ICD-10-CM

## 2024-10-21 DIAGNOSIS — F41.9 ANXIETY AND DEPRESSION: ICD-10-CM

## 2024-10-21 PROCEDURE — 92507 TX SP LANG VOICE COMM INDIV: CPT

## 2024-10-21 NOTE — PROGRESS NOTES
ST DAILY TREATMENT NOTE    Patient Name: Mena Curran  Date:10/21/2024  : 1960  [x]  Patient  Verified  Payor: Payor: MANJIT / Plan: [employer] / Product Type: *No Product type* /   In time:2:35  Out time:3:17  Total Treatment Time (min): 42  Visit #:   PN due 24   Recert due 24; v # until 24    Treatment Diagnosis: Aphasia following cerebral infarction [I69.320]  Apraxia following cerebral infarction [I69.390]    SUBJECTIVE  Pain Level (0-10 scale): 0    Subjective functional status/changes:   [] No changes reported  Pt reported compliance to HEP, endorsed difficulty in saying \"signature.\"     OBJECTIVE  Treatment provided includes:  Increase/Improve:  []  Voice Quality []  Cognitive Linguistic Skills []  Laryngeal/Pharyngeal Exercises   []  Vocal Loudness []  Reading Comprehension []  Swallowing Skills    []  Vocal Cord Function []  Auditory Comprehension []  Oral Motor Skills   []  Resonance []  Writing Skills [x]  Compensatory strategies    [x]  Speech Intelligibility [x]  Expressive Language []  Attention   []  Breath Support/Coord. []  Receptive language []  Memory   [x]  Articulation []  Safety Awareness [x] Pt educ   []  Fluency [x]  Word Retrieval []        Treatment Provided:  Speech-Language Treatment [88539]:   -divergent naming  -convergent naming  -semantic reasoning  -synonym and antonyms   -differences and similarities  -speech intelligibility comp strategies  -Sound production treatment  -pt educ    Patient/Caregiver  Education: [x] Review HEP      HEP/Handouts given: Divergent/convergent naming worksheet    Pain Level (0-10 scale) post treatment: 0    ASSESSMENT     [x]   Improving appropriately and progressing toward goals  []   Improving slowly and progressing toward goals  []   Approximating goals/maximum potential  [x]   Continues to benefit from skilled therapy to address remaining functional deficits  []   Not progressing toward goals and

## 2024-10-23 ENCOUNTER — HOSPITAL ENCOUNTER (OUTPATIENT)
Facility: HOSPITAL | Age: 64
Setting detail: RECURRING SERIES
Discharge: HOME | End: 2024-10-26
Payer: COMMERCIAL

## 2024-10-23 PROCEDURE — 92507 TX SP LANG VOICE COMM INDIV: CPT

## 2024-10-23 NOTE — PROGRESS NOTES
ST DAILY TREATMENT NOTE    Patient Name: Mena Curran  Date:10/23/2024  : 1960  [x]  Patient  Verified  Payor: Payor: MANJIT / Plan: [employer] / Product Type: *No Product type* /   In time:4:41  Out time:5:21  Total Treatment Time (min): 40  Visit #:   PN due 24   Recert due 24; v # until 24    Treatment Diagnosis: Aphasia following cerebral infarction [I69.320]  Apraxia following cerebral infarction [I69.390]    SUBJECTIVE  Pain Level (0-10 scale): 0    Subjective functional status/changes:   [] No changes reported  Pt reported that she attempted to talk to social security for her disability. She reported that she was also able to set up her health insurance card.     OBJECTIVE  Treatment provided includes:  Increase/Improve:  []  Voice Quality []  Cognitive Linguistic Skills []  Laryngeal/Pharyngeal Exercises   []  Vocal Loudness []  Reading Comprehension []  Swallowing Skills    []  Vocal Cord Function []  Auditory Comprehension []  Oral Motor Skills   []  Resonance []  Writing Skills [x]  Compensatory strategies    []  Speech Intelligibility [x]  Expressive Language []  Attention   []  Breath Support/Coord. []  Receptive language []  Memory   []  Articulation []  Safety Awareness [x] Pt educ   []  Fluency []  Word Retrieval []        Treatment Provided:  Speech-Language Treatment [37055]:   -word-finding strategies  -semantic feature analysis  -pt educ    Patient/Caregiver  Education: [x] Review HEP      HEP/Handouts given: Semantic feature analysis    Pain Level (0-10 scale) post treatment: 0    ASSESSMENT     []   Improving appropriately and progressing toward goals  [x]   Improving slowly and progressing toward goals  []   Approximating goals/maximum potential  [x]   Continues to benefit from skilled therapy to address remaining functional deficits  []   Not progressing toward goals and plan of care will be adjusted    Patient will continue to benefit

## 2024-10-28 ENCOUNTER — HOSPITAL ENCOUNTER (OUTPATIENT)
Facility: HOSPITAL | Age: 64
Setting detail: RECURRING SERIES
Discharge: HOME | End: 2024-10-31
Payer: COMMERCIAL

## 2024-10-28 PROCEDURE — 92507 TX SP LANG VOICE COMM INDIV: CPT

## 2024-10-28 NOTE — PROGRESS NOTES
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     OBJECTIVE  Treatment provided includes:  Increase/Improve:  []  Voice Quality []  Cognitive Linguistic Skills []  Laryngeal/Pharyngeal Exercises   []  Vocal Loudness []  Reading Comprehension []  Swallowing Skills    []  Vocal Cord Function [x]  Auditory Comprehension []  Oral Motor Skills   []  Resonance []  Writing Skills [x]  Compensatory strategies    [x]  Speech Intelligibility [x]  Expressive Language []  Attention   [x]  Breath Support/Coord. [x]  Receptive language []  Memory   [x]  Articulation []  Safety Awareness [x] Pt educ   []  Fluency [x]  Word Retrieval []        Treatment Provided:  Speech-Language Treatment [59509]:   -Semantic Feature Analysis (SFA)  -Sound production treatment (SPT)    Patient/Caregiver  Education: [x] Review HEP      HEP/Handouts given: Word list    Pain Level (0-10 scale) post treatment: 0    ASSESSMENT     []   Improving appropriately and progressing toward goals  [x]   Improving slowly and progressing toward goals  []   Approximating goals/maximum potential  [x]   Continues to benefit from skilled therapy to address remaining functional deficits  []   Not progressing toward goals and plan of care will be adjusted    Patient will continue to benefit from skilled therapy to address remaining functional deficits: aphasia, apraxia of speech    Progress towards goals / Updated

## 2024-10-30 ENCOUNTER — HOSPITAL ENCOUNTER (OUTPATIENT)
Facility: HOSPITAL | Age: 64
Setting detail: RECURRING SERIES
Discharge: HOME | End: 2024-11-02
Payer: COMMERCIAL

## 2024-10-30 PROCEDURE — 92507 TX SP LANG VOICE COMM INDIV: CPT

## 2024-10-30 NOTE — PROGRESS NOTES
ST DAILY TREATMENT NOTE    Patient Name: Mena Curran  Date:10/30/2024  : 1960  [x]  Patient  Verified  Payor: Payor: MANJIT / Plan: [employer] / Product Type: *No Product type* /   In time:2:01  Out time:2:40  Total Treatment Time (min): 39  Visit #:   PN due 24   Recert due 24; v # until 24    Treatment Diagnosis: Aphasia following cerebral infarction [I69.320]  Apraxia following cerebral infarction [I69.390]    SUBJECTIVE  Pain Level (0-10 scale): 0    Subjective functional status/changes:   [] No changes reported  Pt reported compliance to HEP. She endorsed increasing social communication in her functional environment.     OBJECTIVE  Treatment provided includes:  Increase/Improve:  []  Voice Quality []  Cognitive Linguistic Skills []  Laryngeal/Pharyngeal Exercises   []  Vocal Loudness []  Reading Comprehension []  Swallowing Skills    []  Vocal Cord Function []  Auditory Comprehension []  Oral Motor Skills   []  Resonance []  Writing Skills []  Compensatory strategies    []  Speech Intelligibility []  Expressive Language []  Attention   []  Breath Support/Coord. []  Receptive language []  Memory   []  Articulation []  Safety Awareness [x] Pt educ   []  Fluency []  Word Retrieval []        Treatment Provided:  Speech-Language Treatment [62830]:   -speech intelligibility comp strategies  -word-finding  -sound production treatment (SPT)    Patient/Caregiver  Education: [x] Review HEP      HEP/Handouts given: Word list    Pain Level (0-10 scale) post treatment: 0    ASSESSMENT     []   Improving appropriately and progressing toward goals  [x]   Improving slowly and progressing toward goals  []   Approximating goals/maximum potential  [x]   Continues to benefit from skilled therapy to address remaining functional deficits  []   Not progressing toward goals and plan of care will be adjusted    Patient will continue to benefit from skilled therapy to address

## 2024-11-04 ENCOUNTER — HOSPITAL ENCOUNTER (OUTPATIENT)
Facility: HOSPITAL | Age: 64
Setting detail: RECURRING SERIES
Discharge: HOME | End: 2024-11-07
Payer: COMMERCIAL

## 2024-11-04 PROCEDURE — 92507 TX SP LANG VOICE COMM INDIV: CPT

## 2024-11-04 NOTE — PROGRESS NOTES
ST DAILY TREATMENT NOTE    Patient Name: Mena Curran  Date:2024  : 1960  [x]  Patient  Verified  Payor: Payor: MANJIT / Plan: [employer] / Product Type: *No Product type* /   In time:10:00  Out time:10:42  Total Treatment Time (min): 42  Visit #:   PN due 24   Recert due 24; v # until 24    Treatment Diagnosis: Aphasia following cerebral infarction [I69.320]  Apraxia following cerebral infarction [I69.390]    SUBJECTIVE  Pain Level (0-10 scale): 0    Subjective functional status/changes:   [] No changes reported  Pt reported compliance to HEP, though reported continued difficulty with multi-syllabic words.     OBJECTIVE  Treatment provided includes:  Increase/Improve:  []  Voice Quality []  Cognitive Linguistic Skills []  Laryngeal/Pharyngeal Exercises   []  Vocal Loudness []  Reading Comprehension []  Swallowing Skills    []  Vocal Cord Function [x]  Auditory Comprehension []  Oral Motor Skills   []  Resonance []  Writing Skills [x]  Compensatory strategies    [x]  Speech Intelligibility []  Expressive Language []  Attention   []  Breath Support/Coord. [x]  Receptive language []  Memory   [x]  Articulation []  Safety Awareness [x] Pt educ   []  Fluency []  Word Retrieval []        Treatment Provided:  Speech-Language Treatment [80962]:   -complex yes/no questions  -Sound production treatment (SPT)  -sentence production  -speech comp strategies   -pt/educ      Patient/Caregiver  Education: [x] Review HEP      HEP/Handouts given: SPT word list    Pain Level (0-10 scale) post treatment: 0    ASSESSMENT     []   Improving appropriately and progressing toward goals  []   Improving slowly and progressing toward goals  []   Approximating goals/maximum potential  [x]   Continues to benefit from skilled therapy to address remaining functional deficits  []   Not progressing toward goals and plan of care will be adjusted    Patient will continue to benefit from

## 2024-11-06 ENCOUNTER — HOSPITAL ENCOUNTER (OUTPATIENT)
Facility: HOSPITAL | Age: 64
Setting detail: RECURRING SERIES
Discharge: HOME | End: 2024-11-09
Payer: COMMERCIAL

## 2024-11-06 PROCEDURE — 92507 TX SP LANG VOICE COMM INDIV: CPT

## 2024-11-09 ENCOUNTER — HOSPITAL ENCOUNTER (OUTPATIENT)
Facility: HOSPITAL | Age: 64
Discharge: HOME | End: 2024-11-12
Attending: STUDENT IN AN ORGANIZED HEALTH CARE EDUCATION/TRAINING PROGRAM
Payer: COMMERCIAL

## 2024-11-09 VITALS — HEIGHT: 61 IN | BODY MASS INDEX: 28.32 KG/M2 | WEIGHT: 150 LBS

## 2024-11-09 DIAGNOSIS — Z12.31 BREAST CANCER SCREENING BY MAMMOGRAM: ICD-10-CM

## 2024-11-09 PROCEDURE — 77063 BREAST TOMOSYNTHESIS BI: CPT

## 2024-11-11 ENCOUNTER — HOSPITAL ENCOUNTER (OUTPATIENT)
Facility: HOSPITAL | Age: 64
Setting detail: RECURRING SERIES
Discharge: HOME | End: 2024-11-14
Payer: COMMERCIAL

## 2024-11-11 PROCEDURE — 92507 TX SP LANG VOICE COMM INDIV: CPT

## 2024-11-11 NOTE — PROGRESS NOTES
ID single letters, ID single words, reading a phrase, reading a short paragraph, reading a paragraph, automatic series, confrontational naming, object description and function, responsive naming, and conversational production. The following are pt's scores/descriptions:    ID body parts: 10/10 ANKIT  ID objects by name: 9/10 ANKIT  ID objects by function:  ANKIT  Conversation comprehension: pt able to comprehend simple conversational comments/questions re: known topic without difficulty.   2-step commands: 3/5 ANKIT; increase to 4/5 given x2 rep of stimuli/mod cues.   3-step commands: 1/3 ANKIT; increase to 2/3 given x2 rep of stimuli/mod cues  Simple yes/no questions: 10/10 ANKIT  Complex yes/no questions:  ANKIT, increase to 8/ given x2 rep of stimuli/mod cues  Paragraph comprehension: 3/5 ANKIT  ID letters:  ANKIT, increase to 5/5 given x2 rep of stimuli/mod cues.   ID single words: 2/ ANKIT, increase to 3/4 given x2 rep of stimuli/mod cues.   Reading a phrase: 100% acc with self-correction; able to complete story with 100% acc.   Reading a short paragraph: 78% acc ANKIT, able to complete story with 100% acc.   Reading a paragraph: 63% acc ANKIT, able to complete story with 100% acc.   Automatic series: 75% acc ANKIT  Confrontational namin/10 acc ANKIT.   Object description and function:  ANKIT, increase to 5/5 given min verbal cues.   Sentence production description: pt able to produce sentences with 6+ word sentences to describe events. Pt with observed anomia, vague words and intermittent ineffective circumlocution, garbled syntax, and phonemic paraphasias. Impairment is evident but pt can discuss all topics.     []   Improving appropriately and progressing toward goals  [x]   Improving slowly and progressing toward goals  []   Approximating goals/maximum potential  [x]   Continues to benefit from skilled therapy to address remaining functional deficits  []   Not progressing toward goals and plan of care will be

## 2024-11-11 NOTE — PROGRESS NOTES
other goals had been prioritized.   Current Status: met; Confrontational namin/10 acc ANKIT.   Object description and function: 4/5 ANKIT, increase to 5/5 given min verbal cues.      During spontaneous conversation, pt with intermittent anomia, however, able to intermittently use word-finding strategies. -d/c goal    Goal: 2) Pt will answer complex yes/no questions and wh-questions using multimodal communication (e.g., verbally, gesturally, communication board, and facial expression) with 80% acc given Lazaro to increase participation in decision-making in the functional living environment   Status at last note/certification: not met; Pt answered yes/no questions re: verbally presented 2-sentence paragraphs with 55% acc ANKIT, increase to 90% acc given mod verbal cues/x2 repetitions.   Current Status: not met; Complex yes/no questions:  ANKIT, increase to 8/9 given x2 rep of stimuli/mod cues -progressing/continue addressing    Goal: 3) Pt will produce the target phoneme/phonemes in the initial, medial, and final word positions of monosyllabic--->bisyllabic words with 80% accuracy given modA.   Status at last note/certification: not met; Pt completed steps of SPT for multisyllabic words (months of year until May):  Step 1 (model + repetition): 2/5 words  Step 2 (written cue, model + repetition): 0/5 words  Step 3 (\"watch me, listen to me, say it with me\"): 0/5 words  Step 4: (\"watch me, listen to me, say it with me\" + articulatory cue): 1/5 words     Pt completed steps of SPT for bisyllabic words:  Step 1 (model + repetition): 0/5 words  Step 2 (written cue, model + repetition): 1/5 words  Step 3 (\"watch me, listen to me, say it with me\"): 1/5 words  Step 4: (\"watch me, listen to me, say it with me\" + articulatory cue): 2/5 words  Current Status: not met; Pt completed steps of SPT for bi-syllabic words:  Step 1 (model + repetition): 2/10 words  Step 2 (written cue, model + repetition): 3/10 words  Step 3 (\"watch me,

## 2024-11-13 ENCOUNTER — HOSPITAL ENCOUNTER (OUTPATIENT)
Facility: HOSPITAL | Age: 64
Setting detail: RECURRING SERIES
Discharge: HOME | End: 2024-11-16
Payer: COMMERCIAL

## 2024-11-13 PROCEDURE — 92507 TX SP LANG VOICE COMM INDIV: CPT

## 2024-11-13 NOTE — PROGRESS NOTES
ST DAILY TREATMENT NOTE    Patient Name: Mena Curran  Date:2024  : 1960  [x]  Patient  Verified  Payor: Payor: MANJIT / Plan: [employer]JIT [employer] / Product Type: *No Product type* /   In time:2:42  Out time:3:22  Total Treatment Time (min): 40  Visit #:   Recert due 24; v # until 24     Treatment Diagnosis: Aphasia following cerebral infarction [I69.320]  Apraxia following cerebral infarction [I69.390]    SUBJECTIVE  Pain Level (0-10 scale): 0    Subjective functional status/changes:   [] No changes reported  Pt reported that she had her appt re-scheduled d/t pt's sister having another conflicting appt. Pt reported friend had been helping keeping her engaged to increase communication opportunities.     OBJECTIVE  Treatment provided includes:  Increase/Improve:  []  Voice Quality []  Cognitive Linguistic Skills []  Laryngeal/Pharyngeal Exercises   []  Vocal Loudness []  Reading Comprehension []  Swallowing Skills    []  Vocal Cord Function []  Auditory Comprehension []  Oral Motor Skills   []  Resonance [x]  Writing Skills [x]  Compensatory strategies    []  Speech Intelligibility [x]  Expressive Language []  Attention   []  Breath Support/Coord. []  Receptive language []  Memory   []  Articulation []  Safety Awareness [x] Pt educ   []  Fluency []  Word Retrieval []        Treatment Provided:  -word-finding strategies  -spelling  -written sentences    Patient/Caregiver  Education: [x] Review HEP      HEP/Handouts given: Unscrambling words    Pain Level (0-10 scale) post treatment: 0    ASSESSMENT     []   Improving appropriately and progressing toward goals  [x]   Improving slowly and progressing toward goals  []   Approximating goals/maximum potential  [x]   Continues to benefit from skilled therapy to address remaining functional deficits  []   Not progressing toward goals and plan of care will be adjusted    Patient will continue to benefit from skilled therapy to

## 2024-11-18 ENCOUNTER — HOSPITAL ENCOUNTER (OUTPATIENT)
Facility: HOSPITAL | Age: 64
Setting detail: RECURRING SERIES
Discharge: HOME | End: 2024-11-21
Payer: COMMERCIAL

## 2024-11-18 PROCEDURE — 92507 TX SP LANG VOICE COMM INDIV: CPT

## 2024-11-18 NOTE — PROGRESS NOTES
ST DAILY TREATMENT NOTE    Patient Name: Mena Curran  Date:2024  : 1960  [x]  Patient  Verified  Payor: Payor: MANJIT / Plan: [employer]JIT [employer]JAYLEN [employer]MO / Product Type: *No Product type* /   In time:12:35  Out time:1:17  Total Treatment Time (min): 42  Visit #:   Recert due 24; v # until 24     Treatment Diagnosis: Aphasia following cerebral infarction [I69.320]  Apraxia following cerebral infarction [I69.390]    SUBJECTIVE  Pain Level (0-10 scale): 0    Subjective functional status/changes:   [] No changes reported  Pt reported compliance to HEP. Pt was informed to make more appts for the month of December--expectation to re-evaluate on 24.     OBJECTIVE  Treatment provided includes:  Increase/Improve:  []  Voice Quality []  Cognitive Linguistic Skills []  Laryngeal/Pharyngeal Exercises   []  Vocal Loudness []  Reading Comprehension []  Swallowing Skills    []  Vocal Cord Function []  Auditory Comprehension []  Oral Motor Skills   []  Resonance []  Writing Skills []  Compensatory strategies    [x]  Speech Intelligibility []  Expressive Language []  Attention   []  Breath Support/Coord. []  Receptive language []  Memory   [x]  Articulation []  Safety Awareness [x] Pt educ   [x]  Fluency []  Word Retrieval []        Treatment Provided:  Speech-Language Treatment [32843]:   -spelling   -word unscramble  -sound production treatment (SPT)    Patient/Caregiver  Education: [x] Review HEP      HEP/Handouts given: Unscramble words, SPT word list.     Pain Level (0-10 scale) post treatment: 0    ASSESSMENT     []   Improving appropriately and progressing toward goals  [x]   Improving slowly and progressing toward goals  []   Approximating goals/maximum potential  [x]   Continues to benefit from skilled therapy to address remaining functional deficits  []   Not progressing toward goals and plan of care will be adjusted    Patient will continue to benefit from skilled therapy to

## 2024-11-20 ENCOUNTER — TELEPHONE (OUTPATIENT)
Facility: HOSPITAL | Age: 64
End: 2024-11-20

## 2024-11-20 NOTE — TELEPHONE ENCOUNTER
SLP reached out to primary number and call received by pt's sister (Lev Grace). Pt's sister reported that they thought appt was at 10:40, SLP clarified it was at 10 am. SLP offered later appt times today, however, pt's sister declined. SLP informed pt's sister of next appt on Monday, 11/25/24 at 10:40 AM. Today's session considered a NS.

## 2024-11-22 NOTE — PROGRESS NOTES
ST DAILY TREATMENT NOTE    Patient Name: Mena Curran  Date:2024  : 1960  [x]  Patient  Verified  Payor: Payor: MANJIT / Plan: MNAJIT [employer]ARC[employer]MO / Product Type: *No Product type* /   In time:10:45  Out time:11:21  Total Treatment Time (min): 35  Visit #:   Recert due 24; v # until 24     Treatment Diagnosis: Aphasia following cerebral infarction [I69.320]  Apraxia following cerebral infarction [I69.390]    SUBJECTIVE  Pain Level (0-10 scale): 0    Subjective functional status/changes:   [] No changes reported  Pt reported compliance to HEP. She did report that she had a bad day last week. She reported she would like to have things to do at home, requested her family members to help with her social interactions. She reported she worked on it with her dtr. She reported that she spoke to her sister, and reported no difficulty with expressing herself. Pt informed re: estimated d/c from OP ST on 24 to HEP, rec that pt to utilize strategies ANKIT s/p expected d/c, then be re-referred if there are further needs and if MD indicates.     OBJECTIVE  Treatment provided includes:  Increase/Improve:  []  Voice Quality []  Cognitive Linguistic Skills []  Laryngeal/Pharyngeal Exercises   []  Vocal Loudness []  Reading Comprehension []  Swallowing Skills    []  Vocal Cord Function []  Auditory Comprehension []  Oral Motor Skills   []  Resonance []  Writing Skills [x]  Compensatory strategies    [x]  Speech Intelligibility [x]  Expressive Language []  Attention   []  Breath Support/Coord. [x]  Receptive language []  Memory   [x]  Articulation []  Safety Awareness [x] Pt educ   []  Fluency []  Word Retrieval []        Treatment Provided:  Speech-Language Treatment [47869]:   -sound production treatment  -word-finding strategies  -pt educ    Patient/Caregiver  Education: [x] Review HEP      HEP/Handouts given: Review HEP    Pain Level (0-10 scale) post treatment: 0    ASSESSMENT

## 2024-11-25 ENCOUNTER — HOSPITAL ENCOUNTER (OUTPATIENT)
Facility: HOSPITAL | Age: 64
Setting detail: RECURRING SERIES
Discharge: HOME | End: 2024-11-28
Payer: COMMERCIAL

## 2024-11-25 PROCEDURE — 92507 TX SP LANG VOICE COMM INDIV: CPT

## 2024-11-27 ENCOUNTER — HOSPITAL ENCOUNTER (OUTPATIENT)
Facility: HOSPITAL | Age: 64
Setting detail: RECURRING SERIES
Discharge: HOME | End: 2024-11-30
Payer: COMMERCIAL

## 2024-11-27 PROCEDURE — 92507 TX SP LANG VOICE COMM INDIV: CPT

## 2024-11-27 NOTE — PROGRESS NOTES
ST DAILY TREATMENT NOTE    Patient Name: Mena Curran  Date:2024  : 1960  [x]  Patient  Verified  Payor: Payor: MANJIT / Plan: [employer] / Product Type: *No Product type* /   In time:10:02  Out time:10:42  Total Treatment Time (min): 40  Visit #:   Recert due 24; v # until 24     Treatment Diagnosis: Aphasia following cerebral infarction [I69.320]  Apraxia following cerebral infarction [I69.390]    SUBJECTIVE  Pain Level (0-10 scale): 0    Subjective functional status/changes:   [] No changes reported  Pt reported that she continues to improve in conversation, more efficient and effective in communicating. Pt reported that she was able to connect with her sister more.     OBJECTIVE  Treatment provided includes:  Increase/Improve:  []  Voice Quality []  Cognitive Linguistic Skills []  Laryngeal/Pharyngeal Exercises   []  Vocal Loudness []  Reading Comprehension []  Swallowing Skills    []  Vocal Cord Function []  Auditory Comprehension []  Oral Motor Skills   []  Resonance []  Writing Skills [x]  Compensatory strategies    [x]  Speech Intelligibility [x]  Expressive Language []  Attention   []  Breath Support/Coord. [x]  Receptive language []  Memory   []  Articulation []  Safety Awareness [x] Pt educ   []  Fluency []  Word Retrieval []        Treatment Provided:  Speech-Language Treatment [53981]:   -word find   -target word production  -reading aloud  -yes/no questions  -pt educ    Patient/Caregiver  Education: [x] Review HEP      HEP/Handouts given: Paragraph reading    Pain Level (0-10 scale) post treatment: 0    ASSESSMENT     [x]   Improving appropriately and progressing toward goals  []   Improving slowly and progressing toward goals  []   Approximating goals/maximum potential  [x]   Continues to benefit from skilled therapy to address remaining functional deficits  []   Not progressing toward goals and plan of care will be adjusted    Patient will

## 2024-11-30 ENCOUNTER — HOSPITAL ENCOUNTER (EMERGENCY)
Facility: HOSPITAL | Age: 64
Discharge: HOME | End: 2024-11-30
Payer: COMMERCIAL

## 2024-11-30 ENCOUNTER — APPOINTMENT (OUTPATIENT)
Facility: HOSPITAL | Age: 64
End: 2024-11-30
Payer: COMMERCIAL

## 2024-11-30 VITALS
BODY MASS INDEX: 28.32 KG/M2 | OXYGEN SATURATION: 99 % | HEART RATE: 67 BPM | SYSTOLIC BLOOD PRESSURE: 184 MMHG | HEIGHT: 61 IN | TEMPERATURE: 97.9 F | DIASTOLIC BLOOD PRESSURE: 81 MMHG | RESPIRATION RATE: 16 BRPM | WEIGHT: 150 LBS

## 2024-11-30 DIAGNOSIS — S90.111A CONTUSION OF RIGHT GREAT TOE WITHOUT DAMAGE TO NAIL, INITIAL ENCOUNTER: Primary | ICD-10-CM

## 2024-11-30 PROCEDURE — 73630 X-RAY EXAM OF FOOT: CPT

## 2024-11-30 PROCEDURE — 6370000000 HC RX 637 (ALT 250 FOR IP): Performed by: PHYSICIAN ASSISTANT

## 2024-11-30 PROCEDURE — 99283 EMERGENCY DEPT VISIT LOW MDM: CPT

## 2024-11-30 RX ORDER — NAPROXEN 250 MG/1
500 TABLET ORAL ONCE
Status: COMPLETED | OUTPATIENT
Start: 2024-11-30 | End: 2024-11-30

## 2024-11-30 RX ORDER — CEPHALEXIN 500 MG/1
500 CAPSULE ORAL 4 TIMES DAILY
Qty: 28 CAPSULE | Refills: 0 | Status: SHIPPED | OUTPATIENT
Start: 2024-11-30 | End: 2024-12-07

## 2024-11-30 RX ADMIN — NAPROXEN 500 MG: 250 TABLET ORAL at 23:04

## 2024-11-30 ASSESSMENT — PAIN DESCRIPTION - ORIENTATION: ORIENTATION: RIGHT

## 2024-11-30 ASSESSMENT — PAIN - FUNCTIONAL ASSESSMENT: PAIN_FUNCTIONAL_ASSESSMENT: 0-10

## 2024-11-30 ASSESSMENT — PAIN DESCRIPTION - LOCATION: LOCATION: TOE (COMMENT WHICH ONE)

## 2024-11-30 ASSESSMENT — PAIN SCALES - GENERAL: PAINLEVEL_OUTOF10: 8

## 2024-12-01 NOTE — ED TRIAGE NOTES
Pt arrives ambulatory to with daughter - reports dropping something on right great toe yesterday & today   Bruising/wound noted to toe

## 2024-12-01 NOTE — ED PROVIDER NOTES
EMERGENCY DEPARTMENT HISTORY AND PHYSICAL EXAM      Date: 2024  Patient Name: Mena Curran    History of Presenting Illness     Chief Complaint   Patient presents with    Toe Injury       History (Context): Mena Curran is a 64 y.o. female with significant past medical history of hypertension, hep C, sleep apnea, hypercholesterolemia presents ambulatory to the ED today.  Patient ports dropping a heavy item on her right great toe yesterday and stubbing it again today.  Denies taking blood thinners.  Patient ports increased pain with walking.  Denies numbness, tingling, weakness.  Patient has not taken anything for symptoms    PCP: Catrina Hardin MD    No current facility-administered medications for this encounter.     Current Outpatient Medications   Medication Sig Dispense Refill    cephALEXin (KEFLEX) 500 MG capsule Take 1 capsule by mouth 4 times daily for 7 days 28 capsule 0    sertraline (ZOLOFT) 50 MG tablet take 2 AND 1/2 tablets by mouth daily 75 tablet 2    ASPIRIN LOW DOSE 81 MG chewable tablet chew and swallow 1 tablet by mouth once daily 90 tablet 1    vitamin B-12 (CYANOCOBALAMIN) 500 MCG tablet Take 1 tablet by mouth daily 30 tablet 6    losartan (COZAAR) 50 MG tablet Take 1 tablet by mouth daily 90 tablet 1    atorvastatin (LIPITOR) 80 MG tablet Take 1 tablet by mouth at bedtime 90 tablet 1       Past History     Past Medical History:   Past Medical History:   Diagnosis Date    Arthritis     Hepatitis C     treated     Hypercholesterolemia     Hypertension     no meds     Sleep apnea     no CPAP        Past Surgical History:  Past Surgical History:   Procedure Laterality Date     SECTION  1984    CHOLECYSTECTOMY  2005    HYSTERECTOMY (CERVIX STATUS UNKNOWN)      KNEE ARTHROSCOPY  2009    Bilateral    NEUROVASCULAR PROCEDURE N/A 2023    Angiography cerebral intracrnial w anesthesia (18895) performed by Elsa Rosales MD at [facility] NEUROVASCULAR SUITE    [place]

## 2024-12-02 ENCOUNTER — HOSPITAL ENCOUNTER (OUTPATIENT)
Facility: HOSPITAL | Age: 64
Setting detail: RECURRING SERIES
Discharge: HOME | End: 2024-12-05
Payer: COMMERCIAL

## 2024-12-02 PROCEDURE — 92507 TX SP LANG VOICE COMM INDIV: CPT

## 2024-12-02 NOTE — PROGRESS NOTES
ST DAILY TREATMENT NOTE    Patient Name: Mena Curran  Date:2024  : 1960  [x]  Patient  Verified  Payor: Payor: MANJIT / Plan: [employer] / Product Type: *No Product type* /   In time:10:42  Out time:11:17  Total Treatment Time (min): 35  Visit #: 15 of 24  Recert due 24; v #15/30 until 24     Treatment Diagnosis: Aphasia following cerebral infarction [I69.320]  Apraxia following cerebral infarction [I69.390]    SUBJECTIVE  Pain Level (0-10 scale): 0    Subjective functional status/changes:   [] No changes reported  Pt reported that while she was on the process of moving, she hit her toe and hurt herself. She is currently wearing a small boot.     OBJECTIVE  Treatment provided includes:  Increase/Improve:  []  Voice Quality []  Cognitive Linguistic Skills []  Laryngeal/Pharyngeal Exercises   []  Vocal Loudness []  Reading Comprehension []  Swallowing Skills    []  Vocal Cord Function []  Auditory Comprehension []  Oral Motor Skills   []  Resonance [x]  Writing Skills [x]  Compensatory strategies    []  Speech Intelligibility []  Expressive Language []  Attention   []  Breath Support/Coord. []  Receptive language []  Memory   []  Articulation []  Safety Awareness [x] Pt educ   []  Fluency []  Word Retrieval []        Treatment Provided:  Speech-Language Treatment [72445]:   -writing for picture description   -sound production treatment (SPT)    Patient/Caregiver  Education: [x] Review HEP      HEP/Handouts given: Picture description--sentences    Pain Level (0-10 scale) post treatment: 0    ASSESSMENT     []   Improving appropriately and progressing toward goals  [x]   Improving slowly and progressing toward goals  []   Approximating goals/maximum potential  [x]   Continues to benefit from skilled therapy to address remaining functional deficits  []   Not progressing toward goals and plan of care will be adjusted    Patient will continue to benefit from skilled therapy to

## 2024-12-04 ENCOUNTER — HOSPITAL ENCOUNTER (OUTPATIENT)
Facility: HOSPITAL | Age: 64
Setting detail: RECURRING SERIES
Discharge: HOME | End: 2024-12-07
Payer: COMMERCIAL

## 2024-12-04 PROCEDURE — 92507 TX SP LANG VOICE COMM INDIV: CPT

## 2024-12-04 NOTE — PROGRESS NOTES
ST DAILY TREATMENT NOTE    Patient Name: Mena Curran  Date:2024  : 1960  [x]  Patient  Verified  Payor: Payor: MANJIT / Plan: [employer] / Product Type: *No Product type* /   In time:11:24  Out time:12:03  Total Treatment Time (min): 39  Visit #:   Recert due 24; v # until 24     Treatment Diagnosis: Aphasia following cerebral infarction [I69.320]  Apraxia following cerebral infarction [I69.390]    SUBJECTIVE  Pain Level (0-10 scale): 0    Subjective functional status/changes:   [] No changes reported  Pt teary-eyed during this session. She reported, \"sometimes I wish I was gone\"; expressed frustration re: living situation. C-SSRS Screening Tool completed, pt answered \"No\" to initial questions. SLP offered to provide National Suicide Hotline number, however, pt declined. Will monitor.     OBJECTIVE  Treatment provided includes:  Increase/Improve:  []  Voice Quality []  Cognitive Linguistic Skills []  Laryngeal/Pharyngeal Exercises   []  Vocal Loudness []  Reading Comprehension []  Swallowing Skills    []  Vocal Cord Function []  Auditory Comprehension []  Oral Motor Skills   []  Resonance []  Writing Skills [x]  Compensatory strategies    [x]  Speech Intelligibility []  Expressive Language []  Attention   []  Breath Support/Coord. []  Receptive language []  Memory   [x]  Articulation []  Safety Awareness [x] Pt educ   []  Fluency []  Word Retrieval []        Treatment Provided:  Speech-Language Treatment [88484]:   Sentence production    Patient/Caregiver  Education: [x] Review HEP      HEP/Handouts given: Picture for sentence production    Pain Level (0-10 scale) post treatment: 0    ASSESSMENT     []   Improving appropriately and progressing toward goals  [x]   Improving slowly and progressing toward goals  []   Approximating goals/maximum potential  [x]   Continues to benefit from skilled therapy to address remaining functional deficits  []   Not progressing

## 2024-12-05 ENCOUNTER — OFFICE VISIT (OUTPATIENT)
Facility: CLINIC | Age: 64
End: 2024-12-05
Payer: COMMERCIAL

## 2024-12-05 VITALS
OXYGEN SATURATION: 97 % | DIASTOLIC BLOOD PRESSURE: 74 MMHG | HEIGHT: 61 IN | SYSTOLIC BLOOD PRESSURE: 132 MMHG | BODY MASS INDEX: 31.15 KG/M2 | WEIGHT: 165 LBS | HEART RATE: 66 BPM

## 2024-12-05 DIAGNOSIS — I10 ESSENTIAL (PRIMARY) HYPERTENSION: Primary | ICD-10-CM

## 2024-12-05 DIAGNOSIS — E78.2 MIXED HYPERLIPIDEMIA: ICD-10-CM

## 2024-12-05 DIAGNOSIS — Z72.0 TOBACCO USE: ICD-10-CM

## 2024-12-05 DIAGNOSIS — R41.89 COGNITIVE DEFICITS: ICD-10-CM

## 2024-12-05 DIAGNOSIS — R47.01 APHASIA: ICD-10-CM

## 2024-12-05 DIAGNOSIS — I69.30 HISTORY OF CVA WITH RESIDUAL DEFICIT: ICD-10-CM

## 2024-12-05 PROCEDURE — 99214 OFFICE O/P EST MOD 30 MIN: CPT | Performed by: STUDENT IN AN ORGANIZED HEALTH CARE EDUCATION/TRAINING PROGRAM

## 2024-12-05 PROCEDURE — 3075F SYST BP GE 130 - 139MM HG: CPT | Performed by: STUDENT IN AN ORGANIZED HEALTH CARE EDUCATION/TRAINING PROGRAM

## 2024-12-05 PROCEDURE — 3078F DIAST BP <80 MM HG: CPT | Performed by: STUDENT IN AN ORGANIZED HEALTH CARE EDUCATION/TRAINING PROGRAM

## 2024-12-05 SDOH — ECONOMIC STABILITY: FOOD INSECURITY: WITHIN THE PAST 12 MONTHS, THE FOOD YOU BOUGHT JUST DIDN'T LAST AND YOU DIDN'T HAVE MONEY TO GET MORE.: NEVER TRUE

## 2024-12-05 SDOH — ECONOMIC STABILITY: INCOME INSECURITY: HOW HARD IS IT FOR YOU TO PAY FOR THE VERY BASICS LIKE FOOD, HOUSING, MEDICAL CARE, AND HEATING?: NOT HARD AT ALL

## 2024-12-05 SDOH — ECONOMIC STABILITY: FOOD INSECURITY: WITHIN THE PAST 12 MONTHS, YOU WORRIED THAT YOUR FOOD WOULD RUN OUT BEFORE YOU GOT MONEY TO BUY MORE.: NEVER TRUE

## 2024-12-05 ASSESSMENT — ENCOUNTER SYMPTOMS
COUGH: 0
BLOOD IN STOOL: 0
VOMITING: 0
DIARRHEA: 0
RHINORRHEA: 0
BACK PAIN: 0
WHEEZING: 0
NAUSEA: 0
SHORTNESS OF BREATH: 0
CHEST TIGHTNESS: 0
ABDOMINAL PAIN: 0

## 2024-12-05 ASSESSMENT — PATIENT HEALTH QUESTIONNAIRE - PHQ9
SUM OF ALL RESPONSES TO PHQ QUESTIONS 1-9: 0
SUM OF ALL RESPONSES TO PHQ9 QUESTIONS 1 & 2: 0
2. FEELING DOWN, DEPRESSED OR HOPELESS: NOT AT ALL
SUM OF ALL RESPONSES TO PHQ QUESTIONS 1-9: 0
SUM OF ALL RESPONSES TO PHQ QUESTIONS 1-9: 0
1. LITTLE INTEREST OR PLEASURE IN DOING THINGS: NOT AT ALL
SUM OF ALL RESPONSES TO PHQ QUESTIONS 1-9: 0

## 2024-12-05 NOTE — PROGRESS NOTES
Mena Curran is a 64 y.o. year old female who presents today for   Chief Complaint   Patient presents with    Follow-up     PT presents for f/u.         Is someone accompanying this pt? No    Is the patient using any DME equipment during OV? no    Depression Screenin/5/2024    11:35 AM 2024     9:08 AM 2024     2:36 PM 2024     9:17 AM 5/10/2024     7:50 AM 2024     9:59 AM 2023    11:19 AM   PHQ-9 Questionaire   Little interest or pleasure in doing things 0 0 0 0 0 0 0   Feeling down, depressed, or hopeless 0 0 0 0 0 0 0   Trouble falling or staying asleep, or sleeping too much   0 0 0 0 0   Feeling tired or having little energy   0 0 0 0 0   Poor appetite or overeating   0 0 0 0 0   Feeling bad about yourself - or that you are a failure or have let yourself or your family down   0 0 0 0 0   Trouble concentrating on things, such as reading the newspaper or watching television   0 0 0 0 0   Moving or speaking so slowly that other people could have noticed. Or the opposite - being so fidgety or restless that you have been moving around a lot more than usual   0 0 0 0 0   Thoughts that you would be better off dead, or of hurting yourself in some way   0 0 0 0 0   PHQ-9 Total Score 0 0 0 0 0 0 0   If you checked off any problems, how difficult have these problems made it for you to do your work, take care of things at home, or get along with other people?   0 0 0 0 0       Abuse Screening:       No data to display                Learning Assessment:  No question data found.    Fall Risk:       No data to display                    Coordination of Care:   1. \"Have you been to the ER, urgent care clinic since your last visit?  Hospitalized since your last visit?\" Yes    2. \"Have you seen or consulted any other health care providers outside of the [facility] since your last visit?\" NO    3. For patients aged 45-75: Has the patient had a colonoscopy / FIT/ Cologuard? 
  Physical Activity: Not on file   Stress: Not on file   Social Connections: Moderately Integrated (8/5/2023)    Social Connection and Isolation Panel [NHANES]     Frequency of Communication with Friends and Family: More than three times a week     Frequency of Social Gatherings with Friends and Family: More than three times a week     Attends Restoration Services: More than 4 times per year     Active Member of Clubs or Organizations: Yes     Attends Club or Organization Meetings: 1 to 4 times per year     Marital Status:    Intimate Partner Violence: Not At Risk (8/5/2023)    Humiliation, Afraid, Rape, and Kick questionnaire     Fear of Current or Ex-Partner: No     Emotionally Abused: No     Physically Abused: No     Sexually Abused: No   Housing Stability: Unknown (12/5/2024)    Housing Stability Vital Sign     Unable to Pay for Housing in the Last Year: No     Number of Times Moved in the Last Year: Not on file     Homeless in the Last Year: No       Current Outpatient Medications   Medication Sig Dispense Refill    cephALEXin (KEFLEX) 500 MG capsule Take 1 capsule by mouth 4 times daily for 7 days 28 capsule 0    ASPIRIN LOW DOSE 81 MG chewable tablet chew and swallow 1 tablet by mouth once daily 90 tablet 1    atorvastatin (LIPITOR) 80 MG tablet Take 1 tablet by mouth at bedtime 90 tablet 1    sertraline (ZOLOFT) 50 MG tablet take 2 AND 1/2 tablets by mouth daily 75 tablet 2    vitamin B-12 (CYANOCOBALAMIN) 500 MCG tablet Take 1 tablet by mouth daily 30 tablet 6    losartan (COZAAR) 50 MG tablet Take 1 tablet by mouth daily 90 tablet 1     No current facility-administered medications for this visit.       BP (!) 149/84 (Site: Right Upper Arm, Position: Sitting, Cuff Size: Large Adult)   Pulse 66   Ht 1.549 m (5' 1\")   Wt 74.8 kg (165 lb)   SpO2 97%   BMI 31.18 kg/m²      Physical Exam  Vitals and nursing note reviewed.   Constitutional:       General: She is not in acute distress.     Appearance:

## 2024-12-09 ENCOUNTER — HOSPITAL ENCOUNTER (OUTPATIENT)
Facility: HOSPITAL | Age: 64
Setting detail: RECURRING SERIES
End: 2024-12-09
Payer: COMMERCIAL

## 2024-12-11 ENCOUNTER — HOSPITAL ENCOUNTER (OUTPATIENT)
Facility: HOSPITAL | Age: 64
Setting detail: RECURRING SERIES
Discharge: HOME | End: 2024-12-14
Payer: COMMERCIAL

## 2024-12-11 PROCEDURE — 92507 TX SP LANG VOICE COMM INDIV: CPT

## 2024-12-11 NOTE — PROGRESS NOTES
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 All aspects of speech/language, cognitition, voice, and swallowing WNLs per pt report prior to 2023 CVA    Comorbidities: Hx of CVA with R-sided weakness, COPD, WILTON, HTN, HLD, treated hepatitis C, OA s/p bilateral knee replacement, anxiety/depression   Cognitive impairment, Neurologic condition, and Social determinants of health: Tobacco, Financial, and Stres      Medications: Verified on Patient Summary List     Visits from Start of Care: 28    Missed Visits: 2    The Plan of Care and following information is based on the patient's current status:  Short-term Goals:  Goal: 1) Pt will answer complex yes/no questions and wh-questions using multimodal communication (e.g., verbally, gesturally, communication board, and facial expression) with 80% acc given Lazaro to increase participation in decision-making in the functional living environment.   Status at last note/certification: not met; Complex yes/no questions:  ANKIT, increase to  given x2 rep of stimuli/mod cues   Current Status: not met;Pt answered yes/no questions after reading 2-sentence paragraphs with 100% acc ANKIT. Continued difficulty with complex yes/no 
communication (e.g., verbally, gesturally, communication board, and facial expression) with 80% acc given Lazaro to increase participation in decision-making in the functional living environment.   Current: Per QAB re-administration, pt with continued severe deficits in sentence comprehension/answering complex yes/no questions.      2) Pt will produce the target phoneme/phonemes in the initial, medial, and final word positions of monosyllabic--->bisyllabic words with 80% accuracy given modA.   Current: Per DAWSON-2 administration, pt continues to present with mod apraxia c/b deficits in bi- and tri-syllabic words.         3) Pt will utilize word-finding strategies (e.g., Semantic Feature Analysis, synonyms/antonyms, divergent/convergent naming, gestures, etc) during different communication tasks and connected speech levels (e.g., sentences, structured/unstructured conversation) in 80% of opportunities given modA.  Current: Per QAB re-administration, pt with continued moderate deficits in word finding.      4) Pt will read aloud and answer yes/no questions; wh-questions re: 3-4 sentence paragraph, functional reading materials (e.g., recipes, appointment cards, menus, etc) with 80% acc given modA to increase reading comprehension in the functional environment.  Current: Per QAB re-administration, pt with continued moderate deficits in reading.      5) Pt will produce functional sentences with 8+ word utterances during structured expressive language tasks (verbal/written) with 80% acc given modA for word retrieval, sentence structure, accurate spelling, and grammatical correctness to promote fluent speech for communication.  Current: Per QAB re-administration, pt with continued moderate deficits in grammatical construction.     PLAN  [x]  Continue plan of care  []  Modify Goals/Treatment Plan      []  Discharge due to:  [] Other:    Jeny Rodriguez M.A., CCC-SLP  Speech Language Pathologist   12/11/2024  2:00

## 2024-12-16 ENCOUNTER — HOSPITAL ENCOUNTER (OUTPATIENT)
Facility: HOSPITAL | Age: 64
Setting detail: RECURRING SERIES
Discharge: HOME | End: 2024-12-19
Payer: COMMERCIAL

## 2024-12-16 PROCEDURE — 92507 TX SP LANG VOICE COMM INDIV: CPT

## 2024-12-16 NOTE — PROGRESS NOTES
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 Pt also reported she will have transportation services starting .     OBJECTIVE  Treatment provided includes:  Increase/Improve:  []  Voice Quality []  Cognitive Linguistic Skills []  Laryngeal/Pharyngeal Exercises   []  Vocal Loudness []  Reading Comprehension []  Swallowing Skills    []  Vocal Cord Function []  Auditory Comprehension []  Oral Motor Skills   []  Resonance [x]  Writing Skills [x]  Compensatory strategies    [x]  Speech Intelligibility [x]  Expressive Language []  Attention   []  Breath Support/Coord. []  Receptive language []  Memory   [x]  Articulation []  Safety Awareness [x] Pt educ   [x]  Fluency []  Word Retrieval []        Treatment Provided:  Speech-Language Treatment [55225]:   -sentences  -sound production treatment  -pt educ    Patient/Caregiver  Education: [x] Review HEP      HEP/Handouts given: Review HEP--word list    Pain Level (0-10 scale) post treatment: 0    ASSESSMENT     []   Improving appropriately and progressing toward goals  [x]   Improving slowly and progressing toward goals  []   Approximating goals/maximum potential  [x]   Continues to benefit from skilled therapy to address remaining functional deficits  []   Not progressing toward goals and plan of care will be adjusted    Patient will continue to benefit from skilled therapy to address remaining functional

## 2024-12-18 ENCOUNTER — HOSPITAL ENCOUNTER (OUTPATIENT)
Facility: HOSPITAL | Age: 64
Setting detail: RECURRING SERIES
Discharge: HOME | End: 2024-12-21
Payer: COMMERCIAL

## 2024-12-18 PROCEDURE — 92507 TX SP LANG VOICE COMM INDIV: CPT

## 2024-12-18 NOTE — PROGRESS NOTES
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 Pt reported she will change her insurance to medicare in February. Informed pt re: authorization for OP ST on 24, and that auth request could be resubmitted, however, will be dependent on insurance if she can have more visits. Pt reported she will talk to her sister but would like to consider d/c at 24 and return when she gets medicare set up. Expected d/c from OP ST on 24.     OBJECTIVE  Treatment provided includes:  Increase/Improve:  []  Voice Quality []  Cognitive Linguistic Skills []  Laryngeal/Pharyngeal Exercises   []  Vocal Loudness []  Reading Comprehension []  Swallowing Skills    []  Vocal Cord Function []  Auditory Comprehension []  Oral Motor Skills   []  Resonance []  Writing Skills [x]  Compensatory strategies    []  Speech Intelligibility [x]  Expressive Language []  Attention   []  Breath Support/Coord. []  Receptive language []  Memory   []  Articulation []  Safety Awareness [x] Pt educ   []  Fluency []  Word Retrieval []        Treatment Provided:  Speech-Language Treatment [34190]:   -complex yes/no   -reading  -sound production treatment    Patient/Caregiver  Education: [x] Review HEP      HEP/Handouts given: Review HEP    Pain Level (0-10 scale) post treatment: 0    ASSESSMENT     []   Improving appropriately and progressing toward goals  [x]

## 2024-12-23 ENCOUNTER — HOSPITAL ENCOUNTER (OUTPATIENT)
Facility: HOSPITAL | Age: 64
Setting detail: RECURRING SERIES
End: 2024-12-23
Payer: COMMERCIAL

## 2024-12-30 ENCOUNTER — HOSPITAL ENCOUNTER (OUTPATIENT)
Facility: HOSPITAL | Age: 64
Setting detail: RECURRING SERIES
End: 2024-12-30
Payer: COMMERCIAL

## 2024-12-30 NOTE — PROGRESS NOTES
Speech Therapy Discharge Instructions      [facility]  [address] [place] [address]  [phone] fax    Patient: Mena Curran  : 1960        Continue utilizing discussed compensatory strategies (e.g., using descriptions, gestures, initial letter, synonyms and antonyms, categories, where they could be found, what it does, etc).    Slow down your speech and take your time.     Let people know to give you enough time to speak.     Tap if it helps you keep your pace.     Reach out to your doctor any changes in cognition, memory, voice, speech, language, and swallowing to possibly be re-referred for Speech Therapy.     Call 911 if you experience sudden chest pain, weakness, slurred speech, seizures, bleedings, choking, facial drooping, etc.     Please reach out to us if you have any questions re: any discussed topics in Speech Therapy. Our number is [phone].

## 2025-01-19 DIAGNOSIS — F41.9 ANXIETY AND DEPRESSION: ICD-10-CM

## 2025-01-19 DIAGNOSIS — F32.A ANXIETY AND DEPRESSION: ICD-10-CM

## 2025-01-20 ENCOUNTER — HOSPITAL ENCOUNTER (OUTPATIENT)
Facility: HOSPITAL | Age: 65
Setting detail: RECURRING SERIES
Discharge: HOME | End: 2025-01-23
Payer: COMMERCIAL

## 2025-01-20 PROCEDURE — 92507 TX SP LANG VOICE COMM INDIV: CPT

## 2025-01-20 NOTE — PROGRESS NOTES
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 Pt also reported compliance to HEP.     OBJECTIVE  Treatment provided includes:  Increase/Improve:  []  Voice Quality []  Cognitive Linguistic Skills []  Laryngeal/Pharyngeal Exercises   []  Vocal Loudness []  Reading Comprehension []  Swallowing Skills    []  Vocal Cord Function []  Auditory Comprehension []  Oral Motor Skills   []  Resonance []  Writing Skills [x]  Compensatory strategies    []  Speech Intelligibility [x]  Expressive Language []  Attention   []  Breath Support/Coord. []  Receptive language []  Memory   [x]  Articulation []  Safety Awareness [x] Pt educ   [x]  Fluency [x]  Word Retrieval []        Treatment Provided:  Speech-Language Treatment [21539]:   -Sound production treatment (SPT)  -Semantic Feature Analysis (SFA)    Patient/Caregiver  Education: [x] Review HEP      HEP/Handouts given: bisyllabic words    Pain Level (0-10 scale) post treatment: 0    ASSESSMENT     []   Improving appropriately and progressing toward goals  [x]   Improving slowly and progressing toward goals  []   Approximating goals/maximum potential  [x]   Continues to benefit from skilled therapy to address remaining functional deficits  []   Not progressing toward goals and plan of care will be adjusted    Patient will continue to benefit from skilled therapy to address remaining functional deficits: aphasia, apraxia of

## 2025-01-20 NOTE — PROGRESS NOTES
YOLANDA WAGNER 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 All aspects of speech/language, cognitition, voice, and swallowing WNLs per pt report prior to 2023 CVA    Comorbidities: Hx of CVA with R-sided weakness, COPD, WILTON, HTN, HLD, treated hepatitis C, OA s/p bilateral knee replacement, anxiety/depression   Cognitive impairment, Neurologic condition, and Social determinants of health: Tobacco, Financial, and Stress     Medications: Verified on Patient Summary List     Visits from Start of Care: 31    Missed Visits: 3    The Plan of Care and following information is based on the patient's current status:  Short-term Goals:  Goal: 1) Pt will answer complex yes/no questions and wh-questions using multimodal communication (e.g., verbally, gesturally, communication board, and facial expression) with 80% acc given Lazaro to increase participation in decision-making in the functional living environment.   Status at last note/certification: not met;Pt answered yes/no questions after reading 2-sentence paragraphs with 100% acc ANKIT. Continued difficulty with complex yes/no questions per QAB re-administration   Current Status: met; Complex yes/no questions: 30% acc ANKIT, increase to

## 2025-02-05 DIAGNOSIS — I10 ESSENTIAL (PRIMARY) HYPERTENSION: ICD-10-CM

## 2025-02-07 RX ORDER — LOSARTAN POTASSIUM 50 MG/1
50 TABLET ORAL DAILY
Qty: 90 TABLET | Refills: 1 | Status: SHIPPED | OUTPATIENT
Start: 2025-02-07

## 2025-02-10 ENCOUNTER — HOSPITAL ENCOUNTER (OUTPATIENT)
Facility: HOSPITAL | Age: 65
Setting detail: RECURRING SERIES
Discharge: HOME | End: 2025-02-13
Payer: MEDICARE

## 2025-02-10 PROCEDURE — 92507 TX SP LANG VOICE COMM INDIV: CPT

## 2025-02-10 NOTE — PROGRESS NOTES
grammatical correctness to promote fluent speech for communication.   Current: Not targeted this date.    PLAN  [x]  Continue plan of care  []  Modify Goals/Treatment Plan      []  Discharge due to:  [] Other:    Catherine Holliday 2/10/2025  10:00 AM   Clinican    Future Appointments   Date Time Provider Department Center   2/10/2025 10:00 AM Jeny Rodriguez, SLP [facility]   2/17/2025 11:20 AM JenniferJenyyza, SLP [facility]   2/19/2025 10:00 AM Jennifer, Jeny Backyza, SLP [facility]   2/24/2025 11:20 AM Jennifer, Jeny Backyza, SLP [facility]   2/26/2025 10:00 AM Jennifer, Jeny Backyza, SLP [facility]   3/3/2025 10:40 AM Jennifer, Jeny Backyza, SLP [facility]   3/5/2025 10:40 AM JenniferJeny landerosyza, SLP [facility]   3/10/2025 10:40 AM JenniferJenyyza, SLP [facility]   3/17/2025 10:40 AM Jennifer, Jeny Backyza, SLP [facility]   3/19/2025 11:20 AM Jennifer, Jeny Backyza, SLP [facility]   4/3/2025 11:40 AM Catrina Hardin MD [facility]     If an interpreting service was utilized for treatment of this patient, the contents of this document represent the material reviewed with the patient via the .

## 2025-02-17 ENCOUNTER — HOSPITAL ENCOUNTER (OUTPATIENT)
Facility: HOSPITAL | Age: 65
Setting detail: RECURRING SERIES
Discharge: HOME | End: 2025-02-20
Payer: COMMERCIAL

## 2025-02-17 PROCEDURE — 92507 TX SP LANG VOICE COMM INDIV: CPT

## 2025-02-17 NOTE — PROGRESS NOTES
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 Pt reported compliance to HEP. Pt reported taking medication for congestion. Pt observed to use the tapping method to pace her speech during conversation.    OBJECTIVE  Treatment provided includes:  Increase/Improve:  []  Voice Quality []  Cognitive Linguistic Skills []  Laryngeal/Pharyngeal Exercises   []  Vocal Loudness []  Reading Comprehension []  Swallowing Skills    []  Vocal Cord Function []  Auditory Comprehension []  Oral Motor Skills   []  Resonance []  Writing Skills [x]  Compensatory strategies    []  Speech Intelligibility [x]  Expressive Language []  Attention   []  Breath Support/Coord. []  Receptive language []  Memory   [x]  Articulation []  Safety Awareness [x] Pt educ   [x]  Fluency []  Word Retrieval []        Treatment Provided:  Speech-Language Treatment [44986]:   - answering WH- questions about 3-4 sentence passages  - writing sentences    Patient/Caregiver  Education: [x] Review HEP      HEP/Handouts given: Categories worksheet  Pain Level (0-10 scale) post treatment: 0    ASSESSMENT     []   Improving appropriately and progressing toward goals  [x]   Improving slowly and progressing toward goals  []   Approximating goals/maximum potential  [x]   Continues to benefit from skilled therapy to address remaining functional deficits  []   Not progressing toward goals and plan of care will be 
word-finding strategies during spontaneous conversation. However, pt continues to demo difficulty with writing and producing verbally accurate sentences with 80% acc given mod verbal cues.       Goals: to be achieved in 4 Weeks:  Short-term Goals:  1) Pt will answer complex yes/no questions and wh-questions using multimodal communication (e.g., verbally, gesturally, communication board, and facial expression) re: variety of language tasks (e.g., 3-4 sentence paragraph, functional reading materials, etc) with 85% acc given Lazaro to increase participation in decision-making in the functional living environment.     2) Pt will produce the target phoneme/phonemes in bi-syllabic --> tri-syllabic words with 80% accuracy given modA.     3) Pt will utilize word-finding strategies (e.g., Semantic Feature Analysis, synonyms/antonyms, divergent/convergent naming, gestures, etc) during different communication tasks and connected speech levels (e.g., sentences, structured/unstructured conversation) in 85% of opportunities given Lazaro.     4) Pt will produce functional sentences with 8+ word utterances during structured expressive language tasks (verbal/written) with 85% acc given Lazaro for word retrieval, sentence structure, accurate spelling, and grammatical correctness to promote fluent speech for communication.     Long-term Goals:  1) Pt will develop functional motor programming, articulatory proficiency and utilize compensatory strategies to express wants and needs for intelligible speech and functional prosody with 90% acc as evidenced by clinician and pt judgment, informal observation, or by a rating of mild in the DAWSON-2      2) Pt will develop functional linguistic-based skills and utilize compensatory strategies to communicate wants and needs effectively to different conversational partners, maintain safety, and participate socially in functional living environment in 90% of opportunities given Lazaro or mild overall QAB

## 2025-02-19 ENCOUNTER — APPOINTMENT (OUTPATIENT)
Facility: HOSPITAL | Age: 65
End: 2025-02-19
Payer: COMMERCIAL

## 2025-02-20 DIAGNOSIS — E78.2 MIXED HYPERLIPIDEMIA: ICD-10-CM

## 2025-02-20 DIAGNOSIS — I69.30 HISTORY OF CVA WITH RESIDUAL DEFICIT: ICD-10-CM

## 2025-02-21 DIAGNOSIS — I69.30 HISTORY OF CVA WITH RESIDUAL DEFICIT: ICD-10-CM

## 2025-02-21 RX ORDER — ATORVASTATIN CALCIUM 80 MG/1
80 TABLET, FILM COATED ORAL NIGHTLY
Qty: 90 TABLET | Refills: 1 | Status: SHIPPED | OUTPATIENT
Start: 2025-02-21

## 2025-02-24 ENCOUNTER — TELEPHONE (OUTPATIENT)
Facility: HOSPITAL | Age: 65
End: 2025-02-24

## 2025-02-24 NOTE — TELEPHONE ENCOUNTER
Left VM with pt's sister re: appt at 11:20, 10-min brenton period, and today's session being considered a no-show. Informed pt's sister re: attendance policy (x2 no-shows/x3 no-shows/cancellations). Offered 2:40/3:20 appt today to make up, and informed pt's sister re: next appt on 2/26/25 at 10 am.     Reached out to pt's dtr re: appt at 11:20. Per pt's dtr, pt's sister is out of town and she is currently at work, so pt will not have a ride to this session Offered 2:40 appt today, however, pt's sister reported that she will not be able to take pt to today's session. Informed pt's dtr re: next appt on 2/26/25 at 10 am.

## 2025-02-26 ENCOUNTER — TELEPHONE (OUTPATIENT)
Facility: HOSPITAL | Age: 65
End: 2025-02-26

## 2025-02-26 RX ORDER — ASPIRIN 81 MG
81 TABLET,CHEWABLE ORAL DAILY
Qty: 90 TABLET | Refills: 1 | Status: SHIPPED | OUTPATIENT
Start: 2025-02-26

## 2025-02-26 NOTE — TELEPHONE ENCOUNTER
SLP reached out to pt's dtr (Charley Flores) re: today's appt at 10 am. She reported that pt's grand dtr was supposed to take her. SLP offered 12:40 and 2:40 openings to reschedule. She reported she will call back to determine schedule.

## 2025-03-03 ENCOUNTER — TELEPHONE (OUTPATIENT)
Facility: HOSPITAL | Age: 65
End: 2025-03-03

## 2025-03-03 NOTE — TELEPHONE ENCOUNTER
SLP reached out to pt's dtr (Charley Flores) re: pt's appt today at 10:40, 10-min brenton period, and today's session considered to be no show. Informed pt's dtr of attendance policy (2 no-shows/3 no-shows/cancellations and d/c from OP ST). Informed pt's dtr that ptt to be d/c from OP ST if she does not make next appt, 3/5/25 at 10:40 am. SLP to send pt's dtr via email (short [email or web address]) appt calendar. Pt's dtr expressed verbal understanding and agreement.

## 2025-03-05 ENCOUNTER — HOSPITAL ENCOUNTER (OUTPATIENT)
Facility: HOSPITAL | Age: 65
Setting detail: RECURRING SERIES
Discharge: HOME | End: 2025-03-08
Payer: MEDICARE

## 2025-03-05 PROCEDURE — 92507 TX SP LANG VOICE COMM INDIV: CPT

## 2025-03-05 NOTE — PROGRESS NOTES
pond, [place], sand): 1/1x opportunities ANKIT  Synonym/antonyms of \"sad\": 2/2x opportunities ANKIT  Identifying similarities between apples and oranges: 1/2x opportunities ANKIT, increased to 2/2x opportunities given min verbal cues   Identifying differences between apples and oranges: 0/2x opportunities ANKIT, increased to 2/2x opportunities given mod verbal and max visual cues    Pt observed to benefit from writing down her intended spoken message when listing differences between \"apples\" and \"oranges.\"     4) Pt will produce functional sentences with 8+ word utterances during structured expressive language tasks (verbal/written) with 85% acc given Lazaro for word retrieval, sentence structure, accurate spelling, and grammatical correctness to promote fluent speech for communication.   Current: When adding to a category, pt spelled words with 88% acc ANKIT, increased to 100% acc given min verbal cues.    PLAN  [x]  Continue plan of care  []  Modify Goals/Treatment Plan      []  Discharge due to:  [] Other:    Catherine Holliday 3/5/2025  10:40 AM   Clinician    Future Appointments   Date Time Provider Department Center   3/5/2025 10:40 AM Jeny Rodriguez SLP [facility]   3/10/2025 10:40 AM Jeny Rodriguez SLP [facility]   3/17/2025 10:40 AM Jeny Rodriguez SLP [facility]   3/19/2025 11:20 AM Jeny Rodriguez SLP [facility]   4/3/2025 11:40 AM Catrina Hardin MD [facility]     If an interpreting service was utilized for treatment of this patient, the contents of this document represent the material reviewed with the patient via the .

## 2025-03-10 ENCOUNTER — HOSPITAL ENCOUNTER (OUTPATIENT)
Facility: HOSPITAL | Age: 65
Setting detail: RECURRING SERIES
Discharge: HOME | End: 2025-03-13
Payer: MEDICARE

## 2025-03-10 PROCEDURE — 92507 TX SP LANG VOICE COMM INDIV: CPT

## 2025-03-10 NOTE — PROGRESS NOTES
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 ST d/w pt re: 7 approved visits. Pt requested to make appts for 3/14/25, 3/24/25, and 3/26/25 at 10:40 AM. Pt's sister reported she will confirm 3/24 and  appt on 3/14/25.Pt endorsed that she wants to continue with ST as long as she can because she feels that it has helped with her expressive language. Pt demo compliance to HEP, recording herself reading through her word list.     OBJECTIVE  Treatment provided includes:  Increase/Improve:  []  Voice Quality []  Cognitive Linguistic Skills []  Laryngeal/Pharyngeal Exercises   []  Vocal Loudness []  Reading Comprehension []  Swallowing Skills    []  Vocal Cord Function []  Auditory Comprehension []  Oral Motor Skills   []  Resonance []  Writing Skills [x]  Compensatory strategies    [x]  Speech Intelligibility []  Expressive Language []  Attention   []  Breath Support/Coord. [x]  Receptive language []  Memory   [x]  Articulation []  Safety Awareness [x] Pt educ   []  Fluency []  Word Retrieval []        Treatment Provided:  Speech-Language Treatment [48387]:   - complex yes/no questions  - sound production treatment (SPT)     Patient/Caregiver  Education: [x] Review HEP      HEP/Handouts given: Practice word list, write sentences using target words    Pain Level (0-10 scale)

## 2025-03-14 ENCOUNTER — HOSPITAL ENCOUNTER (OUTPATIENT)
Facility: HOSPITAL | Age: 65
Setting detail: RECURRING SERIES
Discharge: HOME | End: 2025-03-17
Payer: MEDICARE

## 2025-03-14 PROCEDURE — 92507 TX SP LANG VOICE COMM INDIV: CPT

## 2025-03-14 NOTE — PROGRESS NOTES
ST DAILY TREATMENT NOTE    Patient Name: Mena Curran  Date:3/14/2025  : 1960  [x]  Patient  Verified  Payor: Payor: TIMOTHY HERNÁNDEZ MEDICARE / Plan: [facility]LINE [facility]EEP[facility] MEDIBLUE PLUS / Product Type: *No Product type* /   In time:10:42  Out time:11:23  Total Treatment Time (min): 41  Visit #: 4 of 8  PN due 3/17/25   v #2 until 25    Treatment Diagnosis: Aphasia following cerebral infarction [I69.320]  Apraxia following cerebral infarction [I69.390]  Treatment Area: Aphasia following cerebral infarction  Apraxia following cerebral infarction    SUBJECTIVE  Pain Level (0-10 scale): 0    Subjective functional status/changes:   [] No changes reported  Pt reported compliance to HEP, endorsing that her strategies have helped her conversation partners to understand her intended message.    OBJECTIVE  Treatment provided includes:  Increase/Improve:  []  Voice Quality []  Cognitive Linguistic Skills []  Laryngeal/Pharyngeal Exercises   []  Vocal Loudness []  Reading Comprehension []  Swallowing Skills    []  Vocal Cord Function []  Auditory Comprehension [x]  Oral Motor Skills   []  Resonance []  Writing Skills []  Compensatory strategies    []  Speech Intelligibility [x]  Expressive Language []  Attention   []  Breath Support/Coord. [x]  Receptive language []  Memory   []  Articulation []  Safety Awareness [x] Pt educ   []  Fluency []  Word Retrieval []        Treatment Provided:  Speech-Language Treatment [92967]:   - QAB-2 re-administration    Patient/Caregiver  Education: [x] Review HEP      HEP/Handouts given: Review HEP    Pain Level (0-10 scale) post treatment: 0    ASSESSMENT   The Quick Aphasia Battery (QAB) was administered to reassess pt's expressive and receptive language skills and identify presence of aphasia. The QAB is made up of eight subtests, each comprising sets of items that probe different language domains, vary in difficulty, and are scored with a graded system to maximize the

## 2025-03-17 ENCOUNTER — HOSPITAL ENCOUNTER (OUTPATIENT)
Facility: HOSPITAL | Age: 65
Setting detail: RECURRING SERIES
Discharge: HOME | End: 2025-03-20
Payer: MEDICARE

## 2025-03-17 PROCEDURE — 92507 TX SP LANG VOICE COMM INDIV: CPT

## 2025-03-17 NOTE — PROGRESS NOTES
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] Onset Date:2023 ; initial referral 23      Prior Hospitalization: see medical history Provider#: [id number]   Medications: Verified on Patient Summary List    Comorbidities: Hx of CVA with R-sided weakness, COPD, WILTON, HTN, HLD, treated hepatitis C, OA s/p bilateral knee replacement, anxiety/depression   Cognitive impairment, Neurologic condition, and Social determinants of health: Tobacco, Financial, and Stress     Payor: Payor: [facility] MEDICARE / [facility] / Product Type: *No Product type* /     Treatment Plan: Aphasia Treatment, Patient Education, and Other: Apraxia of Speech 17291 SPEECH TREATMENT ([facility]- [facility]/Language Therapy)    Prior Level of Function:Initially seen for OP ST 12/15/23 for aphasia and apraxia of speech. All aspects of speech/language, cognitition, voice, and swallowing WNLs per pt report prior to 2023 CVA     Visits from Start of Care: 36    Missed Visits: 6    The Plan of Care and following information is based on the patient's current status:  Short-term Goals:  Goal: 1) Pt will answer complex yes/no questions and wh-questions using multimodal communication (e.g., verbally, gesturally, communication board, and facial expression) re: variety of language tasks (e.g., 3-4 sentence paragraph, functional reading materials, etc) with 85% acc given Lazaro to increase participation in decision-making in the functional living environment.   Status at last

## 2025-03-17 NOTE — PROGRESS NOTES
ST DAILY TREATMENT NOTE    Patient Name: Mena Curran  Date:3/17/2025  : 1960  [x]  Patient  Verified  Payor: Payor[place] [employer] / Plan: ROSELINE [facility] HEALTHKEEPERS MEDIBLUE PLUS / Product Type: *No Product type* /   In time:10:43  Out time:11:30  Total Treatment Time (min): 47  Visit #: 1 of 8  PN due 25  v #3 until 25     Treatment Diagnosis: Aphasia following cerebral infarction [I69.320]  Apraxia following cerebral infarction [I69.390]  Treatment Area: Aphasia following cerebral infarction  Apraxia following cerebral infarction    SUBJECTIVE  Pain Level (0-10 scale): 0    Subjective functional status/changes:   [] No changes reported  Pt reported that her allergies have been bothering her; however, she endorsed that she has been taking medication for her postnasal drip. Pt reported that her friends have endorsed being able to understand her better during conversational speech.    OBJECTIVE  Treatment provided includes:  Increase/Improve:  []  Voice Quality []  Cognitive Linguistic Skills []  Laryngeal/Pharyngeal Exercises   []  Vocal Loudness []  Reading Comprehension []  Swallowing Skills    []  Vocal Cord Function []  Auditory Comprehension []  Oral Motor Skills   []  Resonance []  Writing Skills []  Compensatory strategies    []  Speech Intelligibility []  Expressive Language []  Attention   []  Breath Support/Coord. []  Receptive language []  Memory   [x]  Articulation []  Safety Awareness [x] Pt educ   [x]  Fluency []  Word Retrieval []        Treatment Provided:  Speech-Language Treatment [63950]:   - DAWSON-2 re-administration    Patient/Caregiver  Education: [x] Review HEP      HEP/Handouts given: Review HEP    Pain Level (0-10 scale) post treatment: 0    ASSESSMENT   SLP conducted the Apraxia Battery for Adults (DAWSON-2) to measure the presence and severity of apraxia in adolescents and adults.         Subtest Raw Score Level of Impairment   1 Diadochokinetic Rate 2 MODERATE

## 2025-03-19 ENCOUNTER — HOSPITAL ENCOUNTER (OUTPATIENT)
Facility: HOSPITAL | Age: 65
Setting detail: RECURRING SERIES
Discharge: HOME | End: 2025-03-22
Payer: MEDICARE

## 2025-03-19 PROCEDURE — 92507 TX SP LANG VOICE COMM INDIV: CPT

## 2025-03-19 NOTE — PROGRESS NOTES
conversation.      4) Pt will produce functional sentences with 8+ word utterances during structured expressive language tasks (verbal/written) with 85% acc given Lazaro for word retrieval, sentence structure, accurate spelling, and grammatical correctness to promote fluent speech for communication.   Current: Grammar during written sentence production: 33% acc ANKIT, increased to 67% acc given mod verbal cues, increased to 100% acc given max verbal and visual cues  Spelling grammar during written sentence: 58% acc ANKIT, increased to 75% acc given min verbal cues, increased to 83% acc given mod verbal cues, increased to 100% acc given max verbal and visual cues     PLAN  [x]  Continue plan of care  []  Modify Goals/Treatment Plan      []  Discharge due to:  [] Other:    Catherine Holliday 3/19/2025  11:20 AM   Clinician    Future Appointments   Date Time Provider Department Center   3/19/2025 11:20 AM Jeny Rodriguez SLP [facility]   3/24/2025 10:40 AM Jeny Rodriguez SLP [facility]   3/26/2025 10:40 AM Jeny Rodriguez SLP [facility]   3/31/2025 10:40 AM Jeny Rodriguez SLP [facility]   4/3/2025 11:40 AM Catrina Hardin MD [facility]     If an interpreting service was utilized for treatment of this patient, the contents of this document represent the material reviewed with the patient via the .

## 2025-03-24 ENCOUNTER — HOSPITAL ENCOUNTER (OUTPATIENT)
Facility: HOSPITAL | Age: 65
Setting detail: RECURRING SERIES
Discharge: HOME | End: 2025-03-27
Payer: COMMERCIAL

## 2025-03-24 PROCEDURE — 92507 TX SP LANG VOICE COMM INDIV: CPT

## 2025-03-24 NOTE — PROGRESS NOTES
worksheet    Patient/Caregiver  Education: [x] Review HEP      HEP/Handouts given: Categories worksheet    Pain Level (0-10 scale) post treatment: 0    ASSESSMENT     []   Improving appropriately and progressing toward goals  [x]   Improving slowly and progressing toward goals  []   Approximating goals/maximum potential  [x]   Continues to benefit from skilled therapy to address remaining functional deficits  []   Not progressing toward goals and plan of care will be adjusted    Patient will continue to benefit from skilled therapy to address remaining functional deficits: aphasia, apraxia of speech     Progress towards goals / Updated goals:  1) Pt will answer complex yes/no questions and wh-questions using multimodal communication (e.g., verbally, gesturally, communication board, and facial expression) re: variety of language tasks (e.g., 3-4 sentence paragraph, functional reading materials, etc) with 85% acc given Lazaro to increase participation in decision-making in the functional living environment.   Current: Not targeted this session.     2) Pt will produce the target phoneme/phonemes in bi-syllabic --> tri-syllabic words with 80% accuracy given modA.   Current: Pt completed steps of SPT for bisyllabic words:   Step 1 (model + repetition): 1/4 words  Step 2 (written cue, model + repetition): 0/4 words  Step 3 (\"watch me, listen to me, say it with me\"): 0/4 words  Step 4: (\"watch me, listen to me, say it with me\" + articulatory cue): 0/4 words     Pt completed steps of SPT for trisyllabic words:   Step 1 (model + repetition): 0/4 words  Step 2 (written cue, model + repetition): 0/4 words  Step 3 (\"watch me, listen to me, say it with me\"): 0/4 words  Step 4: (\"watch me, listen to me, say it with me\" + articulatory cue): 1/4 words     3) Pt will utilize word-finding strategies (e.g., Semantic Feature Analysis, synonyms/antonyms, divergent/convergent naming, gestures, etc) during different communication tasks and

## 2025-03-26 ENCOUNTER — HOSPITAL ENCOUNTER (OUTPATIENT)
Facility: HOSPITAL | Age: 65
Setting detail: RECURRING SERIES
Discharge: HOME | End: 2025-03-29
Payer: COMMERCIAL

## 2025-03-26 PROCEDURE — 92507 TX SP LANG VOICE COMM INDIV: CPT

## 2025-03-26 NOTE — PROGRESS NOTES
ST DAILY TREATMENT NOTE    Patient Name: Mena Curran  Date:3/26/2025  : 1960  [x]  Patient  Verified  Payor: Payor: MANJIT / Plan: [employer]IT [employer] / Product Type: *No Product type* /   In time:10:41  Out time:11:23  Total Treatment Time (min): 42  Visit #: 4 of 8  PN due 25  v #6/7 until 25     Treatment Diagnosis: Aphasia following cerebral infarction [I69.320]  Apraxia following cerebral infarction [I69.390]  Treatment Area: Aphasia following cerebral infarction  Apraxia following cerebral infarction    SUBJECTIVE  Pain Level (0-10 scale): 0    Subjective functional status/changes:   [] No changes reported  Pt reported partial compliance to HEP, endorsing that she did not record the words she had difficulty producing on her own. Pt endorsed that she \"could talk\" to her friends at a meeting last night, reporting that she shared the progress she has made in ST.     OBJECTIVE  Treatment provided includes:  Increase/Improve:  []  Voice Quality []  Cognitive Linguistic Skills []  Laryngeal/Pharyngeal Exercises   []  Vocal Loudness []  Reading Comprehension []  Swallowing Skills    []  Vocal Cord Function []  Auditory Comprehension []  Oral Motor Skills   []  Resonance []  Writing Skills [x]  Compensatory strategies    [x]  Speech Intelligibility [x]  Expressive Language []  Attention   []  Breath Support/Coord. []  Receptive language []  Memory   []  Articulation []  Safety Awareness [x] Pt educ   []  Fluency []  Word Retrieval []        Treatment Provided:  Speech-Language Treatment [00396]:   - Sound Production Treatment (SPT)  - spelling and grammar in written sentences    Patient/Caregiver  Education: [x] Review HEP      HEP/Handouts given: Write sentences using list 2 of bi-syllabic words    Pain Level (0-10 scale) post treatment: 0    ASSESSMENT     []   Improving appropriately and progressing toward goals  [x]   Improving slowly and progressing toward goals  []   Approximating

## 2025-03-31 ENCOUNTER — HOSPITAL ENCOUNTER (OUTPATIENT)
Facility: HOSPITAL | Age: 65
Setting detail: RECURRING SERIES
Discharge: HOME | End: 2025-04-03
Payer: MEDICARE

## 2025-03-31 PROCEDURE — 92507 TX SP LANG VOICE COMM INDIV: CPT

## 2025-03-31 NOTE — PROGRESS NOTES
Speech Therapy Discharge Instructions      [facility]  [address] [place] [address]  [phone] fax    Patient: Mena Curran  : 1960        Continue utilizing discussed compensatory strategies (e.g., using descriptions, gestures, tapping, synonyms and antonyms, categories, etc).    Slow down your speech and remind others not to finish your sentences for you.     Reach out to your doctor any changes in cognition, memory, voice, speech, language, and swallowing to possibly be re-referred for Speech Therapy.     Call 911 if you experience sudden chest pain, weakness, slurred speech, seizures, bleedings, choking, facial drooping, etc.     Please reach out to us if you have any questions re: any discussed topics in Speech Therapy. Our number is [phone].      It has been a pleasure working with you,    Jeny Rodriguez M.A., CCC-SLP  Speech Language Pathologist

## 2025-03-31 NOTE — PROGRESS NOTES
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 All aspects of speech/language, cognitition, voice, and swallowing WNLs per pt report prior to 2023 CVA     Visits from Start of Care: 40    Missed Visits: 6     Reporting Period : 3/17/25 to 25    [x]  Patient  Verified  Payor: [facility] / [facility] / Product Type: *No Product type* /   In time:10:42  Out time:11:25  Total Treatment Time (min): 43  Visit #:  8  v #/ until 25     SUBJECTIVE  Pain Level (0-10 scale): 0  Subjective functional status/changes:   [] No changes reported  Pt reported that she has noticed a difference in her speech, endorsing that her conversational partners have been able to understand her. Pt reported that when she slows down her rate of speech, she feels that her speech is more intelligible.     OBJECTIVE  Treatment provided includes:  Increase/Improve:  []  Voice Quality []  Cognitive Linguistic Skills []  Laryngeal/Pharyngeal Exercises   []  Vocal Loudness []  Reading

## 2025-04-03 ENCOUNTER — OFFICE VISIT (OUTPATIENT)
Facility: CLINIC | Age: 65
End: 2025-04-03
Payer: MEDICARE

## 2025-04-03 VITALS
DIASTOLIC BLOOD PRESSURE: 84 MMHG | HEIGHT: 61 IN | HEART RATE: 64 BPM | OXYGEN SATURATION: 97 % | BODY MASS INDEX: 31.38 KG/M2 | SYSTOLIC BLOOD PRESSURE: 136 MMHG | WEIGHT: 166.2 LBS

## 2025-04-03 DIAGNOSIS — R41.89 COGNITIVE DEFICITS: ICD-10-CM

## 2025-04-03 DIAGNOSIS — R47.01 APHASIA: ICD-10-CM

## 2025-04-03 DIAGNOSIS — E78.2 MIXED HYPERLIPIDEMIA: ICD-10-CM

## 2025-04-03 DIAGNOSIS — F32.A ANXIETY AND DEPRESSION: ICD-10-CM

## 2025-04-03 DIAGNOSIS — F41.9 ANXIETY AND DEPRESSION: ICD-10-CM

## 2025-04-03 DIAGNOSIS — Z86.73 HISTORY OF STROKE: ICD-10-CM

## 2025-04-03 DIAGNOSIS — M25.512 CHRONIC LEFT SHOULDER PAIN: ICD-10-CM

## 2025-04-03 DIAGNOSIS — Z72.0 TOBACCO USE: ICD-10-CM

## 2025-04-03 DIAGNOSIS — I10 ESSENTIAL (PRIMARY) HYPERTENSION: ICD-10-CM

## 2025-04-03 DIAGNOSIS — G89.29 CHRONIC LEFT SHOULDER PAIN: ICD-10-CM

## 2025-04-03 DIAGNOSIS — Z78.0 ASYMPTOMATIC POSTMENOPAUSAL STATE: ICD-10-CM

## 2025-04-03 DIAGNOSIS — I69.30 HISTORY OF CVA WITH RESIDUAL DEFICIT: Primary | ICD-10-CM

## 2025-04-03 PROCEDURE — 3075F SYST BP GE 130 - 139MM HG: CPT | Performed by: STUDENT IN AN ORGANIZED HEALTH CARE EDUCATION/TRAINING PROGRAM

## 2025-04-03 PROCEDURE — G2211 COMPLEX E/M VISIT ADD ON: HCPCS | Performed by: STUDENT IN AN ORGANIZED HEALTH CARE EDUCATION/TRAINING PROGRAM

## 2025-04-03 PROCEDURE — 99214 OFFICE O/P EST MOD 30 MIN: CPT | Performed by: STUDENT IN AN ORGANIZED HEALTH CARE EDUCATION/TRAINING PROGRAM

## 2025-04-03 PROCEDURE — 1123F ACP DISCUSS/DSCN MKR DOCD: CPT | Performed by: STUDENT IN AN ORGANIZED HEALTH CARE EDUCATION/TRAINING PROGRAM

## 2025-04-03 PROCEDURE — 3079F DIAST BP 80-89 MM HG: CPT | Performed by: STUDENT IN AN ORGANIZED HEALTH CARE EDUCATION/TRAINING PROGRAM

## 2025-04-03 RX ORDER — LOSARTAN POTASSIUM 100 MG/1
100 TABLET ORAL DAILY
Qty: 90 TABLET | Refills: 1 | Status: SHIPPED | OUTPATIENT
Start: 2025-04-03

## 2025-04-03 SDOH — ECONOMIC STABILITY: FOOD INSECURITY: WITHIN THE PAST 12 MONTHS, YOU WORRIED THAT YOUR FOOD WOULD RUN OUT BEFORE YOU GOT MONEY TO BUY MORE.: NEVER TRUE

## 2025-04-03 SDOH — ECONOMIC STABILITY: FOOD INSECURITY: WITHIN THE PAST 12 MONTHS, THE FOOD YOU BOUGHT JUST DIDN'T LAST AND YOU DIDN'T HAVE MONEY TO GET MORE.: NEVER TRUE

## 2025-04-03 ASSESSMENT — ENCOUNTER SYMPTOMS
BLOOD IN STOOL: 0
SHORTNESS OF BREATH: 0
COUGH: 0
NAUSEA: 0
WHEEZING: 0
VOMITING: 0
CHEST TIGHTNESS: 0
RHINORRHEA: 0
DIARRHEA: 0
ABDOMINAL PAIN: 0
BACK PAIN: 0

## 2025-04-03 NOTE — PROGRESS NOTES
Mena Curran is a 65 y.o. female presenting today for Other (Patient presents with complaint of leg arm pain, patient unable to lift.  Patient also reports pain/cramps in her toes.  )  .     Chief Complaint   Patient presents with    Other     Patient presents with complaint of leg arm pain, patient unable to lift.  Patient also reports pain/cramps in her toes.         HPI:  Mena Curran presents to the office today for Follow up.    Patient has a past medical history significant for CVA with right-sided weakness, aphasia, COPD, WILTON not on CPAP, hypertension, HLD, treated hepatitis C, OA s/p bilateral knee replacement, anxiety/depression.    Patient was readmitted to the hospital on 7/31/2024 due to worsening headache concerning for TIA.  She was noted to have abnormal CT of the head suggesting interval new large MCA infarct with a late subacute or chronic appearance.  MRI brain was then negative for acute CVA.  Patient was seen by neurology-temporal biopsy was recommended as outpatient.  She was discharged on prednisone therapy.  She underwent temporal artery biopsy on 8/5/2024-pathology was negative for arteritis.  She is no longer taking the prednisone.  No longer experiencing any headaches      Patient was  admitted to [facility] in 8/23 with an acute stroke causing right-sided weakness.  She had an acute occlusion of the left M1 underwent thrombolysis.  She was noted to have a subarachnoid hemorrhage postprocedure.  Patient had impaired mobility/ADLs and was admitted to acute rehab.  Patient has slurred speech, word finding difficulty and right-sided weakness.  Patient has been undergoing PT/OT and ST.  She has had improvement in the right-sided weakness but still has word finding difficulty.  Patient is now following with neurology.  Patient is going to speech therapy-her right-sided weakness has improved but she continues to have apraxia and aphasia with cognitive issues.    Speech

## 2025-04-05 ENCOUNTER — HOSPITAL ENCOUNTER (OUTPATIENT)
Facility: HOSPITAL | Age: 65
End: 2025-04-05
Payer: MEDICARE

## 2025-04-05 ENCOUNTER — HOSPITAL ENCOUNTER (OUTPATIENT)
Facility: HOSPITAL | Age: 65
End: 2025-04-05
Attending: STUDENT IN AN ORGANIZED HEALTH CARE EDUCATION/TRAINING PROGRAM
Payer: MEDICARE

## 2025-04-05 ENCOUNTER — HOSPITAL ENCOUNTER (OUTPATIENT)
Facility: HOSPITAL | Age: 65
Setting detail: SPECIMEN
Discharge: HOME | End: 2025-04-08

## 2025-04-05 DIAGNOSIS — M25.512 CHRONIC LEFT SHOULDER PAIN: ICD-10-CM

## 2025-04-05 DIAGNOSIS — G89.29 CHRONIC LEFT SHOULDER PAIN: ICD-10-CM

## 2025-04-05 DIAGNOSIS — Z87.891 PERSONAL HISTORY OF TOBACCO USE: ICD-10-CM

## 2025-04-05 LAB — LABCORP SPECIMEN COLLECTION: NORMAL

## 2025-04-05 PROCEDURE — 99001 SPECIMEN HANDLING PT-LAB: CPT

## 2025-04-05 PROCEDURE — 73030 X-RAY EXAM OF SHOULDER: CPT

## 2025-04-05 PROCEDURE — 71271 CT THORAX LUNG CANCER SCR C-: CPT

## 2025-04-07 LAB
BASOPHILS # BLD AUTO: 0.1 X10E3/UL
BASOPHILS NFR BLD AUTO: 1 %
EOSINOPHIL # BLD AUTO: 0.2 X10E3/UL
EOSINOPHIL NFR BLD AUTO: 4 %
ERYTHROCYTE [DISTWIDTH] IN BLOOD BY AUTOMATED COUNT: 12.5 %
HCT VFR BLD AUTO: 41.4 %
HGB BLD-MCNC: 13.3 G/DL
IMM GRANULOCYTES # BLD AUTO: 0 X10E3/UL
IMM GRANULOCYTES NFR BLD AUTO: 0 %
LYMPHOCYTES # BLD AUTO: 2.2 X10E3/UL
LYMPHOCYTES NFR BLD AUTO: 38 %
MCH RBC QN AUTO: 28.2 PG
MCHC RBC AUTO-ENTMCNC: 32.1 G/DL
MCV RBC AUTO: 88 FL
MONOCYTES # BLD AUTO: 0.6 X10E3/UL
MONOCYTES NFR BLD AUTO: 10 %
NEUTROPHILS # BLD AUTO: 2.6 X10E3/UL
NEUTROPHILS NFR BLD AUTO: 47 %
PLATELET # BLD AUTO: 190 X10E3/UL (ref 150–450)
RBC # BLD AUTO: 4.71 X10E6/UL
SPECIMEN STATUS REPORT: NORMAL
WBC # BLD AUTO: 5.6 X10E3/UL (ref 3.4–10.8)

## 2025-04-08 LAB
ALBUMIN SERPL-MCNC: 4.4 G/DL (ref 3.9–4.9)
ALP SERPL-CCNC: 80 IU/L (ref 44–121)
ALT SERPL-CCNC: 12 IU/L
AST SERPL-CCNC: 17 IU/L (ref 0–40)
BILIRUB SERPL-MCNC: 0.4 MG/DL (ref 0–1.2)
BUN SERPL-MCNC: 16 MG/DL
BUN/CREAT SERPL: 18
CALCIUM SERPL-MCNC: 9.6 MG/DL
CHLORIDE SERPL-SCNC: 104 MMOL/L (ref 96–106)
CHOLEST SERPL-MCNC: 172 MG/DL
CO2 SERPL-SCNC: 23 MMOL/L (ref 20–29)
CREAT SERPL-MCNC: 0.87 MG/DL
EGFRCR SERPLBLD CKD-EPI 2021: NORMAL ML/MIN/1.73
GLOBULIN SER CALC-MCNC: 2.6 G/DL (ref 1.5–4.5)
GLUCOSE SERPL-MCNC: 93 MG/DL (ref 70–99)
HDLC SERPL-MCNC: 69 MG/DL
LDLC SERPL CALC-MCNC: 90 MG/DL
POTASSIUM SERPL-SCNC: 5.2 MMOL/L (ref 3.5–5.2)
PROT SERPL-MCNC: 7 G/DL (ref 6–8.5)
SODIUM SERPL-SCNC: 142 MMOL/L (ref 134–144)
TRIGL SERPL-MCNC: 67 MG/DL
VLDLC SERPL CALC-MCNC: 13 MG/DL (ref 5–40)

## 2025-04-11 DIAGNOSIS — F41.9 ANXIETY AND DEPRESSION: ICD-10-CM

## 2025-04-11 DIAGNOSIS — F32.A ANXIETY AND DEPRESSION: ICD-10-CM

## 2025-04-17 ENCOUNTER — TELEPHONE (OUTPATIENT)
Facility: CLINIC | Age: 65
End: 2025-04-17

## 2025-04-30 ENCOUNTER — OFFICE VISIT (OUTPATIENT)
Facility: CLINIC | Age: 65
End: 2025-04-30
Payer: MEDICARE

## 2025-04-30 VITALS
HEART RATE: 62 BPM | DIASTOLIC BLOOD PRESSURE: 69 MMHG | OXYGEN SATURATION: 95 % | BODY MASS INDEX: 30.78 KG/M2 | SYSTOLIC BLOOD PRESSURE: 137 MMHG | HEIGHT: 61 IN | WEIGHT: 163 LBS

## 2025-04-30 DIAGNOSIS — E53.8 LOW SERUM VITAMIN B12: ICD-10-CM

## 2025-04-30 DIAGNOSIS — E78.2 MIXED HYPERLIPIDEMIA: ICD-10-CM

## 2025-04-30 DIAGNOSIS — G89.29 CHRONIC LEFT SHOULDER PAIN: Primary | ICD-10-CM

## 2025-04-30 DIAGNOSIS — M25.512 CHRONIC LEFT SHOULDER PAIN: Primary | ICD-10-CM

## 2025-04-30 DIAGNOSIS — R41.89 COGNITIVE DEFICITS: ICD-10-CM

## 2025-04-30 DIAGNOSIS — Z87.891 PERSONAL HISTORY OF TOBACCO USE: ICD-10-CM

## 2025-04-30 DIAGNOSIS — R47.1 DYSARTHRIA: ICD-10-CM

## 2025-04-30 DIAGNOSIS — I69.30 HISTORY OF CVA WITH RESIDUAL DEFICIT: ICD-10-CM

## 2025-04-30 DIAGNOSIS — I10 ESSENTIAL (PRIMARY) HYPERTENSION: ICD-10-CM

## 2025-04-30 PROCEDURE — 3078F DIAST BP <80 MM HG: CPT | Performed by: STUDENT IN AN ORGANIZED HEALTH CARE EDUCATION/TRAINING PROGRAM

## 2025-04-30 PROCEDURE — 3075F SYST BP GE 130 - 139MM HG: CPT | Performed by: STUDENT IN AN ORGANIZED HEALTH CARE EDUCATION/TRAINING PROGRAM

## 2025-04-30 PROCEDURE — 1123F ACP DISCUSS/DSCN MKR DOCD: CPT | Performed by: STUDENT IN AN ORGANIZED HEALTH CARE EDUCATION/TRAINING PROGRAM

## 2025-04-30 PROCEDURE — 99214 OFFICE O/P EST MOD 30 MIN: CPT | Performed by: STUDENT IN AN ORGANIZED HEALTH CARE EDUCATION/TRAINING PROGRAM

## 2025-04-30 PROCEDURE — G2211 COMPLEX E/M VISIT ADD ON: HCPCS | Performed by: STUDENT IN AN ORGANIZED HEALTH CARE EDUCATION/TRAINING PROGRAM

## 2025-04-30 RX ORDER — MELOXICAM 15 MG/1
15 TABLET ORAL DAILY PRN
Qty: 30 TABLET | Refills: 1 | Status: SHIPPED | OUTPATIENT
Start: 2025-04-30

## 2025-04-30 RX ORDER — TRAMADOL HYDROCHLORIDE 50 MG/1
50 TABLET ORAL EVERY 6 HOURS PRN
Qty: 28 TABLET | Refills: 0 | Status: SHIPPED | OUTPATIENT
Start: 2025-04-30 | End: 2025-05-07

## 2025-04-30 RX ORDER — MULTIVITAMIN WITH IRON
500 TABLET ORAL DAILY
Qty: 30 TABLET | Refills: 6 | Status: SHIPPED | OUTPATIENT
Start: 2025-04-30

## 2025-04-30 SDOH — ECONOMIC STABILITY: FOOD INSECURITY: WITHIN THE PAST 12 MONTHS, YOU WORRIED THAT YOUR FOOD WOULD RUN OUT BEFORE YOU GOT MONEY TO BUY MORE.: NEVER TRUE

## 2025-04-30 SDOH — ECONOMIC STABILITY: FOOD INSECURITY: WITHIN THE PAST 12 MONTHS, THE FOOD YOU BOUGHT JUST DIDN'T LAST AND YOU DIDN'T HAVE MONEY TO GET MORE.: NEVER TRUE

## 2025-04-30 ASSESSMENT — ENCOUNTER SYMPTOMS
BLOOD IN STOOL: 0
NAUSEA: 0
COUGH: 0
VOMITING: 0
ABDOMINAL PAIN: 0
DIARRHEA: 0
RHINORRHEA: 0
CHEST TIGHTNESS: 0
BACK PAIN: 0
SHORTNESS OF BREATH: 0
WHEEZING: 0

## 2025-04-30 NOTE — PROGRESS NOTES
8.9 - 10.4           18 - 59 years       8.7 - 10.2     8.7 - 10.2               >59 years       8.6 - 10.2     8.7 - 10.3      Total Protein 04/05/2025 7.0  6.0 - 8.5 g/dL Final    Albumin 04/05/2025 4.4  3.9 - 4.9 g/dL Final    Globulin, Total 04/05/2025 2.6  1.5 - 4.5 g/dL Final    Total Bilirubin 04/05/2025 0.4  0.0 - 1.2 mg/dL Final    Alkaline Phosphatase 04/05/2025 80  44 - 121 IU/L Final    AST 04/05/2025 17  0 - 40 IU/L Final    ALT 04/05/2025 12  IU/L Final    Comment:               No patient age and/or gender provided                     or \"N\" placed in gender box               Age                Male          Female            0 - 11 years         0 - 29         0 - 28           12 - 17 years         0 - 30         0 - 24               >17 years         0 - 44         0 - 32      Cholesterol, Total 04/05/2025 172  mg/dL Final    Comment:               No patient age and/or gender provided                     or \"N\" placed in gender box               Age                Male          Female            0 - 19 years       100 - 169      100 - 169               >19 years       100 - 199      100 - 199      Triglycerides 04/05/2025 67  mg/dL Final    Comment:               No patient age and/or gender provided                     or \"N\" placed in gender box               Age                Male          Female            0 -  9 years         0 -  74        0 -  74           10 - 19 years         0 -  89        0 -  89               >19 years         0 - 149        0 - 149      HDL 04/05/2025 69  >39 mg/dL Final    VLDL Cholesterol Calculated 04/05/2025 13  5 - 40 mg/dL Final    LDL Cholesterol 04/05/2025 90  mg/dL Final    Comment:               No patient age and/or gender provided                     or \"N\" placed in gender box               Age                Male          Female            0 - 19 years         0 - 109        0 - 109               >19 years         0 -  99        0 -  99         No

## 2025-05-22 ENCOUNTER — HOSPITAL ENCOUNTER (OUTPATIENT)
Facility: HOSPITAL | Age: 65
Setting detail: RECURRING SERIES
Discharge: HOME | End: 2025-05-25
Attending: STUDENT IN AN ORGANIZED HEALTH CARE EDUCATION/TRAINING PROGRAM
Payer: MEDICARE

## 2025-05-22 PROCEDURE — 97161 PT EVAL LOW COMPLEX 20 MIN: CPT

## 2025-05-22 PROCEDURE — 97110 THERAPEUTIC EXERCISES: CPT

## 2025-05-22 PROCEDURE — 97535 SELF CARE MNGMENT TRAINING: CPT

## 2025-05-22 NOTE — PROGRESS NOTES
PT DAILY TREATMENT NOTE/SHOULDER EVAL     Patient Name: Mena Curran    Date: 2025    : 1960  Insurance: Payor: [facility] MEDICARE / Plan: [facility] [employer]ALTH[employer]EEPERS MEDIBLUE PLUS / Product Type: *No Product type* /      Patient  verified yes     Visit #   Current / Total 1 24   Time   In / Out 2:02 240   Pain   In / Out 6-7 6-7   Subjective Functional Status/Changes: See Below     Treatment Area: Chronic left shoulder pain    SUBJECTIVE    []constant [x]intermittent []improving []worsening []no change since onset    Mechanism of Injury: Three weeks ago insidious onset of pain after sleeping on it. She reports trying to perform exercises that family members have given it to her. She notes no resolution to the pain and decided to come to PT for it.     Current symptoms/Complaints: Pain, weakness, stiffness     What produces or worsens: General movement and sleeping on it.     What stops or reduces: Shoulder placed in lap, heating pad (discontinued d/t redness of the skin)    Continued use makes the pain:  [] Better [x]  Worse []  No effect     Pain at rest: [x] No    [] Yes       Previous Treatment/Compliance: trial of family members shoulder HEP given from other occurrence.     PMHx/Surgical Hx: L CVA, B TKA,     Work Hx: Unable  to work d/t previous health conditions.     Living Situation/Domestic Life: 3 [address] w/ ADA ramp into the household, but utilizes steps more frequently. Bed room and bathroom on first floor.     Pt Goals: return to the gym, exercise classes, and swimming     Barriers: []pain []financial []time []transportation [x]Other: Expressive Aphasia.     Mobility: n/a     Self Care: Independent     Disturbed night?: [] No    [x] Yes      Sleep Position: Sleep on L     Activity/Recreational Limitations: Dancing, walks (<2 blocks), swimming, cooking.     Other: Relies on medication to sleep to decrease pain and expressive asphasia     OBJECTIVE/EXAMINATION    [x]  Patient 
improve QuickDASH score to 49% indicate improved functional status and independence.  -Status at IE- QuickDASH score = 63%  3.  Patient to improve L shld ER to >0-50 in order to improve ease with opening doors.   -Status at IE- 23       Frequency / Duration: Patient would benefit from skilled PT 2-3 times per week for 24 VISITS sessions as needed in this certification period.  Goals will be assigned and reassessed every 10 visits/ 30 days per [employer] guidelines      Patient/ Caregiver education and instruction: Diagnosis, prognosis, activity modification and exercises [x]  Plan of care has been reviewed with PTA.We reviewed our facility's Patient Personal Responsibilities (PPR) form, particularly in regards to compliance towards her appointment time, our attendance policy, and her home exercise program. Patient was informed of possible discharge for non-compliance to our attendance policy per PPR form.We also discussed her POC as deemed appropriate by the treating therapist and physician. Patient verbalized understanding that she must show objective and functional improvement in an appropriate time frame. Patient verbalized understanding that should progress or compliance be lacking, we will contact the referring physician for further consultation to address and attempt to establish alternate treatment strategies as necessary and/or possibly discharge.    Certification Period: 05/22/25-08/21/25    Asher Luciano SPT  Ishmael \"BJ\" Sam, LUDY, Cert. MDT, Cert. DN, Cert. SMT, Dip. Osteopractic       5/22/2025       2:49 PM  ===================================================================  I certify that the above Therapy Services are being furnished while the patient is under my care. I agree with the treatment plan and certify that this therapy is necessary.    Physician's Signature:_________________________   DATE:_________   TIME:________                           Catrina Hardin MD    ** Signature, Date and Time

## 2025-05-30 ENCOUNTER — HOSPITAL ENCOUNTER (OUTPATIENT)
Facility: HOSPITAL | Age: 65
Setting detail: RECURRING SERIES
End: 2025-05-30
Attending: STUDENT IN AN ORGANIZED HEALTH CARE EDUCATION/TRAINING PROGRAM
Payer: MEDICARE

## 2025-06-02 ENCOUNTER — OFFICE VISIT (OUTPATIENT)
Age: 65
End: 2025-06-02
Payer: MEDICARE

## 2025-06-02 ENCOUNTER — HOSPITAL ENCOUNTER (OUTPATIENT)
Facility: HOSPITAL | Age: 65
Setting detail: RECURRING SERIES
End: 2025-06-02
Attending: STUDENT IN AN ORGANIZED HEALTH CARE EDUCATION/TRAINING PROGRAM
Payer: MEDICARE

## 2025-06-02 VITALS — HEIGHT: 61 IN | RESPIRATION RATE: 18 BRPM | WEIGHT: 160 LBS | BODY MASS INDEX: 30.21 KG/M2

## 2025-06-02 DIAGNOSIS — M25.812 IMPINGEMENT OF LEFT SHOULDER: ICD-10-CM

## 2025-06-02 DIAGNOSIS — M25.512 ACUTE PAIN OF LEFT SHOULDER: Primary | ICD-10-CM

## 2025-06-02 DIAGNOSIS — M19.012 ARTHRITIS OF LEFT SHOULDER: ICD-10-CM

## 2025-06-02 DIAGNOSIS — M25.612 DECREASED RANGE OF MOTION OF LEFT SHOULDER: ICD-10-CM

## 2025-06-02 DIAGNOSIS — M75.52 SUBACROMIAL BURSITIS OF LEFT SHOULDER JOINT: ICD-10-CM

## 2025-06-02 PROCEDURE — 1123F ACP DISCUSS/DSCN MKR DOCD: CPT | Performed by: PHYSICIAN ASSISTANT

## 2025-06-02 PROCEDURE — 99204 OFFICE O/P NEW MOD 45 MIN: CPT | Performed by: PHYSICIAN ASSISTANT

## 2025-06-02 PROCEDURE — 73030 X-RAY EXAM OF SHOULDER: CPT | Performed by: PHYSICIAN ASSISTANT

## 2025-06-02 PROCEDURE — 20610 DRAIN/INJ JOINT/BURSA W/O US: CPT | Performed by: PHYSICIAN ASSISTANT

## 2025-06-02 RX ORDER — BUPIVACAINE HYDROCHLORIDE 5 MG/ML
7 INJECTION, SOLUTION PERINEURAL ONCE
Status: COMPLETED | OUTPATIENT
Start: 2025-06-02 | End: 2025-06-02

## 2025-06-02 RX ORDER — TRIAMCINOLONE ACETONIDE 40 MG/ML
80 INJECTION, SUSPENSION INTRA-ARTICULAR; INTRAMUSCULAR ONCE
Status: COMPLETED | OUTPATIENT
Start: 2025-06-02 | End: 2025-06-02

## 2025-06-02 RX ADMIN — TRIAMCINOLONE ACETONIDE 80 MG: 40 INJECTION, SUSPENSION INTRA-ARTICULAR; INTRAMUSCULAR at 09:53

## 2025-06-02 RX ADMIN — BUPIVACAINE HYDROCHLORIDE 35 MG: 5 INJECTION, SOLUTION PERINEURAL at 09:53

## 2025-06-02 NOTE — PROGRESS NOTES
Procedure site verified and marked as necessary  * Patient was positioned for comfort  * Consent was signed and verified             Date of procedure: 06/02/25    Time: 9:41 AM    Procedure performed by:  Isrrael Cruz PA-C    Provider assisted by: None     Patient assisted by: self    How tolerated by patient: tolerated the procedure well with no complications    Comments: none    The patient is asked to continue to rest the area for a few more days before resuming regular activities.  It may be more painful for the first 1-2 days.  Watch for fever, or increased swelling or persistent pain in the joint. Call or return to clinic prn if such symptoms occur or there is failure to improve as anticipated.      Post injection instructions were given to patient: Remove the band aid in 3 hours, Wash site with clean soap, No further dressings there after. Call Isrrael Cruz PA-C [phone] if any: pain, redness, drainage, fever, or any concerns/questions  that may have regarding the injection. Patient was advised on the signs of infection and instructed to go to the ER  if it is office after hours.             TOTAL TIME SPENT WITH PATIENT FOR TODAY'S VISIT WAS DEVOTED TO FACE-TO-FACE COUNSELING REGARDING THE FOLLOWING:  DISCUSSED DIAGNOSIS, TREATMENT OPTIONS, AND RISKS AND BENEFITS OF TREATMENT          Special note: [employer] handout for telephonic weight loss strategies provided.      Special note: Medication management discussed and patient will begin the following per recommended dosing.    (  )     (  )     (Yes) Physical therapy ordered for range of motion all modalities, stretching, range of motion of specific functional group associated     with primary treating condition,  gentle strengthening of musculature with attention to increasing endurance, gait training,balance and fine motor coordination.       Special note: I reviewed and agree with the PFSH and the ROS as well as the intake form in the chart in the

## 2025-06-06 ENCOUNTER — HOSPITAL ENCOUNTER (OUTPATIENT)
Facility: HOSPITAL | Age: 65
Setting detail: RECURRING SERIES
End: 2025-06-06
Attending: STUDENT IN AN ORGANIZED HEALTH CARE EDUCATION/TRAINING PROGRAM
Payer: MEDICARE

## 2025-06-09 ENCOUNTER — HOSPITAL ENCOUNTER (OUTPATIENT)
Facility: HOSPITAL | Age: 65
Setting detail: RECURRING SERIES
Discharge: HOME | End: 2025-06-12
Attending: STUDENT IN AN ORGANIZED HEALTH CARE EDUCATION/TRAINING PROGRAM
Payer: MEDICARE

## 2025-06-09 PROCEDURE — 97110 THERAPEUTIC EXERCISES: CPT

## 2025-06-09 PROCEDURE — 97112 NEUROMUSCULAR REEDUCATION: CPT

## 2025-06-09 PROCEDURE — 97530 THERAPEUTIC ACTIVITIES: CPT

## 2025-06-09 NOTE — PROGRESS NOTES
PHYSICAL / OCCUPATIONAL THERAPY - DAILY TREATMENT NOTE    Patient Name: Mena Curran    Date: 2025    : 1960  Insurance: Payor: [facility] MEDICARE / [facility] / Product Type: *No Product type* /      Patient  verified Yes     Visit #   Current / Total 2 24   Time   In / Out 2:43 3:21   Pain   In / Out 0/10 510   Subjective Functional Status/Changes: Pt reports no pain today. It only hurts when she has to use it to do something, it does not hurt all day. She got a shot in it the other day and she likes it, she is pretty sure she needed that shot the first time.      TREATMENT AREA =  Chronic left shoulder pain    OBJECTIVE    Therapeutic Procedures:    Tx Min Billable or 1:1 Min (if diff from Tx Min) Procedure, Rationale, Specifics   16  64958 Therapeutic Exercise (timed):  increase ROM, strength, coordination, balance, and proprioception to improve patient's ability to progress to PLOF and address remaining functional goals. (see flow sheet as applicable)     Details if applicable:       12  87815 Therapeutic Activity (timed):  use of dynamic activities replicating functional movements to increase ROM, strength, coordination, balance, and proprioception in order to improve patient's ability to progress to PLOF and address remaining functional goals.  (see flow sheet as applicable)     Details if applicable:       10  85912 Neuromuscular Re-Education (timed):  improve balance, coordination, kinesthetic sense, posture, core stability and proprioception to improve patient's ability to develop conscious control of individual muscles and awareness of position of extremities in order to progress to PLOF and address remaining functional goals. (see flow sheet as applicable)     Details if applicable:  scap re-ed            Details if applicable:            Details if applicable:     38  [facility] Totals Reminder: bill using total billable min of TIMED therapeutic

## 2025-06-11 ENCOUNTER — HOSPITAL ENCOUNTER (OUTPATIENT)
Facility: HOSPITAL | Age: 65
Setting detail: RECURRING SERIES
Discharge: HOME | End: 2025-06-14
Attending: STUDENT IN AN ORGANIZED HEALTH CARE EDUCATION/TRAINING PROGRAM
Payer: MEDICARE

## 2025-06-11 ENCOUNTER — TELEPHONE (OUTPATIENT)
Facility: CLINIC | Age: 65
End: 2025-06-11

## 2025-06-11 DIAGNOSIS — I69.30 HISTORY OF CVA WITH RESIDUAL DEFICIT: ICD-10-CM

## 2025-06-11 PROCEDURE — 97112 NEUROMUSCULAR REEDUCATION: CPT

## 2025-06-11 PROCEDURE — 97110 THERAPEUTIC EXERCISES: CPT

## 2025-06-11 PROCEDURE — 97530 THERAPEUTIC ACTIVITIES: CPT

## 2025-06-11 RX ORDER — ASPIRIN 81 MG/1
81 TABLET, CHEWABLE ORAL DAILY
Qty: 90 TABLET | Refills: 1 | Status: SHIPPED | OUTPATIENT
Start: 2025-06-11

## 2025-06-11 NOTE — TELEPHONE ENCOUNTER
Pt request refill on medication/Updated pharmacy set next appt for FMLA paperwork    Aspirin     Location    [facility]

## 2025-06-11 NOTE — PROGRESS NOTES
improved functional status and independence.  -Status at IE- QuickDASH score = 63%     3.  Patient to improve L shld ER to >0-50 in order to improve ease with opening doors.   -Status at IE- 23      Next PN 6/21/25 / RC due 8/21/25  Auth due (visit number/ date) 10v exp. 5/22/25-8/19/25    PLAN  - Continue Plan of Care  - Upgrade activities as tolerated    Enedina Stone PTA    6/11/2025    6:38 AM  If an interpreting service was utilized for treatment of this patient, the contents of this document represent the material reviewed with the patient via the .     Future Appointments   Date Time Provider Department Center   6/11/2025  9:20 AM Enedina Stone PTA [facility]   6/16/2025  2:00 PM Batsheva Good, PTA [facility]   6/20/2025  2:00 PM Batsheva Good, PTA [facility]   6/23/2025  2:00 PM Batsheva Good, PTA [facility]   6/27/2025  1:45 PM Isrrael Cruz PA-C [facility]   6/30/2025  2:40 PM Ishmael Vargas, PT [facility]   8/26/2025 10:20 AM Catrina Hardin MD [facility] DE[facility]

## 2025-06-12 ENCOUNTER — OFFICE VISIT (OUTPATIENT)
Facility: CLINIC | Age: 65
End: 2025-06-12
Payer: MEDICARE

## 2025-06-12 VITALS
HEART RATE: 64 BPM | DIASTOLIC BLOOD PRESSURE: 77 MMHG | HEIGHT: 61 IN | SYSTOLIC BLOOD PRESSURE: 141 MMHG | BODY MASS INDEX: 30.4 KG/M2 | WEIGHT: 161 LBS | OXYGEN SATURATION: 95 %

## 2025-06-12 DIAGNOSIS — R47.1 DYSARTHRIA: ICD-10-CM

## 2025-06-12 DIAGNOSIS — F32.A ANXIETY AND DEPRESSION: Primary | ICD-10-CM

## 2025-06-12 DIAGNOSIS — E78.2 MIXED HYPERLIPIDEMIA: ICD-10-CM

## 2025-06-12 DIAGNOSIS — J44.1 CHRONIC OBSTRUCTIVE PULMONARY DISEASE WITH (ACUTE) EXACERBATION (HCC): ICD-10-CM

## 2025-06-12 DIAGNOSIS — F33.1 MAJOR DEPRESSIVE DISORDER, RECURRENT, MODERATE (HCC): ICD-10-CM

## 2025-06-12 DIAGNOSIS — F41.9 ANXIETY AND DEPRESSION: Primary | ICD-10-CM

## 2025-06-12 DIAGNOSIS — I10 ESSENTIAL (PRIMARY) HYPERTENSION: ICD-10-CM

## 2025-06-12 DIAGNOSIS — I69.30 HISTORY OF CVA WITH RESIDUAL DEFICIT: ICD-10-CM

## 2025-06-12 DIAGNOSIS — Z02.89 ENCOUNTER FOR COMPLETION OF FORM WITH PATIENT: ICD-10-CM

## 2025-06-12 DIAGNOSIS — I69.951 HEMIPLEGIA OF RIGHT DOMINANT SIDE AS LATE EFFECT OF CEREBROVASCULAR DISEASE, UNSPECIFIED CEREBROVASCULAR DISEASE TYPE, UNSPECIFIED HEMIPLEGIA TYPE (HCC): ICD-10-CM

## 2025-06-12 DIAGNOSIS — R41.89 COGNITIVE DEFICITS: ICD-10-CM

## 2025-06-12 PROCEDURE — G2211 COMPLEX E/M VISIT ADD ON: HCPCS | Performed by: STUDENT IN AN ORGANIZED HEALTH CARE EDUCATION/TRAINING PROGRAM

## 2025-06-12 PROCEDURE — 1123F ACP DISCUSS/DSCN MKR DOCD: CPT | Performed by: STUDENT IN AN ORGANIZED HEALTH CARE EDUCATION/TRAINING PROGRAM

## 2025-06-12 PROCEDURE — 3078F DIAST BP <80 MM HG: CPT | Performed by: STUDENT IN AN ORGANIZED HEALTH CARE EDUCATION/TRAINING PROGRAM

## 2025-06-12 PROCEDURE — 3077F SYST BP >= 140 MM HG: CPT | Performed by: STUDENT IN AN ORGANIZED HEALTH CARE EDUCATION/TRAINING PROGRAM

## 2025-06-12 PROCEDURE — 99214 OFFICE O/P EST MOD 30 MIN: CPT | Performed by: STUDENT IN AN ORGANIZED HEALTH CARE EDUCATION/TRAINING PROGRAM

## 2025-06-12 SDOH — ECONOMIC STABILITY: FOOD INSECURITY: WITHIN THE PAST 12 MONTHS, THE FOOD YOU BOUGHT JUST DIDN'T LAST AND YOU DIDN'T HAVE MONEY TO GET MORE.: NEVER TRUE

## 2025-06-12 SDOH — ECONOMIC STABILITY: FOOD INSECURITY: WITHIN THE PAST 12 MONTHS, YOU WORRIED THAT YOUR FOOD WOULD RUN OUT BEFORE YOU GOT MONEY TO BUY MORE.: NEVER TRUE

## 2025-06-12 ASSESSMENT — PATIENT HEALTH QUESTIONNAIRE - PHQ9
10. IF YOU CHECKED OFF ANY PROBLEMS, HOW DIFFICULT HAVE THESE PROBLEMS MADE IT FOR YOU TO DO YOUR WORK, TAKE CARE OF THINGS AT HOME, OR GET ALONG WITH OTHER PEOPLE: SOMEWHAT DIFFICULT
3. TROUBLE FALLING OR STAYING ASLEEP: MORE THAN HALF THE DAYS
SUM OF ALL RESPONSES TO PHQ QUESTIONS 1-9: 19
1. LITTLE INTEREST OR PLEASURE IN DOING THINGS: NEARLY EVERY DAY
2. FEELING DOWN, DEPRESSED OR HOPELESS: NEARLY EVERY DAY
9. THOUGHTS THAT YOU WOULD BE BETTER OFF DEAD, OR OF HURTING YOURSELF: NEARLY EVERY DAY
SUM OF ALL RESPONSES TO PHQ QUESTIONS 1-9: 19
SUM OF ALL RESPONSES TO PHQ QUESTIONS 1-9: 19
8. MOVING OR SPEAKING SO SLOWLY THAT OTHER PEOPLE COULD HAVE NOTICED. OR THE OPPOSITE, BEING SO FIGETY OR RESTLESS THAT YOU HAVE BEEN MOVING AROUND A LOT MORE THAN USUAL: NOT AT ALL
SUM OF ALL RESPONSES TO PHQ QUESTIONS 1-9: 16
5. POOR APPETITE OR OVEREATING: MORE THAN HALF THE DAYS
4. FEELING TIRED OR HAVING LITTLE ENERGY: NEARLY EVERY DAY
7. TROUBLE CONCENTRATING ON THINGS, SUCH AS READING THE NEWSPAPER OR WATCHING TELEVISION: NOT AT ALL
6. FEELING BAD ABOUT YOURSELF - OR THAT YOU ARE A FAILURE OR HAVE LET YOURSELF OR YOUR FAMILY DOWN: NEARLY EVERY DAY

## 2025-06-12 ASSESSMENT — ENCOUNTER SYMPTOMS
WHEEZING: 0
BACK PAIN: 0
NAUSEA: 0
SHORTNESS OF BREATH: 0
ABDOMINAL PAIN: 0
DIARRHEA: 0
BLOOD IN STOOL: 0
COUGH: 0
CHEST TIGHTNESS: 0
VOMITING: 0
RHINORRHEA: 0

## 2025-06-12 ASSESSMENT — LIFESTYLE VARIABLES
HOW MANY STANDARD DRINKS CONTAINING ALCOHOL DO YOU HAVE ON A TYPICAL DAY: PATIENT DOES NOT DRINK
HOW OFTEN DO YOU HAVE A DRINK CONTAINING ALCOHOL: NEVER

## 2025-06-12 NOTE — PROGRESS NOTES
equal, round, and reactive to light.   Musculoskeletal:      Right lower leg: No edema.      Left lower leg: No edema.      Comments: Decreased ROM at left arm/shoulder   Skin:     General: Skin is warm and dry.   Neurological:      Mental Status: She is alert.      Comments: Patient speech is more fluent but still has  word finding difficulty.  Hesitancy during speech.     Psychiatric:         Behavior: Behavior normal.         Thought Content: Thought content normal.         Judgment: Judgment normal.      Comments: Low mood-upset, tearful          Hospital Outpatient Visit on 04/05/2025   Component Date Value Ref Range Status    [facility] Specimen Collection 04/05/2025 Specimens collected/sent to [facility]. Please direct inquiries to ([phone]).    Final   Office Visit on 04/03/2025   Component Date Value Ref Range Status    WBC 04/05/2025 5.6  3.4 - 10.8 x10E3/uL Final    RBC 04/05/2025 4.71  x10E6/uL Final    Comment:               No patient age and/or gender provided                     or \"N\" placed in gender box               Age                Male          Female            0 -  7 days       3.68 - 5.77    3.68 - 5.77            8 - 30 days       3.29 - 5.50    3.29 - 5.50           31 - 90 days       2.72 - 4.84    2.72 - 4.84      91 days - 11 months     3.86 - 5.16    3.86 - 5.16            1 -  7 years      3.96 - 5.30    3.96 - 5.30            8 - 12 years      3.91 - 5.45    3.91 - 5.45               >12 years      4.14 - 5.80    3.77 - 5.28      Hemoglobin 04/05/2025 13.3  g/dL Final    Comment:               No patient age and/or gender provided                     or \"N\" placed in gender box               Age                Male          Female            0 -  7 days       10.7 - 20.5    10.7 - 20.5            8 - 30 days       10.5 - 18.7    10.5 - 18.7           31 - 90 days        8.8 - 14.3     8.8 - 14.3      91 days - 11 months     10.4 - 14.1    10.4 - 14.1            1 -  7 years

## 2025-06-13 ENCOUNTER — APPOINTMENT (OUTPATIENT)
Facility: HOSPITAL | Age: 65
End: 2025-06-13
Attending: STUDENT IN AN ORGANIZED HEALTH CARE EDUCATION/TRAINING PROGRAM
Payer: MEDICARE

## 2025-06-16 ENCOUNTER — HOSPITAL ENCOUNTER (OUTPATIENT)
Facility: HOSPITAL | Age: 65
Setting detail: RECURRING SERIES
Discharge: HOME | End: 2025-06-19
Attending: STUDENT IN AN ORGANIZED HEALTH CARE EDUCATION/TRAINING PROGRAM
Payer: MEDICARE

## 2025-06-16 PROCEDURE — 97530 THERAPEUTIC ACTIVITIES: CPT

## 2025-06-16 PROCEDURE — 97112 NEUROMUSCULAR REEDUCATION: CPT

## 2025-06-16 PROCEDURE — 97110 THERAPEUTIC EXERCISES: CPT

## 2025-06-16 NOTE — PROGRESS NOTES
PHYSICAL / OCCUPATIONAL THERAPY - DAILY TREATMENT NOTE    Patient Name: Mena Curran    Date: 2025    : 1960  Insurance: Payor: [facility] MEDICARE / Plan: [facility] [employer]EEPERS MEDIBLUE PLUS / Product Type: *No Product type* /      Patient  verified Yes     Visit #   Current / Total 4 24   Time   In / Out 2:00 2:38   Pain   In / Out 0/10 0/10   Subjective Functional Status/Changes: Pt reports no pain, she didn't used to be able to use her arm.     TREATMENT AREA =  Chronic left shoulder pain    OBJECTIVE    Therapeutic Procedures:    Tx Min Billable or 1:1 Min (if diff from Tx Min) Procedure, Rationale, Specifics   15  73216 Therapeutic Exercise (timed):  increase ROM, strength, coordination, balance, and proprioception to improve patient's ability to progress to PLOF and address remaining functional goals. (see flow sheet as applicable)     Details if applicable:       15  78312 Neuromuscular Re-Education (timed):  improve balance, coordination, kinesthetic sense, posture, core stability and proprioception to improve patient's ability to develop conscious control of individual muscles and awareness of position of extremities in order to progress to PLOF and address remaining functional goals. (see flow sheet as applicable)     Details if applicable:       8  05219 Therapeutic Activity (timed):  use of dynamic activities replicating functional movements to increase ROM, strength, coordination, balance, and proprioception in order to improve patient's ability to progress to PLOF and address remaining functional goals.  (see flow sheet as applicable)     Details if applicable:       38  [facility] Totals Reminder: bill using total billable min of TIMED therapeutic procedures (example: do not include dry needle or estim unattended, both untimed codes, in totals to left)  8-22 min = 1 unit; 23-37 min = 2 units; 38-52 min = 3 units; 53-67 min = 4 units; 68-82 min = 5 units   Total Total     Charge

## 2025-06-19 ENCOUNTER — APPOINTMENT (OUTPATIENT)
Facility: HOSPITAL | Age: 65
End: 2025-06-19
Payer: MEDICARE

## 2025-06-19 ENCOUNTER — HOSPITAL ENCOUNTER (EMERGENCY)
Facility: HOSPITAL | Age: 65
Discharge: HOME | End: 2025-06-19
Attending: EMERGENCY MEDICINE
Payer: MEDICARE

## 2025-06-19 VITALS
RESPIRATION RATE: 18 BRPM | HEIGHT: 61 IN | WEIGHT: 150 LBS | SYSTOLIC BLOOD PRESSURE: 132 MMHG | OXYGEN SATURATION: 96 % | HEART RATE: 68 BPM | BODY MASS INDEX: 28.32 KG/M2 | DIASTOLIC BLOOD PRESSURE: 73 MMHG | TEMPERATURE: 97.6 F

## 2025-06-19 DIAGNOSIS — I69.30 HISTORY OF CVA WITH RESIDUAL DEFICIT: ICD-10-CM

## 2025-06-19 DIAGNOSIS — M79.671 RIGHT FOOT PAIN: Primary | ICD-10-CM

## 2025-06-19 DIAGNOSIS — S99.921A INJURY OF TOENAIL OF RIGHT FOOT, INITIAL ENCOUNTER: ICD-10-CM

## 2025-06-19 PROCEDURE — 73630 X-RAY EXAM OF FOOT: CPT

## 2025-06-19 PROCEDURE — 6370000000 HC RX 637 (ALT 250 FOR IP)

## 2025-06-19 PROCEDURE — 99283 EMERGENCY DEPT VISIT LOW MDM: CPT

## 2025-06-19 RX ORDER — CYCLOBENZAPRINE HCL 10 MG
5 TABLET ORAL ONCE
Status: COMPLETED | OUTPATIENT
Start: 2025-06-19 | End: 2025-06-19

## 2025-06-19 RX ORDER — BACITRACIN ZINC AND POLYMYXIN B SULFATE 500; 1000 [USP'U]/G; [USP'U]/G
OINTMENT TOPICAL
Qty: 28.4 G | Refills: 1 | Status: SHIPPED | OUTPATIENT
Start: 2025-06-19 | End: 2025-06-26

## 2025-06-19 RX ADMIN — CYCLOBENZAPRINE 5 MG: 10 TABLET, FILM COATED ORAL at 22:34

## 2025-06-19 ASSESSMENT — PAIN DESCRIPTION - DESCRIPTORS: DESCRIPTORS: ACHING

## 2025-06-19 ASSESSMENT — PAIN SCALES - GENERAL: PAINLEVEL_OUTOF10: 8

## 2025-06-19 ASSESSMENT — PAIN - FUNCTIONAL ASSESSMENT
PAIN_FUNCTIONAL_ASSESSMENT: ACTIVITIES ARE NOT PREVENTED
PAIN_FUNCTIONAL_ASSESSMENT: 0-10

## 2025-06-19 ASSESSMENT — PAIN DESCRIPTION - PAIN TYPE: TYPE: ACUTE PAIN

## 2025-06-19 ASSESSMENT — PAIN DESCRIPTION - FREQUENCY: FREQUENCY: CONTINUOUS

## 2025-06-19 ASSESSMENT — PAIN DESCRIPTION - LOCATION: LOCATION: TOE (COMMENT WHICH ONE)

## 2025-06-19 ASSESSMENT — PAIN DESCRIPTION - ORIENTATION: ORIENTATION: RIGHT

## 2025-06-19 NOTE — ED TRIAGE NOTES
Pt arrived via Triage ambulatory c/o great toe pain. Pt states she cut her nail wrong and it hurts also states since she had her stroke her toes stick together and are painful. Daughter thinks it is nerve pain.

## 2025-06-20 ENCOUNTER — HOSPITAL ENCOUNTER (OUTPATIENT)
Facility: HOSPITAL | Age: 65
Setting detail: RECURRING SERIES
End: 2025-06-20
Attending: STUDENT IN AN ORGANIZED HEALTH CARE EDUCATION/TRAINING PROGRAM
Payer: MEDICARE

## 2025-06-20 NOTE — DISCHARGE INSTRUCTIONS
Presents the emergency department complaints of right foot pain, right toenail pain.  We discussed supportive care with topical antibiotic ointment and padding between toes.  I do recommend he follow-up outpatient with podiatry and primary care doctor and return to the ER sooner for new or worsening symptoms.

## 2025-06-23 ENCOUNTER — HOSPITAL ENCOUNTER (OUTPATIENT)
Facility: HOSPITAL | Age: 65
Setting detail: RECURRING SERIES
Discharge: HOME | End: 2025-06-26
Attending: STUDENT IN AN ORGANIZED HEALTH CARE EDUCATION/TRAINING PROGRAM
Payer: MEDICARE

## 2025-06-23 PROCEDURE — 97112 NEUROMUSCULAR REEDUCATION: CPT

## 2025-06-23 PROCEDURE — 97110 THERAPEUTIC EXERCISES: CPT

## 2025-06-23 PROCEDURE — 97530 THERAPEUTIC ACTIVITIES: CPT

## 2025-06-23 NOTE — PROGRESS NOTES
PHYSICAL / OCCUPATIONAL THERAPY - DAILY TREATMENT NOTE    Patient Name: Mena Curran    Date: 2025    : 1960  Insurance: Payor: [facility] MEDICARE / Plan: [facility] [employer]ALTHKEEPERS MEDIBLUE PLUS / Product Type: *No Product type* /      Patient  verified Yes     Visit #   Current / Total 5 24   Time   In / Out 2:02 2:40   Pain   In / Out 0/10 0/10   Subjective Functional Status/Changes: Pt reports she is so much better, she just wants to get stronger.      TREATMENT AREA =  Chronic left shoulder pain    OBJECTIVE    Therapeutic Procedures:    Tx Min Billable or 1:1 Min (if diff from Tx Min) Procedure, Rationale, Specifics   15  05585 Therapeutic Exercise (timed):  increase ROM, strength, coordination, balance, and proprioception to improve patient's ability to progress to PLOF and address remaining functional goals. (see flow sheet as applicable)     Details if applicable:       10  30062 Neuromuscular Re-Education (timed):  improve balance, coordination, kinesthetic sense, posture, core stability and proprioception to improve patient's ability to develop conscious control of individual muscles and awareness of position of extremities in order to progress to PLOF and address remaining functional goals. (see flow sheet as applicable)     Details if applicable:       13  43447 Therapeutic Activity (timed):  use of dynamic activities replicating functional movements to increase ROM, strength, coordination, balance, and proprioception in order to improve patient's ability to progress to PLOF and address remaining functional goals.  (see flow sheet as applicable)     Details if applicable:  re-assess goals     38  [facility] Totals Reminder: bill using total billable min of TIMED therapeutic procedures (example: do not include dry needle or estim unattended, both untimed codes, in totals to left)  8-22 min = 1 unit; 23-37 min = 2 units; 38-52 min = 3 units; 53-67 min = 4 units; 68-82 min = 5 units   Total

## 2025-06-23 NOTE — THERAPY RECERTIFICATION
YOLANDA WAGNER [facility] - [facility]  [address] [place] [address] - Ph: [phone]   Fx: [phone]  PHYSICAL THERAPY PROGRESS NOTE  [x] Progress Note  [] Discharge Summary    Patient Name: Mena Curran : 1960   Treatment/Medical Diagnosis: Chronic left shoulder pain   Referral Source: Catrina Hardin MD     Date of Initial Visit: 25 Attended Visits: 5 Missed Visits: 4       Comorbidities: Respiratory disorders, Neurologic condition, and Other: Arthritis,Asthma, Depression, Hearing Impaired, L CVA    Medications: Verified on Patient Summary List     SUMMARY OF TREATMENT  Patient has been seen for 5 visits since initial evaluation on 25. Therapy has consisted of therapeutic exercise, therapeutic activity, neuromuscular re-education, patient education and HEP. Pt reports compliance with HEP. She is progressing slowly in therapy with improving shoulder ROM as evidenced by measurements below. Shoulder strength is slower to progress and is limiting factor with AROM. She continues to require max cuing to prevent left shoulder hiking. She no longer has any pain in the shoulder and would benefit from continued therapy to further strengthening left shoulder to improve ability to perform ADLs.     HEP Compliance: YES  % Improvement: 80  Pain range: 0/10  Subjective Improvement: no shoulder pain, improving shoulder ROM, starting to strengthen  Continued Deficits: improve shoulder strength    Patient will continue to benefit from skilled PT / OT services to modify and progress therapeutic interventions, analyze and address functional mobility deficits, analyze and address ROM deficits, analyze and address strength deficits, analyze and address soft tissue restrictions, analyze and cue for proper movement patterns, analyze and modify for postural abnormalities, analyze and address imbalance/dizziness, and instruct in home and community integration to

## 2025-06-27 ENCOUNTER — OFFICE VISIT (OUTPATIENT)
Age: 65
End: 2025-06-27
Payer: MEDICARE

## 2025-06-27 VITALS — WEIGHT: 150 LBS | BODY MASS INDEX: 28.32 KG/M2 | HEIGHT: 61 IN

## 2025-06-27 DIAGNOSIS — M25.512 ACUTE PAIN OF LEFT SHOULDER: Primary | ICD-10-CM

## 2025-06-27 DIAGNOSIS — M25.812 IMPINGEMENT OF LEFT SHOULDER: ICD-10-CM

## 2025-06-27 DIAGNOSIS — M25.612 DECREASED RANGE OF MOTION OF LEFT SHOULDER: ICD-10-CM

## 2025-06-27 DIAGNOSIS — M19.012 ARTHRITIS OF LEFT SHOULDER: ICD-10-CM

## 2025-06-27 DIAGNOSIS — M75.52 SUBACROMIAL BURSITIS OF LEFT SHOULDER JOINT: ICD-10-CM

## 2025-06-27 PROCEDURE — 1123F ACP DISCUSS/DSCN MKR DOCD: CPT | Performed by: PHYSICIAN ASSISTANT

## 2025-06-27 PROCEDURE — 99213 OFFICE O/P EST LOW 20 MIN: CPT | Performed by: PHYSICIAN ASSISTANT

## 2025-06-27 NOTE — PROGRESS NOTES
[facility] AMBULATORY OFFICE NOTE    Patient: Mena Curran                MRN: [id number]       SSN: [id number]  YOB: 1960        AGE: 65 y.o.        SEX: female      OFFICE NOTE DICTATED    PCP: Catrina Hardin MD  25: Patient follows back now participating in physical therapy for left shoulder pain.  She is status post left shoulder low-dose cortisone injection of.  She is doing very well today.  She is pleased with her return to near normal range of motion and essentially no pain in the left shoulder.  She is joined by her daughter today.    Chief Complaint   Patient presents with    Follow-up     Left shoulder       HISTORY:    Mena Curran is a 65 y.o. female presents to the office accompanied by relative complaining of worsening left shoulder pain with difficulty reaching behind her back.  No trauma reported.  She saw her PCP within the past week and physical therapy has been ordered outpatient.    No trauma reported.        No results found for: \"HBA1C\", \"[id number]\"      2025     1:48 PM 2025     7:31 PM 2025     3:15 PM 2025     9:15 AM 2025     8:21 AM 4/3/2025    11:46 AM 2024    11:36 AM   Weight Metrics   Weight 150 lb 150 lb 161 lb 160 lb 163 lb 166 lb 3.2 oz 165 lb   BMI (Calculated) 28.4 kg/m2 28.4 kg/m2 30.5 kg/m2 30.3 kg/m2 30.9 kg/m2 31.5 kg/m2 31.2 kg/m2          Problem List Items Addressed This Visit    None        PAST MEDICAL HISTORY:       Diagnosis Date    Arthritis     Hepatitis C     treated     Hypercholesterolemia     Hypertension     no meds     Sleep apnea     no CPAP         PAST SURGICAL HISTORY:       Procedure Laterality Date     SECTION  1984    CHOLECYSTECTOMY  2005    HYSTERECTOMY (CERVIX STATUS UNKNOWN)  2008    KNEE ARTHROSCOPY  2009    Bilateral    NEUROVASCULAR PROCEDURE N/A 2023    Angiography cerebral intracrnial w anesthesia (53084) performed by Elsa

## 2025-07-06 NOTE — ED PROVIDER NOTES
[facility] EMERGENCY DEPARTMENT  EMERGENCY DEPARTMENT ENCOUNTER        Pt Name: Mena Curran  MRN: [id number]  Birthdate 1960  Date of evaluation: 2025  Provider: Karo Guadalupe PA-C  PCP: Catrina Hardin MD  Note Started: 12:27 PM EDT 25      ELOISE. I have evaluated this patient.        CHIEF COMPLAINT       Chief Complaint   Patient presents with    Toe Pain       HISTORY OF PRESENT ILLNESS: 1 or more Elements     History From: Patient and daughter            Chief Complaint: Toe pain    Mena Curran is a 65 y.o. female who presents to the ER with PMH of CVA with deficit, HTN, OA, COPD and tobacco use with complaints of great toe pain.  She has no motor deficit from previous CVA states that she attempted to cut her toenails but she states that she got her toenail crooked and she has pain at the distal end of her great toe.  She states ever since her stroke she feels like her toes also stick together and that causes her pain at nighttime.  She is not established with podiatry.  She denies any wounds.  She denies fever, chills    Nursing Notes were all reviewed and agreed with or any disagreements were addressed in the HPI.    REVIEW OF SYSTEMS :      Review of Systems   Constitutional: Negative.    HENT: Negative.     Eyes: Negative.    Respiratory: Negative.     Endocrine: Negative.    Genitourinary: Negative.    Musculoskeletal:  Positive for arthralgias.   Neurological: Negative.    Psychiatric/Behavioral: Negative.         Positives and Pertinent negatives as per HPI.     SURGICAL HISTORY     Past Surgical History:   Procedure Laterality Date     SECTION  1984    CHOLECYSTECTOMY  2005    HYSTERECTOMY (CERVIX STATUS UNKNOWN)  2008    KNEE ARTHROSCOPY  2009    Bilateral    NEUROVASCULAR PROCEDURE N/A 2023    Angiography cerebral intracrnial w anesthesia (67486) performed by Elsa Rosales MD at [facility] NEUROVASCULAR SUITE    OVARY REMOVAL      STPLER

## 2025-07-14 ENCOUNTER — TRANSCRIBE ORDERS (OUTPATIENT)
Facility: HOSPITAL | Age: 65
End: 2025-07-14

## 2025-07-14 DIAGNOSIS — G57.61 LESION OF PLANTAR NERVE, RIGHT LOWER LIMB: Primary | ICD-10-CM

## 2025-07-14 DIAGNOSIS — M79.671 PAIN IN RIGHT FOOT: ICD-10-CM

## 2025-07-14 DIAGNOSIS — M77.41 METATARSALGIA, RIGHT FOOT: ICD-10-CM

## 2025-07-21 ENCOUNTER — TELEPHONE (OUTPATIENT)
Facility: HOSPITAL | Age: 65
End: 2025-07-21

## 2025-08-11 ENCOUNTER — HOSPITAL ENCOUNTER (OUTPATIENT)
Facility: HOSPITAL | Age: 65
Discharge: HOME | End: 2025-08-14
Attending: PODIATRIST
Payer: MEDICARE

## 2025-08-11 DIAGNOSIS — M79.671 PAIN IN RIGHT FOOT: ICD-10-CM

## 2025-08-11 DIAGNOSIS — G57.61 LESION OF PLANTAR NERVE, RIGHT LOWER LIMB: ICD-10-CM

## 2025-08-11 DIAGNOSIS — M77.41 METATARSALGIA, RIGHT FOOT: ICD-10-CM

## 2025-08-11 PROCEDURE — 73718 MRI LOWER EXTREMITY W/O DYE: CPT

## 2025-08-26 ENCOUNTER — OFFICE VISIT (OUTPATIENT)
Facility: CLINIC | Age: 65
End: 2025-08-26

## 2025-08-26 VITALS
WEIGHT: 151.6 LBS | BODY MASS INDEX: 28.62 KG/M2 | HEART RATE: 62 BPM | HEIGHT: 61 IN | OXYGEN SATURATION: 95 % | DIASTOLIC BLOOD PRESSURE: 87 MMHG | SYSTOLIC BLOOD PRESSURE: 161 MMHG

## 2025-08-26 DIAGNOSIS — I10 ESSENTIAL (PRIMARY) HYPERTENSION: ICD-10-CM

## 2025-08-26 DIAGNOSIS — F33.1 MAJOR DEPRESSIVE DISORDER, RECURRENT, MODERATE (HCC): ICD-10-CM

## 2025-08-26 DIAGNOSIS — R47.1 DYSARTHRIA: ICD-10-CM

## 2025-08-26 DIAGNOSIS — Z23 ENCOUNTER FOR IMMUNIZATION: ICD-10-CM

## 2025-08-26 DIAGNOSIS — F41.9 ANXIETY AND DEPRESSION: ICD-10-CM

## 2025-08-26 DIAGNOSIS — G89.29 CHRONIC LEFT SHOULDER PAIN: ICD-10-CM

## 2025-08-26 DIAGNOSIS — Z12.31 BREAST CANCER SCREENING BY MAMMOGRAM: ICD-10-CM

## 2025-08-26 DIAGNOSIS — F32.A ANXIETY AND DEPRESSION: ICD-10-CM

## 2025-08-26 DIAGNOSIS — E78.2 MIXED HYPERLIPIDEMIA: ICD-10-CM

## 2025-08-26 DIAGNOSIS — Z00.00 WELCOME TO MEDICARE PREVENTIVE VISIT: Primary | ICD-10-CM

## 2025-08-26 DIAGNOSIS — I69.30 HISTORY OF CVA WITH RESIDUAL DEFICIT: ICD-10-CM

## 2025-08-26 DIAGNOSIS — M25.512 CHRONIC LEFT SHOULDER PAIN: ICD-10-CM

## 2025-08-26 DIAGNOSIS — E53.8 LOW SERUM VITAMIN B12: ICD-10-CM

## 2025-08-26 DIAGNOSIS — R41.89 COGNITIVE DEFICITS: ICD-10-CM

## 2025-08-26 RX ORDER — MULTIVITAMIN WITH IRON
500 TABLET ORAL DAILY
Qty: 30 TABLET | Refills: 6 | Status: SHIPPED | OUTPATIENT
Start: 2025-08-26

## 2025-08-26 RX ORDER — MELOXICAM 15 MG/1
15 TABLET ORAL DAILY PRN
Qty: 30 TABLET | Refills: 1 | Status: CANCELLED | OUTPATIENT
Start: 2025-08-26

## 2025-08-26 RX ORDER — ASPIRIN 81 MG/1
81 TABLET, CHEWABLE ORAL DAILY
Qty: 90 TABLET | Refills: 1 | Status: SHIPPED | OUTPATIENT
Start: 2025-08-26

## 2025-08-26 RX ORDER — LOSARTAN POTASSIUM 100 MG/1
100 TABLET ORAL DAILY
Qty: 90 TABLET | Refills: 1 | Status: SHIPPED | OUTPATIENT
Start: 2025-08-26

## 2025-08-26 RX ORDER — ATORVASTATIN CALCIUM 80 MG/1
80 TABLET, FILM COATED ORAL NIGHTLY
Qty: 90 TABLET | Refills: 1 | Status: SHIPPED | OUTPATIENT
Start: 2025-08-26

## 2025-08-26 SDOH — ECONOMIC STABILITY: FOOD INSECURITY: WITHIN THE PAST 12 MONTHS, YOU WORRIED THAT YOUR FOOD WOULD RUN OUT BEFORE YOU GOT MONEY TO BUY MORE.: NEVER TRUE

## 2025-08-26 SDOH — ECONOMIC STABILITY: FOOD INSECURITY: WITHIN THE PAST 12 MONTHS, THE FOOD YOU BOUGHT JUST DIDN'T LAST AND YOU DIDN'T HAVE MONEY TO GET MORE.: NEVER TRUE

## 2025-08-26 ASSESSMENT — PATIENT HEALTH QUESTIONNAIRE - PHQ9
SUM OF ALL RESPONSES TO PHQ QUESTIONS 1-9: 0
2. FEELING DOWN, DEPRESSED OR HOPELESS: NOT AT ALL
SUM OF ALL RESPONSES TO PHQ QUESTIONS 1-9: 0
2. FEELING DOWN, DEPRESSED OR HOPELESS: NOT AT ALL
SUM OF ALL RESPONSES TO PHQ QUESTIONS 1-9: 0
1. LITTLE INTEREST OR PLEASURE IN DOING THINGS: NOT AT ALL
SUM OF ALL RESPONSES TO PHQ QUESTIONS 1-9: 0
1. LITTLE INTEREST OR PLEASURE IN DOING THINGS: NOT AT ALL
SUM OF ALL RESPONSES TO PHQ QUESTIONS 1-9: 0
SUM OF ALL RESPONSES TO PHQ QUESTIONS 1-9: 0

## 2025-08-26 ASSESSMENT — VISUAL ACUITY
OD_CC: 20/30
OS_CC: 20/30

## 2025-08-26 ASSESSMENT — LIFESTYLE VARIABLES
HOW OFTEN DO YOU HAVE A DRINK CONTAINING ALCOHOL: NEVER
HOW MANY STANDARD DRINKS CONTAINING ALCOHOL DO YOU HAVE ON A TYPICAL DAY: PATIENT DOES NOT DRINK

## 2025-08-26 ASSESSMENT — ENCOUNTER SYMPTOMS
VOMITING: 0
DIARRHEA: 0
RHINORRHEA: 0
COUGH: 0
NAUSEA: 0
CHEST TIGHTNESS: 0
ABDOMINAL PAIN: 0
BLOOD IN STOOL: 0
BACK PAIN: 0
WHEEZING: 0
SHORTNESS OF BREATH: 0

## (undated) DEVICE — SUTURE MONOCRYL SZ 3-0 L27IN ABSRB UD L26MM SH 1/2 CIR Y416H

## (undated) DEVICE — PACK PROCEDURE SURG MAJ W/ BASIN LF

## (undated) DEVICE — PAD,NON-ADHERENT,3X8,STERILE,LF,1/PK: Brand: MEDLINE

## (undated) DEVICE — BLADE CLIPPER GEN PURP NS

## (undated) DEVICE — SOLUTION SCRB 4OZ 10% PVP I POVIDONE IOD TOP PAINT EXIDINE

## (undated) DEVICE — APPLIER CLP L9.38IN M LIG TI DISP STR RNG HNDL LIGACLP

## (undated) DEVICE — FILTER CLP DISP FOR 5513E CLIPVAC

## (undated) DEVICE — ADHESIVE SKIN CLOSURE 0.7CC TOP MICROBIAL APPL DERMBND ADV

## (undated) DEVICE — JELLY LUBRICATING 2.7GM H2O SOL GREASELESS E-Z

## (undated) DEVICE — GLOVE SURG SZ 7.5 L11.73IN FNGR THK9.8MIL STRW LTX POLYMER

## (undated) DEVICE — ELECTRODE PT RET AD L9FT HI MOIST COND ADH HYDRGEL CORDED

## (undated) DEVICE — GLOVE SURG SZ 7 L11.33IN FNGR THK9.8MIL STRW LTX POLYMER

## (undated) DEVICE — DRAPE,TOP,102X53,STERILE: Brand: MEDLINE

## (undated) DEVICE — SUTURE PERMA-HAND SZ 3-0 L24IN NONABSORBABLE BLK W/O NDL SA74H

## (undated) DEVICE — SOLUTION IV 1000ML 0.9% SOD CHL

## (undated) DEVICE — ELECTRODE NDL L2.8IN COAT LO PWR SET EDGE

## (undated) DEVICE — PAD,EYE,1-5/8X2 5/8,STERILE,LF,1/PK: Brand: MEDLINE

## (undated) DEVICE — SUTURE MONOCRYL SZ 4-0 L18IN ABSRB UD L19MM PS-2 3/8 CIR PRIM Y496G

## (undated) DEVICE — INTENDED FOR TISSUE SEPARATION, AND OTHER PROCEDURES THAT REQUIRE A SHARP SURGICAL BLADE TO PUNCTURE OR CUT.: Brand: BARD-PARKER ® STAINLESS STEEL BLADES

## (undated) DEVICE — GOWN,PREVENTION PLUS,XLN/XL,ST,24/CS: Brand: MEDLINE

## (undated) DEVICE — INTENDED FOR TISSUE SEPARATION, AND OTHER PROCEDURES THAT REQUIRE A SHARP SURGICAL BLADE TO PUNCTURE OR CUT.: Brand: BARD-PARKER SAFETY BLADES SIZE 11, STERILE

## (undated) DEVICE — GLOVE SURG SZ 65 L12IN FNGR THK79MIL GRN LTX FREE

## (undated) DEVICE — PREMIUM DRY TRAY LF: Brand: MEDLINE INDUSTRIES, INC.

## (undated) DEVICE — SUTURE VICRYL + SZ 3-0 L27IN ABSRB UD L26MM SH 1/2 CIR VCP416H

## (undated) DEVICE — SUTURE PERMAHAND SZ 4-0 L12X30IN NONABSORBABLE BLK SILK A303H

## (undated) DEVICE — KIT OR TURNOVER

## (undated) DEVICE — SHEET, DRAPE, SPLIT, STERILE: Brand: MEDLINE

## (undated) DEVICE — PROBE DOPP 3MHZ FET WTRPRF

## (undated) DEVICE — NEEDLE HYPO 25GA L0.625IN BLU POLYPR HUB S STL REG BVL STR